# Patient Record
Sex: FEMALE | Race: WHITE | NOT HISPANIC OR LATINO | Employment: FULL TIME | URBAN - METROPOLITAN AREA
[De-identification: names, ages, dates, MRNs, and addresses within clinical notes are randomized per-mention and may not be internally consistent; named-entity substitution may affect disease eponyms.]

---

## 2017-04-20 LAB
A1C: 5.7
CHOLEST SERPL-MSCNC: 192 MG/DL (ref 0–200)
HDLC SERPL-MCNC: 46 MG/DL
LDLC SERPL CALC-MCNC: 128 MG/DL
NON HDL CHOL. (LDL+VLDL): 146
TRIGL SERPL-MCNC: 91 MG/DL (ref 40–160)
TSH SERPL DL<=0.05 MIU/L-ACNC: 0.97 UIU/ML

## 2018-01-11 ENCOUNTER — HOSPITAL ENCOUNTER (OUTPATIENT)
Dept: RADIOLOGY | Facility: HOSPITAL | Age: 38
Discharge: HOME | End: 2018-01-11
Attending: PODIATRIST
Payer: COMMERCIAL

## 2018-01-11 DIAGNOSIS — M19.079 ARTHRITIS OF FOOT: ICD-10-CM

## 2018-01-11 DIAGNOSIS — M19.079 ARTHRITIS OF FOOT: Primary | ICD-10-CM

## 2018-01-11 PROCEDURE — 73630 X-RAY EXAM OF FOOT: CPT | Mod: TC,LT

## 2018-01-11 PROCEDURE — 73630 X-RAY EXAM OF FOOT: CPT | Mod: 26,LT,, | Performed by: RADIOLOGY

## 2018-01-15 ENCOUNTER — DOCUMENTATION ONLY (OUTPATIENT)
Dept: ADMINISTRATIVE | Facility: HOSPITAL | Age: 38
End: 2018-01-15

## 2018-01-16 ENCOUNTER — OFFICE VISIT (OUTPATIENT)
Dept: INTERNAL MEDICINE | Facility: CLINIC | Age: 38
End: 2018-01-16
Payer: COMMERCIAL

## 2018-01-16 VITALS
HEART RATE: 78 BPM | WEIGHT: 176 LBS | TEMPERATURE: 97 F | OXYGEN SATURATION: 97 % | SYSTOLIC BLOOD PRESSURE: 98 MMHG | DIASTOLIC BLOOD PRESSURE: 60 MMHG

## 2018-01-16 DIAGNOSIS — J03.01 RECURRENT STREPTOCOCCAL TONSILLITIS: ICD-10-CM

## 2018-01-16 DIAGNOSIS — F41.9 ANXIETY: ICD-10-CM

## 2018-01-16 DIAGNOSIS — G47.10 HYPERSOMNOLENCE: ICD-10-CM

## 2018-01-16 DIAGNOSIS — R51.9 GENERALIZED HEADACHES: ICD-10-CM

## 2018-01-16 DIAGNOSIS — Z00.00 ANNUAL PHYSICAL EXAM: Primary | ICD-10-CM

## 2018-01-16 PROBLEM — F32.A DEPRESSION: Status: ACTIVE | Noted: 2018-01-16

## 2018-01-16 PROBLEM — M54.50 CHRONIC BILATERAL LOW BACK PAIN WITHOUT SCIATICA: Status: ACTIVE | Noted: 2017-09-18

## 2018-01-16 PROBLEM — G89.29 CHRONIC BILATERAL LOW BACK PAIN WITHOUT SCIATICA: Status: ACTIVE | Noted: 2017-09-18

## 2018-01-16 PROCEDURE — 99999 PR PBB SHADOW E&M-EST. PATIENT-LVL IV: CPT | Mod: PBBFAC,,, | Performed by: FAMILY MEDICINE

## 2018-01-16 PROCEDURE — 99385 PREV VISIT NEW AGE 18-39: CPT | Mod: S$GLB,,, | Performed by: FAMILY MEDICINE

## 2018-01-16 RX ORDER — ESCITALOPRAM OXALATE 20 MG/1
20 TABLET ORAL DAILY
Qty: 30 TABLET | Refills: 11 | Status: SHIPPED | OUTPATIENT
Start: 2018-01-16 | End: 2018-01-16 | Stop reason: SDUPTHER

## 2018-01-16 RX ORDER — RIZATRIPTAN BENZOATE 10 MG/1
10 TABLET ORAL ONCE AS NEEDED
COMMUNITY
Start: 2017-12-20 | End: 2018-06-19

## 2018-01-16 RX ORDER — NAPROXEN 500 MG/1
500 TABLET ORAL
COMMUNITY
Start: 2017-09-12 | End: 2018-08-08 | Stop reason: SDUPTHER

## 2018-01-16 RX ORDER — ZONISAMIDE 100 MG/1
200 CAPSULE ORAL DAILY
COMMUNITY
Start: 2017-12-20 | End: 2018-06-19

## 2018-01-16 RX ORDER — ESCITALOPRAM OXALATE 20 MG/1
20 TABLET ORAL DAILY
COMMUNITY
Start: 2017-09-18 | End: 2018-01-16 | Stop reason: SDUPTHER

## 2018-01-16 RX ORDER — BUSPIRONE HYDROCHLORIDE 7.5 MG/1
7.5 TABLET ORAL NIGHTLY
COMMUNITY
Start: 2017-09-18 | End: 2018-03-09 | Stop reason: SDUPTHER

## 2018-01-16 RX ORDER — ESCITALOPRAM OXALATE 20 MG/1
20 TABLET ORAL DAILY
Qty: 30 TABLET | Refills: 11 | Status: SHIPPED | OUTPATIENT
Start: 2018-01-16 | End: 2018-06-19

## 2018-01-16 RX ORDER — FLUTICASONE PROPIONATE 50 MCG
1 SPRAY, SUSPENSION (ML) NASAL 2 TIMES DAILY PRN
COMMUNITY
Start: 2017-07-06 | End: 2018-07-06

## 2018-01-16 NOTE — PATIENT INSTRUCTIONS
"  Headache, Unspecified    A number of things can cause headaches. The cause of your headache isnt clear. But it doesnt seem to be a sign of any serious illness.  You could have a tension headache or a migraine headache.  Stress can cause a tension headache. This can happen if you tense the muscles of your shoulders, neck, and scalp without knowing it. If this stress lasts long enough, you may develop a tension headache.  It is not clear why migraines occur, but certain things called" triggers" can raise the risk of having a migraine attack. Migraine triggers may include emotional stress or depression, or by hormone changes during the menstrual cycle. Other triggers include birth control pills and other medicines, alcohol or caffeine, foods with tyramine (such as aged cheese, wine), eyestrain, weather changes, missed meals, and lack of sleep or oversleeping.  Other causes of headache include:  · Viral illness with high fever  · Head injury with concussion  · Sinus, ear, or throat infection  · Dental pain and jaw joint (TMJ) pain  More serious but less common causes of headache include stroke, brain hemorrhage, brain tumor, meningitis, and encephalitis.  Home care  Follow these tips when taking care of yourself at home:  · Dont drive yourself home if you were given pain medicine for your headache. Instead, have someone else drive you home. Try to sleep when you get home. You should feel much better when you wake up.  · Apply heat to the back of your neck to ease a neck muscle spasm. Take care of a migraine headache by putting an ice pack on your forehead or at the base of your skull.  · If you have nausea or vomiting, eat a light diet until your headache eases.  · If you have a migraine headache, use sunglasses when in the daylight or around bright indoor lighting until your symptoms get better. Bright glaring light can make this type of headache worse.  Follow-up care  Follow up with your healthcare provider, or " as advised. Talk with your provider if you have frequent headaches. He or she can help figure out a treatment plan. By knowing the earliest signs of headache, and starting treatment right away, you may be able to stop the pain yourself.  When to seek medical advice  Call your healthcare provider right away if any of these occur:  · Your head pain suddenly gets worse after sexual intercourse or strenuous activity  · Your head pain doesnt get better within 24 hours  · You arent able to keep liquids down (repeated vomiting)  · Fever of 100.4ºF (38ºC) or higher, or as directed by your healthcare provider  · Stiff neck  · Extreme drowsiness, confusion, or fainting  · Dizziness or dizziness with spinning sensation (vertigo)  · Weakness in an arm or leg or one side of your face  · You have trouble talking or seeing  Date Last Reviewed: 8/1/2016  © 9999-0088 [employer]. [address] [place] [address], [place] [address]. All rights reserved. This information is not intended as a substitute for professional medical care. Always follow your healthcare professional's instructions.

## 2018-01-16 NOTE — PROGRESS NOTES
Arielle Verde  01/16/2018  [id number]    Primary Doctor No  Patient Care Team:  Primary Doctor No as PCP - General  Has the patient seen any provider outside of the [facility] network since the last visit? (no). If yes, HIPPA forms completed and records requested.        Visit Type:New patient, establish care    Chief Complaint:  Chief Complaint   Patient presents with    Establish Care    Fatigue       History of Present Illness:  37 year old here to establish care.  She is an employee of [email or web address].    History of Anxiety and depression. She is on Lexapro and Buspar.     She has history of headaches. She had MRI done with [facility] in Sept and Oct 2017. She is on Zomig and Maxalt with prn Naprosyn.  She sees Dr. Barbour at [facility]. She reports chronic headaches. She had some white matter lesions, and she had MRA. She was going to have sleep study as well to eval for hyponapenic epsiodes.   She reports she dose have some day solumnance.     Pap was done by Brandi Arteaga. This was done Sept 2016. Mirenia placed then.     Records reviewed from care everywhere.  Lipids done April 2017  DM screen negative April 2017    Colon screen done, checking for mild IBS. Pathology negative.             History:  Past Medical History:   Diagnosis Date    Anxiety     Chronic headaches     Depression      Past Surgical History:   Procedure Laterality Date    ADENOIDECTOMY      breast aug      Hyperhydrosis      TONSILLECTOMY       Family History   Problem Relation Age of Onset    Lupus Mother     Ulcerative colitis Mother     Kidney failure Father     Drug abuse Father      Social History     Social History    Marital status: Single     Spouse name: N/A    Number of children: N/A    Years of education: N/A     Occupational History    Podiatry nurse      Social History Main Topics    Smoking status: Former Smoker    Smokeless tobacco: Never Used    Alcohol use Yes      Comment: Occa    Drug use: No    Sexual  activity: Not Currently     Other Topics Concern    Not on file     Social History Narrative    No narrative on file     Patient Active Problem List   Diagnosis    Anxiety    Chronic bilateral low back pain without sciatica    Depression    Generalized headaches    Recurrent streptococcal tonsillitis    Hypersomnolence     Review of patient's allergies indicates:  Allergies not on file    The following were reviewed at this visit: active problem list, medication list, allergies, family history, social history, and health maintenance.    Medications:  No current outpatient prescriptions on file prior to visit.     No current facility-administered medications on file prior to visit.        Medications have been reviewed and reconciled with patient at this visit.  Barriers to medications present (no)    Adverse reactions to current medications (no)    Over the counter medications reviewed (Yes ), and if needed added to active Medication list at this visit.     Exam:  Wt Readings from Last 3 Encounters:   01/16/18 79.8 kg (176 lb)     Temp Readings from Last 3 Encounters:   01/16/18 97 °F (36.1 °C) (Tympanic)     BP Readings from Last 3 Encounters:   01/16/18 98/60     Pulse Readings from Last 3 Encounters:   01/16/18 78     There is no height or weight on file to calculate BMI.    Review of Systems   Constitutional: Negative.  Negative for chills and fever.   HENT: Negative.  Negative for congestion, sinus pain and sore throat.    Eyes: Negative for blurred vision and double vision.   Respiratory: Negative for cough, sputum production, shortness of breath and wheezing.    Cardiovascular: Negative for chest pain, palpitations and leg swelling.   Gastrointestinal: Negative for abdominal pain, constipation, diarrhea, heartburn, nausea and vomiting.   Genitourinary: Negative.    Musculoskeletal: Negative.    Skin: Negative.  Negative for rash.   Neurological: Positive for headaches.   Endo/Heme/Allergies: Negative.   Negative for polydipsia. Does not bruise/bleed easily.   Psychiatric/Behavioral: Negative for depression and substance abuse.         Physical Exam   Constitutional: She is oriented to person, place, and time. She appears well-developed and well-nourished. No distress.   HENT:   Head: Normocephalic and atraumatic.   Right Ear: External ear normal.   Left Ear: External ear normal.   Nose: Nose normal.   Mouth/Throat: Oropharynx is clear and moist. No oropharyngeal exudate.   Eyes: Conjunctivae and EOM are normal. Pupils are equal, round, and reactive to light. Right eye exhibits no discharge. Left eye exhibits no discharge.   Neck: Normal range of motion. Neck supple. No thyromegaly present.   Cardiovascular: Normal rate, regular rhythm, normal heart sounds and intact distal pulses.    No murmur heard.  Pulmonary/Chest: Effort normal and breath sounds normal. No respiratory distress. She has no wheezes.   Abdominal: Soft. Bowel sounds are normal. She exhibits no distension and no mass. There is no tenderness.   Musculoskeletal: Normal range of motion. She exhibits no edema.   Lymphadenopathy:     She has no cervical adenopathy.   Neurological: She is alert and oriented to person, place, and time. No cranial nerve deficit.   Skin: Capillary refill takes less than 2 seconds. She is not diaphoretic.   Psychiatric: She has a normal mood and affect. Her behavior is normal. Judgment and thought content normal.   Nursing note and vitals reviewed.      Laboratory Reviewed ({N/A)  Lab Results   Component Value Date    CHOL 192 04/20/2017    TRIG 91 04/20/2017    HDL 46 04/20/2017       Assessment:  The primary encounter diagnosis was Annual physical exam. Diagnoses of Generalized headaches, Anxiety, Recurrent streptococcal tonsillitis, and Hypersomnolence were also pertinent to this visit.     Plan     Annual physical exam   Labs done and reviewed with Care Everywhere   Lipids up to date   Pap requested from Brandi Zuluaga  [place]    Generalized headaches  -     Ambulatory referral to Neurology   MRI showed White matter lesions   Sleep study   Would like to switch to [facility]    Anxiety  -     escitalopram oxalate (LEXAPRO) 20 MG tablet; Take 1 tablet (20 mg total) by mouth once daily.  Dispense: 30 tablet; Refill: 11 Stable and refilled    Recurrent streptococcal tonsillitis  -     Ambulatory referral to ENT    Hypersomnolence  -     Polysomnography 4 or more parameters; Future   Sleep study ordered. See if Hypoapnea possible contributor to HA.           Care Plan/Goals: Reviewed  (Yes)  Goals     None          Follow up 6 months    After visit summary was printed and given to patient upon discharge today.  Patient goals and care plan are included in After Visit Summary.

## 2018-01-24 ENCOUNTER — HOSPITAL ENCOUNTER (OUTPATIENT)
Dept: SLEEP MEDICINE | Facility: HOSPITAL | Age: 38
Discharge: HOME | End: 2018-01-24
Attending: FAMILY MEDICINE
Payer: COMMERCIAL

## 2018-01-24 DIAGNOSIS — G47.61 PLMD (PERIODIC LIMB MOVEMENT DISORDER): ICD-10-CM

## 2018-01-24 DIAGNOSIS — G47.10 HYPERSOMNOLENCE: ICD-10-CM

## 2018-01-24 DIAGNOSIS — F51.5 NIGHTMARE DISORDER: ICD-10-CM

## 2018-01-24 PROCEDURE — 95810 POLYSOM 6/> YRS 4/> PARAM: CPT

## 2018-01-24 PROCEDURE — 95810 POLYSOM 6/> YRS 4/> PARAM: CPT | Mod: 26,,, | Performed by: INTERNAL MEDICINE

## 2018-01-24 NOTE — Clinical Note
"SUMMARY STATEMENTS: 1. Findings related to sleep diagnoses: " No snoring.  Overall AHI was 3.1 events per hour. 2. EEG abnormalities: " Delayed sleep latency.  Sleep efficiency was poor.  Wake after sleep onset 81.4 minutes. " Normal sleep architecture " Arousal was mildly 21.6 events per hour. " No alpha intrusion  3. ECG abnormalities: " Normal sinus rhythm 4. Behavioral observations: a. Recording Tech Comments: No parasomnias noted.  INTERPRETATION:   1. Normal AHI.  No obstructive sleep apnea. 2. No snoring. 3. Very mild periodic limb movements with sleep 4. Delayed sleep latency fragmented sleep and poor sleep continuity 5. Possible nightmare disorder based on questionnaire 6. Correlate for insomnia due to underlying depression and anxiety       RECOMMENDATIONS:   1. Optimize stress management: Both pharmacological and nonpharmacological coping mechanisms 2. Good sleep hygiene, avoid naps for longer than 30 minutes 3. Correlate symptoms with 6 weeks sleep diary monitoring, "

## 2018-01-26 NOTE — PROCEDURES
"SUMMARY STATEMENTS:  1. Findings related to sleep diagnoses:   No snoring.  Overall AHI was 3.1 events per hour.  2. EEG abnormalities:   Delayed sleep latency.  Sleep efficiency was poor.  Wake after sleep onset 81.4 minutes.   Normal sleep architecture   Arousal was mildly 21.6 events per hour.   No alpha intrusion   3. ECG abnormalities:   Normal sinus rhythm  4. Behavioral observations:  a. Recording Tech Comments: No parasomnias noted.    INTERPRETATION:     1. Normal AHI.  No obstructive sleep apnea.  2. No snoring.  3. Very mild periodic limb movements with sleep  4. Delayed sleep latency fragmented sleep and poor sleep continuity  5. Possible nightmare disorder based on questionnaire  6. Correlate for insomnia due to underlying depression and anxiety              RECOMMENDATIONS:     1. Optimize stress management: Both pharmacological and nonpharmacological coping mechanisms  2. Good sleep hygiene, avoid naps for longer than 30 minutes  3. Correlate symptoms with 6 weeks sleep diary monitoring, if daytime sleepiness together concerned then PSG with MSLT may be considered.    See imported Sleep Study result in "Chart Review" under the   "Media tab".      (This Sleep Study was interpreted by a Board Certified Sleep   Specialist who conducted an epoch-by-epoch review of the entire   raw data recording.)     (The indication for this sleep study was reviewed and deemed   appropriate by [facility] Practice Parameters or other reasons by a   Board Certified Sleep Specialist.)    Neo Covarrubias MD      "

## 2018-01-29 ENCOUNTER — PATIENT MESSAGE (OUTPATIENT)
Dept: INTERNAL MEDICINE | Facility: CLINIC | Age: 38
End: 2018-01-29

## 2018-02-01 ENCOUNTER — OFFICE VISIT (OUTPATIENT)
Dept: ALLERGY | Facility: CLINIC | Age: 38
End: 2018-02-01
Payer: COMMERCIAL

## 2018-02-01 ENCOUNTER — LAB VISIT (OUTPATIENT)
Dept: LAB | Facility: HOSPITAL | Age: 38
End: 2018-02-01
Payer: COMMERCIAL

## 2018-02-01 VITALS
HEIGHT: 71 IN | HEART RATE: 74 BPM | RESPIRATION RATE: 15 BRPM | DIASTOLIC BLOOD PRESSURE: 60 MMHG | BODY MASS INDEX: 24.53 KG/M2 | SYSTOLIC BLOOD PRESSURE: 100 MMHG | WEIGHT: 175.25 LBS

## 2018-02-01 DIAGNOSIS — J02.0 PHARYNGITIS DUE TO STREPTOCOCCUS SPECIES: Primary | ICD-10-CM

## 2018-02-01 DIAGNOSIS — J02.0 PHARYNGITIS DUE TO STREPTOCOCCUS SPECIES: ICD-10-CM

## 2018-02-01 DIAGNOSIS — T63.481D ALLERGIC REACTION TO INSECT STING, ACCIDENTAL OR UNINTENTIONAL, SUBSEQUENT ENCOUNTER: ICD-10-CM

## 2018-02-01 DIAGNOSIS — J03.01 RECURRENT STREPTOCOCCAL TONSILLITIS: ICD-10-CM

## 2018-02-01 LAB
BASOPHILS # BLD AUTO: 0.05 K/UL
BASOPHILS NFR BLD: 0.6 %
DIFFERENTIAL METHOD: ABNORMAL
EOSINOPHIL # BLD AUTO: 0.1 K/UL
EOSINOPHIL NFR BLD: 0.8 %
ERYTHROCYTE [DISTWIDTH] IN BLOOD BY AUTOMATED COUNT: 13 %
HCT VFR BLD AUTO: 40.2 %
HGB BLD-MCNC: 12.7 G/DL
IGA SERPL-MCNC: 217 MG/DL
IGE SERPL-ACNC: <35 IU/ML
IGG SERPL-MCNC: 935 MG/DL
IGM SERPL-MCNC: 110 MG/DL
IMM GRANULOCYTES # BLD AUTO: 0.01 K/UL
IMM GRANULOCYTES NFR BLD AUTO: 0.1 %
LYMPHOCYTES # BLD AUTO: 3.4 K/UL
LYMPHOCYTES NFR BLD: 43.4 %
MCH RBC QN AUTO: 29.3 PG
MCHC RBC AUTO-ENTMCNC: 31.6 G/DL
MCV RBC AUTO: 93 FL
MONOCYTES # BLD AUTO: 0.4 K/UL
MONOCYTES NFR BLD: 5.3 %
NEUTROPHILS # BLD AUTO: 3.8 K/UL
NEUTROPHILS NFR BLD: 49.8 %
NRBC BLD-RTO: 0 /100 WBC
PLATELET # BLD AUTO: 289 K/UL
PMV BLD AUTO: 10.4 FL
RBC # BLD AUTO: 4.33 M/UL
WBC # BLD AUTO: 7.72 K/UL

## 2018-02-01 PROCEDURE — 86003 ALLG SPEC IGE CRUDE XTRC EA: CPT

## 2018-02-01 PROCEDURE — 99999 PR PBB SHADOW E&M-EST. PATIENT-LVL III: CPT | Mod: PBBFAC,,, | Performed by: ALLERGY & IMMUNOLOGY

## 2018-02-01 PROCEDURE — 82787 IGG 1 2 3 OR 4 EACH: CPT

## 2018-02-01 PROCEDURE — 84165 PROTEIN E-PHORESIS SERUM: CPT

## 2018-02-01 PROCEDURE — 3008F BODY MASS INDEX DOCD: CPT | Mod: S$GLB,,, | Performed by: ALLERGY & IMMUNOLOGY

## 2018-02-01 PROCEDURE — 36415 COLL VENOUS BLD VENIPUNCTURE: CPT

## 2018-02-01 PROCEDURE — 99204 OFFICE O/P NEW MOD 45 MIN: CPT | Mod: S$GLB,,, | Performed by: ALLERGY & IMMUNOLOGY

## 2018-02-01 PROCEDURE — 85025 COMPLETE CBC W/AUTO DIFF WBC: CPT

## 2018-02-01 PROCEDURE — 83520 IMMUNOASSAY QUANT NOS NONAB: CPT

## 2018-02-01 PROCEDURE — 86162 COMPLEMENT TOTAL (CH50): CPT

## 2018-02-01 PROCEDURE — 82784 ASSAY IGA/IGD/IGG/IGM EACH: CPT

## 2018-02-01 PROCEDURE — 82785 ASSAY OF IGE: CPT

## 2018-02-01 PROCEDURE — 86774 TETANUS ANTIBODY: CPT

## 2018-02-01 PROCEDURE — 84165 PROTEIN E-PHORESIS SERUM: CPT | Mod: 26,,, | Performed by: PATHOLOGY

## 2018-02-01 RX ORDER — CHOLECALCIFEROL (VITAMIN D3) 25 MCG
1000 TABLET ORAL DAILY
COMMUNITY
End: 2020-03-18

## 2018-02-01 RX ORDER — NAPROXEN SODIUM 220 MG/1
81 TABLET, FILM COATED ORAL NIGHTLY
COMMUNITY
End: 2020-03-18

## 2018-02-02 NOTE — PROGRESS NOTES
"Chief complaint: Streptococcal tonsillitis and pharyngitis    This note was dictated using voice recognition software and may contain errors.    History:    She informed me that Dr. Duenas suggested she see me for evaluation in view of her history.  She had a 2 PM appointment on Thursday, February 1, 2018.    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  She has an 18-month-old daughter at home.    She stated at age 18 a tonsillectomy was performed because of streptococcal tonsillitis episodes.    In childhood she did experience some episodes of otitis media.  She did not have PE tubes.  She has no history of pneumonia, skin abscesses, osteomyelitis or meningitis.    She stated she experienced mononucleosis upon 2 occasions when she was in third grade and in sixth grade.    Her family history is significant.  Her mother has lupus and ulcerative colitis.  She stated that she does not know her biological father.  She stated he is aware he develops some type of kidney disease and requires dialysis.    Information in her medical record regarding her past medical history family history and social history was reviewed and updated today.  Significant additions if any are as noted above or below.    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  She has no animals at home.  She is a former smoker.    Past medical history:    She stated about 6 years ago she experienced anaphylaxis subsequent to being stung by a honeybee.  This occurred when she was at a swimming pool.  She stated that she lost consciousness.  She keeps an EpiPen with her.  She stated about 6 or 7 years ago she was experiencing gastrointestinal symptoms including diarrhea.  She required colonoscopy and endoscopy of the esophagus and stomach.  She was told that she had esophagitis and "ulcers" which I believe or in the stomach.    She has no history of diabetes thyroid disease heart liver or kidney disease.  " She has no history of asthma.  She does have a history of nasal symptoms.    Review of systems: At the time of her appointment today she stated that she is feeling well.  She stated in August of last year when she was symptomatic with streptococcal pharyngitis that she did feel ill.  I reviewed with her results of laboratory tests present in her medical record.  Rapid strep screens were noted to be positive upon 3 separate dates:  August 23, 2017, September 12, 2017, and September 18, 2017.    Exam:    In general she is in no distress.  She is alert oriented well-developed in good mood attentive  Gait steady  Skin no rash noted  Head no swelling noted  Eyes sclera white conjunctiva pink  Nose patent no polyps seen  Mouth no inflammation or swelling of the lips tongue or in the throat noted no exudate noted no thrush noted no vesicles noted  Ears not inflamed tympanic membranes not inflamed  Neck no masses or thyromegaly noted  Lymph nodes no significant cervical or epitrochlear lymphadenopathy noted  Lungs clear to auscultation  Heart no murmurs heard regular rhythm  Extremities no swelling or inflammation of the hands or legs noted    Impression:    #1 streptococcal tonsillitis and pharyngitis status post tonsillectomy at age 18  #2 anaphylaxis occurring subsequent to honeybee sting  #3 other health concerns as noted in her medical record    Assessment and plan:    Her appointment was 60 minutes in duration spent entirely in face-to-face contact.  More than 50% of the visit was spent in counseling and coordination of care.    I recommended that diagnostic laboratory tests be performed.  This is agreeable with her.  Arrangements were made to have blood drawn after her appointment with me today.  When the results are available to review with her I will do so.  Additional recommendations may be made at that time.    In view of her history of loss of consciousness after being stung by a honeybee consistent with  anaphylaxis I have recommended that IgE be measured directed against honeybee venom.  We discussed the importance of avoidance as it relates to insect stings.  She will continue to keep her EpiPen with her.  We discussed venom immunotherapy.    We discussed the normal functioning of the immune system.  I told her I would like to speak with colleagues regarding the rapid strep screen diagnostic test.    She was given the office phone number.  Should she have additional questions or concerns she was instructed to call.

## 2018-02-03 LAB
IGG1 SER-MCNC: 511 MG/DL
IGG2 SER-MCNC: 259 MG/DL
IGG3 SER-MCNC: 61 MG/DL
IGG4 SER-MCNC: 22 MG/DL
TRYPTASE LEVEL: 3.6 NG/ML

## 2018-02-05 LAB
ALBUMIN SERPL ELPH-MCNC: 5.02 G/DL
ALPHA1 GLOB SERPL ELPH-MCNC: 0.37 G/DL
ALPHA2 GLOB SERPL ELPH-MCNC: 0.78 G/DL
B-GLOBULIN SERPL ELPH-MCNC: 0.86 G/DL
CH50 SERPL-ACNC: 64 U/ML
DEPRECATED HONEY BEE IGE RAST QL: NORMAL
GAMMA GLOB SERPL ELPH-MCNC: 0.98 G/DL
HONEY BEE IGE QN: <0.35 KU/L
PROT SERPL-MCNC: 8 G/DL

## 2018-02-06 LAB — PATHOLOGIST INTERPRETATION SPE: NORMAL

## 2018-02-07 LAB
C DIPHTHERIAE AB SER IA-ACNC: 0.51 IU/ML
C TETANI AB SER-ACNC: 1.29 IU/ML
DEPRECATED S PNEUM 1 IGG SER-MCNC: <0.3 MCG/ML
DEPRECATED S PNEUM12 IGG SER-MCNC: <0.3 MCG/ML
DEPRECATED S PNEUM14 IGG SER-MCNC: 0.4 MCG/ML
DEPRECATED S PNEUM19 IGG SER-MCNC: 2 MCG/ML
DEPRECATED S PNEUM23 IGG SER-MCNC: <0.3 MCG/ML
DEPRECATED S PNEUM3 IGG SER-MCNC: <0.3 MCG/ML
DEPRECATED S PNEUM4 IGG SER-MCNC: 0.3 MCG/ML
DEPRECATED S PNEUM5 IGG SER-MCNC: 0.4 MCG/ML
DEPRECATED S PNEUM8 IGG SER-MCNC: 0.4 MCG/ML
DEPRECATED S PNEUM9 IGG SER-MCNC: <0.3 MCG/ML
HAEM INFLU B IGG SER-MCNC: 0.82 MG/L
S PNEUM DA 18C IGG SER-MCNC: <0.3 MCG/ML
S PNEUM DA 6B IGG SER-MCNC: <0.3 MCG/ML
S PNEUM DA 7F IGG SER-MCNC: 0.5 MCG/ML
S PNEUM DA 9V IGG SER-MCNC: <0.3 MCG/ML

## 2018-02-08 ENCOUNTER — TELEPHONE (OUTPATIENT)
Dept: ALLERGY | Facility: CLINIC | Age: 38
End: 2018-02-08

## 2018-02-08 DIAGNOSIS — T78.2XXD ANAPHYLAXIS, SUBSEQUENT ENCOUNTER: Primary | ICD-10-CM

## 2018-02-09 ENCOUNTER — PATIENT MESSAGE (OUTPATIENT)
Dept: INTERNAL MEDICINE | Facility: CLINIC | Age: 38
End: 2018-02-09

## 2018-02-09 ENCOUNTER — TELEPHONE (OUTPATIENT)
Dept: OBSTETRICS AND GYNECOLOGY | Facility: CLINIC | Age: 38
End: 2018-02-09

## 2018-02-09 DIAGNOSIS — Z11.3 SCREEN FOR STD (SEXUALLY TRANSMITTED DISEASE): Primary | ICD-10-CM

## 2018-02-09 DIAGNOSIS — R51.9 RECURRENT HEADACHE: Primary | ICD-10-CM

## 2018-02-09 NOTE — TELEPHONE ENCOUNTER
This note was dictated using voice recognition software may contain errors.    I completed my telephone conversation with her at 6:50 PM on Thursday, February 8, 2018.  I reviewed the results of her laboratory tests with her.  I have recommended that blood be drawn for measurement of Ig E directed against wasp venom, yellowjacket venom, and warned venom.  This is agreeable with her.  She requested that an appointment be made on February 14, 2018 at the [facility] laboratory.  This will be done at her request.    I reviewed with her that toxic reaction subsequent to insect stings could occur.  In these situations I explained that the IgE antibody against honeybee venom, in her case, would not be detected because toxic reactions were not IgE mediated.  I told her it is important to be certain that she is not ALLERGIC to a different stinging insect.  She stated that she knows with certainty when she experienced her symptoms after experiencing an insect sting that she was not stung by a fire ant.    I reviewed with her the results of her other laboratory tests.  I told her the results would suggest that her immune system is functioning appropriately.    In the future should she be diagnosed with streptococcal pharyngitis, in addition to a rapid strep screen I requested that she ask that a culture and sensitivity assay also be performed.    When the results of her diagnostic tests are available to review with her I will do so.

## 2018-02-09 NOTE — TELEPHONE ENCOUNTER
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   [facility].  She is now an [employer] employee and transfering her care here. She has an appt with neurology, however, based on her records from 'care everywhere' looks like they ruled out several differentials, however there is one differential that soundslike was not pursued - Lyme disease.  Would seeing you help with her work up?  Xuan      Here is her MRI results. Her MRA is also in 'care everywhere'  1.  Scattered foci of increased FLAIR and T2 signal in the centrum semiovale and deep white matter, predominating frontally, advanced for age. This does not have the typical appearance of multiple sclerosis. Other differential possibilities to consider would include vasculitis, Lyme disease, or a manifestation of diabetes mellitus. Chronic microangiopathic ischemic change cannot be totally excluded but this extent would be unusual in a patient this young.    2.  Minimal, bilateral ethmoid sinusitis.

## 2018-02-12 ENCOUNTER — LAB VISIT (OUTPATIENT)
Dept: LAB | Facility: HOSPITAL | Age: 38
End: 2018-02-12
Attending: FAMILY MEDICINE
Payer: COMMERCIAL

## 2018-02-12 ENCOUNTER — OFFICE VISIT (OUTPATIENT)
Dept: INTERNAL MEDICINE | Facility: CLINIC | Age: 38
End: 2018-02-12
Payer: COMMERCIAL

## 2018-02-12 VITALS
WEIGHT: 174.19 LBS | DIASTOLIC BLOOD PRESSURE: 64 MMHG | OXYGEN SATURATION: 98 % | HEIGHT: 71 IN | HEART RATE: 76 BPM | SYSTOLIC BLOOD PRESSURE: 90 MMHG | BODY MASS INDEX: 24.39 KG/M2 | TEMPERATURE: 96 F

## 2018-02-12 DIAGNOSIS — J02.9 SORE THROAT: Primary | ICD-10-CM

## 2018-02-12 DIAGNOSIS — T78.2XXD ANAPHYLAXIS, SUBSEQUENT ENCOUNTER: ICD-10-CM

## 2018-02-12 DIAGNOSIS — H65.03 BILATERAL ACUTE SEROUS OTITIS MEDIA, RECURRENCE NOT SPECIFIED: ICD-10-CM

## 2018-02-12 DIAGNOSIS — F32.A DEPRESSION, UNSPECIFIED DEPRESSION TYPE: ICD-10-CM

## 2018-02-12 DIAGNOSIS — Z11.3 SCREEN FOR STD (SEXUALLY TRANSMITTED DISEASE): ICD-10-CM

## 2018-02-12 DIAGNOSIS — R52 BODY ACHES: ICD-10-CM

## 2018-02-12 LAB
DEPRECATED S PYO AG THROAT QL EIA: NEGATIVE
FLUAV AG SPEC QL IA: NEGATIVE
FLUBV AG SPEC QL IA: NEGATIVE
SPECIMEN SOURCE: NORMAL

## 2018-02-12 PROCEDURE — 36415 COLL VENOUS BLD VENIPUNCTURE: CPT

## 2018-02-12 PROCEDURE — 86003 ALLG SPEC IGE CRUDE XTRC EA: CPT | Mod: 59

## 2018-02-12 PROCEDURE — 87880 STREP A ASSAY W/OPTIC: CPT

## 2018-02-12 PROCEDURE — 3008F BODY MASS INDEX DOCD: CPT | Mod: S$GLB,,, | Performed by: FAMILY MEDICINE

## 2018-02-12 PROCEDURE — 99999 PR PBB SHADOW E&M-EST. PATIENT-LVL III: CPT | Mod: PBBFAC,,, | Performed by: FAMILY MEDICINE

## 2018-02-12 PROCEDURE — 86003 ALLG SPEC IGE CRUDE XTRC EA: CPT

## 2018-02-12 PROCEDURE — 87081 CULTURE SCREEN ONLY: CPT

## 2018-02-12 PROCEDURE — 87400 INFLUENZA A/B EACH AG IA: CPT | Mod: 59

## 2018-02-12 PROCEDURE — 99213 OFFICE O/P EST LOW 20 MIN: CPT | Mod: S$GLB,,, | Performed by: FAMILY MEDICINE

## 2018-02-12 PROCEDURE — 86703 HIV-1/HIV-2 1 RESULT ANTBDY: CPT

## 2018-02-12 RX ORDER — ASPIRIN 81 MG/1
TABLET ORAL
COMMUNITY
Start: 2017-07-10 | End: 2018-02-12

## 2018-02-12 RX ORDER — CEFDINIR 300 MG/1
300 CAPSULE ORAL 2 TIMES DAILY
Qty: 20 CAPSULE | Refills: 0 | Status: SHIPPED | OUTPATIENT
Start: 2018-02-12 | End: 2018-02-22

## 2018-02-12 NOTE — LETTER
February 12, 2018      [facility]  [id number] [place] [id number]  Phone: [phone]  Fax: [phone]       Patient: Arielle Verde   YOB: 1980  Date of Visit: 02/12/2018    To Whom It May Concern:    Rajesh Verde  was at [facility] on 02/12/2018. She may return to work/school on 12/14/2018 with no restrictions. If you have any questions or concerns, or if I can be of further assistance, please do not hesitate to contact me.    Sincerely,    Vera Broussard MD

## 2018-02-12 NOTE — PROGRESS NOTES
"Arielle Verde  02/12/2018  [id number]    Vera Broussard MD  Patient Care Team:  Vera Broussard MD as PCP - General (Family Medicine)  Has the patient seen any provider outside of the [facility] network since the last visit? (no). If yes, HIPPA forms completed and records requested.        Visit Type:an urgent visit for a new problem    Chief Complaint:  Chief Complaint   Patient presents with    Headache     Since Friday, Took Tylenol    Fatigue    Sore Throat     Discuss Lexapro       History of Present Illness:  HPI     Headache    Additional comments: Since Friday, Took Tylenol           Sore Throat    Additional comments: Discuss Lexapro       Last edited by Baldemar Cruz MA on 2/12/2018  8:04 AM. (History)      Started Friday, feeling congestion, bad.  She has cough, productive.  Sore throat  Achy all over.   No doumented fever  Lightheaded and dizziness feeling.  She reports took Tylenol cold and flu.    She does not think the Lexapro is helping with her Depression.  She is on 20 mg.   She reports that she was on Effexor in past when living in [place].   Trigger is her Ex- split. She has her daughter only 50% of time with custody.  She has been in counseling, but reports "gave up" on it because she felt that she didn't get anything out of it.         History:  Past Medical History:   Diagnosis Date    Anxiety     Chronic headaches     Depression      Past Surgical History:   Procedure Laterality Date    ADENOIDECTOMY      breast aug      Hyperhydrosis      TONSILLECTOMY       Family History   Problem Relation Age of Onset    Lupus Mother     Ulcerative colitis Mother     Kidney failure Father     Drug abuse Father      Social History     Social History    Marital status: Single     Spouse name: N/A    Number of children: N/A    Years of education: N/A     Occupational History    Podiatry nurse      Social History Main Topics    Smoking status: Former Smoker    Smokeless " tobacco: Never Used    Alcohol use Yes      Comment: Occa    Drug use: No    Sexual activity: Not Currently     Other Topics Concern    Not on file     Social History Narrative    No narrative on file     Patient Active Problem List   Diagnosis    Anxiety    Chronic bilateral low back pain without sciatica    Depression    Generalized headaches    Recurrent streptococcal tonsillitis    Hypersomnolence    PLMD (periodic limb movement disorder)    Nightmare disorder     Review of patient's allergies indicates:   Allergen Reactions    Topiramate Itching       The following were reviewed at this visit: active problem list, medication list, allergies, family history, social history, and health maintenance.    Medications:  Current Outpatient Prescriptions on File Prior to Visit   Medication Sig Dispense Refill    aspirin 81 MG Chew Take 81 mg by mouth once daily.      busPIRone (BUSPAR) 7.5 MG tablet Take 7.5 mg by mouth nightly.       escitalopram oxalate (LEXAPRO) 20 MG tablet Take 1 tablet (20 mg total) by mouth once daily. 30 tablet 11    fluticasone (FLONASE) 50 mcg/actuation nasal spray 1 spray 2 (two) times daily as needed.       levonorgestrel (MIRENA) 20 mcg/24 hr (5 years) IUD 1 each by Intrauterine route once.      naproxen (NAPROSYN) 500 MG tablet Take 500 mg by mouth.       rizatriptan (MAXALT) 10 MG tablet Take 10 mg by mouth once as needed.       vitamin D 1000 units Tab Take 1,000 Units by mouth once daily.      zonisamide (ZONEGRAN) 100 MG Cap Take 200 mg by mouth once daily.       [DISCONTINUED] ASCORBATE CALCIUM (VITAMIN C ORAL) Take by mouth once daily.        No current facility-administered medications on file prior to visit.        Medications have been reviewed and reconciled with patient at this visit.  Barriers to medications present (no)    Adverse reactions to current medications (no)    Over the counter medications reviewed (Yes ), and if needed added to active  Medication list at this visit.     Exam:  Wt Readings from Last 3 Encounters:   02/12/18 79 kg (174 lb 3.2 oz)   02/01/18 79.5 kg (175 lb 4.3 oz)   01/16/18 79.8 kg (176 lb)     Temp Readings from Last 3 Encounters:   02/12/18 96.4 °F (35.8 °C) (Tympanic)   01/16/18 97 °F (36.1 °C) (Tympanic)     BP Readings from Last 3 Encounters:   02/12/18 90/64   02/01/18 100/60   01/16/18 98/60     Pulse Readings from Last 3 Encounters:   02/12/18 76   02/01/18 74   01/16/18 78     Body mass index is 24.31 kg/m².    Review of Systems   Constitutional: Positive for chills. Negative for fever.   HENT: Positive for congestion and sore throat. Negative for sinus pain.    Eyes: Negative for blurred vision and double vision.   Respiratory: Positive for cough. Negative for sputum production, shortness of breath and wheezing.    Cardiovascular: Negative for chest pain, palpitations and leg swelling.   Gastrointestinal: Negative for abdominal pain, constipation, diarrhea, heartburn, nausea and vomiting.   Genitourinary: Negative.    Musculoskeletal: Negative.    Skin: Negative.  Negative for rash.   Neurological: Negative.    Endo/Heme/Allergies: Negative.  Negative for polydipsia. Does not bruise/bleed easily.   Psychiatric/Behavioral: Negative for depression and substance abuse.         Physical Exam   Constitutional: She is oriented to person, place, and time. She appears well-developed and well-nourished. No distress.   HENT:   Head: Normocephalic and atraumatic.   Right Ear: External ear normal. A middle ear effusion is present.   Left Ear: External ear normal. A middle ear effusion is present.   Nose: Nose normal.   Mouth/Throat: Oropharynx is clear and moist. No oropharyngeal exudate.   Eyes: Conjunctivae and EOM are normal. Pupils are equal, round, and reactive to light. Right eye exhibits no discharge. Left eye exhibits no discharge.   Neck: Normal range of motion. Neck supple. No thyromegaly present.   Cardiovascular: Normal  rate, regular rhythm, normal heart sounds and intact distal pulses.    No murmur heard.  Pulmonary/Chest: Effort normal and breath sounds normal. No respiratory distress. She has no wheezes.   Abdominal: Soft. Bowel sounds are normal. She exhibits no distension and no mass. There is no tenderness.   Musculoskeletal: Normal range of motion. She exhibits no edema.   Lymphadenopathy:     She has no cervical adenopathy.   Neurological: She is alert and oriented to person, place, and time. No cranial nerve deficit.   Skin: Capillary refill takes less than 2 seconds. She is not diaphoretic.   Psychiatric: She has a normal mood and affect. Her behavior is normal. Judgment and thought content normal.   Nursing note and vitals reviewed.      Laboratory Reviewed ({N/A)  Lab Results   Component Value Date    WBC 7.72 02/01/2018    HGB 12.7 02/01/2018    HCT 40.2 02/01/2018     02/01/2018    CHOL 192 04/20/2017    TRIG 91 04/20/2017    HDL 46 04/20/2017       Assessment:  The primary encounter diagnosis was Sore throat. Diagnoses of Body aches, Depression, unspecified depression type, and Bilateral acute serous otitis media, recurrence not specified were also pertinent to this visit.     Plan     Sore throat  -     Strep A culture, throat  -     Influenza antigen Nasal Swab    Body aches  -     Strep A culture, throat  -     Influenza antigen Nasal Swab    Depression, unspecified depression type   Continue Lexapro   Given Celine Thomas information    Bilateral acute serous otitis media, recurrence not specified   Omnicef 300 BID for middle ear infection    FLu and strep negative    Care Plan/Goals: Reviewed  (N/A)  Goals     None          Follow up: No Follow-up on file.    After visit summary was printed and given to patient upon discharge today.  Patient goals and care plan are included in After Visit Summary.

## 2018-02-12 NOTE — PROGRESS NOTES
Arielle Verde  02/12/2018  [id number]    Vera Broussard MD  Patient Care Team:  Vera Broussard MD as PCP - General (Family Medicine)  Has the patient seen any provider outside of the [facility] network since the last visit? (no). If yes, HIPPA forms completed and records requested.        Visit Type:an urgent visit for a new problem    Chief Complaint:  Chief Complaint   Patient presents with    Headache     Since Friday, Took Tylenol    Fatigue    Sore Throat     Discuss Lexapro       History of Present Illness:  HPI     Headache    Additional comments: Since Friday, Took Tylenol           Sore Throat    Additional comments: Discuss Lexapro       Last edited by Baldemar Cruz MA on 2/12/2018  8:04 AM. (History)          History:  Past Medical History:   Diagnosis Date    Anxiety     Chronic headaches     Depression      Past Surgical History:   Procedure Laterality Date    ADENOIDECTOMY      breast aug      Hyperhydrosis      TONSILLECTOMY       Family History   Problem Relation Age of Onset    Lupus Mother     Ulcerative colitis Mother     Kidney failure Father     Drug abuse Father      Social History     Social History    Marital status: Single     Spouse name: N/A    Number of children: N/A    Years of education: N/A     Occupational History    Podiatry nurse      Social History Main Topics    Smoking status: Former Smoker    Smokeless tobacco: Never Used    Alcohol use Yes      Comment: Occa    Drug use: No    Sexual activity: Not Currently     Other Topics Concern    Not on file     Social History Narrative    No narrative on file     Patient Active Problem List   Diagnosis    Anxiety    Chronic bilateral low back pain without sciatica    Depression    Generalized headaches    Recurrent streptococcal tonsillitis    Hypersomnolence    PLMD (periodic limb movement disorder)    Nightmare disorder     Review of patient's allergies indicates:   Allergen Reactions     Topiramate Itching       The following were reviewed at this visit: active problem list, medication list, allergies, family history, social history, and health maintenance.    Medications:  Current Outpatient Prescriptions on File Prior to Visit   Medication Sig Dispense Refill    aspirin 81 MG Chew Take 81 mg by mouth once daily.      busPIRone (BUSPAR) 7.5 MG tablet Take 7.5 mg by mouth nightly.       escitalopram oxalate (LEXAPRO) 20 MG tablet Take 1 tablet (20 mg total) by mouth once daily. 30 tablet 11    fluticasone (FLONASE) 50 mcg/actuation nasal spray 1 spray 2 (two) times daily as needed.       levonorgestrel (MIRENA) 20 mcg/24 hr (5 years) IUD 1 each by Intrauterine route once.      naproxen (NAPROSYN) 500 MG tablet Take 500 mg by mouth.       rizatriptan (MAXALT) 10 MG tablet Take 10 mg by mouth once as needed.       vitamin D 1000 units Tab Take 1,000 Units by mouth once daily.      zonisamide (ZONEGRAN) 100 MG Cap Take 200 mg by mouth once daily.       [DISCONTINUED] ASCORBATE CALCIUM (VITAMIN C ORAL) Take by mouth once daily.        No current facility-administered medications on file prior to visit.        Medications have been reviewed and reconciled with patient at this visit.  Barriers to medications present (no)    Adverse reactions to current medications (no)    Over the counter medications reviewed (Yes ), and if needed added to active Medication list at this visit.     Exam:  Wt Readings from Last 3 Encounters:   02/12/18 79 kg (174 lb 3.2 oz)   02/01/18 79.5 kg (175 lb 4.3 oz)   01/16/18 79.8 kg (176 lb)     Temp Readings from Last 3 Encounters:   02/12/18 96.4 °F (35.8 °C) (Tympanic)   01/16/18 97 °F (36.1 °C) (Tympanic)     BP Readings from Last 3 Encounters:   02/12/18 90/64   02/01/18 100/60   01/16/18 98/60     Pulse Readings from Last 3 Encounters:   02/12/18 76   02/01/18 74   01/16/18 78     Body mass index is 24.31 kg/m².    ROS      Physical Exam    Laboratory Reviewed  ({Yes)  Lab Results   Component Value Date    WBC 7.72 02/01/2018    HGB 12.7 02/01/2018    HCT 40.2 02/01/2018     02/01/2018    CHOL 192 04/20/2017    TRIG 91 04/20/2017    HDL 46 04/20/2017       Assessment:  The primary encounter diagnosis was Sore throat. A diagnosis of Body aches was also pertinent to this visit.     Plan         Care Plan/Goals: Reviewed  (N/A)  Goals     None          Follow up: No Follow-up on file.    After visit summary was printed and given to patient upon discharge today.  Patient goals and care plan are included in After Visit Summary.

## 2018-02-14 LAB
BACTERIA THROAT CULT: NORMAL
HIV 1+2 AB+HIV1 P24 AG SERPL QL IA: NEGATIVE

## 2018-02-15 ENCOUNTER — PATIENT MESSAGE (OUTPATIENT)
Dept: INTERNAL MEDICINE | Facility: CLINIC | Age: 38
End: 2018-02-15

## 2018-02-15 ENCOUNTER — PATIENT MESSAGE (OUTPATIENT)
Dept: ALLERGY | Facility: CLINIC | Age: 38
End: 2018-02-15

## 2018-02-15 ENCOUNTER — OFFICE VISIT (OUTPATIENT)
Dept: INTERNAL MEDICINE | Facility: CLINIC | Age: 38
End: 2018-02-15
Payer: COMMERCIAL

## 2018-02-15 VITALS
HEART RATE: 68 BPM | SYSTOLIC BLOOD PRESSURE: 110 MMHG | BODY MASS INDEX: 24.26 KG/M2 | HEIGHT: 71 IN | DIASTOLIC BLOOD PRESSURE: 68 MMHG | TEMPERATURE: 98 F | WEIGHT: 173.31 LBS

## 2018-02-15 DIAGNOSIS — J06.9 UPPER RESPIRATORY TRACT INFECTION, UNSPECIFIED TYPE: Primary | ICD-10-CM

## 2018-02-15 LAB
ALLERGEN COMMON WASP (YELLOW JACKET) IGE: <0.35 KU/L
ALLERGEN WHITE FACED HORNET IGE: <0.35 KU/L
ALLERGEN YELLOW HORNET IGE: <0.35 KU/L
WASP CLASS: NORMAL
WASP VENOM IGE: <0.35 KU/L
WHITE FACED HORNET CLASS: NORMAL
YEL FACED HORN. CLASS: NORMAL
YELLOW JACKET CLASS: NORMAL

## 2018-02-15 PROCEDURE — 3008F BODY MASS INDEX DOCD: CPT | Mod: S$GLB,,, | Performed by: FAMILY MEDICINE

## 2018-02-15 PROCEDURE — 96372 THER/PROPH/DIAG INJ SC/IM: CPT | Mod: S$GLB,,, | Performed by: FAMILY MEDICINE

## 2018-02-15 PROCEDURE — 99213 OFFICE O/P EST LOW 20 MIN: CPT | Mod: 25,S$GLB,, | Performed by: FAMILY MEDICINE

## 2018-02-15 PROCEDURE — 99999 PR PBB SHADOW E&M-EST. PATIENT-LVL III: CPT | Mod: PBBFAC,,, | Performed by: FAMILY MEDICINE

## 2018-02-15 RX ORDER — METHYLPREDNISOLONE ACETATE 40 MG/ML
40 INJECTION, SUSPENSION INTRA-ARTICULAR; INTRALESIONAL; INTRAMUSCULAR; SOFT TISSUE
Status: COMPLETED | OUTPATIENT
Start: 2018-02-15 | End: 2018-02-15

## 2018-02-15 RX ADMIN — METHYLPREDNISOLONE ACETATE 40 MG: 40 INJECTION, SUSPENSION INTRA-ARTICULAR; INTRALESIONAL; INTRAMUSCULAR; SOFT TISSUE at 03:02

## 2018-02-15 NOTE — PROGRESS NOTES
Arielle Verde  02/15/2018  [id number]    Vera Broussard MD  Patient Care Team:  Vera Broussard MD as PCP - General (Family Medicine)  Has the patient seen any provider outside of the [facility] network since the last visit? (no). If yes, HIPPA forms completed and records requested.        Visit Type:an urgent visit for an existing problem     Chief Complaint:  No chief complaint on file.      History of Present Illness:  Seen last week, runny nose, congestion and diarrhea.  Strep was negative, but throat looked bad.  Started on Omnicef.  She is here because she is feeling still run down.  Flu was negative as well.          History:  Past Medical History:   Diagnosis Date    Anxiety     Chronic headaches     Depression      Past Surgical History:   Procedure Laterality Date    ADENOIDECTOMY      breast aug      Hyperhydrosis      TONSILLECTOMY       Family History   Problem Relation Age of Onset    Lupus Mother     Ulcerative colitis Mother     Kidney failure Father     Drug abuse Father      Social History     Social History    Marital status: Single     Spouse name: N/A    Number of children: N/A    Years of education: N/A     Occupational History    Podiatry nurse      Social History Main Topics    Smoking status: Former Smoker    Smokeless tobacco: Never Used    Alcohol use Yes      Comment: Occa    Drug use: No    Sexual activity: Not Currently     Other Topics Concern    Not on file     Social History Narrative    No narrative on file     Patient Active Problem List   Diagnosis    Anxiety    Chronic bilateral low back pain without sciatica    Depression    Generalized headaches    Recurrent streptococcal tonsillitis    Hypersomnolence    PLMD (periodic limb movement disorder)    Nightmare disorder     Review of patient's allergies indicates:   Allergen Reactions    Topiramate Itching       The following were reviewed at this visit: active problem list, medication list,  allergies, family history, social history, and health maintenance.    Medications:  Current Outpatient Prescriptions on File Prior to Visit   Medication Sig Dispense Refill    ASCORBATE CALCIUM (VITAMIN C ORAL)       aspirin 81 MG Chew Take 81 mg by mouth once daily.      busPIRone (BUSPAR) 7.5 MG tablet Take 7.5 mg by mouth nightly.       cefdinir (OMNICEF) 300 MG capsule Take 1 capsule (300 mg total) by mouth 2 (two) times daily. 20 capsule 0    escitalopram oxalate (LEXAPRO) 20 MG tablet Take 1 tablet (20 mg total) by mouth once daily. 30 tablet 11    fluticasone (FLONASE) 50 mcg/actuation nasal spray 1 spray 2 (two) times daily as needed.       levonorgestrel (MIRENA) 20 mcg/24 hr (5 years) IUD 1 each by Intrauterine route once.      naproxen (NAPROSYN) 500 MG tablet Take 500 mg by mouth.       rizatriptan (MAXALT) 10 MG tablet Take 10 mg by mouth once as needed.       vitamin D 1000 units Tab Take 1,000 Units by mouth once daily.      zonisamide (ZONEGRAN) 100 MG Cap Take 200 mg by mouth once daily.        No current facility-administered medications on file prior to visit.        Medications have been reviewed and reconciled with patient at this visit.  Barriers to medications present (no)    Adverse reactions to current medications (no)    Over the counter medications reviewed (Yes ), and if needed added to active Medication list at this visit.     Exam:  Wt Readings from Last 3 Encounters:   02/12/18 79 kg (174 lb 3.2 oz)   02/01/18 79.5 kg (175 lb 4.3 oz)   01/16/18 79.8 kg (176 lb)     Temp Readings from Last 3 Encounters:   02/12/18 96.4 °F (35.8 °C) (Tympanic)   01/16/18 97 °F (36.1 °C) (Tympanic)     BP Readings from Last 3 Encounters:   02/12/18 90/64   02/01/18 100/60   01/16/18 98/60     Pulse Readings from Last 3 Encounters:   02/12/18 76   02/01/18 74   01/16/18 78     There is no height or weight on file to calculate BMI.    Review of Systems   Constitutional: Negative.  Negative for  chills and fever.   HENT: Positive for ear pain and sore throat. Negative for congestion and sinus pain.    Eyes: Negative for blurred vision and double vision.   Respiratory: Negative for cough, sputum production, shortness of breath and wheezing.    Cardiovascular: Negative for chest pain, palpitations and leg swelling.   Gastrointestinal: Positive for diarrhea. Negative for abdominal pain, constipation, heartburn, nausea and vomiting.   Genitourinary: Negative.    Musculoskeletal: Negative.    Skin: Negative.  Negative for rash.   Neurological: Positive for headaches.   Endo/Heme/Allergies: Negative.  Negative for polydipsia. Does not bruise/bleed easily.   Psychiatric/Behavioral: Negative for depression and substance abuse.         Physical Exam   Constitutional: She is oriented to person, place, and time. She appears well-developed and well-nourished. No distress.   HENT:   Head: Normocephalic and atraumatic.   Right Ear: External ear normal. A middle ear effusion is present.   Left Ear: External ear normal. A middle ear effusion is present.   Nose: Nose normal.   Mouth/Throat: Oropharynx is clear and moist. No oropharyngeal exudate.   Eyes: Conjunctivae and EOM are normal. Pupils are equal, round, and reactive to light. Right eye exhibits no discharge. Left eye exhibits no discharge.   Neck: Normal range of motion. Neck supple. No thyromegaly present.   Cardiovascular: Normal rate, regular rhythm, normal heart sounds and intact distal pulses.    No murmur heard.  Pulmonary/Chest: Effort normal and breath sounds normal. No respiratory distress. She has no wheezes.   Abdominal: Soft. Bowel sounds are normal. She exhibits no distension and no mass. There is no tenderness.   Musculoskeletal: Normal range of motion. She exhibits no edema.   Lymphadenopathy:     She has no cervical adenopathy.   Neurological: She is alert and oriented to person, place, and time. No cranial nerve deficit.   Skin: Capillary refill  takes less than 2 seconds. She is not diaphoretic.   Psychiatric: She has a normal mood and affect. Her behavior is normal. Judgment and thought content normal.   Nursing note and vitals reviewed.      Laboratory Reviewed ({Yes)  Lab Results   Component Value Date    WBC 7.72 02/01/2018    HGB 12.7 02/01/2018    HCT 40.2 02/01/2018     02/01/2018    CHOL 192 04/20/2017    TRIG 91 04/20/2017    HDL 46 04/20/2017       Assessment:  The encounter diagnosis was Upper respiratory tract infection, unspecified type.     Plan     Upper respiratory tract infection, unspecified type  -     methylPREDNISolone acetate injection 40 mg; Inject 1 mL (40 mg total) into the muscle one time.        Care Plan/Goals: Reviewed  (N/A)  Goals     None          Follow up: No Follow-up on file.    After visit summary was printed and given to patient upon discharge today.  Patient goals and care plan are included in After Visit Summary.  Answers for HPI/ROS submitted by the patient on 2/15/2018   Ear pain  Affected ear: right  Chronicity: recurrent  Onset: in the past 7 days  Progression since onset: gradually worsening  Frequency: every few hours  Fever: no fever  Pain - numeric: 1/10  rhinorrhea: Yes  Treatments tried: antibiotics  Improvement on treatment: no relief  Pain severity: mild  chronic ear infection: Yes  hearing loss: No  tympanostomy tube: No

## 2018-02-16 ENCOUNTER — TELEPHONE (OUTPATIENT)
Dept: ALLERGY | Facility: CLINIC | Age: 38
End: 2018-02-16

## 2018-02-16 NOTE — TELEPHONE ENCOUNTER
I completed my telephone conversation with her at 3:05 PM on Friday, February 16, 2018.  I reviewed with her the results of the additional laboratory tests which I had ordered.  All tests for Ig E directed against the insect venoms were negative.    We discussed in detail guidelines for use of epinephrine and treating reactions occurring subsequent to insect stings.  I reviewed with her the importance of promptly administering epinephrine if she is concerned that she may be experiencing an ALLERGIC reaction.    We again discussed differences between toxic reactions occurring subsequent to an insect sting and IgE mediated hypersensitivity reactions occurring subsequent to an insect sting.  As noted above or Ig E assays were negative for honeybee venom, wasp venom, yellowjacket venom, white faced hornet venom and yellow hornet venom.    Should she have additional questions or concerns she was instructed to call.

## 2018-02-18 ENCOUNTER — PATIENT MESSAGE (OUTPATIENT)
Dept: INTERNAL MEDICINE | Facility: CLINIC | Age: 38
End: 2018-02-18

## 2018-02-19 ENCOUNTER — TELEPHONE (OUTPATIENT)
Dept: INTERNAL MEDICINE | Facility: CLINIC | Age: 38
End: 2018-02-19

## 2018-02-19 ENCOUNTER — HOSPITAL ENCOUNTER (OUTPATIENT)
Dept: RADIOLOGY | Facility: HOSPITAL | Age: 38
Discharge: HOME | End: 2018-02-19
Attending: FAMILY MEDICINE
Payer: COMMERCIAL

## 2018-02-19 ENCOUNTER — TELEPHONE (OUTPATIENT)
Dept: OBSTETRICS AND GYNECOLOGY | Facility: CLINIC | Age: 38
End: 2018-02-19

## 2018-02-19 DIAGNOSIS — R05.9 COUGH: ICD-10-CM

## 2018-02-19 DIAGNOSIS — Z82.69 FAMILY HISTORY OF SYSTEMIC LUPUS ERYTHEMATOSUS: Primary | ICD-10-CM

## 2018-02-19 DIAGNOSIS — R51.9 RECURRENT HEADACHE: ICD-10-CM

## 2018-02-19 PROCEDURE — 71046 X-RAY EXAM CHEST 2 VIEWS: CPT | Mod: TC

## 2018-02-19 PROCEDURE — 71046 X-RAY EXAM CHEST 2 VIEWS: CPT | Mod: 26,,, | Performed by: RADIOLOGY

## 2018-02-19 RX ORDER — CODEINE PHOSPHATE AND GUAIFENESIN 10; 100 MG/5ML; MG/5ML
5 SOLUTION ORAL EVERY 8 HOURS PRN
Qty: 118 ML | Refills: 0 | Status: SHIPPED | OUTPATIENT
Start: 2018-02-19 | End: 2018-03-01

## 2018-02-19 NOTE — TELEPHONE ENCOUNTER
I just spoke with patient and she had her chest xray this morning and she has picked up her cough medication.

## 2018-02-19 NOTE — TELEPHONE ENCOUNTER
----- Message from Alyse Douglas sent at 2/19/2018  9:09 AM CST -----  Contact: Patient  Patient returned call. She can be contacted at [phone].    Thanks,  Alyse

## 2018-02-19 NOTE — TELEPHONE ENCOUNTER
Please schedule patient chest xray at [facility] this am.  Call her and tell her she is schedule and to check in.    Cough meds sent to [facility] pharm.    Dr. Broussard  Thanks

## 2018-03-05 ENCOUNTER — OFFICE VISIT (OUTPATIENT)
Dept: INTERNAL MEDICINE | Facility: CLINIC | Age: 38
End: 2018-03-05
Payer: COMMERCIAL

## 2018-03-05 VITALS
HEART RATE: 72 BPM | BODY MASS INDEX: 23.38 KG/M2 | TEMPERATURE: 97 F | OXYGEN SATURATION: 98 % | SYSTOLIC BLOOD PRESSURE: 118 MMHG | WEIGHT: 167 LBS | HEIGHT: 71 IN | DIASTOLIC BLOOD PRESSURE: 70 MMHG

## 2018-03-05 DIAGNOSIS — F33.1 MODERATE EPISODE OF RECURRENT MAJOR DEPRESSIVE DISORDER: Primary | ICD-10-CM

## 2018-03-05 PROCEDURE — 99213 OFFICE O/P EST LOW 20 MIN: CPT | Mod: S$GLB,,, | Performed by: FAMILY MEDICINE

## 2018-03-05 PROCEDURE — 99999 PR PBB SHADOW E&M-EST. PATIENT-LVL III: CPT | Mod: PBBFAC,,, | Performed by: FAMILY MEDICINE

## 2018-03-05 RX ORDER — ARIPIPRAZOLE 5 MG/1
5 TABLET ORAL DAILY
Qty: 30 TABLET | Refills: 2 | Status: SHIPPED | OUTPATIENT
Start: 2018-03-05 | End: 2018-08-27

## 2018-03-05 NOTE — PROGRESS NOTES
Arielle Verde  03/06/2018  [id number]    Vera Broussard MD  Patient Care Team:  Vera Broussard MD as PCP - General (Family Medicine)  Has the patient seen any provider outside of the [facility] network since the last visit? (no). If yes, HIPPA forms completed and records requested.        Visit Type:a scheduled routine follow-up visit    Chief Complaint:  Chief Complaint   Patient presents with    Follow-up     med review       History of Present Illness:  HPI     Follow-up    Additional comments: med review       Last edited by Zenon Cook LPN on 3/5/2018  2:07 PM. (History)      She reports she has history of depression.   She was tx in [place] she was on Lexapro, Buspar, Seroquel and Xanax, and Ambien/Lunesta all at once and she felt over medicated. (Reports over 8 years ago). She was on the Abilify.       Reports work and her friends are noting more depressed mood. Work reports she is more defensive. She feels that she is irritable.    Stressor is that she is  and she feels worse when her daughter is not with her. She does lay in bed. She feels emotionally hypersensitive.   Sleep is poor, mind races at night. She does have excessive worry.         History:  Past Medical History:   Diagnosis Date    Anxiety     Chronic headaches     Depression      Past Surgical History:   Procedure Laterality Date    ADENOIDECTOMY      breast aug      Hyperhydrosis      TONSILLECTOMY       Family History   Problem Relation Age of Onset    Lupus Mother     Ulcerative colitis Mother     Kidney failure Father     Drug abuse Father      Social History     Social History    Marital status: Single     Spouse name: N/A    Number of children: N/A    Years of education: N/A     Occupational History    Podiatry nurse     OB      Social History Main Topics    Smoking status: Former Smoker    Smokeless tobacco: Never Used    Alcohol use Yes      Comment: Occa    Drug use: No    Sexual  activity: Not Currently     Partners: Male     Other Topics Concern    Not on file     Social History Narrative    No narrative on file     Patient Active Problem List   Diagnosis    Anxiety    Chronic bilateral low back pain without sciatica    Depression    Generalized headaches    Recurrent streptococcal tonsillitis    Hypersomnolence    PLMD (periodic limb movement disorder)    Nightmare disorder    Cough     Review of patient's allergies indicates:   Allergen Reactions    Topiramate Itching       The following were reviewed at this visit: active problem list, medication list, allergies, family history, social history, and health maintenance.    Medications:  Current Outpatient Prescriptions on File Prior to Visit   Medication Sig Dispense Refill    ASCORBATE CALCIUM (VITAMIN C ORAL)       aspirin 81 MG Chew Take 81 mg by mouth once daily.      busPIRone (BUSPAR) 7.5 MG tablet Take 7.5 mg by mouth nightly.       escitalopram oxalate (LEXAPRO) 20 MG tablet Take 1 tablet (20 mg total) by mouth once daily. 30 tablet 11    levonorgestrel (MIRENA) 20 mcg/24 hr (5 years) IUD 1 each by Intrauterine route once.      naproxen (NAPROSYN) 500 MG tablet Take 500 mg by mouth.       rizatriptan (MAXALT) 10 MG tablet Take 10 mg by mouth once as needed.       vitamin D 1000 units Tab Take 1,000 Units by mouth once daily.      zonisamide (ZONEGRAN) 100 MG Cap Take 200 mg by mouth once daily.       fluticasone (FLONASE) 50 mcg/actuation nasal spray 1 spray 2 (two) times daily as needed.        No current facility-administered medications on file prior to visit.        Medications have been reviewed and reconciled with patient at this visit.  Barriers to medications present (no)    Adverse reactions to current medications (no)    Over the counter medications reviewed (Yes ), and if needed added to active Medication list at this visit.     Exam:  Wt Readings from Last 3 Encounters:   03/05/18 75.8 kg (167 lb)    02/15/18 78.6 kg (173 lb 4.5 oz)   02/12/18 79 kg (174 lb 3.2 oz)     Temp Readings from Last 3 Encounters:   03/05/18 97.1 °F (36.2 °C)   02/15/18 97.6 °F (36.4 °C) (Tympanic)   02/12/18 96.4 °F (35.8 °C) (Tympanic)     BP Readings from Last 3 Encounters:   03/05/18 118/70   02/15/18 110/68   02/12/18 90/64     Pulse Readings from Last 3 Encounters:   03/05/18 72   02/15/18 68   02/12/18 76     Body mass index is 23.29 kg/m².    Review of Systems   Constitutional: Negative.    Respiratory: Negative.    Cardiovascular: Negative.    Genitourinary: Negative.    Endo/Heme/Allergies: Negative.    Psychiatric/Behavioral: Positive for depression. Negative for substance abuse. The patient does not have insomnia.          Physical Exam   Constitutional: She is oriented to person, place, and time. She appears well-developed and well-nourished.   HENT:   Head: Normocephalic.   Eyes: EOM are normal. Pupils are equal, round, and reactive to light.   Neck: Normal range of motion.   Pulmonary/Chest: Effort normal.   Neurological: She is alert and oriented to person, place, and time.   Psychiatric: She has a normal mood and affect. Her behavior is normal. Judgment and thought content normal.   Nursing note and vitals reviewed.      Laboratory Reviewed ({N/A)  Lab Results   Component Value Date    WBC 7.72 02/01/2018    HGB 12.7 02/01/2018    HCT 40.2 02/01/2018     02/01/2018    CHOL 192 04/20/2017    TRIG 91 04/20/2017    HDL 46 04/20/2017       Assessment:  The encounter diagnosis was Moderate episode of recurrent major depressive disorder.     Plan       Moderate episode of recurrent major depressive disorder  -     ARIPiprazole (ABILIFY) 5 MG Tab; Take 1 tablet (5 mg total) by mouth once daily.  Dispense: 30 tablet; Refill: 2    Recheck 4-5 weeks  Update on Mychart  Discussed possible Psych referral and counseling.  Patient will think about it.    Care Plan/Goals: Reviewed  (N/A)  Goals     None          Follow up: No  Follow-up on file.    After visit summary was printed and given to patient upon discharge today.  Patient goals and care plan are included in After Visit Summary.

## 2018-03-09 ENCOUNTER — PATIENT MESSAGE (OUTPATIENT)
Dept: INTERNAL MEDICINE | Facility: CLINIC | Age: 38
End: 2018-03-09

## 2018-03-09 RX ORDER — BUSPIRONE HYDROCHLORIDE 7.5 MG/1
7.5 TABLET ORAL NIGHTLY
Qty: 30 TABLET | Refills: 3 | Status: SHIPPED | OUTPATIENT
Start: 2018-03-09 | End: 2018-06-20

## 2018-04-04 ENCOUNTER — HOSPITAL ENCOUNTER (OUTPATIENT)
Dept: RADIOLOGY | Facility: HOSPITAL | Age: 38
Discharge: HOME | End: 2018-04-04
Attending: PODIATRIST
Payer: COMMERCIAL

## 2018-04-04 DIAGNOSIS — S90.121A CONTUSION OF LESSER TOE OF RIGHT FOOT WITHOUT DAMAGE TO NAIL, INITIAL ENCOUNTER: Primary | ICD-10-CM

## 2018-04-04 DIAGNOSIS — S90.121A CONTUSION OF LESSER TOE OF RIGHT FOOT WITHOUT DAMAGE TO NAIL, INITIAL ENCOUNTER: ICD-10-CM

## 2018-04-04 PROCEDURE — 73660 X-RAY EXAM OF TOE(S): CPT | Mod: 26,RT,, | Performed by: RADIOLOGY

## 2018-04-04 PROCEDURE — 73660 X-RAY EXAM OF TOE(S): CPT | Mod: TC

## 2018-06-20 ENCOUNTER — OFFICE VISIT (OUTPATIENT)
Dept: INTERNAL MEDICINE | Facility: CLINIC | Age: 38
End: 2018-06-20
Payer: COMMERCIAL

## 2018-06-20 VITALS
BODY MASS INDEX: 24.19 KG/M2 | WEIGHT: 172.81 LBS | HEART RATE: 55 BPM | SYSTOLIC BLOOD PRESSURE: 90 MMHG | TEMPERATURE: 97 F | HEIGHT: 71 IN | OXYGEN SATURATION: 98 % | DIASTOLIC BLOOD PRESSURE: 62 MMHG

## 2018-06-20 DIAGNOSIS — F41.9 ANXIETY: Primary | ICD-10-CM

## 2018-06-20 DIAGNOSIS — F32.A DEPRESSION, UNSPECIFIED DEPRESSION TYPE: ICD-10-CM

## 2018-06-20 PROCEDURE — 99213 OFFICE O/P EST LOW 20 MIN: CPT | Mod: S$GLB,,, | Performed by: FAMILY MEDICINE

## 2018-06-20 PROCEDURE — 99999 PR PBB SHADOW E&M-EST. PATIENT-LVL IV: CPT | Mod: PBBFAC,,, | Performed by: FAMILY MEDICINE

## 2018-06-20 PROCEDURE — 3008F BODY MASS INDEX DOCD: CPT | Mod: CPTII,S$GLB,, | Performed by: FAMILY MEDICINE

## 2018-06-20 RX ORDER — BUSPIRONE HYDROCHLORIDE 5 MG/1
TABLET ORAL
Refills: 1 | COMMUNITY
Start: 2018-04-30 | End: 2018-06-20

## 2018-06-20 RX ORDER — BUSPIRONE HYDROCHLORIDE 7.5 MG/1
7.5 TABLET ORAL NIGHTLY
Qty: 30 TABLET | Refills: 3
Start: 2018-06-20 | End: 2018-07-18 | Stop reason: DRUGHIGH

## 2018-06-20 NOTE — PATIENT INSTRUCTIONS
Anxiety Reaction  Anxiety is the feeling we all get when we think something bad might happen. It is a normal response to stress and usually causes only a mild reaction. When anxiety becomes more severe, it can interfere with daily life. In some cases, you may not even be aware of what it is youre anxious about. There may also be a genetic link or it may be a learned behavior in the home.  Both psychological and physical triggers cause stress reaction. It's often a response to fear or emotional stress, real or imagined. This stress may come from home, family, work, or social relationships.  During an anxiety reaction, you may feel:  · Helpless  · Nervous  · Depressed  · Irritable  Your body may show signs of anxiety in many ways. You may experience:  · Dry mouth  · Shakiness  · Dizziness  · Weakness  · Trouble breathing  · Breathing fast (hyperventilating)  · Chest pressure  · Sweating  · Headache  · Nausea  · Diarrhea  · Tiredness  · Inability to sleep  · Sexual problems  Home care  · Try to locate the sources of stress in your life. They may not be obvious. These may include:  ¨ Daily hassles of life (traffic jams, missed appointments, car troubles, etc.)  ¨ Major life changes, both good (new baby, job promotion) and bad (loss of job, loss of loved one)  ¨ Overload: feeling that you have too many responsibilities and can't take care of all of them at once  ¨ Feeling helpless, feeling that your problems are beyond what youre able to solve  · Notice how your body reacts to stress. Learn to listen to your body signals. This will help you take action before the stress becomes severe.  · When you can, do something about the source of your stress. (Avoid hassles, limit the amount of change that happens in your life at one time and take a break when you feel overloaded).  · Unfortunately, many stressful situations can't be avoided. It is necessary to learn how to better manage stress. There are many proven methods  that will reduce your anxiety. These include simple things like exercise, good nutrition and adequate rest. Also, there are certain techniques that are helpful:  ¨ Relaxation  ¨ Breathing exercises  ¨ Visualization  ¨ Biofeedback  ¨ Meditation  For more information about this, consult your doctor or go to a local bookstore and review the many books and tapes available on this subject.  Follow-up care  If you feel that your anxiety is not responding to self-help measures, contact your doctor or make an appointment with a counselor. You may need short-term psychological counseling and temporary medicine to help you manage stress.  Call 911  Call your healthcare provider right away if any of these occur:  · Trouble breathing  · Confusion  · Drowsiness or trouble wakening  · Fainting or loss of consciousness  · Rapid heart rate  · Seizure  · New chest pain that becomes more severe, lasts longer, or spreads into your shoulder, arm, neck, jaw, or back  When to seek medical advice  Call your healthcare provider right away if any of these occur:  · Your symptoms get worse  · Severe headache not relieved by rest and mild pain reliever  Date Last Reviewed: 9/29/2015  © 0806-7407 [employer]. [address] [place] [id number]. All rights reserved. This information is not intended as a substitute for professional medical care. Always follow your healthcare professional's instructions.

## 2018-06-20 NOTE — PROGRESS NOTES
Arielle Verde  06/20/2018  [id number]    Vear Broussard MD  Patient Care Team:  Vera Broussard MD as PCP - General (Family Medicine)  Has the patient seen any provider outside of the [facility] network since the last visit? (no). If yes, HIPPA forms completed and records requested.        Visit Type:a scheduled routine follow-up visit    Chief Complaint:  Chief Complaint   Patient presents with    Hair Loss     noticed 6 weeks ago    Follow-up     issues with staying focused on and when she does try hard to focus things aren't clear       History of Present Illness:  She reports she has history of depression.   She was tx in [place] she was on Lexapro, Buspar, Seroquel and Xanax, and Ambien/Lunesta all at once and she felt over medicated. (Reports over 8 years ago). She was on the Abilify as well.         Reports work and her friends werew noting more depressed mood. Work reports she is more defensive. She feels that she is irritable.  She was continued on Lexapro and Buspar, and then wanted to restart Abilify.  After her visit in March, we decided to give the Abilify another shot.  She was to remain on Lexapro.      Stressor is that she is  and she feels worse when her daughter is not with her. She does lay in bed. She feels emotionally hypersensitive.   Sleep is poor, mind races at night. She does have excessive worry.     She is back today because she continues to feel more stress.  She reports that she feels that she is loosing her hair because of her stress. She had a TSH on file over a year ago, normal    We had discussed a referral to Psych, she wanted to restart her meds first and see, then will think about it.         History:  Past Medical History:   Diagnosis Date    Anxiety     Chronic headaches     Depression      Past Surgical History:   Procedure Laterality Date    ADENOIDECTOMY      breast aug      Hyperhydrosis      TONSILLECTOMY       Family History   Problem Relation  Age of Onset    Lupus Mother     Ulcerative colitis Mother     Kidney failure Father     Drug abuse Father      Social History     Social History    Marital status: Single     Spouse name: N/A    Number of children: N/A    Years of education: N/A     Occupational History    Podiatry nurse     OB      Social History Main Topics    Smoking status: Former Smoker    Smokeless tobacco: Never Used    Alcohol use Yes      Comment: Occa    Drug use: No    Sexual activity: Not Currently     Partners: Male     Other Topics Concern    Not on file     Social History Narrative    No narrative on file     Patient Active Problem List   Diagnosis    Anxiety    Chronic bilateral low back pain without sciatica    Depression    Generalized headaches    Recurrent streptococcal tonsillitis    Hypersomnolence    PLMD (periodic limb movement disorder)    Nightmare disorder    Cough     Review of patient's allergies indicates:   Allergen Reactions    Topiramate Itching       The following were reviewed at this visit: active problem list, medication list, allergies, family history, social history, and health maintenance.    Medications:  Current Outpatient Prescriptions on File Prior to Visit   Medication Sig Dispense Refill    ARIPiprazole (ABILIFY) 5 MG Tab Take 1 tablet (5 mg total) by mouth once daily. 30 tablet 2    ASCORBATE CALCIUM (VITAMIN C ORAL)       aspirin 81 MG Chew Take 81 mg by mouth once daily.      escitalopram oxalate (LEXAPRO) 20 MG tablet TAKE 1 TABLET BY MOUTH DAILY      fluticasone (FLONASE) 50 mcg/actuation nasal spray 1 spray 2 (two) times daily as needed.       levonorgestrel (MIRENA) 20 mcg/24 hr (5 years) IUD 1 each by Intrauterine route once.      naproxen (NAPROSYN) 500 MG tablet Take 500 mg by mouth.       rizatriptan (MAXALT) 10 MG tablet Take 10 mg by mouth.      vitamin D 1000 units Tab Take 1,000 Units by mouth once daily.      zonisamide (ZONEGRAN) 100 MG Cap TAKE 2  CAPSULES BY MOUTH EVERY NIGHT AT BEDTIME      [DISCONTINUED] busPIRone (BUSPAR) 7.5 MG tablet Take 1 tablet (7.5 mg total) by mouth nightly. 30 tablet 3     No current facility-administered medications on file prior to visit.        Medications have been reviewed and reconciled with patient at this visit.  Barriers to medications present (no)    Adverse reactions to current medications (no)    Over the counter medications reviewed (Yes ), and if needed added to active Medication list at this visit.     Exam:  Wt Readings from Last 3 Encounters:   06/20/18 78.4 kg (172 lb 13.5 oz)   03/05/18 75.8 kg (167 lb)   02/15/18 78.6 kg (173 lb 4.5 oz)     Temp Readings from Last 3 Encounters:   06/20/18 97.1 °F (36.2 °C) (Tympanic)   03/05/18 97.1 °F (36.2 °C)   02/15/18 97.6 °F (36.4 °C) (Tympanic)     BP Readings from Last 3 Encounters:   06/20/18 90/62   03/05/18 118/70   02/15/18 110/68     Pulse Readings from Last 3 Encounters:   06/20/18 (!) 55   03/05/18 72   02/15/18 68     Body mass index is 24.11 kg/m².    Review of Systems   Constitutional: Negative.  Negative for chills and fever.   HENT: Negative.  Negative for congestion, sinus pain and sore throat.    Eyes: Negative for blurred vision and double vision.   Respiratory: Negative for cough, sputum production, shortness of breath and wheezing.    Cardiovascular: Positive for palpitations. Negative for chest pain and leg swelling.   Gastrointestinal: Negative for abdominal pain, constipation, diarrhea, heartburn, nausea and vomiting.   Genitourinary: Negative.    Musculoskeletal: Negative.    Skin: Negative.  Negative for rash.   Neurological: Negative.    Endo/Heme/Allergies: Negative.  Negative for polydipsia. Does not bruise/bleed easily.   Psychiatric/Behavioral: Positive for depression. Negative for substance abuse. The patient is nervous/anxious and has insomnia.          Physical Exam   Constitutional: She is oriented to person, place, and time. She appears  well-developed and well-nourished. No distress.   HENT:   Head: Normocephalic and atraumatic.   Nose: Nose normal.   Mouth/Throat: Oropharyngeal exudate present.   Eyes: Conjunctivae and EOM are normal. Pupils are equal, round, and reactive to light. Right eye exhibits no discharge. Left eye exhibits no discharge.   Neck: Normal range of motion. Neck supple.   Cardiovascular: Normal rate.    Abdominal: She exhibits no distension.   Musculoskeletal: Normal range of motion.   Neurological: She is alert and oriented to person, place, and time. No cranial nerve deficit.   Skin: Capillary refill takes less than 2 seconds. She is not diaphoretic.   Psychiatric: Her behavior is normal. Judgment and thought content normal. Her mood appears anxious. She expresses no homicidal and no suicidal ideation.   Nursing note and vitals reviewed.      Laboratory Reviewed ({Yes)  Lab Results   Component Value Date    WBC 7.72 02/01/2018    HGB 12.7 02/01/2018    HCT 40.2 02/01/2018     02/01/2018    CHOL 192 04/20/2017    TRIG 91 04/20/2017    HDL 46 04/20/2017       Assessment:  The primary encounter diagnosis was Anxiety. A diagnosis of Depression, unspecified depression type was also pertinent to this visit.     Plan     Anxiety  -     Ambulatory Referral to Psychiatry    Depression, unspecified depression type  -     Ambulatory Referral to Psychiatry    I am not sure what to add.  I discussed with patient is time to review with Psych to see with her continued and breakthru sx what is next pharmacological steps.  She agrees with specialty referral.        Care Plan/Goals: Reviewed  (N/A)  Goals     None          Follow up: No Follow-up on file.    After visit summary was printed and given to patient upon discharge today.  Patient goals and care plan are included in After Visit Summary.

## 2018-07-18 ENCOUNTER — OFFICE VISIT (OUTPATIENT)
Dept: PSYCHIATRY | Facility: CLINIC | Age: 38
End: 2018-07-18
Payer: COMMERCIAL

## 2018-07-18 DIAGNOSIS — F41.8 ANXIETY ASSOCIATED WITH DEPRESSION: Primary | ICD-10-CM

## 2018-07-18 DIAGNOSIS — F33.1 MODERATE EPISODE OF RECURRENT MAJOR DEPRESSIVE DISORDER: ICD-10-CM

## 2018-07-18 DIAGNOSIS — G47.00 INSOMNIA, UNSPECIFIED TYPE: ICD-10-CM

## 2018-07-18 PROCEDURE — 90792 PSYCH DIAG EVAL W/MED SRVCS: CPT | Mod: S$GLB,,, | Performed by: PSYCHIATRY & NEUROLOGY

## 2018-07-18 PROCEDURE — 99999 PR PBB SHADOW E&M-EST. PATIENT-LVL I: CPT | Mod: PBBFAC,,, | Performed by: PSYCHIATRY & NEUROLOGY

## 2018-07-18 RX ORDER — BUSPIRONE HYDROCHLORIDE 30 MG/1
30 TABLET ORAL 2 TIMES DAILY
Qty: 60 TABLET | Refills: 2 | Status: SHIPPED | OUTPATIENT
Start: 2018-07-18 | End: 2018-08-23 | Stop reason: SDUPTHER

## 2018-07-18 RX ORDER — TRAZODONE HYDROCHLORIDE 100 MG/1
TABLET ORAL
Qty: 60 TABLET | Refills: 2 | Status: SHIPPED | OUTPATIENT
Start: 2018-07-18 | End: 2018-09-14 | Stop reason: SDUPTHER

## 2018-07-18 RX ORDER — DULOXETIN HYDROCHLORIDE 30 MG/1
CAPSULE, DELAYED RELEASE ORAL
Qty: 60 CAPSULE | Refills: 2 | Status: SHIPPED | OUTPATIENT
Start: 2018-07-18 | End: 2018-09-14 | Stop reason: SDUPTHER
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
## 2018-07-29 PROBLEM — F41.8 ANXIETY ASSOCIATED WITH DEPRESSION: Status: ACTIVE | Noted: 2018-01-16

## 2018-08-08 ENCOUNTER — PATIENT MESSAGE (OUTPATIENT)
Dept: INTERNAL MEDICINE | Facility: CLINIC | Age: 38
End: 2018-08-08

## 2018-08-08 RX ORDER — NAPROXEN 500 MG/1
500 TABLET ORAL 2 TIMES DAILY WITH MEALS
Qty: 60 TABLET | Refills: 0 | Status: SHIPPED | OUTPATIENT
Start: 2018-08-08 | End: 2019-03-27

## 2018-08-08 RX ORDER — ZONISAMIDE 100 MG/1
200 CAPSULE ORAL NIGHTLY
Qty: 60 CAPSULE | Refills: 3 | Status: SHIPPED | OUTPATIENT
Start: 2018-08-08 | End: 2018-10-30 | Stop reason: SDUPTHER

## 2018-08-23 ENCOUNTER — PATIENT MESSAGE (OUTPATIENT)
Dept: PSYCHIATRY | Facility: CLINIC | Age: 38
End: 2018-08-23

## 2018-08-23 RX ORDER — BUSPIRONE HYDROCHLORIDE 15 MG/1
TABLET ORAL
Qty: 120 TABLET | Refills: 2 | Status: SHIPPED | OUTPATIENT
Start: 2018-08-23 | End: 2018-09-14 | Stop reason: SDUPTHER

## 2018-08-27 ENCOUNTER — OFFICE VISIT (OUTPATIENT)
Dept: INTERNAL MEDICINE | Facility: CLINIC | Age: 38
End: 2018-08-27
Payer: COMMERCIAL

## 2018-08-27 VITALS
OXYGEN SATURATION: 98 % | SYSTOLIC BLOOD PRESSURE: 98 MMHG | WEIGHT: 168 LBS | BODY MASS INDEX: 23.52 KG/M2 | HEART RATE: 60 BPM | DIASTOLIC BLOOD PRESSURE: 64 MMHG | TEMPERATURE: 96 F | HEIGHT: 71 IN

## 2018-08-27 DIAGNOSIS — I95.1 ORTHOSTATIC HYPOTENSION: Primary | ICD-10-CM

## 2018-08-27 PROCEDURE — 99213 OFFICE O/P EST LOW 20 MIN: CPT | Mod: S$GLB,,, | Performed by: FAMILY MEDICINE

## 2018-08-27 PROCEDURE — 3008F BODY MASS INDEX DOCD: CPT | Mod: CPTII,S$GLB,, | Performed by: FAMILY MEDICINE

## 2018-08-27 PROCEDURE — 99999 PR PBB SHADOW E&M-EST. PATIENT-LVL III: CPT | Mod: PBBFAC,,, | Performed by: FAMILY MEDICINE

## 2018-08-27 NOTE — PROGRESS NOTES
Arielle Verde  08/27/2018  [id number]    Vera Broussard MD  Patient Care Team:  Vera Broussard MD as PCP - General (Family Medicine)  Has the patient seen any provider outside of the [facility] network since the last visit? (no). If yes, HIPPA forms completed and records requested.        Visit Type:an urgent visit for a new problem    Chief Complaint:  Chief Complaint   Patient presents with    Follow-up     bp changes. it has been running low. She stands up she gets really lightheaded. vision glows black and it goes out. over the weekend she hasn't been feeling like her self. some voice changes and been extremelly tired    Mass     on her left side.    Headache       History of Present Illness:  Patient is here today with concern for orthostatic hypotension.  Bp range over last 6 months of visit, /70-80 range.     Since my last visit with her, she has been able to get in with Psych.   She is on Cymbalta 60, Buspar 30 BID and Abilify 5 mg. She has trazadone for sleep.   It could be possible that some of her psych meds are contributing to her orthostasis.        History:  Past Medical History:   Diagnosis Date    Anxiety     Chronic headaches     Depression      Past Surgical History:   Procedure Laterality Date    ADENOIDECTOMY      breast aug      Hyperhydrosis      TONSILLECTOMY       Family History   Problem Relation Age of Onset    Lupus Mother     Ulcerative colitis Mother     Kidney failure Father     Drug abuse Father      Social History     Socioeconomic History    Marital status: Single     Spouse name: Not on file    Number of children: Not on file    Years of education: Not on file    Highest education level: Not on file   Social Needs    Financial resource strain: Not on file    Food insecurity - worry: Not on file    Food insecurity - inability: Not on file    Transportation needs - medical: Not on file    Transportation needs - non-medical: Not on file    Occupational History    Occupation: Podiatry nurse    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   Tobacco Use    Smoking status: Former Smoker    Smokeless tobacco: Never Used   Substance and Sexual Activity    Alcohol use: Yes     Comment: Occa    Drug use: No    Sexual activity: Not Currently     Partners: Male   Other Topics Concern    Not on file   Social History Narrative    Not on file     Patient Active Problem List   Diagnosis    Anxiety associated with depression    Chronic bilateral low back pain without sciatica    Depression    Generalized headaches    Recurrent streptococcal tonsillitis    Hypersomnolence    PLMD (periodic limb movement disorder)    Nightmare disorder    Cough     Review of patient's allergies indicates:   Allergen Reactions    Topiramate Itching       The following were reviewed at this visit: active problem list, medication list, allergies, family history, social history, and health maintenance.    Medications:  Current Outpatient Medications on File Prior to Visit   Medication Sig Dispense Refill    ASCORBATE CALCIUM (VITAMIN C ORAL)       aspirin 81 MG Chew Take 81 mg by mouth once daily.      busPIRone (BUSPAR) 15 MG tablet Take 2 tablets twice daily 120 tablet 2    DULoxetine (CYMBALTA) 30 MG capsule Take 1 tablet daily for 1 week then 2 tablets daily thereafter. 60 capsule 2    levonorgestrel (MIRENA) 20 mcg/24 hr (5 years) IUD 1 each by Intrauterine route once.      naproxen (NAPROSYN) 500 MG tablet Take 1 tablet (500 mg total) by mouth 2 (two) times daily with meals. 60 tablet 0    rizatriptan (MAXALT) 10 MG tablet Take 10 mg by mouth.      traZODone (DESYREL) 100 MG tablet Take 1/2 to 1 tablet at bedtime as needed for sleep. 60 tablet 2    vitamin D 1000 units Tab Take 1,000 Units by mouth once daily.      zonisamide (ZONEGRAN) 100 MG Cap Take 2 capsules (200 mg total) by mouth every evening. 60 capsule 3    ARIPiprazole (ABILIFY) 5 MG Tab Take 1 tablet (5 mg total) by  mouth once daily. 30 tablet 2     No current facility-administered medications on file prior to visit.        Medications have been reviewed and reconciled with patient at this visit.  Barriers to medications present (no)    Adverse reactions to current medications (no)    Over the counter medications reviewed (Yes ), and if needed added to active Medication list at this visit.   100/62 laying  106/64 sitting  Standing 96/63    Exam:  Wt Readings from Last 3 Encounters:   08/27/18 76.2 kg (167 lb 15.9 oz)   06/20/18 78.4 kg (172 lb 13.5 oz)   03/05/18 75.8 kg (167 lb)     Temp Readings from Last 3 Encounters:   08/27/18 96.2 °F (35.7 °C) (Tympanic)   06/20/18 97.1 °F (36.2 °C) (Tympanic)   03/05/18 97.1 °F (36.2 °C)     BP Readings from Last 3 Encounters:   08/27/18 98/64   06/20/18 90/62   03/05/18 118/70     Pulse Readings from Last 3 Encounters:   08/27/18 60   06/20/18 (!) 55   03/05/18 72     Body mass index is 23.43 kg/m².    Review of Systems   Constitutional: Negative for chills and fever.   HENT: Positive for congestion. Negative for sinus pain and sore throat.    Eyes: Negative for blurred vision and double vision.   Respiratory: Negative for cough, sputum production, shortness of breath and wheezing.    Cardiovascular: Negative for chest pain, palpitations and leg swelling.   Gastrointestinal: Negative for abdominal pain, constipation, diarrhea, heartburn, nausea and vomiting.   Genitourinary: Negative.    Musculoskeletal: Negative.    Skin: Negative.  Negative for rash.   Neurological: Positive for dizziness and weakness.   Endo/Heme/Allergies: Negative.  Negative for polydipsia. Does not bruise/bleed easily.   Psychiatric/Behavioral: Negative for depression and substance abuse.         Physical Exam   Constitutional: She is oriented to person, place, and time. She appears well-developed and well-nourished. No distress.   HENT:   Head: Normocephalic and atraumatic.   Right Ear: External ear normal. A  middle ear effusion is present.   Left Ear: External ear normal. A middle ear effusion is present.   Nose: Nose normal.   Mouth/Throat: Oropharynx is clear and moist. No oropharyngeal exudate.   Eyes: Conjunctivae and EOM are normal. Pupils are equal, round, and reactive to light. Right eye exhibits no discharge. Left eye exhibits no discharge.   Neck: Normal range of motion. Neck supple. No thyromegaly present.   Cardiovascular: Normal rate, regular rhythm, normal heart sounds and intact distal pulses.   No murmur heard.  Pulmonary/Chest: Effort normal and breath sounds normal. No respiratory distress. She has no wheezes.   Abdominal: Soft. Bowel sounds are normal. She exhibits no distension and no mass. There is no tenderness.   Musculoskeletal: Normal range of motion. She exhibits no edema.   Lymphadenopathy:     She has no cervical adenopathy.   Neurological: She is alert and oriented to person, place, and time. No cranial nerve deficit.   Skin: Capillary refill takes less than 2 seconds. She is not diaphoretic.   Psychiatric: She has a normal mood and affect. Her behavior is normal. Judgment and thought content normal.   Nursing note and vitals reviewed.      Laboratory Reviewed ({N/A)  Lab Results   Component Value Date    WBC 7.72 02/01/2018    HGB 12.7 02/01/2018    HCT 40.2 02/01/2018     02/01/2018    CHOL 192 04/20/2017    TRIG 91 04/20/2017    HDL 46 04/20/2017       Assessment:  The encounter diagnosis was Orthostatic hypotension.     Plan     [place] Salt  Increase water intake    If not better- will do Cards referral and Tilt table.    Mucinex and Flonase, Zyrtec.     Care Plan/Goals: Reviewed  (N/A)  Goals     None          Follow up: No Follow-up on file.    After visit summary was printed and given to patient upon discharge today.  Patient goals and care plan are included in After Visit Summary.

## 2018-09-03 ENCOUNTER — PATIENT MESSAGE (OUTPATIENT)
Dept: INTERNAL MEDICINE | Facility: CLINIC | Age: 38
End: 2018-09-03

## 2018-09-03 RX ORDER — METHYLPREDNISOLONE 4 MG/1
TABLET ORAL
Qty: 21 TABLET | Refills: 0 | Status: SHIPPED | OUTPATIENT
Start: 2018-09-03 | End: 2018-09-24
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
## 2018-09-14 ENCOUNTER — OFFICE VISIT (OUTPATIENT)
Dept: PSYCHIATRY | Facility: CLINIC | Age: 38
End: 2018-09-14
Payer: COMMERCIAL

## 2018-09-14 DIAGNOSIS — F33.41 RECURRENT MAJOR DEPRESSIVE DISORDER, IN PARTIAL REMISSION: Primary | ICD-10-CM

## 2018-09-14 DIAGNOSIS — F41.8 ANXIETY ASSOCIATED WITH DEPRESSION: ICD-10-CM

## 2018-09-14 PROCEDURE — 99213 OFFICE O/P EST LOW 20 MIN: CPT | Mod: S$GLB,,, | Performed by: PSYCHIATRY & NEUROLOGY

## 2018-09-14 RX ORDER — TRAZODONE HYDROCHLORIDE 100 MG/1
TABLET ORAL
Qty: 60 TABLET | Refills: 2 | Status: SHIPPED | OUTPATIENT
Start: 2018-09-14 | End: 2019-04-12 | Stop reason: SDUPTHER

## 2018-09-14 RX ORDER — BUSPIRONE HYDROCHLORIDE 15 MG/1
TABLET ORAL
Qty: 120 TABLET | Refills: 2 | Status: SHIPPED | OUTPATIENT
Start: 2018-09-14 | End: 2019-01-03 | Stop reason: SDUPTHER

## 2018-09-14 RX ORDER — DULOXETIN HYDROCHLORIDE 30 MG/1
CAPSULE, DELAYED RELEASE ORAL
Qty: 60 CAPSULE | Refills: 2 | Status: SHIPPED | OUTPATIENT
Start: 2018-09-14 | End: 2018-12-10 | Stop reason: SDUPTHER

## 2018-09-21 ENCOUNTER — PATIENT MESSAGE (OUTPATIENT)
Dept: INTERNAL MEDICINE | Facility: CLINIC | Age: 38
End: 2018-09-21

## 2018-09-21 NOTE — TELEPHONE ENCOUNTER
Patient with what I think it orthostatic hypotension.  I would like a tilt test and card eval.  Do you do this testing, and can we get her scheduled?

## 2018-09-22 PROBLEM — F32.4 MAJOR DEPRESSIVE DISORDER IN PARTIAL REMISSION: Status: ACTIVE | Noted: 2018-01-16

## 2018-09-28 DIAGNOSIS — I95.1 ORTHOSTATIC HYPOTENSION: Primary | ICD-10-CM

## 2018-09-28 DIAGNOSIS — Z76.89 ESTABLISHING CARE WITH NEW DOCTOR, ENCOUNTER FOR: ICD-10-CM

## 2018-10-02 ENCOUNTER — CLINICAL SUPPORT (OUTPATIENT)
Dept: CARDIOLOGY | Facility: CLINIC | Age: 38
End: 2018-10-02
Payer: COMMERCIAL

## 2018-10-02 ENCOUNTER — OFFICE VISIT (OUTPATIENT)
Dept: CARDIOLOGY | Facility: CLINIC | Age: 38
End: 2018-10-02
Payer: COMMERCIAL

## 2018-10-02 ENCOUNTER — CLINICAL SUPPORT (OUTPATIENT)
Dept: CARDIOLOGY | Facility: CLINIC | Age: 38
End: 2018-10-02
Attending: NUCLEAR MEDICINE
Payer: COMMERCIAL

## 2018-10-02 VITALS
HEIGHT: 71 IN | BODY MASS INDEX: 23.89 KG/M2 | DIASTOLIC BLOOD PRESSURE: 60 MMHG | HEART RATE: 70 BPM | SYSTOLIC BLOOD PRESSURE: 102 MMHG | WEIGHT: 170.63 LBS

## 2018-10-02 DIAGNOSIS — I95.1 POSTURAL HYPOTENSION: Primary | Chronic | ICD-10-CM

## 2018-10-02 DIAGNOSIS — I95.1 POSTURAL HYPOTENSION: Chronic | ICD-10-CM

## 2018-10-02 DIAGNOSIS — Z76.89 ESTABLISHING CARE WITH NEW DOCTOR, ENCOUNTER FOR: ICD-10-CM

## 2018-10-02 DIAGNOSIS — R00.2 HEART PALPITATIONS: ICD-10-CM

## 2018-10-02 DIAGNOSIS — I95.1 ORTHOSTATIC HYPOTENSION: ICD-10-CM

## 2018-10-02 LAB
DIASTOLIC DYSFUNCTION: NO
ESTIMATED PA SYSTOLIC PRESSURE: 27.01
RETIRED EF AND QEF - SEE NOTES: 60 (ref 55–65)

## 2018-10-02 PROCEDURE — 99204 OFFICE O/P NEW MOD 45 MIN: CPT | Mod: S$GLB,,, | Performed by: NUCLEAR MEDICINE

## 2018-10-02 PROCEDURE — 99999 PR PBB SHADOW E&M-EST. PATIENT-LVL III: CPT | Mod: PBBFAC,,, | Performed by: NUCLEAR MEDICINE

## 2018-10-02 PROCEDURE — 3008F BODY MASS INDEX DOCD: CPT | Mod: CPTII,S$GLB,, | Performed by: NUCLEAR MEDICINE

## 2018-10-02 PROCEDURE — 93000 ELECTROCARDIOGRAM COMPLETE: CPT | Mod: S$GLB,,, | Performed by: INTERNAL MEDICINE

## 2018-10-02 PROCEDURE — 93306 TTE W/DOPPLER COMPLETE: CPT | Mod: S$GLB,,, | Performed by: INTERNAL MEDICINE

## 2018-10-02 NOTE — PROGRESS NOTES
Subjective:   Patient ID:  Arielle Verde is a 38 y.o. female who presents for evaluation of Consult; Palpitations; and Dizziness      HPI SELF REFERRED FOR CARD EVALUATION OF POSITIONAL DIZZINESS AND WEAKNESS,  FOR LAST 3 WEEKS.  ASSOCIATED WITH LOW BP?  NO FLUSHING, NO PERSPIRATION, NO NAUSEA  NO HX OF NEAR SYNCOPE OR SYNCOPE  HAS HX OF INTERMITTENT PALPITATIONS. AND MIGRAINE  NO HX OF HEART MURMUR, VHD OR CONGENITAL HEART DISEASE  NO HX OF CHF, CMP , CARDIOMEGALY OR MYOPERICARDITIS  NO FM HX OF CARD ARRHYTHMIAS, SCD, SYNCOPE OR DEVICE PLACEMENT  NO FOCAL CNS SYMPTOMS OR SIGNS TO  SUGGEST TIA OR STROKE    Review of Systems   Constitution: Negative for chills, decreased appetite, fever, weakness, malaise/fatigue, weight gain and weight loss.   HENT: Negative for nosebleeds.    Eyes: Negative for blurred vision, double vision and visual disturbance.   Cardiovascular: Positive for palpitations. Negative for chest pain, claudication, cyanosis, dyspnea on exertion, irregular heartbeat, leg swelling, near-syncope, orthopnea, paroxysmal nocturnal dyspnea and syncope.   Respiratory: Negative for cough, hemoptysis, shortness of breath, sleep disturbances due to breathing, snoring and wheezing.    Endocrine: Negative for cold intolerance, heat intolerance, polydipsia and polyuria.   Hematologic/Lymphatic: Does not bruise/bleed easily.   Skin: Negative for flushing and rash.   Musculoskeletal: Negative for gout, joint pain, joint swelling, muscle weakness and myalgias.   Gastrointestinal: Negative for abdominal pain, anorexia, change in bowel habit, constipation, diarrhea, dysphagia, heartburn, hematemesis, hematochezia, jaundice, melena, nausea and vomiting.   Genitourinary: Negative for decreased libido, frequency, hematuria, nocturia and urgency.   Neurological: Positive for dizziness and light-headedness. Negative for difficulty with concentration, excessive daytime sleepiness, focal weakness, headaches,  numbness, seizures, tremors and vertigo.   Psychiatric/Behavioral: Negative for depression and memory loss. The patient is not nervous/anxious.    Allergic/Immunologic: Negative for environmental allergies and hives.         Objective:     Physical Exam   Constitutional: She is oriented to person, place, and time. She appears well-developed. No distress.   HENT:   Head: Normocephalic.   Eyes: Conjunctivae are normal. Pupils are equal, round, and reactive to light. No scleral icterus.   Neck: Normal range of motion. Neck supple. Normal carotid pulses, no hepatojugular reflux and no JVD present. Carotid bruit is not present. No edema present. No thyroid mass and no thyromegaly present.   Cardiovascular: Normal rate, regular rhythm, S1 normal, S2 normal, normal heart sounds and intact distal pulses. PMI is not displaced. Exam reveals no gallop and no friction rub.   No murmur heard.  Pulses:       Carotid pulses are 2+ on the right side, and 2+ on the left side.       Radial pulses are 2+ on the right side, and 2+ on the left side.        Femoral pulses are 2+ on the right side, and 2+ on the left side.       Popliteal pulses are 2+ on the right side, and 2+ on the left side.        Dorsalis pedis pulses are 2+ on the right side, and 2+ on the left side.        Posterior tibial pulses are 2+ on the right side, and 2+ on the left side.   Pulmonary/Chest: Effort normal and breath sounds normal. She has no wheezes. She has no rales. She exhibits no tenderness.   Abdominal: Soft. Bowel sounds are normal. She exhibits no pulsatile midline mass and no mass. There is no hepatosplenomegaly. There is no tenderness.   Musculoskeletal: Normal range of motion. She exhibits no edema or tenderness.        Cervical back: Normal.        Thoracic back: Normal.        Lumbar back: Normal.   Lymphadenopathy:     She has no cervical adenopathy.     She has no axillary adenopathy.        Right: No supraclavicular adenopathy present.         Left: No supraclavicular adenopathy present.   Neurological: She is alert and oriented to person, place, and time. She has normal strength and normal reflexes. No sensory deficit. Gait normal.   Skin: Skin is warm. No rash noted. No cyanosis. No pallor. Nails show no clubbing.   Psychiatric: She has a normal mood and affect. Her speech is normal and behavior is normal. Cognition and memory are normal.       Assessment:     1. Postural hypotension    2. Heart palpitations      WE NEED TO EVALUATE FOR STRUCTURAL AND FUNCTIONAL HEART DISEASE, AND ARRHYTHMIAS    Plan:     1-SCHEDULE ECHO, HOLTER AND TILT TABLE EXAM    2-PHONE CALL, TO REVIEW RESULTS  AND FURTHER RECOMMENDATIONS

## 2018-10-08 ENCOUNTER — CLINICAL SUPPORT (OUTPATIENT)
Dept: CARDIOLOGY | Facility: CLINIC | Age: 38
End: 2018-10-08
Attending: NUCLEAR MEDICINE
Payer: COMMERCIAL

## 2018-10-08 PROCEDURE — 93224 XTRNL ECG REC UP TO 48 HRS: CPT | Mod: S$GLB,,, | Performed by: INTERNAL MEDICINE

## 2018-10-12 ENCOUNTER — PATIENT MESSAGE (OUTPATIENT)
Dept: CARDIOLOGY | Facility: CLINIC | Age: 38
End: 2018-10-12

## 2018-10-12 ENCOUNTER — PATIENT MESSAGE (OUTPATIENT)
Dept: NEUROLOGY | Facility: CLINIC | Age: 38
End: 2018-10-12

## 2018-10-15 ENCOUNTER — TELEPHONE (OUTPATIENT)
Dept: CARDIOLOGY | Facility: CLINIC | Age: 38
End: 2018-10-15

## 2018-10-15 NOTE — TELEPHONE ENCOUNTER
----- Message from Johnson Rodriguez MD sent at 10/2/2018  2:27 PM CDT -----  Reviewed  We will call the patient this week.

## 2018-10-30 ENCOUNTER — OFFICE VISIT (OUTPATIENT)
Dept: NEUROLOGY | Facility: CLINIC | Age: 38
End: 2018-10-30
Payer: COMMERCIAL

## 2018-10-30 ENCOUNTER — TELEPHONE (OUTPATIENT)
Dept: PHARMACY | Facility: CLINIC | Age: 38
End: 2018-10-30

## 2018-10-30 VITALS
HEART RATE: 80 BPM | HEIGHT: 71 IN | SYSTOLIC BLOOD PRESSURE: 94 MMHG | BODY MASS INDEX: 24.07 KG/M2 | WEIGHT: 171.94 LBS | DIASTOLIC BLOOD PRESSURE: 58 MMHG

## 2018-10-30 DIAGNOSIS — Z79.899 USE OF MEDICATION WITH TERATOGENIC POTENTIAL IN FEMALE OF REPRODUCTIVE AGE: ICD-10-CM

## 2018-10-30 DIAGNOSIS — I95.1 ORTHOSTASIS: ICD-10-CM

## 2018-10-30 DIAGNOSIS — G43.019 COMMON MIGRAINE WITH INTRACTABLE MIGRAINE: Primary | ICD-10-CM

## 2018-10-30 PROCEDURE — 99999 PR PBB SHADOW E&M-EST. PATIENT-LVL III: CPT | Mod: PBBFAC,,, | Performed by: PSYCHIATRY & NEUROLOGY

## 2018-10-30 PROCEDURE — 99244 OFF/OP CNSLTJ NEW/EST MOD 40: CPT | Mod: S$GLB,,, | Performed by: PSYCHIATRY & NEUROLOGY

## 2018-10-30 RX ORDER — RIZATRIPTAN BENZOATE 10 MG/1
10 TABLET ORAL ONCE AS NEEDED
Qty: 10 TABLET | Refills: 3 | Status: SHIPPED | OUTPATIENT
Start: 2018-10-30 | End: 2019-10-21 | Stop reason: SDUPTHER

## 2018-10-30 RX ORDER — ZONISAMIDE 100 MG/1
200 CAPSULE ORAL NIGHTLY
Qty: 180 CAPSULE | Refills: 3 | Status: SHIPPED | OUTPATIENT
Start: 2018-10-30 | End: 2020-03-18

## 2018-10-30 RX ORDER — FOLIC ACID 1 MG/1
1 TABLET ORAL DAILY
Qty: 90 TABLET | Refills: 3 | Status: SHIPPED | OUTPATIENT
Start: 2018-10-30 | End: 2020-03-18

## 2018-10-30 NOTE — PATIENT INSTRUCTIONS
Preventing Migraine Headaches: Medicines and Lifestyle Changes     Going to bed and getting up at the same time each day, including weekends, may help prevent migraines.     A migraine is a type of severe headache. Having a migraine can be very painful. But there are steps you can take to help prevent migraines.  Medicines to help prevent migraines  · Your healthcare provider may prescribe certain medicines to help prevent migraines. These medicines may need to be taken daily. Or they may only need to be taken at times when youre likely to have a migraine.  · Common medicines used to help prevent migraines include:  ¨ Triptans (serotonin receptor agonists)  ¨ Nonsteroidal anti-inflammatory drugs (available over-the-counter)  ¨ Beta-blockers  ¨ Anticonvulsants  ¨ Tricyclic antidepressants  ¨ Calcium channel blockers  ¨ Certain vitamins, minerals, and plant extracts  ¨ Botulinum toxin injection (Botox) for certain chronic migraines   ¨ CGRP (calcitonin gene-related peptide) agnonists are being reviewed by the Food and Drug Administration (FDA)  Lifestyle changes for long-term prevention  Here are some suggestions:  · Exercise. Regular exercise can help prevent migraines and improve your health. (If exercise triggers your migraines, talk to your healthcare provider.)  · Keep regular habits. Dont skip or delay meals. Drink plenty of water. And go to bed and get up at about the same time each day. This includes weekends.  · Try alternative treatments. These are treatments that do not involve the use of medicines or surgery. They may help relieve symptoms and prevent migraines. Some treatment options include biofeedback and acupuncture. Ask your healthcare provider to tell you more about these treatments if you have questions.  · Limit caffeine. You may find that caffeine helps relieve pain during an attack. But too much caffeine can also trigger migraines. So, limit the amount of caffeine you consume.  Date Last  Reviewed: 10/11/2015  © 1892-8954 [employer]. [address] [place] [address], [place] [address]. All rights reserved. This information is not intended as a substitute for professional medical care. Always follow your healthcare professional's instructions.

## 2018-10-30 NOTE — PROGRESS NOTES
"Subjective:       Patient ID: Arielle Verde is a 38 y.o. female.    Chief Complaint: Consult; Migraine; Headache; Dizziness; and Visual Field Change    HPI     The patient was referred by Dr. Broussard for evaluation of headache and dizziness.      The headaches started at the age of 16 and  progress from different areas and involves different sides with a throbbing nature. It can involve the right side, the left side or both sides. No visual aura. History is significant for prodromal irritability and urinary frequency.  The headaches are daily with severe ones every week The headaches range from 6-8/10 headaches that cause significant morbidity. The headache is associated with nausea and light, sound, temperature and smell sensitivity. The patient also vomits occasionally.  Abortive meds like OTC NSAIDs and triptans have been partially helpful.  The headaches last for several hours. The patient feels "down" during the headache with no racing. Physical and emotional stressors provoke and aggravate the headache.  The headache builds up slowly towards the middle of the day or the end of the day. The headache is associated with scalp sensitivity. Lack of sleep is a significant trigger of the headache.  No neck pain with radiation. No TMJ problems and she has been wearing a .  The patient denies blurry vision except when she stands uop and no ear ringing. The headache is not positional or postural but worsens with movement and valsalva. Sleep help lessen the headache. No history of head trauma. No history of seizures. No history of smoking. No caffeine overuses. No vertigo. No blacking out.  No fever, chills or rigors. No history of significant memory loss. No history of strokes.  The patient cannot think of food that aggravates the headache. Family history is not remarkable for migraine. No family history of early dementia. Brain MRI and MRA in 2017 were reported as unremarkable. She could not " tolerate TPM and SSRIs have not helped. She was started on  mg QHS by Dr. Barbour with modest benefit.      In addition, she describes lightheadedness and blurry vision when she stands up. No LOC spells. BP is low at baseline an drops further when she stands up.  Cardiac eval has been unremarkable and Tilt-Table is pending.       Review of Systems   Constitutional: Positive for fatigue. Negative for appetite change.   HENT: Negative for hearing loss and tinnitus.    Eyes: Negative for photophobia and visual disturbance.   Respiratory: Negative for apnea and shortness of breath.    Cardiovascular: Negative for chest pain and palpitations.   Gastrointestinal: Negative for nausea and vomiting.   Endocrine: Negative for cold intolerance and heat intolerance.   Genitourinary: Negative for difficulty urinating and urgency.   Musculoskeletal: Negative for arthralgias, back pain, gait problem, joint swelling, myalgias, neck pain and neck stiffness.   Skin: Negative for color change and rash.   Allergic/Immunologic: Negative for environmental allergies and immunocompromised state.   Neurological: Positive for dizziness and headaches. Negative for tremors, seizures, syncope, facial asymmetry, speech difficulty, weakness, light-headedness and numbness.   Hematological: Negative for adenopathy. Does not bruise/bleed easily.   Psychiatric/Behavioral: Positive for dysphoric mood. Negative for agitation, behavioral problems, confusion, decreased concentration, hallucinations, self-injury, sleep disturbance and suicidal ideas. The patient is not nervous/anxious and is not hyperactive.          Current Outpatient Medications:     ASCORBATE CALCIUM (VITAMIN C ORAL), Take 1 tablet by mouth once daily. , Disp: , Rfl:     aspirin 81 MG Chew, Take 81 mg by mouth once daily., Disp: , Rfl:     busPIRone (BUSPAR) 15 MG tablet, Take 2 tablets twice daily, Disp: 120 tablet, Rfl: 2    DULoxetine (CYMBALTA) 30 MG capsule, Take 1  tablet daily for 1 week then 2 tablets daily thereafter. (Patient taking differently: Take 60 mg by mouth once daily. Take 1 tablet daily for 1 week then 2 tablets daily thereafter.), Disp: 60 capsule, Rfl: 2    levonorgestrel (MIRENA) 20 mcg/24 hr (5 years) IUD, 1 each by Intrauterine route once., Disp: , Rfl:     naproxen (NAPROSYN) 500 MG tablet, Take 1 tablet (500 mg total) by mouth 2 (two) times daily with meals. (Patient taking differently: Take 500 mg by mouth 2 (two) times daily as needed. ), Disp: 60 tablet, Rfl: 0    rizatriptan (MAXALT) 10 MG tablet, Take 1 tablet (10 mg total) by mouth once as needed., Disp: 10 tablet, Rfl: 3    traZODone (DESYREL) 100 MG tablet, Take 1/2 to 1 tablet at bedtime as needed for sleep., Disp: 60 tablet, Rfl: 2    vitamin D 1000 units Tab, Take 1,000 Units by mouth once daily., Disp: , Rfl:     zonisamide (ZONEGRAN) 100 MG Cap, Take 2 capsules (200 mg total) by mouth every evening., Disp: 180 capsule, Rfl: 3    erenumab-aooe (AIMOVIG AUTOINJECTOR, 2 PACK,) 70 mg/mL AtIn, Inject 2 mLs (140 mg total) into the skin every 28 days., Disp: 2 mL, Rfl: 11    folic acid (FOLVITE) 1 MG tablet, Take 1 tablet (1 mg total) by mouth once daily., Disp: 90 tablet, Rfl: 3  Past Medical History:   Diagnosis Date    Anxiety     Chronic headaches     Depression      Past Surgical History:   Procedure Laterality Date    ADENOIDECTOMY      breast aug      Hyperhydrosis      TONSILLECTOMY       Social History     Socioeconomic History    Marital status: Single     Spouse name: Not on file    Number of children: Not on file    Years of education: Not on file    Highest education level: Not on file   Social Needs    Financial resource strain: Not on file    Food insecurity - worry: Not on file    Food insecurity - inability: Not on file    Transportation needs - medical: Not on file    Transportation needs - non-medical: Not on file   Occupational History    Occupation:  Podiatry nurse    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    No signs of medical or psychiatric distress.    Normal breathing pattern.    No dysmorphic features    Normal eye contact.     GENERAL MEDICAL EXAM:    HEENT:  Head is atraumatic normocephalic. No tender temporal arteries.     Neck and Axillae: No JVD. No carotid bruits. No thyromegaly. No lymphadenopathy.    Cardiopulmonary: No cyanosis. No tachypnea. Normal respiratory effort.  Clear breath sounds. Normal heart sounds with regular rhythm and no murmurs.    Gastrointestinal:  No stomas or lesions. No hernias.  Abdomen is soft non-tender. No masses or organomegaly.    Skin, Hair and Nails: No pathognonomic skin rash. No neurofibromatosis.   No stigmata of autoimmune disease.     Limbs: No varicose veins. No edema. Symmetric pulses.     Muskoskeletal: No deformities.No spine tenderness.   No signs of longstanding neuropathy. No dislocations or fractures.        Neurologic Exam     Mental Status   Oriented to person, place, and time.   Registration: recalls 3 of 3 objects. Recall at 5 minutes: recalls 3 of 3 objects. Follows 3 step commands.   Attention: normal. Concentration: normal.   Speech: speech is normal   Level of consciousness: alert  Knowledge: good and consistent with education. Able to perform simple calculations.   Able to name object. Able to read. Able to repeat. Able to write. Normal comprehension.     Cranial Nerves     CN II   Visual fields full to confrontation.   Visual acuity: normal  Right visual field deficit: none  Left visual field deficit: none     CN III, IV, VI   Pupils are  equal, round, and reactive to light.  Extraocular motions are normal.   Right pupil: Size: 2 mm. Shape: regular. Reactivity: brisk. Consensual response: intact. Accommodation: intact.   Left pupil: Size: 2 mm. Shape: regular. Reactivity: brisk. Consensual response: intact. Accommodation: intact.   CN III: no CN III palsy  CN VI: no CN VI palsy  Nystagmus: none   Diplopia: none  Ophthalmoparesis: none  Upgaze: normal  Downgaze: normal  Conjugate gaze: present  Vestibulo-ocular reflex: present    CN V   Facial sensation intact.   Right facial sensation deficit: none  Left facial sensation deficit: none  Right corneal reflex: normal  Left corneal reflex: normal    CN VII   Right facial weakness: none  Left facial weakness: none  Right taste: normal  Left taste: normal    CN VIII   CN VIII normal.   Hearing: intact  Right Rinne: AC > BC  Left Rinne: AC > BC  Hwang: does not lateralize     CN IX, X   CN IX normal.   CN X normal.   Palate: symmetric  Right gag reflex: normal  Left gag reflex: normal    CN XI   CN XI normal.   Right sternocleidomastoid strength: normal  Left sternocleidomastoid strength: normal  Right trapezius strength: normal  Left trapezius strength: normal    CN XII   CN XII normal.   Tongue: not atrophic  Fasciculations: absent  Tongue deviation: none    Motor Exam   Muscle bulk: normal  Overall muscle tone: normal  Right arm tone: normal  Left arm tone: normal  Right arm pronator drift: absent  Left arm pronator drift: absent  Right leg tone: normal  Left leg tone: normal    Strength   Strength 5/5 throughout.   Right neck flexion: 5/5  Left neck flexion: 5/5  Right neck extension: 5/5  Left neck extension: 5/5  Right deltoid: 5/5  Left deltoid: 5/5  Right biceps: 5/5  Left biceps: 5/5  Right triceps: 5/5  Left triceps: 5/5  Right wrist flexion: 5/5  Left wrist flexion: 5/5  Right wrist extension: 5/5  Left wrist extension: 5/5  Right interossei: 5/5  Left interossei: 5/5  Right abdominals: 5/5  Left  abdominals: 5/5  Right iliopsoas: 5/5  Left iliopsoas: 5/5  Right quadriceps: 5/5  Left quadriceps: 5/5  Right hamstrin/5  Left hamstrin/5  Right glutei: 5/5  Left glutei: 5/5  Right anterior tibial: 5/5  Left anterior tibial: 5/5  Right posterior tibial: 5/5  Left posterior tibial: 5/5  Right peroneal: 5/5  Left peroneal: 5/5  Right gastroc: 5/5  Left gastroc: 5/5    Sensory Exam   Light touch normal.   Right arm light touch: normal  Left arm light touch: normal  Right leg light touch: normal  Left leg light touch: normal  Vibration normal.   Right arm vibration: normal  Left arm vibration: normal  Right leg vibration: normal  Left leg vibration: normal  Proprioception normal.   Right arm proprioception: normal  Left arm proprioception: normal  Right leg proprioception: normal  Left leg proprioception: normal  Pinprick normal.   Right arm pinprick: normal  Left arm pinprick: normal  Right leg pinprick: normal  Left leg pinprick: normal  Graphesthesia: normal  Stereognosis: normal    Gait, Coordination, and Reflexes     Gait  Gait: normal    Coordination   Romberg: negative  Finger to nose coordination: normal  Heel to shin coordination: normal  Tandem walking coordination: normal    Tremor   Resting tremor: absent  Intention tremor: absent  Action tremor: absent    Reflexes   Right brachioradialis: 2+  Left brachioradialis: 2+  Right biceps: 2+  Left biceps: 2+  Right triceps: 2+  Left triceps: 2+  Right patellar: 2+  Left patellar: 2+  Right achilles: 2+  Left achilles: 2+  Right : 2+  Left : 2+  Right plantar: normal  Left plantar: normal  Right Reynaga: absent  Left Reynaga: absent  Right ankle clonus: absent  Left ankle clonus: absent  Right pendular knee jerk: absent  Left pendular knee jerk: absent      Lab Results   Component Value Date    WBC 7.72 2018    HGB 12.7 2018    HCT 40.2 2018    MCV 93 2018     2018     2017    Brain MRI CMCs    Brain MRA  Unremarkable     Assessment:       1. Common migraine with intractable migraine    2. Use of medication with teratogenic potential in female of reproductive age    3. Orthostasis        Plan:       COMMON MIGRAINE    Get MRI films for review     Headache Calendar     Lifestyle changes:    Good sleep hygiene  Avoid triggers like certain foods, TV and computer work   Minimize physical and emotional stress  Smoking avoidance and cessation  Tapering off caffeine   Good hydration   Small frequent meals   Moderate 30-minute-long aerobic exercises 3 times/week         Abortive medications:    Should only be taken 2-3 times/week to avoid rebound and overuse headaches.  Take at the ONSET of the headache Xavpaq26 mg  PO in combination with naproxen 500 mg PO or Ibuprofen 800 mg PO OR Treximet  for headache without nausea or vomiting.  This regimen can be repeated only once in 24 hours after 2 hours.     When the headache is associated with vomiting I recommended that the patient takes Sumatriptan 3/6 mg Sub-Q  injection. I-explained to the patient that pain meds especially triptans should NOT be taken daily to avoid Rebound Headache and Overuse Headache.    Side effects of triptans were discussed and include rare cardiac and cerebral ischemia.  NSAIDs can cause peptic ulcers, renal insufficiency and may increase the risk of cardiovascular diseases.  SEs were discussed with the patient.  If the headache is severe and  persists beyond 36 hours the patient needs to come to the hospital.    Preventative medications:    TPM was not tolerated     ZNS failed    SSRIs failed    BB is not appropriate with low BP    VPA is not appropriate for CBP    Elavil is not appropriate while on multiple SSRIs    Propranolol/Inderal which can cause low blood pressure, slow heart rate, erectile dysfunction, depression, airway obstruction and heart failure exacerbations.      Will try Aimovig (erenumab) 140 mg monthly. I made her aware that it is a  new medication with limited post marketing experience. She verbalized full understanding.       She uses Mirena for birth control      ORTHOSTASIS    Tilt-table Test     Avoid standing for a long time    Slow transition from lying down to sitting to standing    Cross legs while standing    Increase fluid intake    Increase salt intake     Wearing thigh high stockings           All migraine medications are not safe during pregnancy and the patient was made aware of this fact. Any pregnancy should be planned, and medications should be stopped PRIOR to pregnancy planning. Folic acid 1 mg daily was recommended.            RTC in 8 weeks

## 2018-10-31 NOTE — TELEPHONE ENCOUNTER
DOCUMENTATION ONLY:  Prior Authorization for Aimovig approved from 10/30/18 to 0429/19    Case Id: [id number]    Co-pay: $45    Patient Assistance IS required.    DOCUMENTATION ONLY:  Financial Assistance for Aimovig approved.  Source: Aimovig Copay Card  BIN : [id number]  VANESSAN : CONSTANTIN  ID : [id number]  Group: [id number]  Co-pay: $5.00    Forwarded to the clinical pharmacist for consult and shipment.    -ARR

## 2018-11-05 ENCOUNTER — TELEPHONE (OUTPATIENT)
Dept: PHARMACY | Facility: CLINIC | Age: 38
End: 2018-11-05

## 2018-11-05 NOTE — TELEPHONE ENCOUNTER
Initial Aimovig consult completed on  . Aimovig 140mg will be shipped on  to arrive at patient's home on  via [employer]. $0 copay and permission to charge CC on file. Patient intends to start Aimovig on  . Address confirmed. Confirmed 2 patient identifiers - name and . Therapy Appropriate.    Counseled patient on administration directions:  - Inject 140mg into the skin every 30 days.   - Take out of the refrigerator 30-60 minutes prior to injection.  - Wash hands before and after injection.  - Monthly RX will come with gauze, bandaids, and alcohol swabs.  - Patient may inject in either the tops of the thighs, abdomen- but at least 2 inches away from belly button, or the outer part of her upper arm (with assistance). If injecting 2 pens, use 2 different injection sites. Patient was instructed to rotate injections sites.  - Patient is to wipe down the injection site with the alcohol pad, wait to dry.    - Remove white cap from pen  - Gently squeeze OR stretch the area of the cleaned skin and hold it firmly.  Place the pen flat against the skin then push down on the purple button and release - there will be an initial click; in 10-15 seconds you will hear a second click and the window will go from from clear to yellow, indicating injection is complete.  - Patient should rotate injection sites.   - Patient will use sharps container; once full, per [place] law, she may lock the sharps container and place in trash. Pharmacy will replace the sharps at no additional charge.    Patient was counseled on possible side effects:  - Injection site reaction: redness, soreness, itching, bruising, which should resolve within 3-5 days.  - constipation    Advised to keep a calendar to stay compliant. Consultation included: indication; goals of treatment; administration; storage and handling; side effects; how to handle side effects; the importance of compliance; the importance of keeping all follow up appointments.  Patient  understands to report any medication changes to [facility] and provider. All questions answered and addressed to patients satisfaction. I will f/u with patient in 7-10 days from start, [facility] to contact patient in 3 weeks for refills.

## 2018-11-07 ENCOUNTER — PATIENT MESSAGE (OUTPATIENT)
Dept: CARDIOLOGY | Facility: CLINIC | Age: 38
End: 2018-11-07

## 2018-11-07 ENCOUNTER — PATIENT MESSAGE (OUTPATIENT)
Dept: INTERNAL MEDICINE | Facility: CLINIC | Age: 38
End: 2018-11-07

## 2018-11-07 RX ORDER — METHYLPREDNISOLONE 4 MG/1
TABLET ORAL
Qty: 21 TABLET | Refills: 0 | Status: SHIPPED | OUTPATIENT
Start: 2018-11-07 | End: 2018-11-28

## 2018-11-07 RX ORDER — PROMETHAZINE HYDROCHLORIDE AND DEXTROMETHORPHAN HYDROBROMIDE 6.25; 15 MG/5ML; MG/5ML
5 SYRUP ORAL 2 TIMES DAILY
Qty: 100 ML | Refills: 0 | Status: SHIPPED | OUTPATIENT
Start: 2018-11-07 | End: 2018-11-18

## 2018-11-13 NOTE — TELEPHONE ENCOUNTER
Telephoned patient to advise that NO Tilt testing schedule is booked through January at present and if there are any cancellations they would definitely try and get scheduled sooner.

## 2018-11-20 ENCOUNTER — TELEPHONE (OUTPATIENT)
Dept: PHARMACY | Facility: CLINIC | Age: 38
End: 2018-11-20

## 2018-11-20 NOTE — TELEPHONE ENCOUNTER
Ms. Herrera called [facility] back - she did well with her first injection of aimovig on 11/7 - injected on each side of her stomach. No injection side reactions. She has been noticing some constipation - she has tried dulcolax, does drink as much water or eat as much fiber as she thinks she should. Advised her to try to inc water and fiber (even in the form of a supplement). Also advised using docusate daily as a stool softener. she hasn't noticed much difference in her migraines since her first injection - she had a bad migraine this past weekend that lasted for ~2.5 days, not relieved by Maxalt x2 doses/day, where she was in bed all weekend and was 15 minutes late to work on Monday. she still has her daily, nagging headaches and has started taking folica monse per her neurologist. Advised her it can take a while for the full effects of aimovig to show, and to expect anything from reduction in rescue medications, pain severity, frequency, or duration of headaches. Patient verbalized understanding.  Advised her to call [facility] with any questions or concerns.

## 2018-11-23 ENCOUNTER — PATIENT MESSAGE (OUTPATIENT)
Dept: ELECTROPHYSIOLOGY | Facility: CLINIC | Age: 38
End: 2018-11-23

## 2018-11-26 ENCOUNTER — TELEPHONE (OUTPATIENT)
Dept: CARDIOLOGY | Facility: CLINIC | Age: 38
End: 2018-11-26

## 2018-11-26 ENCOUNTER — PATIENT MESSAGE (OUTPATIENT)
Dept: CARDIOLOGY | Facility: CLINIC | Age: 38
End: 2018-11-26

## 2018-11-26 NOTE — TELEPHONE ENCOUNTER
MsRon Morelpriscila did call back and stated she would wait until January 11 to schedule Tilt    Telephoned patient to schedule Tilt test in  on 11/29/18 for 10:30 AM.  Patient was not aware of needing a  nor all other pre/post procedure requirements.  Stated she would call back later today

## 2018-11-27 ENCOUNTER — TELEPHONE (OUTPATIENT)
Dept: PHARMACY | Facility: CLINIC | Age: 38
End: 2018-11-27

## 2018-12-04 ENCOUNTER — OFFICE VISIT (OUTPATIENT)
Dept: INTERNAL MEDICINE | Facility: CLINIC | Age: 38
End: 2018-12-04
Payer: COMMERCIAL

## 2018-12-04 VITALS
OXYGEN SATURATION: 98 % | HEART RATE: 70 BPM | TEMPERATURE: 98 F | DIASTOLIC BLOOD PRESSURE: 64 MMHG | HEIGHT: 71 IN | WEIGHT: 165 LBS | BODY MASS INDEX: 23.1 KG/M2 | SYSTOLIC BLOOD PRESSURE: 108 MMHG

## 2018-12-04 DIAGNOSIS — H65.93 FLUID LEVEL BEHIND TYMPANIC MEMBRANE OF BOTH EARS: ICD-10-CM

## 2018-12-04 DIAGNOSIS — J01.10 ACUTE FRONTAL SINUSITIS, RECURRENCE NOT SPECIFIED: Primary | ICD-10-CM

## 2018-12-04 PROCEDURE — 99999 PR PBB SHADOW E&M-EST. PATIENT-LVL IV: CPT | Mod: PBBFAC,,, | Performed by: NURSE PRACTITIONER

## 2018-12-04 PROCEDURE — 99214 OFFICE O/P EST MOD 30 MIN: CPT | Mod: S$GLB,,, | Performed by: NURSE PRACTITIONER

## 2018-12-04 PROCEDURE — 3008F BODY MASS INDEX DOCD: CPT | Mod: CPTII,S$GLB,, | Performed by: NURSE PRACTITIONER

## 2018-12-04 RX ORDER — AMOXICILLIN AND CLAVULANATE POTASSIUM 875; 125 MG/1; MG/1
1 TABLET, FILM COATED ORAL EVERY 12 HOURS
Qty: 20 TABLET | Refills: 0 | Status: SHIPPED | OUTPATIENT
Start: 2018-12-04 | End: 2019-02-11 | Stop reason: ALTCHOICE

## 2018-12-04 NOTE — PROGRESS NOTES
Subjective:       Patient ID: Arielle Verde is a 38 y.o. female.    Chief Complaint: Generalized Body Aches; ear pain; Cough; and Headache    Patient presents for cough and congestion.      Cough   This is a new problem. The current episode started 1 to 4 weeks ago. The problem has been unchanged. The cough is productive of sputum (green-yellow). Associated symptoms include chills, ear pain, headaches (h/o migraines, one last week), nasal congestion, rhinorrhea and shortness of breath. Pertinent negatives include no fever, rash, sore throat or wheezing. She has tried prescription cough suppressant (oral steroids several weeks ago) for the symptoms.     Review of Systems   Constitutional: Positive for chills. Negative for fever.   HENT: Positive for congestion, ear pain, rhinorrhea and sinus pressure (behind eyes). Negative for sore throat.    Respiratory: Positive for cough and shortness of breath. Negative for wheezing.    Gastrointestinal: Positive for nausea.   Skin: Negative for rash.   Neurological: Positive for headaches (h/o migraines, one last week).       Objective:      Physical Exam   Constitutional: She is oriented to person, place, and time. She appears well-developed and well-nourished. No distress.   HENT:   Head: Normocephalic and atraumatic.   Right Ear: A middle ear effusion is present.   Left Ear: A middle ear effusion is present.   Nose: Right sinus exhibits frontal sinus tenderness. Right sinus exhibits no maxillary sinus tenderness. Left sinus exhibits frontal sinus tenderness. Left sinus exhibits no maxillary sinus tenderness.   Mouth/Throat: Uvula is midline, oropharynx is clear and moist and mucous membranes are normal.   Beefy red appearance to nasal mucosa with yellow discharge.   Eyes: Conjunctivae and EOM are normal. Pupils are equal, round, and reactive to light.   Neck: Normal range of motion. Neck supple.   Cardiovascular: Normal rate and regular rhythm. Exam reveals no  gallop and no friction rub.   No murmur heard.  Pulmonary/Chest: Effort normal and breath sounds normal. No respiratory distress. She has no wheezes. She has no rales.   Lymphadenopathy:     She has no cervical adenopathy.   Neurological: She is alert and oriented to person, place, and time.   Skin: Skin is warm and dry. No rash noted.   Vitals reviewed.      Assessment:       1. Acute frontal sinusitis, recurrence not specified    2. Fluid level behind tympanic membrane of both ears        Plan:   Acute frontal sinusitis, recurrence not specified  -     amoxicillin-clavulanate 875-125mg (AUGMENTIN) 875-125 mg per tablet; Take 1 tablet by mouth every 12 (twelve) hours.  Dispense: 20 tablet; Refill: 0    Fluid level behind tympanic membrane of both ears  Comments:  flonase, pseudofed      Medication education provided.   Follow-up if symptoms worsen or fail to improve, for keep routine follow up.

## 2018-12-04 NOTE — LETTER
December 4, 2018      [facility]  [id number] [place] [id number]  Phone: [phone]  Fax: [phone]       Patient: Arielle Verde   YOB: 1980  Date of Visit: 12/04/2018    To Whom It May Concern:    Rajesh Verde  was at [facility] on 12/04/2018. She may return to work/school on 12/4/18 with no restrictions. If you have any questions or concerns, or if I can be of further assistance, please do not hesitate to contact me.    Sincerely,    Lisa Broussard NP

## 2018-12-05 ENCOUNTER — PATIENT MESSAGE (OUTPATIENT)
Dept: PSYCHIATRY | Facility: CLINIC | Age: 38
End: 2018-12-05

## 2018-12-07 ENCOUNTER — OFFICE VISIT (OUTPATIENT)
Dept: OPHTHALMOLOGY | Facility: CLINIC | Age: 38
End: 2018-12-07
Payer: COMMERCIAL

## 2018-12-07 DIAGNOSIS — H52.13 MYOPIC ASTIGMATISM, BILATERAL: ICD-10-CM

## 2018-12-07 DIAGNOSIS — Z86.69 HISTORY OF MIGRAINE: ICD-10-CM

## 2018-12-07 DIAGNOSIS — H52.203 MYOPIC ASTIGMATISM, BILATERAL: ICD-10-CM

## 2018-12-07 DIAGNOSIS — R51.9 FREQUENT HEADACHES: Primary | ICD-10-CM

## 2018-12-07 PROCEDURE — 92004 COMPRE OPH EXAM NEW PT 1/>: CPT | Mod: S$GLB,,, | Performed by: OPTOMETRIST

## 2018-12-07 PROCEDURE — 99999 PR PBB SHADOW E&M-EST. PATIENT-LVL II: CPT | Mod: PBBFAC,,, | Performed by: OPTOMETRIST

## 2018-12-07 NOTE — PROGRESS NOTES
HPI     HPI    Dailly headaches, behind eyes, temple area, immediately after waking until   sleep, on several meds, pain scale 7, worse at computer or driving at   night.  The patient states shes been told she has 20/20 vision but at night the   lights bother her and her migraines have gotten worse.   Eye pain: eyes ache daily    Do you wear currently wear glasses or contacts? no    Interested in contacts today? If needed    Do you plan on getting new glasses today? If needed    Last edited by Jefferson Villarreal, OD on 12/7/2018  1:48 PM. (History)            Assessment /Plan     For exam results, see Encounter Report.    Frequent headaches    History of migraine    Myopic astigmatism, bilateral      Minimal refractive error.  OTC readers for computer work or may get Rx filled.    Headaches are non-ocular in origin.    RTC prn

## 2018-12-11 RX ORDER — DULOXETIN HYDROCHLORIDE 30 MG/1
CAPSULE, DELAYED RELEASE ORAL
Qty: 60 CAPSULE | Refills: 0 | Status: SHIPPED | OUTPATIENT
Start: 2018-12-11 | End: 2019-01-03 | Stop reason: SDUPTHER

## 2018-12-17 ENCOUNTER — OFFICE VISIT (OUTPATIENT)
Dept: NEUROLOGY | Facility: CLINIC | Age: 38
End: 2018-12-17
Payer: COMMERCIAL

## 2018-12-17 ENCOUNTER — TELEPHONE (OUTPATIENT)
Dept: PHARMACY | Facility: CLINIC | Age: 38
End: 2018-12-17

## 2018-12-17 VITALS
HEIGHT: 71 IN | HEART RATE: 68 BPM | DIASTOLIC BLOOD PRESSURE: 68 MMHG | BODY MASS INDEX: 23.74 KG/M2 | SYSTOLIC BLOOD PRESSURE: 116 MMHG | WEIGHT: 169.56 LBS

## 2018-12-17 DIAGNOSIS — I95.1 ORTHOSTASIS: ICD-10-CM

## 2018-12-17 DIAGNOSIS — I95.1 POSTURAL HYPOTENSION: Chronic | ICD-10-CM

## 2018-12-17 DIAGNOSIS — Z79.899 USE OF MEDICATION WITH TERATOGENIC POTENTIAL IN FEMALE OF REPRODUCTIVE AGE: ICD-10-CM

## 2018-12-17 DIAGNOSIS — G43.019 COMMON MIGRAINE WITH INTRACTABLE MIGRAINE: Primary | ICD-10-CM

## 2018-12-17 DIAGNOSIS — K59.00 CONSTIPATION, UNSPECIFIED CONSTIPATION TYPE: ICD-10-CM

## 2018-12-17 DIAGNOSIS — G47.10 HYPERSOMNOLENCE: ICD-10-CM

## 2018-12-17 DIAGNOSIS — F51.5 NIGHTMARE DISORDER: ICD-10-CM

## 2018-12-17 DIAGNOSIS — F41.8 ANXIETY ASSOCIATED WITH DEPRESSION: ICD-10-CM

## 2018-12-17 DIAGNOSIS — M54.50 CHRONIC BILATERAL LOW BACK PAIN WITHOUT SCIATICA: ICD-10-CM

## 2018-12-17 DIAGNOSIS — G89.29 CHRONIC BILATERAL LOW BACK PAIN WITHOUT SCIATICA: ICD-10-CM

## 2018-12-17 DIAGNOSIS — F33.41 RECURRENT MAJOR DEPRESSIVE DISORDER, IN PARTIAL REMISSION: ICD-10-CM

## 2018-12-17 DIAGNOSIS — R00.2 HEART PALPITATIONS: ICD-10-CM

## 2018-12-17 DIAGNOSIS — G47.61 PLMD (PERIODIC LIMB MOVEMENT DISORDER): ICD-10-CM

## 2018-12-17 PROCEDURE — 99214 OFFICE O/P EST MOD 30 MIN: CPT | Mod: S$GLB,,, | Performed by: PSYCHIATRY & NEUROLOGY

## 2018-12-17 PROCEDURE — 99999 PR PBB SHADOW E&M-EST. PATIENT-LVL III: CPT | Mod: PBBFAC,,, | Performed by: PSYCHIATRY & NEUROLOGY

## 2018-12-17 PROCEDURE — 3008F BODY MASS INDEX DOCD: CPT | Mod: CPTII,S$GLB,, | Performed by: PSYCHIATRY & NEUROLOGY

## 2018-12-17 NOTE — PROGRESS NOTES
"Subjective:       Patient ID: Arielle Verde is a 38 y.o. female.    Chief Complaint: Follow-up and Migraine    Migraine    Associated symptoms include dizziness. Pertinent negatives include no back pain, hearing loss, nausea, neck pain, numbness, photophobia, seizures, tinnitus, vomiting or weakness.      BACKGROUND HISTORY      The patient was referred by Dr. Broussard for evaluation of headache and dizziness.      The headaches started at the age of 16 and  progress from different areas and involves different sides with a throbbing nature. It can involve the right side, the left side or both sides. No visual aura. History is significant for prodromal irritability and urinary frequency.  The headaches are daily with severe ones every week The headaches range from 6-8/10 headaches that cause significant morbidity. The headache is associated with nausea and light, sound, temperature and smell sensitivity. The patient also vomits occasionally.  Abortive meds like OTC NSAIDs and triptans have been partially helpful.  The headaches last for several hours. The patient feels "down" during the headache with no racing. Physical and emotional stressors provoke and aggravate the headache.  The headache builds up slowly towards the middle of the day or the end of the day. The headache is associated with scalp sensitivity. Lack of sleep is a significant trigger of the headache.  No neck pain with radiation. No TMJ problems and she has been wearing a .  The patient denies blurry vision except when she stands uop and no ear ringing. The headache is not positional or postural but worsens with movement and valsalva. Sleep help lessen the headache. No history of head trauma. No history of seizures. No history of smoking. No caffeine overuses. No vertigo. No blacking out.  No fever, chills or rigors. No history of significant memory loss. No history of strokes.  The patient cannot think of food that aggravates the " headache. Family history is not remarkable for migraine. No family history of early dementia. Brain MRI and MRA in 2017 were reported as unremarkable. She could not tolerate TPM and SSRIs have not helped. She was started on  mg QHS by Dr. Barbour with modest benefit. I added Aimovig 140 mg SQ monthly.      In addition, she describes lightheadedness and blurry vision when she stands up. No LOC spells. BP is low at baseline an drops further when she stands up.  Cardiac eval has been unremarkable and Tilt-Table is pending.  Recommended orthostatic measures.         INTERVAL HISTORY       Aimbovig seems to have helped but caused severe constipation.    No LOC spells. Tilt table results are pending.        Review of Systems   Constitutional: Positive for fatigue. Negative for appetite change.   HENT: Negative for hearing loss and tinnitus.    Eyes: Negative for photophobia and visual disturbance.   Respiratory: Negative for apnea and shortness of breath.    Cardiovascular: Negative for chest pain and palpitations.   Gastrointestinal: Positive for constipation. Negative for nausea and vomiting.   Endocrine: Negative for cold intolerance and heat intolerance.   Genitourinary: Negative for difficulty urinating and urgency.   Musculoskeletal: Negative for arthralgias, back pain, gait problem, joint swelling, myalgias, neck pain and neck stiffness.   Skin: Negative for color change and rash.   Allergic/Immunologic: Negative for environmental allergies and immunocompromised state.   Neurological: Positive for dizziness and headaches. Negative for tremors, seizures, syncope, facial asymmetry, speech difficulty, weakness, light-headedness and numbness.   Hematological: Negative for adenopathy. Does not bruise/bleed easily.   Psychiatric/Behavioral: Positive for dysphoric mood. Negative for agitation, behavioral problems, confusion, decreased concentration, hallucinations, self-injury, sleep disturbance and suicidal ideas.  The patient is not nervous/anxious and is not hyperactive.          Current Outpatient Medications:     amoxicillin-clavulanate 875-125mg (AUGMENTIN) 875-125 mg per tablet, Take 1 tablet by mouth every 12 (twelve) hours., Disp: 20 tablet, Rfl: 0    ASCORBATE CALCIUM (VITAMIN C ORAL), Take 1 tablet by mouth once daily. , Disp: , Rfl:     aspirin 81 MG Chew, Take 81 mg by mouth once daily., Disp: , Rfl:     busPIRone (BUSPAR) 15 MG tablet, Take 2 tablets twice daily, Disp: 120 tablet, Rfl: 2    DULoxetine (CYMBALTA) 30 MG capsule, TAKE ONE CAPSULE BY MOUTH ONCE DAILY FOR 1 WEEK, THEN TWO CAPSULES ONCE DAILY THEREAFTER, Disp: 60 capsule, Rfl: 0    folic acid (FOLVITE) 1 MG tablet, Take 1 tablet (1 mg total) by mouth once daily., Disp: 90 tablet, Rfl: 3    levonorgestrel (MIRENA) 20 mcg/24 hr (5 years) IUD, 1 each by Intrauterine route once., Disp: , Rfl:     naproxen (NAPROSYN) 500 MG tablet, Take 1 tablet (500 mg total) by mouth 2 (two) times daily with meals. (Patient taking differently: Take 500 mg by mouth 2 (two) times daily as needed. ), Disp: 60 tablet, Rfl: 0    traZODone (DESYREL) 100 MG tablet, Take 1/2 to 1 tablet at bedtime as needed for sleep., Disp: 60 tablet, Rfl: 2    vitamin D 1000 units Tab, Take 1,000 Units by mouth once daily., Disp: , Rfl:     zonisamide (ZONEGRAN) 100 MG Cap, Take 2 capsules (200 mg total) by mouth every evening., Disp: 180 capsule, Rfl: 3    galcanezumab-gnlm (EMGALITY) 120 mg/mL PnIj, Inject 120 mg into the skin every 28 days., Disp: 1 mL, Rfl: 11    rizatriptan (MAXALT) 10 MG tablet, Take 1 tablet (10 mg total) by mouth once as needed., Disp: 10 tablet, Rfl: 3  Past Medical History:   Diagnosis Date    Anxiety     Chronic headaches     Depression      Past Surgical History:   Procedure Laterality Date    ADENOIDECTOMY      breast aug      Hyperhydrosis      TONSILLECTOMY       Social History     Socioeconomic History    Marital status: Single     Spouse  name: Not on file    Number of children: Not on file    Years of education: Not on file    Highest education level: Not on file   Social Needs    Financial resource strain: Not on file    Food insecurity - worry: Not on file    Food insecurity - inability: Not on file    Transportation needs - medical: Not on file    Transportation needs - non-medical: Not on file   Occupational History    Occupation: Podiatry nurse    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    No signs of medical or psychiatric distress.    Normal breathing pattern.    No dysmorphic features    Normal eye contact.     GENERAL MEDICAL EXAM:    HEENT:  Head is atraumatic normocephalic.     Neck and Axillae: No JVD.     Cardiopulmonary: No cyanosis. No tachypnea. Normal respiratory effort.    Gastrointestinal:  No stomas or lesions. No hernias.    Skin, Hair and Nails: No pathognonomic skin rash. No neurofibromatosis. No stigmata of autoimmune disease.     Limbs: No varicose veins. No edema.    Muskoskeletal: No deformities.. No signs of longstanding neuropathy. No dislocations or fractures.        Neurologic Exam     Mental Status   Oriented to person, place, and time.   Registration: recalls 3 of 3 objects. Recall at 5 minutes: recalls 3 of 3 objects. Follows 3 step commands.   Attention: normal. Concentration: normal.   Speech: speech is normal   Level of consciousness: alert  Knowledge: good and consistent with education. Able to perform simple calculations.   Able to name object. Able to read. Able to repeat. Able to write. Normal comprehension.     Cranial Nerves     CN II   Visual  fields full to confrontation.   Visual acuity: normal  Right visual field deficit: none  Left visual field deficit: none     CN III, IV, VI   Pupils are equal, round, and reactive to light.  Extraocular motions are normal.   Right pupil: Size: 2 mm. Shape: regular. Reactivity: brisk. Consensual response: intact. Accommodation: intact.   Left pupil: Size: 2 mm. Shape: regular. Reactivity: brisk. Consensual response: intact. Accommodation: intact.   CN III: no CN III palsy  CN VI: no CN VI palsy  Nystagmus: none   Diplopia: none  Ophthalmoparesis: none  Upgaze: normal  Downgaze: normal  Conjugate gaze: present  Vestibulo-ocular reflex: present    CN V   Facial sensation intact.   Right facial sensation deficit: none  Left facial sensation deficit: none  Right corneal reflex: normal  Left corneal reflex: normal    CN VII   Right facial weakness: none  Left facial weakness: none  Right taste: normal  Left taste: normal    CN VIII   CN VIII normal.   Hearing: intact  Right Rinne: AC > BC  Left Rinne: AC > BC  Hwang: does not lateralize     CN IX, X   CN IX normal.   CN X normal.   Palate: symmetric  Right gag reflex: normal  Left gag reflex: normal    CN XI   CN XI normal.   Right sternocleidomastoid strength: normal  Left sternocleidomastoid strength: normal  Right trapezius strength: normal  Left trapezius strength: normal    CN XII   CN XII normal.   Tongue: not atrophic  Fasciculations: absent  Tongue deviation: none    Motor Exam   Muscle bulk: normal  Overall muscle tone: normal  Right arm tone: normal  Left arm tone: normal  Right arm pronator drift: absent  Left arm pronator drift: absent  Right leg tone: normal  Left leg tone: normal    Strength   Strength 5/5 throughout.   Right neck flexion: 5/5  Left neck flexion: 5/5  Right neck extension: 5/5  Left neck extension: 5/5  Right deltoid: 5/5  Left deltoid: 5/5  Right biceps: 5/5  Left biceps: 5/5  Right triceps: 5/5  Left triceps: 5/5  Right wrist flexion:  5/5  Left wrist flexion: 5/5  Right wrist extension: 5/5  Left wrist extension: 5/5  Right interossei: 5/5  Left interossei: 5/5  Right abdominals: 5/5  Left abdominals: 5/5  Right iliopsoas: 5/5  Left iliopsoas: 5/5  Right quadriceps: 5/5  Left quadriceps: 5/  Right hamstrin/5  Left hamstrin/5  Right glutei: 5/5  Left glutei: 5/5  Right anterior tibial: 5/5  Left anterior tibial: 5/5  Right posterior tibial: 5/5  Left posterior tibial: 5  Right peroneal: 55  Left peroneal: 5  Right gastroc: 55  Left gastroc:     Sensory Exam   Light touch normal.   Right arm light touch: normal  Left arm light touch: normal  Right leg light touch: normal  Left leg light touch: normal  Vibration normal.   Right arm vibration: normal  Left arm vibration: normal  Right leg vibration: normal  Left leg vibration: normal  Proprioception normal.   Right arm proprioception: normal  Left arm proprioception: normal  Right leg proprioception: normal  Left leg proprioception: normal  Pinprick normal.   Right arm pinprick: normal  Left arm pinprick: normal  Right leg pinprick: normal  Left leg pinprick: normal  Graphesthesia: normal  Stereognosis: normal    Gait, Coordination, and Reflexes     Gait  Gait: normal    Coordination   Romberg: negative  Finger to nose coordination: normal  Heel to shin coordination: normal  Tandem walking coordination: normal    Tremor   Resting tremor: absent  Intention tremor: absent  Action tremor: absent    Reflexes   Right brachioradialis: 2+  Left brachioradialis: 2+  Right biceps: 2+  Left biceps: 2+  Right triceps: 2+  Left triceps: 2+  Right patellar: 2+  Left patellar: 2+  Right achilles: 2+  Left achilles: 2+  Right : 2+  Left : 2+  Right plantar: normal  Left plantar: normal  Right Reynaga: absent  Left Reynaga: absent  Right ankle clonus: absent  Left ankle clonus: absent  Right pendular knee jerk: absent  Left pendular knee jerk: absent      Lab Results   Component Value Date     WBC 7.72 02/01/2018    HGB 12.7 02/01/2018    HCT 40.2 02/01/2018    MCV 93 02/01/2018     02/01/2018     2017    Brain MRI CMCs    Brain MRA Unremarkable     Assessment:       1. Common migraine with intractable migraine    2. Anxiety associated with depression    3. Recurrent major depressive disorder, in partial remission    4. Postural hypotension    5. Heart palpitations    6. Orthostasis    7. Use of medication with teratogenic potential in female of reproductive age    8. Chronic bilateral low back pain without sciatica    9. Hypersomnolence    10. PLMD (periodic limb movement disorder)    11. Nightmare disorder    12. Constipation, unspecified constipation type        Plan:       COMMON MIGRAINE      Headache Calendar     Lifestyle changes:    Good sleep hygiene  Avoid triggers like certain foods, TV and computer work   Minimize physical and emotional stress  Smoking avoidance and cessation  Tapering off caffeine   Good hydration   Small frequent meals   Moderate 30-minute-long aerobic exercises 3 times/week         Abortive medications:    Should only be taken 2-3 times/week to avoid rebound and overuse headaches.  Take at the ONSET of the headache Sefwsn15 mg  PO in combination with naproxen 500 mg PO or Ibuprofen 800 mg PO OR Treximet  for headache without nausea or vomiting.  This regimen can be repeated only once in 24 hours after 2 hours.      Preventative medications:    TPM was not tolerated     ZNS failed    SSRIs failed    BB is not appropriate with low BP    VPA is not appropriate for CBP    Elavil is not appropriate while on multiple SSRIs    Propranolol/Inderal which can cause low blood pressure, slow heart rate, erectile dysfunction, depression, airway obstruction and heart failure exacerbations.    Aimovig (erenumab) 140 mg monthly caused constipation.    Will try Emgality 120 mg SQ monthly     She uses Mirena for birth control      ORTHOSTASIS    Tilt-table Test     Avoid standing  for a long time    Slow transition from lying down to sitting to standing    Cross legs while standing    Increase fluid intake    Increase salt intake     Wearing thigh high stockings           All migraine medications are not safe during pregnancy and the patient was made aware of this fact. Any pregnancy should be planned, and medications should be stopped PRIOR to pregnancy planning. Folic acid 1 mg daily was recommended.            RTC in 8 weeks

## 2018-12-17 NOTE — PATIENT INSTRUCTIONS
OnabotulinumtoxinA injection (Medical Use)  What is this medicine?  ONABOTULINUMTOXINA (o na NOEMI you lye num tox in ) is a neuro-muscular blocker. This medicine is used to treat crossed eyes, eyelid spasms, severe neck muscle spasms, ankle and toe muscle spasms, and elbow, wrist, and finger muscle spasms. It is also used to treat excessive underarm sweating, to prevent chronic migraine headaches, and to treat loss of bladder control due to neurologic conditions such as multiple sclerosis or spinal cord injury.  How should I use this medicine?  This medicine is for injection into a muscle. It is given by a health care professional in a hospital or clinic setting.  Talk to your pediatrician regarding the use of this medicine in children. While this drug may be prescribed for children as young as 12 years old for selected conditions, precautions do apply.  What side effects may I notice from receiving this medicine?  Side effects that you should report to your doctor or health care professional as soon as possible:  · allergic reactions like skin rash, itching or hives, swelling of the face, lips, or tongue  · breathing problems  · changes in vision  · chest pain or tightness  · eye irritation, pain  · fast, irregular heartbeat  · infection  · numbness  · speech problems  · swallowing problems  · unusual weakness  Side effects that usually do not require medical attention (report to your doctor or health care professional if they continue or are bothersome):  · bruising or pain at site where injected  · drooping eyelid  · dry eyes or mouth  · headache  · muscles aches, pains  · sensitivity to light  · tearing  What may interact with this medicine?  · aminoglycoside antibiotics like gentamicin, neomycin, tobramycin  · muscle relaxants  · other botulinum toxin injections  What if I miss a dose?  This does not apply.  Where should I keep my medicine?  This drug is given in a hospital or clinic and will not be stored  at home.  What should I tell my health care provider before I take this medicine?  They need to know if you have any of these conditions:  · breathing problems  · cerebral palsy spasms  · difficulty urinating  · heart problems  · history of surgery where this medicine is going to be used  · infection at the site where this medicine is going to be used  · myasthenia gravis or other neurologic disease  · nerve or muscle disease  · surgery plans  · take medicines that treat or prevent blood clots  · thyroid problems  · an unusual or allergic reaction to botulinum toxin, albumin, other medicines, foods, dyes, or preservatives  · pregnant or trying to get pregnant  · breast-feeding  What should I watch for while using this medicine?  Visit your doctor for regular check ups.  This medicine will cause weakness in the muscle where it is injected. Tell your doctor if you feel unusually weak in other muscles. Get medical help right away if you have problems with breathing, swallowing, or talking.  This medicine might make your eyelids droop or make you see blurry or double. If you have weak muscles or trouble seeing do not drive a car, use machinery, or do other dangerous activities.  This medicine contains albumin from human blood. It may be possible to pass an infection in this medicine, but no cases have been reported. Talk to your doctor about the risks and benefits of this medicine.  If your activities have been limited by your condition, go back to your regular routine slowly after treatment with this medicine.  NOTE:This sheet is a summary. It may not cover all possible information. If you have questions about this medicine, talk to your doctor, pharmacist, or health care provider. Copyright© 2017 [employer]

## 2018-12-18 ENCOUNTER — TELEPHONE (OUTPATIENT)
Dept: NEUROLOGY | Facility: CLINIC | Age: 38
End: 2018-12-18

## 2018-12-18 NOTE — TELEPHONE ENCOUNTER
----- Message from Maty Cid PharmD sent at 12/18/2018  9:13 AM CST -----  Regarding: Emgality Loading Dose  Good Morning,    We received the rx for Emgality. However, I do not see the loading dose rx. The usual dose for Emgality is....    240 mg once as a loading dose then 28 days later 120 mg every 28 days.    Thank You,    Maty Cid, PharmD    Specialty Pharmacy Clinical Pharmacist  [facility]  P: [phone]

## 2018-12-20 NOTE — TELEPHONE ENCOUNTER
DOCUMENTATION ONLY:  Prior Authorization for Emgality approved from 12/20/18 to 06/17/19    Case Id: [id number]    Co-pay: $45 - $0 with Emgality E-Voucher applied    Forwarded to the clinical pharmacist for consult and shipment.    -ARR

## 2018-12-29 ENCOUNTER — OFFICE VISIT (OUTPATIENT)
Dept: URGENT CARE | Facility: CLINIC | Age: 38
End: 2018-12-29
Payer: COMMERCIAL

## 2018-12-29 VITALS
HEART RATE: 76 BPM | SYSTOLIC BLOOD PRESSURE: 95 MMHG | TEMPERATURE: 99 F | OXYGEN SATURATION: 98 % | DIASTOLIC BLOOD PRESSURE: 66 MMHG

## 2018-12-29 DIAGNOSIS — J02.9 SORE THROAT: ICD-10-CM

## 2018-12-29 DIAGNOSIS — R05.9 COUGH: ICD-10-CM

## 2018-12-29 DIAGNOSIS — R68.89 FLU-LIKE SYMPTOMS: ICD-10-CM

## 2018-12-29 DIAGNOSIS — B96.89 ACUTE BACTERIAL SINUSITIS: Primary | ICD-10-CM

## 2018-12-29 DIAGNOSIS — J01.90 ACUTE BACTERIAL SINUSITIS: Primary | ICD-10-CM

## 2018-12-29 LAB
CTP QC/QA: YES
CTP QC/QA: YES
POC MOLECULAR INFLUENZA A AGN: NEGATIVE
POC MOLECULAR INFLUENZA B AGN: NEGATIVE
S PYO RRNA THROAT QL PROBE: NEGATIVE

## 2018-12-29 PROCEDURE — 99999 PR PBB SHADOW E&M-EST. PATIENT-LVL V: CPT | Mod: PBBFAC,,, | Performed by: PHYSICIAN ASSISTANT

## 2018-12-29 PROCEDURE — 87502 INFLUENZA DNA AMP PROBE: CPT | Mod: QW,S$GLB,, | Performed by: PHYSICIAN ASSISTANT

## 2018-12-29 PROCEDURE — 87081 CULTURE SCREEN ONLY: CPT

## 2018-12-29 PROCEDURE — 99214 OFFICE O/P EST MOD 30 MIN: CPT | Mod: S$GLB,,, | Performed by: PHYSICIAN ASSISTANT

## 2018-12-29 PROCEDURE — 87880 STREP A ASSAY W/OPTIC: CPT | Mod: QW,S$GLB,, | Performed by: PHYSICIAN ASSISTANT

## 2018-12-29 RX ORDER — PROMETHAZINE HYDROCHLORIDE AND DEXTROMETHORPHAN HYDROBROMIDE 6.25; 15 MG/5ML; MG/5ML
5 SYRUP ORAL NIGHTLY PRN
Qty: 118 ML | Refills: 0 | Status: SHIPPED | OUTPATIENT
Start: 2018-12-29 | End: 2019-01-08

## 2018-12-29 RX ORDER — AZITHROMYCIN 250 MG/1
250 TABLET, FILM COATED ORAL DAILY
Qty: 6 TABLET | Refills: 0 | Status: SHIPPED | OUTPATIENT
Start: 2018-12-29 | End: 2019-01-03

## 2018-12-29 RX ORDER — PREDNISONE 10 MG/1
10 TABLET ORAL DAILY
Qty: 5 TABLET | Refills: 0 | Status: SHIPPED | OUTPATIENT
Start: 2018-12-29 | End: 2019-01-03

## 2018-12-29 NOTE — PROGRESS NOTES
Subjective:      Patient ID: Arielle Verde is a 38 y.o. female.    Chief Complaint: Sore Throat; Cough; and Otalgia    URI    This is a new problem. The current episode started yesterday. There has been no fever. Associated symptoms include coughing (green/yellow), ear pain, a sore throat and swollen glands. Pertinent negatives include no abdominal pain, diarrhea, nausea, neck pain, rash, vomiting or wheezing.     Review of Systems   Constitutional: Negative for chills and fever.   HENT: Positive for ear pain and sore throat.    Respiratory: Positive for cough (green/yellow). Negative for shortness of breath, wheezing and stridor.    Gastrointestinal: Negative for abdominal pain, diarrhea, nausea and vomiting.   Musculoskeletal: Negative for neck pain.   Skin: Negative for rash.       Objective:   BP 95/66   Pulse 76   Temp 98.6 °F (37 °C)   SpO2 98%   Physical Exam   Constitutional: She is oriented to person, place, and time. She appears well-developed and well-nourished. No distress.   HENT:   Head: Normocephalic.   Right Ear: External ear and ear canal normal. A middle ear effusion is present.   Left Ear: External ear and ear canal normal. A middle ear effusion is present.   Nose: No mucosal edema or rhinorrhea. Right sinus exhibits maxillary sinus tenderness and frontal sinus tenderness. Left sinus exhibits maxillary sinus tenderness and frontal sinus tenderness.   Mouth/Throat: Uvula is midline, oropharynx is clear and moist and mucous membranes are normal. No oropharyngeal exudate, posterior oropharyngeal edema or posterior oropharyngeal erythema.   Eyes: Conjunctivae and EOM are normal.   Neck: Normal range of motion. Neck supple.   Cardiovascular: Normal rate, regular rhythm and normal heart sounds.   Pulmonary/Chest: Effort normal and breath sounds normal. No accessory muscle usage. No apnea, no tachypnea and no bradypnea. No respiratory distress. She has no decreased breath sounds. She has  no wheezes. She has no rhonchi. She has no rales.   Lymphadenopathy:        Head (right side): No submental, no submandibular and no tonsillar adenopathy present.        Head (left side): No submental, no submandibular and no tonsillar adenopathy present.     She has no cervical adenopathy.   Neurological: She is alert and oriented to person, place, and time.   Skin: Skin is warm and dry. She is not diaphoretic.     Assessment:      1. Acute bacterial sinusitis    2. Sore throat    3. Flu-like symptoms    4. Cough       Plan:   Acute bacterial sinusitis  -     predniSONE (DELTASONE) 10 MG tablet; Take 1 tablet (10 mg total) by mouth once daily. for 5 days  Dispense: 5 tablet; Refill: 0  -     azithromycin (Z-DUANE) 250 MG tablet; Take 1 tablet (250 mg total) by mouth once daily. Take 2 tablets by mouth on day 1, then one tablet daily on days 2-5. for 5 days  Dispense: 6 tablet; Refill: 0    Sore throat  -     POCT rapid strep A  -     Strep A culture, throat    Flu-like symptoms  -     POCT Influenza A/B Molecular    Cough  -     promethazine-dextromethorphan (PROMETHAZINE-DM) 6.25-15 mg/5 mL Syrp; Take 5 mLs by mouth nightly as needed.  Dispense: 118 mL; Refill: 0    strep/flu negative     Acute Sinusitis    -  Discussed viral vs. Bacterial etiology - likely viral given short history of symptoms    -  increase fluids, nasal saline irrigation, flonase    -  Arielle has had a recent steroid, but Arielle requested a steroid shot - discussed side effects of steroids including elevating blood pressure, increased blood glucose levels, increased risk of opportunistic infections, peptic ulcer disease and GI bleeding, insomnia, tremors, suppression of ones own steroid production, avascular necrosis, osteoporosis, increased intraocular pressure, etc. Discussed shot vs. Oral steroid - and decided to do oral - if she feels better with a couple of days of oral steroid then will not take full five days given recent steroid use -  Patient verbalizes risk/benefit profile and agrees to oral steroid    - will cover with antibiotic ([id number]) for risk of lowering immune system with steroid     Cough    -  Promethazine dm for night time only (encourage cough during day, no driving on cough medicine)       AVS provided and instructions reviewed with patient. Patient was counseled on supportive care and instructed to return or contact primary care provider if condition does not improve or for any new or worsening symptoms.    Aspen Daley PA-C   Physician Assistant   [facility]

## 2018-12-29 NOTE — PATIENT INSTRUCTIONS
Acute Sinusitis    Acute sinusitis is irritation and swelling of the sinuses. It is usually caused by a viral infection after a common cold. Your doctor can help you find relief.  What is acute sinusitis?  Sinuses are air-filled spaces in the skull behind the face. They are kept moist and clean by a lining of mucosa. Things such as pollen, smoke, and chemical fumes can irritate the mucosa. It can then swell up. As a response to irritation, the mucosa makes more mucus and other fluids. Tiny hairlike cilia cover the mucosa. Cilia help carry mucus toward the opening of the sinus. Too much mucus may cause the cilia to stop working. This blocks the sinus opening. A buildup of fluid in the sinuses then causes pain and pressure. It can also encourage bacteria to grow in the sinuses.  Common symptoms of acute sinusitis  You may have:  · Facial soreness pain  · Headache  · Fever  · Fluid draining in the back of the throat (postnasal drip)  · Congestion  · Drainage that is thick and colored, instead of clear  · Cough  Diagnosing acute sinusitis  Your doctor will ask about your symptoms and health history. He or she will look at your ear, nose, and throat. You usually won't need to have X-rays taken.    The doctor may take a sample of mucus to check for bacteria. If you have sinusitis that keeps coming back, you may need imaging tests such as X-rays or CAT scans. This will help your doctor check for a structural problem that may be causing the infection.  Treating acute sinusitis  Treatment is aimed at unblocking the sinus opening and helping the cilia work again. You may need to take antihistamine and decongestant medicine. These can reduce inflammation and decrease the amount of fluid your sinuses make. If you have a bacterial infection, you will need to take antibiotic medicine for 10 to 14 days. Take this medicine until it is gone, even if you feel better.  Date Last Reviewed: 10/1/2016  © 9779-8446 [employer] [address] [place] [id number]. All rights reserved. This information is not intended as a substitute for professional medical care. Always follow your healthcare professional's instructions.

## 2019-01-02 LAB — BACTERIA THROAT CULT: NORMAL

## 2019-01-03 ENCOUNTER — OFFICE VISIT (OUTPATIENT)
Dept: PSYCHIATRY | Facility: CLINIC | Age: 39
End: 2019-01-03
Payer: COMMERCIAL

## 2019-01-03 DIAGNOSIS — F33.0 MILD EPISODE OF RECURRENT MAJOR DEPRESSIVE DISORDER: Primary | ICD-10-CM

## 2019-01-03 DIAGNOSIS — F41.8 ANXIETY ASSOCIATED WITH DEPRESSION: ICD-10-CM

## 2019-01-03 PROCEDURE — 99999 PR PBB SHADOW E&M-EST. PATIENT-LVL I: ICD-10-PCS | Mod: PBBFAC,,, | Performed by: PSYCHIATRY & NEUROLOGY

## 2019-01-03 PROCEDURE — 99214 PR OFFICE/OUTPT VISIT, EST, LEVL IV, 30-39 MIN: ICD-10-PCS | Mod: S$GLB,,, | Performed by: PSYCHIATRY & NEUROLOGY

## 2019-01-03 PROCEDURE — 99999 PR PBB SHADOW E&M-EST. PATIENT-LVL I: CPT | Mod: PBBFAC,,, | Performed by: PSYCHIATRY & NEUROLOGY

## 2019-01-03 PROCEDURE — 99214 OFFICE O/P EST MOD 30 MIN: CPT | Mod: S$GLB,,, | Performed by: PSYCHIATRY & NEUROLOGY

## 2019-01-03 RX ORDER — BUSPIRONE HYDROCHLORIDE 15 MG/1
TABLET ORAL
Qty: 120 TABLET | Refills: 3 | Status: SHIPPED | OUTPATIENT
Start: 2019-01-03 | End: 2019-04-12 | Stop reason: SDUPTHER

## 2019-01-03 RX ORDER — DULOXETIN HYDROCHLORIDE 30 MG/1
60 CAPSULE, DELAYED RELEASE ORAL DAILY
Qty: 60 CAPSULE | Refills: 3 | Status: SHIPPED | OUTPATIENT
Start: 2019-01-03 | End: 2019-10-04 | Stop reason: SDUPTHER

## 2019-01-07 ENCOUNTER — TELEPHONE (OUTPATIENT)
Dept: PHARMACY | Facility: CLINIC | Age: 39
End: 2019-01-07

## 2019-01-07 DIAGNOSIS — M72.2 PLANTAR FASCIITIS: Primary | ICD-10-CM

## 2019-01-07 RX ORDER — NABUMETONE 500 MG/1
500 TABLET, FILM COATED ORAL 2 TIMES DAILY
Qty: 60 TABLET | Refills: 1 | Status: SHIPPED | OUTPATIENT
Start: 2019-01-07 | End: 2019-02-06

## 2019-01-11 ENCOUNTER — HOSPITAL ENCOUNTER (OUTPATIENT)
Facility: HOSPITAL | Age: 39
Discharge: HOME | End: 2019-01-11
Attending: INTERNAL MEDICINE | Admitting: INTERNAL MEDICINE
Payer: COMMERCIAL

## 2019-01-11 VITALS
OXYGEN SATURATION: 100 % | WEIGHT: 165 LBS | RESPIRATION RATE: 18 BRPM | BODY MASS INDEX: 23.1 KG/M2 | HEIGHT: 71 IN | SYSTOLIC BLOOD PRESSURE: 82 MMHG | HEART RATE: 100 BPM | TEMPERATURE: 98 F | DIASTOLIC BLOOD PRESSURE: 48 MMHG

## 2019-01-11 DIAGNOSIS — R55 SYNCOPE: ICD-10-CM

## 2019-01-11 DIAGNOSIS — I95.1 POSTURAL HYPOTENSION: Primary | Chronic | ICD-10-CM

## 2019-01-11 PROCEDURE — 93660 TILT TABLE EVALUATION: CPT

## 2019-01-11 PROCEDURE — 93660 TILT TABLE: ICD-10-PCS | Mod: 26,,, | Performed by: INTERNAL MEDICINE

## 2019-01-11 PROCEDURE — 93660 TILT TABLE EVALUATION: CPT | Mod: 26,,, | Performed by: INTERNAL MEDICINE

## 2019-01-11 NOTE — H&P
Consult Note  Cardiology    Consult Requested By:  Reason for Consult:     SUBJECTIVE:     History of Present Illness:  Patient is a 38 y.o. female presents with presyncope    Subjective:   Patient ID:  Arielle Verde is a 38 y.o. female who presents for evaluation of Consult; Palpitations; and Dizziness        HPI SELF REFERRED FOR CARD EVALUATION OF POSITIONAL DIZZINESS AND WEAKNESS,  FOR LAST 3 WEEKS.  ASSOCIATED WITH LOW BP?  NO FLUSHING, NO PERSPIRATION, NO NAUSEA  NO HX OF NEAR SYNCOPE OR SYNCOPE  HAS HX OF INTERMITTENT PALPITATIONS. AND MIGRAINE  NO HX OF HEART MURMUR, VHD OR CONGENITAL HEART DISEASE  NO HX OF CHF, CMP , CARDIOMEGALY OR MYOPERICARDITIS  NO FM HX OF CARD ARRHYTHMIAS, SCD, SYNCOPE OR DEVICE PLACEMENT  NO FOCAL CNS SYMPTOMS OR SIGNS TO  SUGGEST TIA OR STROKE       Review of patient's allergies indicates:   Allergen Reactions    Topiramate Itching       Past Medical History:   Diagnosis Date    Anxiety     Chronic headaches     Depression      Past Surgical History:   Procedure Laterality Date    ADENOIDECTOMY      breast aug      Hyperhydrosis      TONSILLECTOMY       Family History   Problem Relation Age of Onset    Lupus Mother     Ulcerative colitis Mother     Kidney failure Father     Drug abuse Father      Social History     Tobacco Use    Smoking status: Former Smoker    Smokeless tobacco: Never Used   Substance Use Topics    Alcohol use: Yes     Comment: Occa    Drug use: No        Review of Systems:  Constitutional: negative  Eyes: negative  Ears, nose, mouth, throat, and face: negative  Respiratory: negative  Cardiovascular: negative  Gastrointestinal: negative  Genitourinary:negative  Hematologic/lymphatic: negative  Musculoskeletal:negative,   Neurological: negative  Behavioral/Psych: negative  Endocrine: negative  Allergic/Immunologic: negative    OBJECTIVE:     Vital Signs (Most Recent)       Vital Signs Range (Last 24H):  BP: ()/()   Arterial Line BP:  ()/()     No current facility-administered medications for this encounter.      Current Outpatient Medications   Medication Sig    amoxicillin-clavulanate 875-125mg (AUGMENTIN) 875-125 mg per tablet Take 1 tablet by mouth every 12 (twelve) hours.    ASCORBATE CALCIUM (VITAMIN C ORAL) Take 1 tablet by mouth once daily.     aspirin 81 MG Chew Take 81 mg by mouth once daily.    busPIRone (BUSPAR) 15 MG tablet Take 2 tablets twice daily    DULoxetine (CYMBALTA) 30 MG capsule Take 2 capsules (60 mg total) by mouth once daily. TAKE ONE CAPSULE BY MOUTH ONCE DAILY FOR 1 WEEK, THEN TWO CAPSULES ONCE DAILY THEREAFTER    folic acid (FOLVITE) 1 MG tablet Take 1 tablet (1 mg total) by mouth once daily.    galcanezumab-gnlm (EMGALITY) 120 mg/mL PnIj Inject 120 mg into the skin every 28 days.    levonorgestrel (MIRENA) 20 mcg/24 hr (5 years) IUD 1 each by Intrauterine route once.    nabumetone (RELAFEN) 500 MG tablet Take 1 tablet (500 mg total) by mouth 2 (two) times daily.    naproxen (NAPROSYN) 500 MG tablet Take 1 tablet (500 mg total) by mouth 2 (two) times daily with meals. (Patient taking differently: Take 500 mg by mouth 2 (two) times daily as needed. )    rizatriptan (MAXALT) 10 MG tablet Take 1 tablet (10 mg total) by mouth once as needed.    traZODone (DESYREL) 100 MG tablet Take 1/2 to 1 tablet at bedtime as needed for sleep.    vitamin D 1000 units Tab Take 1,000 Units by mouth once daily.    zonisamide (ZONEGRAN) 100 MG Cap Take 2 capsules (200 mg total) by mouth every evening.     No current facility-administered medications on file prior to encounter.      Current Outpatient Medications on File Prior to Encounter   Medication Sig    ASCORBATE CALCIUM (VITAMIN C ORAL) Take 1 tablet by mouth once daily.     aspirin 81 MG Chew Take 81 mg by mouth once daily.    folic acid (FOLVITE) 1 MG tablet Take 1 tablet (1 mg total) by mouth once daily.    levonorgestrel (MIRENA) 20 mcg/24 hr (5 years) IUD 1  each by Intrauterine route once.    naproxen (NAPROSYN) 500 MG tablet Take 1 tablet (500 mg total) by mouth 2 (two) times daily with meals. (Patient taking differently: Take 500 mg by mouth 2 (two) times daily as needed. )    rizatriptan (MAXALT) 10 MG tablet Take 1 tablet (10 mg total) by mouth once as needed.    traZODone (DESYREL) 100 MG tablet Take 1/2 to 1 tablet at bedtime as needed for sleep.    vitamin D 1000 units Tab Take 1,000 Units by mouth once daily.    zonisamide (ZONEGRAN) 100 MG Cap Take 2 capsules (200 mg total) by mouth every evening.       Physical Exam:  General appearance: alert, appears stated age and cooperative  Head: Normocephalic, without obvious abnormality, atraumatic  Eyes:  conjunctivae/corneas clear. PERRL, EOM's intact. Fundi benign.  Nose: no discharge  Throat: normal findings: tongue midline and normal  Neck: no adenopathy, no carotid bruit, no JVD, supple, symmetrical, trachea midline and thyroid not enlarged, symmetric, no tenderness/mass/nodules  Back:  no skin lesions, erythema, or scars  Lungs:  clear to auscultation bilaterally  Chest wall: no tenderness  Heart: regular rate and rhythm, S1, S2 normal, no murmur, click, rub or gallop  Abdomen: soft, non-tender; bowel sounds normal; no masses,  no organomegaly  Extremities: extremities normal, atraumatic, no cyanosis or edema  Pulses: 2+ and symmetric  Skin: Skin color, texture, turgor normal. No rashes or lesions  Neurologic: Grossly normal    Laboratory:  Chemistry: No results found for: NA, K, CL, CO2, BUN, CREATININE, GLUCOSE, CALCIUM  Cardiac Markers: No results found for: CKTOTAL, CKMB, CKMBINDEX, TROPONINI  Cardiac Markers (Last 3): No results found for: CKTOTAL, CKMB, CKMBINDEX, TROPONINI  CBC:   Lab Results   Component Value Date    WBC 7.72 02/01/2018    HGB 12.7 02/01/2018    HCT 40.2 02/01/2018    MCV 93 02/01/2018     02/01/2018     Lipids:   Lab Results   Component Value Date    CHOL 192 04/20/2017     TRIG 91 04/20/2017    HDL 46 04/20/2017     Coagulation: No results found for: PT, INR, APTT    Diagnostic Results:  ECG: Reviewed  X-Ray: Reviewed  US: Reviewed  CT: Reviewed  Echo: Reviewed      ASSESSMENT/PLAN:     Patient Active Problem List   Diagnosis    Anxiety associated with depression    Chronic bilateral low back pain without sciatica    Major depressive disorder in partial remission    Generalized headaches    Recurrent streptococcal tonsillitis    Hypersomnolence    PLMD (periodic limb movement disorder)    Nightmare disorder    Cough    Postural hypotension    Heart palpitations    Common migraine with intractable migraine    Use of medication with teratogenic potential in female of reproductive age    Orthostasis    Constipation        Plan: Tilt table             Risks and benefits explained

## 2019-01-11 NOTE — DISCHARGE INSTRUCTIONS
Vasovagal syncope    Vasovagal syncope is one of the most common causes of fainting. Vasovagal syncope occurs when your body overreacts to certain triggers, such as the sight of blood or extreme emotional distress.    The vasovagal syncope trigger causes a sudden drop in your heart rate and blood pressure. That leads to reduced blood flow to our brain which results in a brief loss of consciousness.    Vasovagal syncope is usually harmless and requires no treatment. But its possible you may injure yourself during an episode.     ORTHOSTATIC HYPOTENSION (POSTURAL HYPOTENSION)    Orthostatic hypotension is a form of low blood pressure that happens when you stand up from sitting or lying down. Orthostatic hypotension can make you feel dizzy and lightheaded.    Orthostatic hypotension is often mild, lasting a few seconds to a few minutes after standing. However, long lasting orthostatic, hypotension can be a sign of more serious problems.    Mild orthostatic hypotension often doesn't need treatment. Many people occasionally feel dizzy or lightheaded after standing and it's usually not cause for concern.    If symptomatic, sit or lay down until the episode passes.

## 2019-01-14 ENCOUNTER — TELEPHONE (OUTPATIENT)
Dept: CARDIOLOGY | Facility: CLINIC | Age: 39
End: 2019-01-14

## 2019-01-14 ENCOUNTER — PATIENT MESSAGE (OUTPATIENT)
Dept: CARDIOLOGY | Facility: CLINIC | Age: 39
End: 2019-01-14

## 2019-01-14 PROBLEM — F33.0 MILD EPISODE OF RECURRENT MAJOR DEPRESSIVE DISORDER: Status: ACTIVE | Noted: 2018-01-16

## 2019-01-14 NOTE — TELEPHONE ENCOUNTER
01/14 I called pt to make her an appt with Dr Rodriguez and she tells me she has one on 01/16/19 scheduled as nurse visit. Cm  ----- Message from Justin Freeman MD sent at 1/13/2019  5:43 AM CST -----  Tilt table normal ordered by  Dr. Rodriguez, schedule appt for next week for him. Pt is a nurse at [employer]

## 2019-01-16 ENCOUNTER — DOCUMENTATION ONLY (OUTPATIENT)
Dept: CARDIOLOGY | Facility: CLINIC | Age: 39
End: 2019-01-16

## 2019-01-16 DIAGNOSIS — I95.1 POSTURAL HYPOTENSION: Primary | Chronic | ICD-10-CM

## 2019-01-16 RX ORDER — MIDODRINE HYDROCHLORIDE 2.5 MG/1
2.5 TABLET ORAL 2 TIMES DAILY WITH MEALS
Qty: 60 TABLET | Refills: 6 | Status: SHIPPED | OUTPATIENT
Start: 2019-01-16 | End: 2019-10-21 | Stop reason: SDUPTHER

## 2019-01-16 NOTE — PROGRESS NOTES
PROBLEM- POSTURAL HYPOTENSION    RECENT TILT TABLE STUDY WAS REVIEWED AND DISCUSSED WITH PT  FINDINGS ARE CONSISTENT WITH POSTURAL HYPOTENSION RATHER THAN VASODEPRESSOR RESPONSE    PLAN:    1- ADEQUATE HYDRATION- 6- 8 GLASSES OF WATER DAILY    2- CAN SALTY SNACKS    3- AVOID PROLONGED STANDING POSITIONS    4- START LOW DOSE OF MIDODRINE 2.5 MG BID WITH MEALS      RETURN IN 3 MONTHS.

## 2019-02-01 ENCOUNTER — TELEPHONE (OUTPATIENT)
Dept: PHARMACY | Facility: CLINIC | Age: 39
End: 2019-02-01

## 2019-02-01 NOTE — TELEPHONE ENCOUNTER
Called patient for follow up and refill for Emgality. Patient reports that her next dose will be on 2/6. Medication will be shipped on 2/5 for patient to receive on 2/6. She did not have any issues with the injections and mentioned that she feels like it is working better than Aimovig. Before starting she was having migraines about 5 times a month and daily headaches. Now she reports only 2 migraines this month and her daily headaches are only twice a week. She also has new glasses after going to her eye doctor for her to use when she is on the computer. Patient uses Maxalt for her migraines and uses it about twice a week to keep the HA from progressing. Medication list reviewed; no new meds, allergies or health conditions. Patient is doing well while on Emgality and denies any side effects at this time. She did not have much time to discuss her therapy as she was at work and very busy. She is aware to reach out to [facility] if she has any questions or concerns.

## 2019-02-08 ENCOUNTER — NURSE TRIAGE (OUTPATIENT)
Dept: ADMINISTRATIVE | Facility: CLINIC | Age: 39
End: 2019-02-08

## 2019-02-09 NOTE — TELEPHONE ENCOUNTER
"  Reason for Disposition   [1] SEVERE widespread itching (i.e., interferes with sleep, normal activities or school) AND [2] not improved after 24 hours of itching Care Advice    Answer Assessment - Initial Assessment Questions  1. DESCRIPTION: "Describe the itching you are having."      Shoulders and scalp, injection site   2. SEVERITY: "How bad is it?"     - MILD - doesn't interfere with normal activities    - MODERATE-SEVERE: interferes with work, school, sleep, or other activities     Moderate - now getting worse   3. SCRATCHING: "Are there any scratch marks? Bleeding?"     N/a   4. ONSET: "When did this begin?"       Mid morning at 10am, took med yest 2/7 at 0600  5. CAUSE: "What do you think is causing the itching?" (ask about swimming pools, pollen, animals, soaps, etc.)     Emgality 120 mg SQ monthly   6. OTHER SYMPTOMS: "Do you have any other symptoms?"      No swelling or trouble breathing, has epi pen on hand for bee al;lergy   7. 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  On emgality for migraine. Had loading injecting last month. Takes med q 28 days. Took dose yest am. mid morning injection site itching feels warm, hard knot, not scalp itching. No throat swelling. Seen in neuro office today - MD gone for weekend. Will try Emgality 120 mg SQ monthly. Pt transferred to speak with MD on call.   "

## 2019-02-11 ENCOUNTER — OFFICE VISIT (OUTPATIENT)
Dept: NEUROLOGY | Facility: CLINIC | Age: 39
End: 2019-02-11
Payer: COMMERCIAL

## 2019-02-11 ENCOUNTER — TELEPHONE (OUTPATIENT)
Dept: NEUROLOGY | Facility: CLINIC | Age: 39
End: 2019-02-11

## 2019-02-11 VITALS
SYSTOLIC BLOOD PRESSURE: 102 MMHG | DIASTOLIC BLOOD PRESSURE: 76 MMHG | HEIGHT: 71 IN | WEIGHT: 169.56 LBS | HEART RATE: 72 BPM | BODY MASS INDEX: 23.74 KG/M2

## 2019-02-11 DIAGNOSIS — Z79.899 USE OF MEDICATION WITH TERATOGENIC POTENTIAL IN FEMALE OF REPRODUCTIVE AGE: ICD-10-CM

## 2019-02-11 DIAGNOSIS — I95.1 POSTURAL HYPOTENSION: Chronic | ICD-10-CM

## 2019-02-11 DIAGNOSIS — M54.50 CHRONIC BILATERAL LOW BACK PAIN WITHOUT SCIATICA: ICD-10-CM

## 2019-02-11 DIAGNOSIS — G43.019 COMMON MIGRAINE WITH INTRACTABLE MIGRAINE: Primary | ICD-10-CM

## 2019-02-11 DIAGNOSIS — G89.29 CHRONIC BILATERAL LOW BACK PAIN WITHOUT SCIATICA: ICD-10-CM

## 2019-02-11 DIAGNOSIS — G47.61 PLMD (PERIODIC LIMB MOVEMENT DISORDER): ICD-10-CM

## 2019-02-11 DIAGNOSIS — F41.8 ANXIETY ASSOCIATED WITH DEPRESSION: ICD-10-CM

## 2019-02-11 DIAGNOSIS — F51.5 NIGHTMARE DISORDER: ICD-10-CM

## 2019-02-11 DIAGNOSIS — R00.2 HEART PALPITATIONS: ICD-10-CM

## 2019-02-11 DIAGNOSIS — F33.0 MILD EPISODE OF RECURRENT MAJOR DEPRESSIVE DISORDER: ICD-10-CM

## 2019-02-11 DIAGNOSIS — I95.1 ORTHOSTASIS: ICD-10-CM

## 2019-02-11 PROBLEM — J03.01 RECURRENT STREPTOCOCCAL TONSILLITIS: Status: RESOLVED | Noted: 2018-01-16 | Resolved: 2019-02-11

## 2019-02-11 PROBLEM — K59.00 CONSTIPATION: Status: RESOLVED | Noted: 2018-12-17 | Resolved: 2019-02-11

## 2019-02-11 PROCEDURE — 99999 PR PBB SHADOW E&M-EST. PATIENT-LVL III: ICD-10-PCS | Mod: PBBFAC,,, | Performed by: PSYCHIATRY & NEUROLOGY

## 2019-02-11 PROCEDURE — 3008F PR BODY MASS INDEX (BMI) DOCUMENTED: ICD-10-PCS | Mod: CPTII,S$GLB,, | Performed by: PSYCHIATRY & NEUROLOGY

## 2019-02-11 PROCEDURE — 99214 OFFICE O/P EST MOD 30 MIN: CPT | Mod: S$GLB,,, | Performed by: PSYCHIATRY & NEUROLOGY

## 2019-02-11 PROCEDURE — 99999 PR PBB SHADOW E&M-EST. PATIENT-LVL III: CPT | Mod: PBBFAC,,, | Performed by: PSYCHIATRY & NEUROLOGY

## 2019-02-11 PROCEDURE — 99214 PR OFFICE/OUTPT VISIT, EST, LEVL IV, 30-39 MIN: ICD-10-PCS | Mod: S$GLB,,, | Performed by: PSYCHIATRY & NEUROLOGY

## 2019-02-11 PROCEDURE — 3008F BODY MASS INDEX DOCD: CPT | Mod: CPTII,S$GLB,, | Performed by: PSYCHIATRY & NEUROLOGY

## 2019-02-11 NOTE — PROGRESS NOTES
"Subjective:       Patient ID: Arielle Verde is a 38 y.o. female.    Chief Complaint: Follow-up (medication question)    Migraine    Associated symptoms include dizziness. Pertinent negatives include no back pain, hearing loss, nausea, neck pain, numbness, photophobia, seizures, tinnitus, vomiting or weakness.      BACKGROUND HISTORY      The patient was referred by Dr. Broussard for evaluation of headache and dizziness.      The headaches started at the age of 16 and  progress from different areas and involves different sides with a throbbing nature. It can involve the right side, the left side or both sides. No visual aura. History is significant for prodromal irritability and urinary frequency.  The headaches are daily with severe ones every week The headaches range from 6-8/10 headaches that cause significant morbidity. The headache is associated with nausea and light, sound, temperature and smell sensitivity. The patient also vomits occasionally.  Abortive meds like OTC NSAIDs and triptans have been partially helpful.  The headaches last for several hours. The patient feels "down" during the headache with no racing. Physical and emotional stressors provoke and aggravate the headache.  The headache builds up slowly towards the middle of the day or the end of the day. The headache is associated with scalp sensitivity. Lack of sleep is a significant trigger of the headache.  No neck pain with radiation. No TMJ problems and she has been wearing a .  The patient denies blurry vision except when she stands uop and no ear ringing. The headache is not positional or postural but worsens with movement and valsalva. Sleep help lessen the headache. No history of head trauma. No history of seizures. No history of smoking. No caffeine overuses. No vertigo. No blacking out.  No fever, chills or rigors. No history of significant memory loss. No history of strokes.  The patient cannot think of food that " aggravates the headache. Family history is not remarkable for migraine. No family history of early dementia. Brain MRI and MRA in 2017 were reported as unremarkable. She could not tolerate TPM and SSRIs have not helped. She was started on  mg QHS by Dr. Barbour with modest benefit. I added Aimovig 140 mg SQ monthly.Aimbovig seems to have helped but caused severe constipation. We tried Emgality.         In addition, she describes lightheadedness and blurry vision when she stands up. No LOC spells. BP is low at baseline an drops further when she stands up.  Cardiac eval has been unremarkable and Tilt-Table is pending.  Recommended orthostatic measures.         INTERVAL HISTORY       Emgality has caused allergic reaction.    No LOC spells. Tilt table results are pending.        Review of Systems   Constitutional: Positive for fatigue. Negative for appetite change.   HENT: Negative for hearing loss and tinnitus.    Eyes: Negative for photophobia and visual disturbance.   Respiratory: Negative for apnea and shortness of breath.    Cardiovascular: Negative for chest pain and palpitations.   Gastrointestinal: Positive for constipation. Negative for nausea and vomiting.   Endocrine: Negative for cold intolerance and heat intolerance.   Genitourinary: Negative for difficulty urinating and urgency.   Musculoskeletal: Negative for arthralgias, back pain, gait problem, joint swelling, myalgias, neck pain and neck stiffness.   Skin: Negative for color change and rash.   Allergic/Immunologic: Negative for environmental allergies and immunocompromised state.   Neurological: Positive for dizziness and headaches. Negative for tremors, seizures, syncope, facial asymmetry, speech difficulty, weakness, light-headedness and numbness.   Hematological: Negative for adenopathy. Does not bruise/bleed easily.   Psychiatric/Behavioral: Positive for dysphoric mood. Negative for agitation, behavioral problems, confusion, decreased  concentration, hallucinations, self-injury, sleep disturbance and suicidal ideas. The patient is not nervous/anxious and is not hyperactive.          Current Outpatient Medications:     ASCORBATE CALCIUM (VITAMIN C ORAL), Take 1 tablet by mouth once daily. , Disp: , Rfl:     aspirin 81 MG Chew, Take 81 mg by mouth once daily., Disp: , Rfl:     busPIRone (BUSPAR) 15 MG tablet, Take 2 tablets twice daily, Disp: 120 tablet, Rfl: 3    DULoxetine (CYMBALTA) 30 MG capsule, Take 2 capsules (60 mg total) by mouth once daily. TAKE ONE CAPSULE BY MOUTH ONCE DAILY FOR 1 WEEK, THEN TWO CAPSULES ONCE DAILY THEREAFTER, Disp: 60 capsule, Rfl: 3    folic acid (FOLVITE) 1 MG tablet, Take 1 tablet (1 mg total) by mouth once daily., Disp: 90 tablet, Rfl: 3    levonorgestrel (MIRENA) 20 mcg/24 hr (5 years) IUD, 1 each by Intrauterine route once., Disp: , Rfl:     midodrine (PROAMATINE) 2.5 MG Tab, Take 1 tablet (2.5 mg total) by mouth 2 (two) times daily with meals., Disp: 60 tablet, Rfl: 6    naproxen (NAPROSYN) 500 MG tablet, Take 1 tablet (500 mg total) by mouth 2 (two) times daily with meals. (Patient taking differently: Take 500 mg by mouth 2 (two) times daily as needed. ), Disp: 60 tablet, Rfl: 0    rizatriptan (MAXALT) 10 MG tablet, Take 1 tablet (10 mg total) by mouth once as needed., Disp: 10 tablet, Rfl: 3    traZODone (DESYREL) 100 MG tablet, Take 1/2 to 1 tablet by mouth at bedtime as needed for sleep., Disp: 60 tablet, Rfl: 2    vitamin D 1000 units Tab, Take 1,000 Units by mouth once daily., Disp: , Rfl:     zonisamide (ZONEGRAN) 100 MG Cap, Take 2 capsules (200 mg total) by mouth every evening., Disp: 180 capsule, Rfl: 3  Past Medical History:   Diagnosis Date    Anxiety     Chronic headaches     Depression      Past Surgical History:   Procedure Laterality Date    ADENOIDECTOMY      breast aug      Hyperhydrosis      TILT TABLE TEST N/A 1/11/2019    Performed by [facility] at 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    No signs of medical or psychiatric distress.    Normal breathing pattern.    No dysmorphic features    Normal eye contact.     GENERAL MEDICAL EXAM:    HEENT:  Head is atraumatic normocephalic.     Neck and Axillae: No JVD.     Cardiopulmonary: No cyanosis. No tachypnea. Normal respiratory effort.    Gastrointestinal:  No stomas or lesions. No hernias.    Skin, Hair and Nails: Abdominal rash at the site of Emgality injection . No neurofibromatosis. No stigmata of autoimmune disease.     Limbs: No varicose veins. No edema.    Muskoskeletal: No deformities.. No signs of longstanding neuropathy. No dislocations or fractures.        Neurologic Exam     Mental Status   Oriented to person, place, and time.   Registration: recalls 3 of 3 objects. Recall at 5 minutes: recalls 3 of 3 objects. Follows 3 step commands.   Attention: normal. Concentration: normal.   Speech: speech is normal   Level of consciousness: alert  Knowledge: good and consistent with education. Able to perform simple calculations.    Able to name object. Able to read. Able to repeat. Able to write. Normal comprehension.     Cranial Nerves     CN II   Visual fields full to confrontation.   Visual acuity: normal  Right visual field deficit: none  Left visual field deficit: none     CN III, IV, VI   Pupils are equal, round, and reactive to light.  Extraocular motions are normal.   Right pupil: Size: 2 mm. Shape: regular. Reactivity: brisk. Consensual response: intact. Accommodation: intact.   Left pupil: Size: 2 mm. Shape: regular. Reactivity: brisk. Consensual response: intact. Accommodation: intact.   CN III: no CN III palsy  CN VI: no CN VI palsy  Nystagmus: none   Diplopia: none  Ophthalmoparesis: none  Upgaze: normal  Downgaze: normal  Conjugate gaze: present  Vestibulo-ocular reflex: present    CN V   Facial sensation intact.   Right facial sensation deficit: none  Left facial sensation deficit: none  Right corneal reflex: normal  Left corneal reflex: normal    CN VII   Right facial weakness: none  Left facial weakness: none  Right taste: normal  Left taste: normal    CN VIII   CN VIII normal.   Hearing: intact  Right Rinne: AC > BC  Left Rinne: AC > BC  Hwang: does not lateralize     CN IX, X   CN IX normal.   CN X normal.   Palate: symmetric  Right gag reflex: normal  Left gag reflex: normal    CN XI   CN XI normal.   Right sternocleidomastoid strength: normal  Left sternocleidomastoid strength: normal  Right trapezius strength: normal  Left trapezius strength: normal    CN XII   CN XII normal.   Tongue: not atrophic  Fasciculations: absent  Tongue deviation: none    Motor Exam   Muscle bulk: normal  Overall muscle tone: normal  Right arm tone: normal  Left arm tone: normal  Right arm pronator drift: absent  Left arm pronator drift: absent  Right leg tone: normal  Left leg tone: normal    Strength   Strength 5/5 throughout.   Right neck flexion: 5/5  Left neck flexion: 5/5  Right neck extension: 5/5  Left neck extension: 5/5  Right  deltoid: 5/5  Left deltoid: 5/5  Right biceps: 5/5  Left biceps: 5/5  Right triceps: 5/5  Left triceps: 5/5  Right wrist flexion: 5/5  Left wrist flexion: 5/  Right wrist extension: 5/  Left wrist extension: /  Right interossei: 5  Left interossei: 5/  Right abdominals: 5/  Left abdominals: 5  Right iliopsoas:   Left iliopsoas:   Right quadriceps: 5  Left quadriceps: 5/  Right hamstrin  Left hamstrin/5  Right glutei:   Left glutei:   Right anterior tibial:   Left anterior tibial:   Right posterior tibial:   Left posterior tibial:   Right peroneal:   Left peroneal:   Right gastroc:   Left gastroc:     Sensory Exam   Light touch normal.   Right arm light touch: normal  Left arm light touch: normal  Right leg light touch: normal  Left leg light touch: normal  Vibration normal.   Right arm vibration: normal  Left arm vibration: normal  Right leg vibration: normal  Left leg vibration: normal  Proprioception normal.   Right arm proprioception: normal  Left arm proprioception: normal  Right leg proprioception: normal  Left leg proprioception: normal  Pinprick normal.   Right arm pinprick: normal  Left arm pinprick: normal  Right leg pinprick: normal  Left leg pinprick: normal  Graphesthesia: normal  Stereognosis: normal    Gait, Coordination, and Reflexes     Gait  Gait: normal    Coordination   Romberg: negative  Finger to nose coordination: normal  Heel to shin coordination: normal  Tandem walking coordination: normal    Tremor   Resting tremor: absent  Intention tremor: absent  Action tremor: absent    Reflexes   Right brachioradialis: 2+  Left brachioradialis: 2+  Right biceps: 2+  Left biceps: 2+  Right triceps: 2+  Left triceps: 2+  Right patellar: 2+  Left patellar: 2+  Right achilles: 2+  Left achilles: 2+  Right : 2+  Left : 2+  Right plantar: normal  Left plantar: normal  Right Reynaga: absent  Left Reynaga: absent  Right ankle clonus:  absent  Left ankle clonus: absent  Right pendular knee jerk: absent  Left pendular knee jerk: absent      Lab Results   Component Value Date    WBC 7.72 02/01/2018    HGB 12.7 02/01/2018    HCT 40.2 02/01/2018    MCV 93 02/01/2018     02/01/2018     2017    Brain MRI CMCs    Brain MRA Unremarkable     Assessment:       1. Common migraine with intractable migraine    2. Anxiety associated with depression    3. Mild episode of recurrent major depressive disorder    4. Postural hypotension    5. Heart palpitations    6. Orthostasis    7. Use of medication with teratogenic potential in female of reproductive age    8. Chronic bilateral low back pain without sciatica    9. PLMD (periodic limb movement disorder)    10. Nightmare disorder        Plan:       COMMON MIGRAINE      Headache Calendar     Lifestyle changes:    Good sleep hygiene  Avoid triggers like certain foods, TV and computer work   Minimize physical and emotional stress  Smoking avoidance and cessation  Tapering off caffeine   Good hydration   Small frequent meals   Moderate 30-minute-long aerobic exercises 3 times/week         Abortive medications:    Should only be taken 2-3 times/week to avoid rebound and overuse headaches.  Take at the ONSET of the headache [id number] mg  PO in combination with naproxen 500 mg PO or Ibuprofen 800 mg PO OR Treximet  for headache without nausea or vomiting.  This regimen can be repeated only once in 24 hours after 2 hours.      Preventative medications:    TPM was not tolerated     ZNS failed    SSRIs failed    BB is not appropriate with low BP    VPA is not appropriate for CBP    Elavil is not appropriate while on multiple SSRIs    Aimovig (erenumab) 140 mg monthly caused constipation.    Emgality 120 mg SQ monthly caused allergic reaction     Does not want Ajovy. Will proceed to Botox.    She uses Mirena for birth control      ORTHOSTASIS    Tilt-table Test     Avoid standing for a long time    Slow transition from  lying down to sitting to standing    Cross legs while standing    Increase fluid intake    Increase salt intake     Wearing thigh high stockings           All migraine medications are not safe during pregnancy and the patient was made aware of this fact. Any pregnancy should be planned, and medications should be stopped PRIOR to pregnancy planning. Folic acid 1 mg daily was recommended.           RTC for Botox

## 2019-02-13 ENCOUNTER — PATIENT MESSAGE (OUTPATIENT)
Dept: URGENT CARE | Facility: CLINIC | Age: 39
End: 2019-02-13

## 2019-02-13 ENCOUNTER — OFFICE VISIT (OUTPATIENT)
Dept: URGENT CARE | Facility: CLINIC | Age: 39
End: 2019-02-13
Payer: COMMERCIAL

## 2019-02-13 VITALS
HEIGHT: 71 IN | HEART RATE: 70 BPM | OXYGEN SATURATION: 98 % | RESPIRATION RATE: 18 BRPM | TEMPERATURE: 98 F | BODY MASS INDEX: 22.4 KG/M2 | WEIGHT: 160 LBS

## 2019-02-13 DIAGNOSIS — J02.9 PHARYNGITIS, UNSPECIFIED ETIOLOGY: ICD-10-CM

## 2019-02-13 DIAGNOSIS — R05.9 COUGH: ICD-10-CM

## 2019-02-13 DIAGNOSIS — B96.89 ACUTE BACTERIAL SINUSITIS: ICD-10-CM

## 2019-02-13 DIAGNOSIS — R50.9 FEVER, UNSPECIFIED FEVER CAUSE: Primary | ICD-10-CM

## 2019-02-13 DIAGNOSIS — R52 BODY ACHES: ICD-10-CM

## 2019-02-13 DIAGNOSIS — N39.0 UTI (URINARY TRACT INFECTION), UNCOMPLICATED: ICD-10-CM

## 2019-02-13 DIAGNOSIS — R35.0 URINARY FREQUENCY: ICD-10-CM

## 2019-02-13 DIAGNOSIS — J01.90 ACUTE BACTERIAL SINUSITIS: ICD-10-CM

## 2019-02-13 LAB
BILIRUB SERPL-MCNC: NEGATIVE MG/DL
BLOOD URINE, POC: 50
COLOR, POC UA: YELLOW
CTP QC/QA: YES
GLUCOSE UR QL STRIP: NORMAL
INFLUENZA A, MOLECULAR: NEGATIVE
INFLUENZA B, MOLECULAR: NEGATIVE
KETONES UR QL STRIP: NEGATIVE
LEUKOCYTE ESTERASE URINE, POC: ABNORMAL
NITRITE, POC UA: NEGATIVE
PH, POC UA: 5
PROTEIN, POC: NEGATIVE
S PYO RRNA THROAT QL PROBE: NEGATIVE
SPECIFIC GRAVITY, POC UA: 1.02
SPECIMEN SOURCE: NORMAL
UROBILINOGEN, POC UA: NORMAL

## 2019-02-13 PROCEDURE — 87502 INFLUENZA DNA AMP PROBE: CPT

## 2019-02-13 PROCEDURE — 99214 PR OFFICE/OUTPT VISIT, EST, LEVL IV, 30-39 MIN: ICD-10-PCS | Mod: 25,S$GLB,, | Performed by: NURSE PRACTITIONER

## 2019-02-13 PROCEDURE — 87880 POCT RAPID STREP A: ICD-10-PCS | Mod: QW,S$GLB,, | Performed by: NURSE PRACTITIONER

## 2019-02-13 PROCEDURE — 81002 URINALYSIS NONAUTO W/O SCOPE: CPT | Mod: S$GLB,,, | Performed by: NURSE PRACTITIONER

## 2019-02-13 PROCEDURE — 87081 CULTURE SCREEN ONLY: CPT

## 2019-02-13 PROCEDURE — 81002 POCT URINE DIPSTICK WITHOUT MICROSCOPE: ICD-10-PCS | Mod: S$GLB,,, | Performed by: NURSE PRACTITIONER

## 2019-02-13 PROCEDURE — 99214 OFFICE O/P EST MOD 30 MIN: CPT | Mod: 25,S$GLB,, | Performed by: NURSE PRACTITIONER

## 2019-02-13 PROCEDURE — 3008F BODY MASS INDEX DOCD: CPT | Mod: CPTII,S$GLB,, | Performed by: NURSE PRACTITIONER

## 2019-02-13 PROCEDURE — 3008F PR BODY MASS INDEX (BMI) DOCUMENTED: ICD-10-PCS | Mod: CPTII,S$GLB,, | Performed by: NURSE PRACTITIONER

## 2019-02-13 PROCEDURE — 99999 PR PBB SHADOW E&M-EST. PATIENT-LVL V: ICD-10-PCS | Mod: PBBFAC,,, | Performed by: NURSE PRACTITIONER

## 2019-02-13 PROCEDURE — 87880 STREP A ASSAY W/OPTIC: CPT | Mod: QW,S$GLB,, | Performed by: NURSE PRACTITIONER

## 2019-02-13 PROCEDURE — 99999 PR PBB SHADOW E&M-EST. PATIENT-LVL V: CPT | Mod: PBBFAC,,, | Performed by: NURSE PRACTITIONER

## 2019-02-13 RX ORDER — AMOXICILLIN AND CLAVULANATE POTASSIUM 875; 125 MG/1; MG/1
1 TABLET, FILM COATED ORAL EVERY 12 HOURS
Qty: 14 TABLET | Refills: 0 | Status: SHIPPED | OUTPATIENT
Start: 2019-02-13 | End: 2019-02-20

## 2019-02-13 RX ORDER — PROMETHAZINE HYDROCHLORIDE AND DEXTROMETHORPHAN HYDROBROMIDE 6.25; 15 MG/5ML; MG/5ML
5 SYRUP ORAL
Qty: 180 ML | Refills: 0 | Status: SHIPPED | OUTPATIENT
Start: 2019-02-13 | End: 2020-01-07

## 2019-02-13 RX ORDER — FLUTICASONE PROPIONATE 50 MCG
2 SPRAY, SUSPENSION (ML) NASAL DAILY
Qty: 16 G | Refills: 0 | Status: SHIPPED | OUTPATIENT
Start: 2019-02-13 | End: 2021-01-06 | Stop reason: SDUPTHER

## 2019-02-13 RX ORDER — PHENAZOPYRIDINE HYDROCHLORIDE 200 MG/1
200 TABLET, FILM COATED ORAL
Qty: 6 TABLET | Refills: 0 | Status: SHIPPED | OUTPATIENT
Start: 2019-02-13 | End: 2019-02-15

## 2019-02-13 NOTE — LETTER
[facility]  Urgent Care  [address]  [place] [address]  Phone: [phone]  Fax: [phone] February 13, 2019    Patient: Arielle Verde   Patient ID [id number]   YOB: 1980   Date of Visit: 2/13/2019       To Whom It May Concern:    Arielle Verde was seen and treated in our urgent care department on 2/13/2019. She may return to work on February 18, 2019.    Sincerely,       Gretchen Salinas NP

## 2019-02-13 NOTE — PROGRESS NOTES
"CHIEF COMPLAINT/REASON FOR VISIT:  Sore throat, nasal congestion, headache, fever with body aches, abdominal pain     HISTORY OF PRESENT ILLNESS:  38  year-old female complains of  Sore throat, nasal congestion, dizziness, headache, nausea, abdominal pain, urinary frequency, cough, fever with body aches onset 2-3 days. Complains of symptoms-nausea, abdominal pain, urinary frequency, fever, back ache being worse this morning. Patient admits tried over-the-counter medications with no relief.  Patient states " supervisor concerned about her being contagious with influenza and strep. ,  Patient concerned regarding strep throat and influenza & requesting strep and influenza screen. Denies chest pain, shortness of breath, vomiting, diarrhea, constipation, blurred vision, dysuria.       Past Medical History:   Diagnosis Date    Anxiety     Chronic headaches     Depression          .  Past Surgical History:   Procedure Laterality Date    ADENOIDECTOMY      breast aug      Hyperhydrosis      TILT TABLE TEST N/A 1/11/2019    Performed by Justin [facility] MD at [facility] CATH LAB    TONSILLECTOMY           Social History     Socioeconomic History    Marital status: Single     Spouse name: Not on file    Number of children: Not on file    Years of education: Not on file    Highest education level: Not on file   Social Needs    Financial resource strain: Not on file    Food insecurity - worry: Not on file    Food insecurity - inability: Not on file    Transportation needs - medical: Not on file    Transportation needs - non-medical: Not on file   Occupational History    Occupation: Podiatry nurse    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   HEENT:  Reports sore throat, nasal congestion, headache, dizziness  NODES: No masses or lesions. Denies swollen glands.   CHEST: reports cough   CARDIOVASCULAR: Denies chest pain, shortness of breath.  ABDOMEN: nausea and abdominal pain,  No weight loss. Denies vomiting diarrhea, constipation  MUSCULOSKELETAL:  back pain.  NEUROLOGIC: No history of seizures, paralysis, alteration of gait or coordination.  PSYCHIATRIC: Denies mood swings, depression or suicidal thoughts.    PE:   APPEARANCE: Well nourished, well developed, in mild distress.   V/S: Reviewed.  SKIN: Normal skin turgor, no lesions.  HEENT: Turbinates injected, mucus membranes okay, minimal red pharynx. TM's with effusions & poor light reflex bilateral, facial tenderness.  CHEST: Lungs clear to auscultation. No wheezing  CARDIOVASCULAR: Regular rate and rhythm.  ABDOMEN: Soft.  Left lower quadrant with Minimal tenderness, Active bowel sounds  MUSCULOSKELETAL:  Low lumbar with minimal limited range of motion due to pain, minimal tenderness on palpation  NEUROLOGIC: No sensory deficits. Gait & Posture: Normal, No cerebellar signs.  MENTAL STATUS: Patient alert, oriented x 3 & conversant.    PLAN: Lab work POCT rapid strep screen, C&S, POCT UA, POCT Influenza screen/ Will call results  Advise increase p.o. fluids--water/juice & rest  Med's:  Augmentin, Flonase, Phenergan DM  / no refills  Simply saline nasal wash to irrigate sinuses and for congestion/runny nose.  Cool mist humidifier/vaporizer.  Practice good handwashing.  Tylenol or Ibuprofen for fever, headache and body aches.  Warm salt water gargles for throat comfort.  Chloraseptic spray or lozenges for throat comfort.  Advise  follow up with PCP  Advise go to ER if symptoms worsen or fail to improve with treatment.  Instructions, follow up, and supportive care as per AVS.  Advise go to ER if nausea, vomiting, fever, increased back pain, or fail to improve with treatment.  AVS provided and reviewed with patient including supportive care, follow up, and red flag symptoms.   Patient verbalizes understanding and agrees with treatment plan. Discharged from Urgent Care in stable condition.  Given work excuse      DIAGNOSIS:  Fever  Cough  Body aches  Pharyngitis  Urinary frequency/UTI  Acute bacterial sinusitis

## 2019-02-13 NOTE — PATIENT INSTRUCTIONS
PLAN: Lab work POCT rapid strep screen, C&S, POCT UA, POCT Influenza screen/ Will call results  Advise increase p.o. fluids--water/juice & rest  Meds:  Augmentin, Flonase, Phenergan DM  / no refills  Simply saline nasal wash to irrigate sinuses and for congestion/runny nose.  Cool mist humidifier/vaporizer.  Practice good handwashing.  Tylenol or Ibuprofen for fever, headache and body aches.  Warm salt water gargles for throat comfort.  Chloraseptic spray or lozenges for throat comfort.  Advise follow up with PCP  Advise go to ER if symptoms worsen or fail to improve with treatment.  Instructions, follow up, and supportive care as per AVS.  Advise go to ER if nausea, vomiting, fever, increased back pain, or fail to improve with treatment.  AVS provided and reviewed with patient including supportive care, follow up, and red flag symptoms.   Patient verbalizes understanding and agrees with treatment plan. Discharged from Urgent Care in stable condition.  Given work excuse

## 2019-02-14 ENCOUNTER — OFFICE VISIT (OUTPATIENT)
Dept: INTERNAL MEDICINE | Facility: CLINIC | Age: 39
End: 2019-02-14
Payer: COMMERCIAL

## 2019-02-14 ENCOUNTER — LAB VISIT (OUTPATIENT)
Dept: LAB | Facility: HOSPITAL | Age: 39
End: 2019-02-14
Payer: COMMERCIAL

## 2019-02-14 VITALS
HEART RATE: 70 BPM | OXYGEN SATURATION: 98 % | SYSTOLIC BLOOD PRESSURE: 92 MMHG | HEIGHT: 71 IN | DIASTOLIC BLOOD PRESSURE: 56 MMHG | RESPIRATION RATE: 20 BRPM | BODY MASS INDEX: 22.32 KG/M2 | TEMPERATURE: 97 F

## 2019-02-14 DIAGNOSIS — R10.9 ABDOMINAL PAIN, UNSPECIFIED ABDOMINAL LOCATION: ICD-10-CM

## 2019-02-14 DIAGNOSIS — R31.9 HEMATURIA, UNSPECIFIED TYPE: ICD-10-CM

## 2019-02-14 DIAGNOSIS — J06.9 URTI (ACUTE UPPER RESPIRATORY INFECTION): ICD-10-CM

## 2019-02-14 DIAGNOSIS — R31.9 HEMATURIA, UNSPECIFIED TYPE: Primary | ICD-10-CM

## 2019-02-14 DIAGNOSIS — R53.83 FATIGUE, UNSPECIFIED TYPE: ICD-10-CM

## 2019-02-14 LAB
ALBUMIN SERPL BCP-MCNC: 4.2 G/DL
ALBUMIN SERPL BCP-MCNC: 4.2 G/DL
ALP SERPL-CCNC: 40 U/L
ALP SERPL-CCNC: 40 U/L
ALT SERPL W/O P-5'-P-CCNC: 15 U/L
ALT SERPL W/O P-5'-P-CCNC: 15 U/L
ANION GAP SERPL CALC-SCNC: 5 MMOL/L
ANION GAP SERPL CALC-SCNC: 5 MMOL/L
AST SERPL-CCNC: 14 U/L
AST SERPL-CCNC: 14 U/L
BACTERIA #/AREA URNS HPF: ABNORMAL /HPF
BASOPHILS # BLD AUTO: 0.04 K/UL
BASOPHILS # BLD AUTO: 0.04 K/UL
BASOPHILS NFR BLD: 0.5 %
BASOPHILS NFR BLD: 0.5 %
BILIRUB SERPL-MCNC: 0.3 MG/DL
BILIRUB SERPL-MCNC: 0.3 MG/DL
BILIRUB UR QL STRIP: ABNORMAL
BUN SERPL-MCNC: 11 MG/DL
BUN SERPL-MCNC: 11 MG/DL
CALCIUM SERPL-MCNC: 9.2 MG/DL
CALCIUM SERPL-MCNC: 9.2 MG/DL
CHLORIDE SERPL-SCNC: 108 MMOL/L
CHLORIDE SERPL-SCNC: 108 MMOL/L
CLARITY UR: ABNORMAL
CO2 SERPL-SCNC: 25 MMOL/L
CO2 SERPL-SCNC: 25 MMOL/L
COLOR UR: ABNORMAL
CREAT SERPL-MCNC: 0.9 MG/DL
CREAT SERPL-MCNC: 0.9 MG/DL
DIFFERENTIAL METHOD: ABNORMAL
DIFFERENTIAL METHOD: ABNORMAL
EOSINOPHIL # BLD AUTO: 0.2 K/UL
EOSINOPHIL # BLD AUTO: 0.2 K/UL
EOSINOPHIL NFR BLD: 2 %
EOSINOPHIL NFR BLD: 2 %
ERYTHROCYTE [DISTWIDTH] IN BLOOD BY AUTOMATED COUNT: 12.9 %
ERYTHROCYTE [DISTWIDTH] IN BLOOD BY AUTOMATED COUNT: 12.9 %
EST. GFR  (AFRICAN AMERICAN): >60 ML/MIN/1.73 M^2
EST. GFR  (AFRICAN AMERICAN): >60 ML/MIN/1.73 M^2
EST. GFR  (NON AFRICAN AMERICAN): >60 ML/MIN/1.73 M^2
EST. GFR  (NON AFRICAN AMERICAN): >60 ML/MIN/1.73 M^2
GLUCOSE SERPL-MCNC: 81 MG/DL
GLUCOSE SERPL-MCNC: 81 MG/DL
GLUCOSE UR QL STRIP: ABNORMAL
HCT VFR BLD AUTO: 40.1 %
HCT VFR BLD AUTO: 40.1 %
HGB BLD-MCNC: 12.6 G/DL
HGB BLD-MCNC: 12.6 G/DL
HGB UR QL STRIP: ABNORMAL
IMM GRANULOCYTES # BLD AUTO: 0.01 K/UL
IMM GRANULOCYTES # BLD AUTO: 0.01 K/UL
IMM GRANULOCYTES NFR BLD AUTO: 0.1 %
IMM GRANULOCYTES NFR BLD AUTO: 0.1 %
KETONES UR QL STRIP: ABNORMAL
LEUKOCYTE ESTERASE UR QL STRIP: ABNORMAL
LYMPHOCYTES # BLD AUTO: 3.1 K/UL
LYMPHOCYTES # BLD AUTO: 3.1 K/UL
LYMPHOCYTES NFR BLD: 36.9 %
LYMPHOCYTES NFR BLD: 36.9 %
MCH RBC QN AUTO: 29.6 PG
MCH RBC QN AUTO: 29.6 PG
MCHC RBC AUTO-ENTMCNC: 31.4 G/DL
MCHC RBC AUTO-ENTMCNC: 31.4 G/DL
MCV RBC AUTO: 94 FL
MCV RBC AUTO: 94 FL
MICROSCOPIC COMMENT: ABNORMAL
MONOCYTES # BLD AUTO: 0.6 K/UL
MONOCYTES # BLD AUTO: 0.6 K/UL
MONOCYTES NFR BLD: 7 %
MONOCYTES NFR BLD: 7 %
NEUTROPHILS # BLD AUTO: 4.5 K/UL
NEUTROPHILS # BLD AUTO: 4.5 K/UL
NEUTROPHILS NFR BLD: 53.5 %
NEUTROPHILS NFR BLD: 53.5 %
NITRITE UR QL STRIP: ABNORMAL
NRBC BLD-RTO: 0 /100 WBC
NRBC BLD-RTO: 0 /100 WBC
PH UR STRIP: ABNORMAL [PH] (ref 5–8)
PLATELET # BLD AUTO: 250 K/UL
PLATELET # BLD AUTO: 250 K/UL
PMV BLD AUTO: 10.4 FL
PMV BLD AUTO: 10.4 FL
POTASSIUM SERPL-SCNC: 3.4 MMOL/L
POTASSIUM SERPL-SCNC: 3.4 MMOL/L
PROT SERPL-MCNC: 7.1 G/DL
PROT SERPL-MCNC: 7.1 G/DL
PROT UR QL STRIP: ABNORMAL
RBC # BLD AUTO: 4.26 M/UL
RBC # BLD AUTO: 4.26 M/UL
RBC #/AREA URNS HPF: 5 /HPF (ref 0–4)
SODIUM SERPL-SCNC: 138 MMOL/L
SODIUM SERPL-SCNC: 138 MMOL/L
SP GR UR STRIP: ABNORMAL (ref 1–1.03)
SQUAMOUS #/AREA URNS HPF: 25 /HPF
TSH SERPL DL<=0.005 MIU/L-ACNC: 0.65 UIU/ML
URN SPEC COLLECT METH UR: ABNORMAL
WBC # BLD AUTO: 8.48 K/UL
WBC # BLD AUTO: 8.48 K/UL
WBC #/AREA URNS HPF: 11 /HPF (ref 0–5)

## 2019-02-14 PROCEDURE — 87086 URINE CULTURE/COLONY COUNT: CPT

## 2019-02-14 PROCEDURE — 81000 URINALYSIS NONAUTO W/SCOPE: CPT

## 2019-02-14 PROCEDURE — 99214 PR OFFICE/OUTPT VISIT, EST, LEVL IV, 30-39 MIN: ICD-10-PCS | Mod: S$GLB,,, | Performed by: FAMILY MEDICINE

## 2019-02-14 PROCEDURE — 3008F PR BODY MASS INDEX (BMI) DOCUMENTED: ICD-10-PCS | Mod: CPTII,S$GLB,, | Performed by: FAMILY MEDICINE

## 2019-02-14 PROCEDURE — 99214 OFFICE O/P EST MOD 30 MIN: CPT | Mod: S$GLB,,, | Performed by: FAMILY MEDICINE

## 2019-02-14 PROCEDURE — 84443 ASSAY THYROID STIM HORMONE: CPT

## 2019-02-14 PROCEDURE — 85025 COMPLETE CBC W/AUTO DIFF WBC: CPT

## 2019-02-14 PROCEDURE — 36415 COLL VENOUS BLD VENIPUNCTURE: CPT

## 2019-02-14 PROCEDURE — 99999 PR PBB SHADOW E&M-EST. PATIENT-LVL V: ICD-10-PCS | Mod: PBBFAC,,, | Performed by: FAMILY MEDICINE

## 2019-02-14 PROCEDURE — 80053 COMPREHEN METABOLIC PANEL: CPT

## 2019-02-14 PROCEDURE — 99999 PR PBB SHADOW E&M-EST. PATIENT-LVL V: CPT | Mod: PBBFAC,,, | Performed by: FAMILY MEDICINE

## 2019-02-14 PROCEDURE — 3008F BODY MASS INDEX DOCD: CPT | Mod: CPTII,S$GLB,, | Performed by: FAMILY MEDICINE

## 2019-02-14 RX ORDER — PREDNISONE 20 MG/1
20 TABLET ORAL DAILY
Qty: 5 TABLET | Refills: 0 | Status: SHIPPED | OUTPATIENT
Start: 2019-02-14 | End: 2019-02-19

## 2019-02-14 NOTE — PROGRESS NOTES
"Subjective:       Patient ID: Arielle Verde is a 38 y.o. female.    Chief Complaint: Follow-up (bilateral ear infection, sinusitis) and Pain (headache and abdomen)    HPI     37 yo F    Anxiety  Migraine  orthostasis    Went to urgent care yesterday  [place] poorly    Noted to have low BP and high heart rate.    Diagnosis of bilateral ear infection / sinus infection.    Has been having migraine.  Has had ongoing pressure in sinus       Reports has been having 3 weeks of abdominal pain.  Low abdomen on L side  UA obtained - noted to have blood in urine.  Reports back has been hurting    Reports back has been hurting on R side.    Prescribed Augmentin and pyridium    Given flonase    Feeling "out of it" currently  Stomach and back hurting worse    She is worried as her father was on dialysis.  Mother also had renal issues.      Feeling dizzy  Feeling lightheaded  Atypical for her.    Eating and drinking is poor      No diarrhea  No nausea -     Not hungry - just tired.    Headache is significant.      Fevers/ off on at home.  Low grade.    Review of Systems   Constitutional: Negative for activity change and unexpected weight change.   HENT: Negative for hearing loss, rhinorrhea and trouble swallowing.    Eyes: Negative for discharge and visual disturbance.   Respiratory: Negative for chest tightness and wheezing.    Cardiovascular: Negative for chest pain and palpitations.   Gastrointestinal: Negative for blood in stool, constipation, diarrhea and vomiting.   Endocrine: Positive for polyuria. Negative for polydipsia.   Genitourinary: Positive for hematuria. Negative for difficulty urinating, dysuria and menstrual problem.   Musculoskeletal: Negative for arthralgias, joint swelling and neck pain.   Neurological: Positive for weakness and headaches.   Psychiatric/Behavioral: Positive for confusion. Negative for dysphoric mood.       Objective:       Vitals:    02/14/19 1449   BP: (!) 92/56   Pulse: 70   Resp: " 20   Temp: 97.4 °F (36.3 °C)       Physical Exam   Constitutional: She is oriented to person, place, and time. She appears well-developed and well-nourished. She does not have a sickly appearance. No distress.   HENT:   Head: Normocephalic and atraumatic.   Right Ear: External ear normal.   Left Ear: External ear normal.   Eyes: Conjunctivae, EOM and lids are normal.   Neck: Trachea normal, normal range of motion and full passive range of motion without pain.   Cardiovascular: Normal rate, regular rhythm and normal heart sounds.   Pulmonary/Chest: Effort normal and breath sounds normal. No stridor. No respiratory distress.   Abdominal: Soft. Normal appearance and bowel sounds are normal. She exhibits no distension. There is no tenderness. There is no guarding. No hernia.   Some lower abd tenderness - L worse than right  No flank tenderness   Musculoskeletal: Normal range of motion.   Lymphadenopathy:     She has no cervical adenopathy.   Neurological: She is alert and oriented to person, place, and time. She is not disoriented.   Skin: Skin is warm, dry and intact. No rash noted. She is not diaphoretic.   Psychiatric: She has a normal mood and affect. Her speech is normal and behavior is normal. Thought content normal.       Assessment:       1. Hematuria, unspecified type    2. Abdominal pain, unspecified abdominal location    3. Fatigue, unspecified type    4. URTI (acute upper respiratory infection)        Plan:   URTI  Using Augmentin  Will prescribe some prednisone to help with sinus relief pain.  Flu? Tested negative but still on my differential  Will continue w abx for now        Hematuria, unspecified type  -     CBC auto differential; Future; Expected date: 02/14/2019  -     Urinalysis; Future; Expected date: 02/14/2019  -     Urine culture; Future; Expected date: 02/14/2019  -     Comprehensive metabolic panel; Future; Expected date: 02/14/2019    Suspect UTI  Will await cutlure prior to changing  augmentin      Abdominal pain, unspecified abdominal location  -     CBC auto differential; Future; Expected date: 02/14/2019  -     Comprehensive metabolic panel; Future; Expected date: 02/14/2019  Worsens or fails to improve CT scan to be obtained    Fatigue, unspecified type  -     CBC auto differential; Future; Expected date: 02/14/2019  -     Comprehensive metabolic panel; Future; Expected date: 02/14/2019  -     TSH; Future; Expected date: 02/14/2019        No Follow-up on file.

## 2019-02-16 LAB
BACTERIA THROAT CULT: NORMAL
BACTERIA UR CULT: NO GROWTH

## 2019-02-19 ENCOUNTER — TELEPHONE (OUTPATIENT)
Dept: INTERNAL MEDICINE | Facility: CLINIC | Age: 39
End: 2019-02-19

## 2019-02-19 ENCOUNTER — LAB VISIT (OUTPATIENT)
Dept: LAB | Facility: HOSPITAL | Age: 39
End: 2019-02-19
Attending: FAMILY MEDICINE
Payer: COMMERCIAL

## 2019-02-19 DIAGNOSIS — R10.9 ABDOMINAL PAIN, UNSPECIFIED ABDOMINAL LOCATION: ICD-10-CM

## 2019-02-19 DIAGNOSIS — R10.9 ABDOMINAL PAIN, UNSPECIFIED ABDOMINAL LOCATION: Primary | ICD-10-CM

## 2019-02-19 LAB
BACTERIA #/AREA URNS HPF: NORMAL /HPF
BILIRUB UR QL STRIP: NEGATIVE
CLARITY UR: CLEAR
COLOR UR: YELLOW
GLUCOSE UR QL STRIP: NEGATIVE
HGB UR QL STRIP: NEGATIVE
KETONES UR QL STRIP: NEGATIVE
LEUKOCYTE ESTERASE UR QL STRIP: NEGATIVE
MICROSCOPIC COMMENT: NORMAL
NITRITE UR QL STRIP: NEGATIVE
PH UR STRIP: 6 [PH] (ref 5–8)
PROT UR QL STRIP: NEGATIVE
RBC #/AREA URNS HPF: 1 /HPF (ref 0–4)
SP GR UR STRIP: 1.01 (ref 1–1.03)
SQUAMOUS #/AREA URNS HPF: 3 /HPF
URN SPEC COLLECT METH UR: NORMAL
WBC #/AREA URNS HPF: 2 /HPF (ref 0–5)

## 2019-02-19 PROCEDURE — 81000 URINALYSIS NONAUTO W/SCOPE: CPT

## 2019-02-19 PROCEDURE — 87086 URINE CULTURE/COLONY COUNT: CPT

## 2019-02-19 NOTE — TELEPHONE ENCOUNTER
----- Message from Valerio Cassidy MD sent at 2/19/2019 11:38 AM CST -----  Call and schedule urine and US

## 2019-02-19 NOTE — PROGRESS NOTES
Subjective:       Patient ID: Arielle Verde is a 38 y.o. female.    Chief Complaint: No chief complaint on file.    HPI  Review of Systems    Objective:      Physical Exam    Assessment:       1. Abdominal pain, unspecified abdominal location        Plan:   Abdominal pain, unspecified abdominal location  -     URINALYSIS; Future; Expected date: 02/19/2019  -     Urinalysis Microscopic; Future; Expected date: 02/19/2019  -     Urine culture; Future; Expected date: 02/19/2019  -     US Abdomen Limited; Future; Expected date: 02/19/2019          Recurrent abdominal pain  Umbilical region - to LLQ  Will obtain repeat urine - noted some WBC on last check  Will get US schedule to evaluate for any gross abnormality  Monitor for resolution as pain has come gone    Denies GI/ Urinary/ fever/chills symptoms.    No Follow-up on file.

## 2019-02-20 LAB — BACTERIA UR CULT: NO GROWTH

## 2019-02-21 ENCOUNTER — TELEPHONE (OUTPATIENT)
Dept: RADIOLOGY | Facility: HOSPITAL | Age: 39
End: 2019-02-21

## 2019-02-22 ENCOUNTER — HOSPITAL ENCOUNTER (OUTPATIENT)
Dept: RADIOLOGY | Facility: HOSPITAL | Age: 39
Discharge: HOME | End: 2019-02-22
Attending: FAMILY MEDICINE
Payer: COMMERCIAL

## 2019-02-22 DIAGNOSIS — R10.9 ABDOMINAL PAIN, UNSPECIFIED ABDOMINAL LOCATION: ICD-10-CM

## 2019-02-22 PROCEDURE — 76700 US ABDOMEN COMPLETE: ICD-10-PCS | Mod: 26,,, | Performed by: RADIOLOGY

## 2019-02-22 PROCEDURE — 76700 US EXAM ABDOM COMPLETE: CPT | Mod: 26,,, | Performed by: RADIOLOGY

## 2019-02-22 PROCEDURE — 76700 US EXAM ABDOM COMPLETE: CPT | Mod: TC

## 2019-03-06 ENCOUNTER — HOSPITAL ENCOUNTER (OUTPATIENT)
Dept: RADIOLOGY | Facility: HOSPITAL | Age: 39
Discharge: HOME | End: 2019-03-06
Attending: PODIATRIST
Payer: COMMERCIAL

## 2019-03-06 DIAGNOSIS — M79.672 PAIN IN LEFT FOOT: ICD-10-CM

## 2019-03-06 DIAGNOSIS — M79.672 PAIN IN LEFT FOOT: Primary | ICD-10-CM

## 2019-03-06 PROCEDURE — 73630 XR FOOT COMPLETE 3 VIEW LEFT: ICD-10-PCS | Mod: 26,LT,, | Performed by: RADIOLOGY

## 2019-03-06 PROCEDURE — 73630 X-RAY EXAM OF FOOT: CPT | Mod: TC,LT

## 2019-03-06 PROCEDURE — 73630 X-RAY EXAM OF FOOT: CPT | Mod: 26,LT,, | Performed by: RADIOLOGY

## 2019-03-22 ENCOUNTER — PROCEDURE VISIT (OUTPATIENT)
Dept: NEUROLOGY | Facility: CLINIC | Age: 39
End: 2019-03-22
Payer: COMMERCIAL

## 2019-03-22 VITALS
RESPIRATION RATE: 20 BRPM | SYSTOLIC BLOOD PRESSURE: 110 MMHG | HEIGHT: 71 IN | HEART RATE: 60 BPM | DIASTOLIC BLOOD PRESSURE: 62 MMHG | WEIGHT: 165 LBS | BODY MASS INDEX: 23.1 KG/M2

## 2019-03-22 DIAGNOSIS — G43.019 COMMON MIGRAINE WITH INTRACTABLE MIGRAINE: Primary | ICD-10-CM

## 2019-03-22 PROCEDURE — 64615 CHEMODENERV MUSC MIGRAINE: CPT | Mod: PBBFAC | Performed by: PSYCHIATRY & NEUROLOGY

## 2019-03-22 PROCEDURE — 64615 CHEMODENERV MUSC MIGRAINE: CPT | Mod: S$GLB,,, | Performed by: PSYCHIATRY & NEUROLOGY

## 2019-03-22 PROCEDURE — 64615 PR CHEMODENERVATION OF MUSCLE FOR CHRONIC MIGRAINE: ICD-10-PCS | Mod: S$GLB,,, | Performed by: PSYCHIATRY & NEUROLOGY

## 2019-03-22 RX ADMIN — ONABOTULINUMTOXINA 200 UNITS: 200 INJECTION, POWDER, LYOPHILIZED, FOR SOLUTION INTRADERMAL; INTRAMUSCULAR at 10:03

## 2019-03-22 NOTE — PROCEDURES
1. PROCEDURE: Botox injections     2. INDICATION: Intractable Migraine     3. TIME OUT: The procedure was discussed in details with the patient and the consent form was signed. The patient verbalized understanding of the procedure . I discussed the risks that may include serious immune reaction and distant spread that could results in loss of breathing. Other side effects may include droopy eyelids, trouble swallowing and cardiac arrhythmias. It was also stressed that it is contraindicated in pregnancy.     3. COURCE:   The standard protocol was followed. the procedure was very smooth.     4. COMPLICATIONS: None. The patient tolerated the procedure very well.     5. AFTERCARE: I encouraged the patient to use ice if the sites of injection get tender and call me with any problems or complications.         As per the standard protocol, a total 200 units were injected in 31 sites.     RT  10 units  LT  10 units  Procerus 10 units  RT Frontalis 15 units  LT Frontalis 15 units  RT Temporalis 25 units  LT Temporalis 25 units  RT Occipitalis 20 units   LT Occipitalis 20 units  RT Cervical Paraspinals 10 units  LT Cervical Paraspinals 10 units  RT Trapezius 15 units  LT Trapezius 15 units

## 2019-03-27 RX ORDER — IBUPROFEN 600 MG/1
600 TABLET ORAL 2 TIMES DAILY
Qty: 60 TABLET | Refills: 2 | Status: SHIPPED | OUTPATIENT
Start: 2019-03-27 | End: 2019-08-02

## 2019-04-12 ENCOUNTER — OFFICE VISIT (OUTPATIENT)
Dept: PSYCHIATRY | Facility: CLINIC | Age: 39
End: 2019-04-12
Payer: COMMERCIAL

## 2019-04-12 VITALS
BODY MASS INDEX: 23.28 KG/M2 | HEART RATE: 81 BPM | DIASTOLIC BLOOD PRESSURE: 70 MMHG | SYSTOLIC BLOOD PRESSURE: 111 MMHG | WEIGHT: 166.88 LBS

## 2019-04-12 DIAGNOSIS — F33.0 MILD EPISODE OF RECURRENT MAJOR DEPRESSIVE DISORDER: Primary | ICD-10-CM

## 2019-04-12 DIAGNOSIS — F41.8 ANXIETY ASSOCIATED WITH DEPRESSION: ICD-10-CM

## 2019-04-12 PROCEDURE — 3008F PR BODY MASS INDEX (BMI) DOCUMENTED: ICD-10-PCS | Mod: CPTII,S$GLB,, | Performed by: PSYCHIATRY & NEUROLOGY

## 2019-04-12 PROCEDURE — 3008F BODY MASS INDEX DOCD: CPT | Mod: CPTII,S$GLB,, | Performed by: PSYCHIATRY & NEUROLOGY

## 2019-04-12 PROCEDURE — 99214 PR OFFICE/OUTPT VISIT, EST, LEVL IV, 30-39 MIN: ICD-10-PCS | Mod: S$GLB,,, | Performed by: PSYCHIATRY & NEUROLOGY

## 2019-04-12 PROCEDURE — 99999 PR PBB SHADOW E&M-EST. PATIENT-LVL II: ICD-10-PCS | Mod: PBBFAC,,, | Performed by: PSYCHIATRY & NEUROLOGY

## 2019-04-12 PROCEDURE — 99999 PR PBB SHADOW E&M-EST. PATIENT-LVL II: CPT | Mod: PBBFAC,,, | Performed by: PSYCHIATRY & NEUROLOGY

## 2019-04-12 PROCEDURE — 99214 OFFICE O/P EST MOD 30 MIN: CPT | Mod: S$GLB,,, | Performed by: PSYCHIATRY & NEUROLOGY

## 2019-04-12 RX ORDER — BUSPIRONE HYDROCHLORIDE 15 MG/1
TABLET ORAL
Qty: 360 TABLET | Refills: 1 | Status: SHIPPED | OUTPATIENT
Start: 2019-04-12 | End: 2019-10-04 | Stop reason: SDUPTHER

## 2019-04-12 RX ORDER — DULOXETIN HYDROCHLORIDE 60 MG/1
60 CAPSULE, DELAYED RELEASE ORAL DAILY
Qty: 90 CAPSULE | Refills: 1 | Status: SHIPPED | OUTPATIENT
Start: 2019-04-12 | End: 2019-10-04 | Stop reason: SDUPTHER

## 2019-04-12 RX ORDER — TRAZODONE HYDROCHLORIDE 100 MG/1
TABLET ORAL
Qty: 90 TABLET | Refills: 1 | Status: SHIPPED | OUTPATIENT
Start: 2019-04-12 | End: 2019-10-04 | Stop reason: SDUPTHER
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
## 2019-07-03 ENCOUNTER — PROCEDURE VISIT (OUTPATIENT)
Dept: NEUROLOGY | Facility: CLINIC | Age: 39
End: 2019-07-03
Payer: MEDICAID

## 2019-07-03 VITALS
BODY MASS INDEX: 22.99 KG/M2 | DIASTOLIC BLOOD PRESSURE: 73 MMHG | HEIGHT: 71 IN | WEIGHT: 164.25 LBS | HEART RATE: 82 BPM | SYSTOLIC BLOOD PRESSURE: 113 MMHG

## 2019-07-03 DIAGNOSIS — G47.61 PLMD (PERIODIC LIMB MOVEMENT DISORDER): ICD-10-CM

## 2019-07-03 DIAGNOSIS — R51.9 GENERALIZED HEADACHES: ICD-10-CM

## 2019-07-03 DIAGNOSIS — I95.1 ORTHOSTASIS: ICD-10-CM

## 2019-07-03 DIAGNOSIS — R00.2 HEART PALPITATIONS: ICD-10-CM

## 2019-07-03 DIAGNOSIS — M54.50 CHRONIC BILATERAL LOW BACK PAIN WITHOUT SCIATICA: ICD-10-CM

## 2019-07-03 DIAGNOSIS — F41.8 ANXIETY ASSOCIATED WITH DEPRESSION: ICD-10-CM

## 2019-07-03 DIAGNOSIS — Z79.899 USE OF MEDICATION WITH TERATOGENIC POTENTIAL IN FEMALE OF REPRODUCTIVE AGE: ICD-10-CM

## 2019-07-03 DIAGNOSIS — G43.019 COMMON MIGRAINE WITH INTRACTABLE MIGRAINE: Primary | ICD-10-CM

## 2019-07-03 DIAGNOSIS — G89.29 CHRONIC BILATERAL LOW BACK PAIN WITHOUT SCIATICA: ICD-10-CM

## 2019-07-03 DIAGNOSIS — F33.0 MILD EPISODE OF RECURRENT MAJOR DEPRESSIVE DISORDER: ICD-10-CM

## 2019-07-03 DIAGNOSIS — I95.1 POSTURAL HYPOTENSION: Chronic | ICD-10-CM

## 2019-07-03 PROBLEM — R05.9 COUGH: Status: RESOLVED | Noted: 2018-02-19 | Resolved: 2019-07-03

## 2019-07-03 PROCEDURE — 64615 PR CHEMODENERVATION OF MUSCLE FOR CHRONIC MIGRAINE: ICD-10-PCS | Mod: S$PBB,,, | Performed by: PSYCHIATRY & NEUROLOGY

## 2019-07-03 PROCEDURE — 64615 CHEMODENERV MUSC MIGRAINE: CPT | Mod: S$PBB,,, | Performed by: PSYCHIATRY & NEUROLOGY

## 2019-07-03 PROCEDURE — 64615 CHEMODENERV MUSC MIGRAINE: CPT | Mod: PBBFAC | Performed by: PSYCHIATRY & NEUROLOGY

## 2019-07-03 RX ADMIN — ONABOTULINUMTOXINA 200 UNITS: 200 INJECTION, POWDER, LYOPHILIZED, FOR SOLUTION INTRADERMAL; INTRAMUSCULAR at 09:07

## 2019-07-03 NOTE — PROCEDURES
Procedures             1. PROCEDURE: Botox injections     2. INDICATION: Intractable Migraine     3. TIME OUT: The procedure was discussed in details with the patient and the consent form was signed. The patient verbalized understanding of the procedure . I discussed the risks that may include serious immune reaction and distant spread that could results in loss of breathing. Other side effects may include droopy eyelids, trouble swallowing and cardiac arrhythmias. It was also stressed that it is contraindicated in pregnancy.     3. COURCE:   The standard protocol was followed. the procedure was very smooth.     4. COMPLICATIONS: None. The patient tolerated the procedure very well.     5. AFTERCARE: I encouraged the patient to use ice if the sites of injection get tender and call me with any problems or complications.           As per the standard protocol, a total 155 units were injected in 31 sites.         RT  5 units in 1 site    LT  5 units in 1 site     Procerus 5 units in 1 site     RT Frontalis 10 units in 2 sites     LT Frontalis 10 units in 2 site     RT Temporalis 20 units in 4 sites    LT Temporalis 20 units in 4 sites    RT Occipitalis 15 units in 3 sites    LT Occipitalis 15  units in 3 sites     RT Cervical Paraspinals 10 units in 2 sites    LT Cervical Paraspinals 10 units in 2 sites    RT Trapezius 15 units in 3 sites    LT Trapezius 15 units in 3 sites       Per the standard of care protocol we use 155 units and waste 45 units. I gave the patient the option of following the standard protocol vs.using the extra 45 units to target areas where the pain is maximum which could potentially provide extra help with headache control. I explained to the patient that this will be an off-label use, not the standard protocol, not evidence-based and could result in more pronounced side effects. The patient verbalized full understanding of all the facts and the risks and benefits and elected  to use the extra 45 units for the following sites:            Procerus 5 units in 1 site     RT Frontalis 10 units in 2 sites     LT Frontalis 10 units in 2 site     RT Temporalis 10 units in 2 sites    LT Temporalis 10 units in 2 sites

## 2019-09-17 ENCOUNTER — PATIENT MESSAGE (OUTPATIENT)
Dept: NEUROLOGY | Facility: CLINIC | Age: 39
End: 2019-09-17

## 2019-09-23 ENCOUNTER — TELEPHONE (OUTPATIENT)
Dept: PSYCHIATRY | Facility: CLINIC | Age: 39
End: 2019-09-23

## 2019-09-23 RX ORDER — AMOXICILLIN 500 MG/1
500 CAPSULE ORAL EVERY 12 HOURS
Qty: 20 CAPSULE | Refills: 0 | Status: SHIPPED | OUTPATIENT
Start: 2019-09-23 | End: 2019-10-03

## 2019-09-23 NOTE — PROGRESS NOTES
Subjective:       Patient ID: Arielle Verde is a 39 y.o. female.    Chief Complaint: No chief complaint on file.    HPI  Review of Systems    Objective:      Physical Exam    Assessment:       No diagnosis found.    Plan:   There are no diagnoses linked to this encounter.      Patient Called  Lost insurance cannot make appt.  Several days of sore throat/ enlarged nodes/ strept exposure  centor criteria ran  3-4 pts  Amoxicillin called in.  F/u if not improving    No follow-ups on file.

## 2019-10-01 ENCOUNTER — TELEPHONE (OUTPATIENT)
Dept: NEUROLOGY | Facility: CLINIC | Age: 39
End: 2019-10-01

## 2019-10-01 NOTE — TELEPHONE ENCOUNTER
Called to inform pt that botox procedure wasn't covered , to see if she could possible upload info to get in approved for her procedure tomorrow . Pl got very rude and disrespectful calling me little girl and trying to argue online. after asking pat was there anything I cold do to deescalate the call . Pt stated on the lil girl I will call the dr and your supervisor and get you fired . supervisor ( Glory )  Was informed of this call .

## 2019-10-03 DIAGNOSIS — G43.019 COMMON MIGRAINE WITH INTRACTABLE MIGRAINE: Primary | ICD-10-CM

## 2019-10-04 ENCOUNTER — OFFICE VISIT (OUTPATIENT)
Dept: PSYCHIATRY | Facility: CLINIC | Age: 39
End: 2019-10-04
Payer: COMMERCIAL

## 2019-10-04 VITALS
BODY MASS INDEX: 25.03 KG/M2 | HEART RATE: 93 BPM | SYSTOLIC BLOOD PRESSURE: 103 MMHG | WEIGHT: 179.44 LBS | DIASTOLIC BLOOD PRESSURE: 74 MMHG

## 2019-10-04 DIAGNOSIS — F41.9 ANXIETY: Primary | ICD-10-CM

## 2019-10-04 DIAGNOSIS — F33.0 MILD EPISODE OF RECURRENT MAJOR DEPRESSIVE DISORDER: ICD-10-CM

## 2019-10-04 PROCEDURE — 99214 OFFICE O/P EST MOD 30 MIN: CPT | Mod: S$GLB,,, | Performed by: PSYCHIATRY & NEUROLOGY

## 2019-10-04 PROCEDURE — 99999 PR PBB SHADOW E&M-EST. PATIENT-LVL II: ICD-10-PCS | Mod: PBBFAC,,, | Performed by: PSYCHIATRY & NEUROLOGY

## 2019-10-04 PROCEDURE — 3008F PR BODY MASS INDEX (BMI) DOCUMENTED: ICD-10-PCS | Mod: CPTII,S$GLB,, | Performed by: PSYCHIATRY & NEUROLOGY

## 2019-10-04 PROCEDURE — 99214 PR OFFICE/OUTPT VISIT, EST, LEVL IV, 30-39 MIN: ICD-10-PCS | Mod: S$GLB,,, | Performed by: PSYCHIATRY & NEUROLOGY

## 2019-10-04 PROCEDURE — 99999 PR PBB SHADOW E&M-EST. PATIENT-LVL II: CPT | Mod: PBBFAC,,, | Performed by: PSYCHIATRY & NEUROLOGY

## 2019-10-04 PROCEDURE — 3008F BODY MASS INDEX DOCD: CPT | Mod: CPTII,S$GLB,, | Performed by: PSYCHIATRY & NEUROLOGY

## 2019-10-04 RX ORDER — CLONAZEPAM 0.5 MG/1
0.5 TABLET ORAL DAILY PRN
Qty: 30 TABLET | Refills: 2 | Status: SHIPPED | OUTPATIENT
Start: 2019-10-04 | End: 2020-01-22 | Stop reason: SDUPTHER

## 2019-10-04 RX ORDER — TRAZODONE HYDROCHLORIDE 100 MG/1
TABLET ORAL
Qty: 180 TABLET | Refills: 0 | Status: SHIPPED | OUTPATIENT
Start: 2019-10-04 | End: 2019-10-21 | Stop reason: SDUPTHER

## 2019-10-04 RX ORDER — DULOXETIN HYDROCHLORIDE 60 MG/1
60 CAPSULE, DELAYED RELEASE ORAL DAILY
Qty: 90 CAPSULE | Refills: 0 | Status: SHIPPED | OUTPATIENT
Start: 2019-10-04 | End: 2019-10-21 | Stop reason: SDUPTHER

## 2019-10-04 RX ORDER — BUSPIRONE HYDROCHLORIDE 15 MG/1
TABLET ORAL
Qty: 360 TABLET | Refills: 0 | Status: SHIPPED | OUTPATIENT
Start: 2019-10-04 | End: 2019-10-21 | Stop reason: SDUPTHER
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
## 2019-10-21 ENCOUNTER — OFFICE VISIT (OUTPATIENT)
Dept: OBSTETRICS AND GYNECOLOGY | Facility: CLINIC | Age: 39
End: 2019-10-21
Payer: COMMERCIAL

## 2019-10-21 ENCOUNTER — LAB VISIT (OUTPATIENT)
Dept: LAB | Facility: HOSPITAL | Age: 39
End: 2019-10-21
Attending: OBSTETRICS & GYNECOLOGY
Payer: COMMERCIAL

## 2019-10-21 ENCOUNTER — PATIENT OUTREACH (OUTPATIENT)
Dept: ADMINISTRATIVE | Facility: HOSPITAL | Age: 39
End: 2019-10-21

## 2019-10-21 VITALS
SYSTOLIC BLOOD PRESSURE: 98 MMHG | BODY MASS INDEX: 25.74 KG/M2 | DIASTOLIC BLOOD PRESSURE: 72 MMHG | HEIGHT: 71 IN | WEIGHT: 183.88 LBS

## 2019-10-21 DIAGNOSIS — Z11.3 SCREENING FOR STDS (SEXUALLY TRANSMITTED DISEASES): ICD-10-CM

## 2019-10-21 DIAGNOSIS — Z01.419 WELL WOMAN EXAM WITH ROUTINE GYNECOLOGICAL EXAM: ICD-10-CM

## 2019-10-21 DIAGNOSIS — Z01.419 WELL WOMAN EXAM WITH ROUTINE GYNECOLOGICAL EXAM: Primary | ICD-10-CM

## 2019-10-21 LAB
ALBUMIN SERPL BCP-MCNC: 4.2 G/DL (ref 3.5–5.2)
ALP SERPL-CCNC: 43 U/L (ref 55–135)
ALT SERPL W/O P-5'-P-CCNC: 35 U/L (ref 10–44)
ANION GAP SERPL CALC-SCNC: 9 MMOL/L (ref 8–16)
AST SERPL-CCNC: 31 U/L (ref 10–40)
BASOPHILS # BLD AUTO: 0.03 K/UL (ref 0–0.2)
BASOPHILS NFR BLD: 0.5 % (ref 0–1.9)
BILIRUB SERPL-MCNC: 0.3 MG/DL (ref 0.1–1)
BUN SERPL-MCNC: 13 MG/DL (ref 6–20)
CALCIUM SERPL-MCNC: 9.4 MG/DL (ref 8.7–10.5)
CHLORIDE SERPL-SCNC: 102 MMOL/L (ref 95–110)
CO2 SERPL-SCNC: 27 MMOL/L (ref 23–29)
CREAT SERPL-MCNC: 0.9 MG/DL (ref 0.5–1.4)
DIFFERENTIAL METHOD: ABNORMAL
EOSINOPHIL # BLD AUTO: 0.1 K/UL (ref 0–0.5)
EOSINOPHIL NFR BLD: 1.9 % (ref 0–8)
ERYTHROCYTE [DISTWIDTH] IN BLOOD BY AUTOMATED COUNT: 13.5 % (ref 11.5–14.5)
EST. GFR  (AFRICAN AMERICAN): >60 ML/MIN/1.73 M^2
EST. GFR  (NON AFRICAN AMERICAN): >60 ML/MIN/1.73 M^2
FSH SERPL-ACNC: 5.6 MIU/ML
GLUCOSE SERPL-MCNC: 90 MG/DL (ref 70–110)
HCT VFR BLD AUTO: 38.1 % (ref 37–48.5)
HGB BLD-MCNC: 12 G/DL (ref 12–16)
IMM GRANULOCYTES # BLD AUTO: 0.01 K/UL (ref 0–0.04)
IMM GRANULOCYTES NFR BLD AUTO: 0.2 % (ref 0–0.5)
LH SERPL-ACNC: 6.5 MIU/ML
LYMPHOCYTES # BLD AUTO: 2.4 K/UL (ref 1–4.8)
LYMPHOCYTES NFR BLD: 36.4 % (ref 18–48)
MCH RBC QN AUTO: 29.7 PG (ref 27–31)
MCHC RBC AUTO-ENTMCNC: 31.5 G/DL (ref 32–36)
MCV RBC AUTO: 94 FL (ref 82–98)
MONOCYTES # BLD AUTO: 0.4 K/UL (ref 0.3–1)
MONOCYTES NFR BLD: 5.6 % (ref 4–15)
NEUTROPHILS # BLD AUTO: 3.6 K/UL (ref 1.8–7.7)
NEUTROPHILS NFR BLD: 55.4 % (ref 38–73)
NRBC BLD-RTO: 0 /100 WBC
PLATELET # BLD AUTO: 251 K/UL (ref 150–350)
PMV BLD AUTO: 10.1 FL (ref 9.2–12.9)
POTASSIUM SERPL-SCNC: 4 MMOL/L (ref 3.5–5.1)
PROLACTIN SERPL IA-MCNC: 9.9 NG/ML (ref 5.2–26.5)
PROT SERPL-MCNC: 7.4 G/DL (ref 6–8.4)
RBC # BLD AUTO: 4.04 M/UL (ref 4–5.4)
SODIUM SERPL-SCNC: 138 MMOL/L (ref 136–145)
TSH SERPL DL<=0.005 MIU/L-ACNC: 0.89 UIU/ML (ref 0.4–4)
WBC # BLD AUTO: 6.46 K/UL (ref 3.9–12.7)

## 2019-10-21 PROCEDURE — 86703 HIV-1/HIV-2 1 RESULT ANTBDY: CPT

## 2019-10-21 PROCEDURE — 87086 URINE CULTURE/COLONY COUNT: CPT

## 2019-10-21 PROCEDURE — 87491 CHLMYD TRACH DNA AMP PROBE: CPT

## 2019-10-21 PROCEDURE — 88141 LIQUID-BASED PAP SMEAR, SCREENING: ICD-10-PCS | Mod: ,,, | Performed by: PATHOLOGY

## 2019-10-21 PROCEDURE — 84402 ASSAY OF FREE TESTOSTERONE: CPT

## 2019-10-21 PROCEDURE — 84146 ASSAY OF PROLACTIN: CPT

## 2019-10-21 PROCEDURE — 36415 COLL VENOUS BLD VENIPUNCTURE: CPT

## 2019-10-21 PROCEDURE — 88141 CYTOPATH C/V INTERPRET: CPT | Mod: ,,, | Performed by: PATHOLOGY

## 2019-10-21 PROCEDURE — 84443 ASSAY THYROID STIM HORMONE: CPT

## 2019-10-21 PROCEDURE — 83001 ASSAY OF GONADOTROPIN (FSH): CPT

## 2019-10-21 PROCEDURE — 99385 PREV VISIT NEW AGE 18-39: CPT | Mod: S$GLB,,, | Performed by: OBSTETRICS & GYNECOLOGY

## 2019-10-21 PROCEDURE — 83002 ASSAY OF GONADOTROPIN (LH): CPT

## 2019-10-21 PROCEDURE — 87661 TRICHOMONAS VAGINALIS AMPLIF: CPT

## 2019-10-21 PROCEDURE — 86592 SYPHILIS TEST NON-TREP QUAL: CPT

## 2019-10-21 PROCEDURE — 85025 COMPLETE CBC W/AUTO DIFF WBC: CPT

## 2019-10-21 PROCEDURE — 88175 CYTOPATH C/V AUTO FLUID REDO: CPT | Performed by: PATHOLOGY

## 2019-10-21 PROCEDURE — 99385 PR PREVENTIVE VISIT,NEW,18-39: ICD-10-PCS | Mod: S$GLB,,, | Performed by: OBSTETRICS & GYNECOLOGY

## 2019-10-21 PROCEDURE — 99999 PR PBB SHADOW E&M-EST. PATIENT-LVL II: ICD-10-PCS | Mod: PBBFAC,,,

## 2019-10-21 PROCEDURE — 80053 COMPREHEN METABOLIC PANEL: CPT

## 2019-10-21 PROCEDURE — 80074 ACUTE HEPATITIS PANEL: CPT

## 2019-10-21 PROCEDURE — 87481 CANDIDA DNA AMP PROBE: CPT | Mod: 59

## 2019-10-21 PROCEDURE — 99999 PR PBB SHADOW E&M-EST. PATIENT-LVL II: CPT | Mod: PBBFAC,,,

## 2019-10-21 PROCEDURE — 87624 HPV HI-RISK TYP POOLED RSLT: CPT

## 2019-10-21 RX ORDER — CETIRIZINE HYDROCHLORIDE 10 MG/1
10 TABLET ORAL DAILY PRN
Refills: 0 | COMMUNITY
Start: 2019-08-11

## 2019-10-21 RX ORDER — MIDODRINE HYDROCHLORIDE 2.5 MG/1
2.5 TABLET ORAL 2 TIMES DAILY WITH MEALS
COMMUNITY
End: 2020-03-18

## 2019-10-21 RX ORDER — DULOXETIN HYDROCHLORIDE 60 MG/1
60 CAPSULE, DELAYED RELEASE ORAL NIGHTLY
COMMUNITY
End: 2020-01-22 | Stop reason: SDUPTHER

## 2019-10-21 RX ORDER — RIZATRIPTAN BENZOATE 10 MG/1
10 TABLET ORAL DAILY PRN
COMMUNITY
Start: 2017-12-20

## 2019-10-21 RX ORDER — BUSPIRONE HYDROCHLORIDE 15 MG/1
15 TABLET ORAL NIGHTLY
COMMUNITY
End: 2020-01-22 | Stop reason: SDUPTHER

## 2019-10-21 RX ORDER — TRAZODONE HYDROCHLORIDE 100 MG/1
100 TABLET ORAL NIGHTLY
COMMUNITY
End: 2020-01-22

## 2019-10-21 NOTE — PROGRESS NOTES
Pap completd  9/6/2016 Result. This result has an attachment that is not available. LVM Re info on pap smear.

## 2019-10-22 ENCOUNTER — PATIENT MESSAGE (OUTPATIENT)
Dept: INTERNAL MEDICINE | Facility: CLINIC | Age: 39
End: 2019-10-22

## 2019-10-22 LAB
BACTERIA UR CULT: NORMAL
BACTERIAL VAGINOSIS DNA: POSITIVE
C TRACH DNA SPEC QL NAA+PROBE: NOT DETECTED
CANDIDA GLABRATA DNA: NEGATIVE
CANDIDA KRUSEI DNA: NEGATIVE
CANDIDA RRNA VAG QL PROBE: NEGATIVE
HAV IGM SERPL QL IA: NEGATIVE
HBV CORE IGM SERPL QL IA: NEGATIVE
HBV SURFACE AG SERPL QL IA: NEGATIVE
HBV SURFACE AG SERPL QL IA: NEGATIVE
HCV AB SERPL QL IA: NEGATIVE
HIV 1+2 AB+HIV1 P24 AG SERPL QL IA: NEGATIVE
N GONORRHOEA DNA SPEC QL NAA+PROBE: NOT DETECTED
RPR SER QL: NORMAL
T VAGINALIS RRNA GENITAL QL PROBE: NEGATIVE

## 2019-10-24 ENCOUNTER — PATIENT MESSAGE (OUTPATIENT)
Dept: INTERNAL MEDICINE | Facility: CLINIC | Age: 39
End: 2019-10-24

## 2019-10-24 LAB
HPV HR 12 DNA CVX QL NAA+PROBE: POSITIVE
HPV16 AG SPEC QL: NEGATIVE
HPV18 DNA SPEC QL NAA+PROBE: NEGATIVE
TESTOST FREE SERPL-MCNC: 0.9 PG/ML

## 2019-10-24 NOTE — PROGRESS NOTES
Subjective:       Patient ID: Arielle Verde is a 39 y.o. female.    Chief Complaint:  No chief complaint on file.      History of Present Illness  HPI  Annual Exam-Premenopausal  Patient presents for annual exam. The patient has no complaints today. The patient is sexually active. GYN screening history: last pap: was normal.  She currently has Mirena IUD which was placed in January 2018.  No complaints today, but she requests full panel of STD testing because she has gotten back together with her ex and they have both been with other partners in the interim. She would also like to have her hormones and thyroid checked for baseline, in case she desires to conceive in the near future.          GYN & OB History  No LMP recorded. Patient has had an implant.   Date of Last Pap: 10/22/2019    OB History   No data available       Review of Systems  Review of Systems       Constitutional:  Negative for fatigue; reports weight gain of roughly 17 lbs this year  Breasts:  Negative for masses, tenderness, or nipple discharge  Gastrointestinal:  Negative for nausea, abdominal pain, and abdominal distension  Genitourinary:  Negative for dysuria, frequency, or urgency  Gyn:  Negative for vaginal bleeding, pelvic pain, or vaginal discharge     Objective:    Physical Exam:   Constitutional: She is oriented to person, place, and time. She appears well-developed and well-nourished. No distress.      Neck: Neck supple. No thyromegaly present.    Cardiovascular: Normal heart sounds and intact distal pulses.     Pulmonary/Chest: Effort normal. No respiratory distress. She exhibits no mass, no tenderness, no deformity and no retraction. Right breast exhibits no mass, no nipple discharge and no tenderness. Left breast exhibits no mass, no nipple discharge and no tenderness. Breasts are symmetrical.        Abdominal: Soft. Bowel sounds are normal. She exhibits no distension. There is no tenderness. There is no guarding.      Genitourinary: Vagina normal and uterus normal. Uterus is not tender. Cervix is normal. Right adnexum displays no mass and no tenderness. Left adnexum displays no mass and no tenderness. No tenderness in the vagina. Labial bartholins normal.Cervix exhibits no motion tenderness.   Genitourinary Comments: iud strings visible extending from cervical os                Lymphadenopathy:        Right: No inguinal adenopathy present.        Left: No inguinal adenopathy present.    Neurological: She is alert and oriented to person, place, and time. She has normal reflexes.    Skin: Skin is warm and dry.    Psychiatric: She has a normal mood and affect.                      Physical Exam:    Constitutional:  She appears well developed and well nourished.  No distress.  Neck:  Supple.  No thyromegaly present.  Breasts:  Symmetrical.  No skin changes or visible lesions.  No palpable masses, nipple discharge, or lymphadenopathy bilaterally.    Abdominal:  Soft, no distension.  No tenderness.  No masses  Pelvic:  Vulva:  No lesions.  Normal female genitalia  Urethral Meatus:  Normal size and location.  No lesions.  No prolapse.  Urethra:  No masses, tenderness, prolapse, or scarring  Vagina:  No lesions or discharge.  No significant cystocoele or rectocoele.  Cervix:  Surgically absent  Uterus:  Surgically absent  Adnexa:  No masses or tenderness  Anus and Perineum:  No lesions.  No external hemorrhoids      ASSESSMENT:  1. Well woman exam with routine gynecological exam  Liquid-based pap smear, screening    HPV High Risk Genotypes, PCR    C. trachomatis/N. gonorrhoeae by AMP DNA    HIV 1/2 Ag/Ab (4th Gen)    RPR    Hepatitis panel, acute    Hepatitis B surface antigen    Follicle stimulating hormone    Luteinizing hormone    TSH    Prolactin    Testosterone, free    CBC auto differential    Comprehensive metabolic panel    Urine culture    Vaginosis Screen by DNA Probe   :      PLAN:  Patient counseled regarding recommended  routine health maintenance for her age.       Assessment:             The primary encounter diagnosis was Well woman exam with routine gynecological exam. A diagnosis of Screening for STDs (sexually transmitted diseases) was also pertinent to this visit.     Plan:            Orders Placed This Encounter   Procedures    HPV High Risk Genotypes, PCR    C. trachomatis/N. gonorrhoeae by AMP DNA    Urine culture    Vaginosis Screen by DNA Probe    HIV 1/2 Ag/Ab (4th Gen)    RPR    Hepatitis panel, acute    Hepatitis B surface antigen    Follicle stimulating hormone    Luteinizing hormone    TSH    Prolactin    Testosterone, free    CBC auto differential    Comprehensive metabolic panel   Follow up with results of labwork and pap smear.  Patient was meant to see Dr. Willis today, but due to unexpected surgery, patient agreed to see me.  The patient was discussed with Dr. Willis for collaborative care of Ms. Verde.  She will follow up with Dr. Willis for removal of her Mirena IUD if she desires to attempt conceiving within the next year.  Otherwise, she can return in 1 year for annual visit.

## 2019-10-25 ENCOUNTER — TELEPHONE (OUTPATIENT)
Dept: OBSTETRICS AND GYNECOLOGY | Facility: CLINIC | Age: 39
End: 2019-10-25

## 2019-10-25 ENCOUNTER — PATIENT MESSAGE (OUTPATIENT)
Dept: NEUROLOGY | Facility: CLINIC | Age: 39
End: 2019-10-25

## 2019-10-25 RX ORDER — FLUCONAZOLE 150 MG/1
150 TABLET ORAL DAILY
Qty: 1 TABLET | Refills: 1 | Status: SHIPPED | OUTPATIENT
Start: 2019-10-25 | End: 2019-10-26

## 2019-10-25 RX ORDER — METRONIDAZOLE 500 MG/1
500 TABLET ORAL EVERY 12 HOURS
Qty: 14 TABLET | Refills: 0 | Status: SHIPPED | OUTPATIENT
Start: 2019-10-25 | End: 2019-11-01

## 2019-10-25 NOTE — TELEPHONE ENCOUNTER
Spoke with patient regarding her HPV co-test results.  They were released to patient portal despite having not received final path report for pap smear.  Explained the purpose of the co-testing and how the results are informative and pending her pap smear results, it will help us determine what next steps may be needed.  She verbalizes understanding.

## 2019-10-28 ENCOUNTER — PATIENT MESSAGE (OUTPATIENT)
Dept: INTERNAL MEDICINE | Facility: CLINIC | Age: 39
End: 2019-10-28

## 2019-10-28 NOTE — PROGRESS NOTES
I just notified pt her pap smear is abnormal and requires further testing with colposcopy.   Please schedule colpo

## 2019-10-29 ENCOUNTER — TELEPHONE (OUTPATIENT)
Dept: OBSTETRICS AND GYNECOLOGY | Facility: CLINIC | Age: 39
End: 2019-10-29

## 2019-10-29 NOTE — TELEPHONE ENCOUNTER
----- Message from Rachele Willis MD sent at 10/28/2019  5:05 PM CDT -----  I just notified pt her pap smear is abnormal and requires further testing with colposcopy.   Please schedule colpo

## 2019-11-04 ENCOUNTER — PROCEDURE VISIT (OUTPATIENT)
Dept: OBSTETRICS AND GYNECOLOGY | Facility: CLINIC | Age: 39
End: 2019-11-04
Payer: COMMERCIAL

## 2019-11-04 VITALS
BODY MASS INDEX: 25.96 KG/M2 | HEIGHT: 71 IN | SYSTOLIC BLOOD PRESSURE: 100 MMHG | WEIGHT: 185.44 LBS | DIASTOLIC BLOOD PRESSURE: 70 MMHG

## 2019-11-04 DIAGNOSIS — R87.612 LOW GRADE SQUAMOUS INTRAEPITHELIAL LESION (LGSIL) ON CERVICAL PAP SMEAR: ICD-10-CM

## 2019-11-04 DIAGNOSIS — Z01.818 PRE-PROCEDURAL EXAMINATION: Primary | ICD-10-CM

## 2019-11-04 LAB
B-HCG UR QL: NEGATIVE
CTP QC/QA: YES

## 2019-11-04 PROCEDURE — 88305 TISSUE SPECIMEN TO PATHOLOGY, OBSTETRICS/GYNECOLOGY: ICD-10-PCS | Mod: 26,,, | Performed by: PATHOLOGY

## 2019-11-04 PROCEDURE — 57454 BX/CURETT OF CERVIX W/SCOPE: CPT | Mod: S$GLB,,, | Performed by: OBSTETRICS & GYNECOLOGY

## 2019-11-04 PROCEDURE — 57454 PR COLPOSC,CERVIX W/ADJ VAG,W/BX & CURRETAG: ICD-10-PCS | Mod: S$GLB,,, | Performed by: OBSTETRICS & GYNECOLOGY

## 2019-11-04 PROCEDURE — 88305 TISSUE EXAM BY PATHOLOGIST: CPT | Performed by: PATHOLOGY

## 2019-11-04 PROCEDURE — 87491 CHLMYD TRACH DNA AMP PROBE: CPT

## 2019-11-04 PROCEDURE — 88305 TISSUE EXAM BY PATHOLOGIST: CPT | Mod: 26,,, | Performed by: PATHOLOGY

## 2019-11-04 PROCEDURE — 81025 POCT URINE PREGNANCY: ICD-10-PCS | Mod: S$GLB,,, | Performed by: OBSTETRICS & GYNECOLOGY

## 2019-11-04 PROCEDURE — 81025 URINE PREGNANCY TEST: CPT | Mod: S$GLB,,, | Performed by: OBSTETRICS & GYNECOLOGY

## 2019-11-04 NOTE — PROCEDURES
Colposcopy W/BIOPSY AND ECC  Date/Time: 11/4/2019 1:15 PM  Performed by: Rachele Willis MD  Authorized by: Rachele Willis MD   Preparation: Patient was prepped and draped in the usual sterile fashion.  Local anesthesia used: no    Anesthesia:  Local anesthesia used: no    Sedation:  Patient sedated: no    Patient tolerance: Patient tolerated the procedure well with no immediate complications              COLPOSCOPY:    Arielle Verde is a 39 y.o. female No obstetric history on file.  presents for colposcopy.  No LMP recorded. Patient has had an implant..  Her most recent pap smear shows low-grade squamous intraepithelial neoplasia (LGSIL - encompassing HPV,mild dysplasia,SALLIE I).      This is her first ever abnl pap. She is present w her on and off significant other: her baby Jose's father.       The abnormal test findings were discussed, as well as HPV infection, need for colposcopy and possible biopsies to determine the plan of care, treatments available, the minimal risk of bleeding and infection with colposcopy, and alternatives to colposcopy and she agrees to proceed.      UPT is negative    COLPOSCOPY EXAM:   TIME OUT PERFORMED.     There were no vulvar, vaginal or cervical lesions.  The transformation zone was visualized.     acetowhite lesion(s) noted at 11, 5 o'clock    Biopsy was taken at 2, 5 o'clock.  ECC was performed    Hemostasis was adequate with application of Monsel's solution.  The speculum was removed.  The patient did tolerate the procedure well.    All collected specimens sent to pathology for histologic analysis.    Post-colposcopy counseling:  The patient was instructed to manage post-colposcopy cramping with NSAIDs or Tylenol, or with a prescription per the medication card.  Avoid intercourse, douching, or tampons in the vagina for at least 2-3 days.  Expect a clumpy blackish discharge due to Monsel's solution application for several days.  Report heavy bleeding,  worsening pain or pain that does not respond to above medications, or foul-smelling vaginal discharge. HPV vaccine recommended according to FDA age guidelines.  Importance of follow-up stressed.      Follow up based on colposcopy results.

## 2019-11-05 LAB
C TRACH DNA SPEC QL NAA+PROBE: NOT DETECTED
N GONORRHOEA DNA SPEC QL NAA+PROBE: NOT DETECTED

## 2019-11-07 ENCOUNTER — PATIENT MESSAGE (OUTPATIENT)
Dept: OBSTETRICS AND GYNECOLOGY | Facility: CLINIC | Age: 39
End: 2019-11-07

## 2019-11-08 ENCOUNTER — PATIENT MESSAGE (OUTPATIENT)
Dept: OBSTETRICS AND GYNECOLOGY | Facility: CLINIC | Age: 39
End: 2019-11-08

## 2019-11-12 ENCOUNTER — OFFICE VISIT (OUTPATIENT)
Dept: INTERNAL MEDICINE | Facility: CLINIC | Age: 39
End: 2019-11-12
Payer: COMMERCIAL

## 2019-11-12 ENCOUNTER — TELEPHONE (OUTPATIENT)
Dept: OBSTETRICS AND GYNECOLOGY | Facility: CLINIC | Age: 39
End: 2019-11-12

## 2019-11-12 VITALS
DIASTOLIC BLOOD PRESSURE: 60 MMHG | OXYGEN SATURATION: 99 % | WEIGHT: 183.88 LBS | TEMPERATURE: 97 F | BODY MASS INDEX: 25.64 KG/M2 | SYSTOLIC BLOOD PRESSURE: 100 MMHG | HEART RATE: 80 BPM

## 2019-11-12 DIAGNOSIS — F32.A DEPRESSION, UNSPECIFIED DEPRESSION TYPE: Primary | ICD-10-CM

## 2019-11-12 PROCEDURE — 3008F PR BODY MASS INDEX (BMI) DOCUMENTED: ICD-10-PCS | Mod: CPTII,S$GLB,, | Performed by: FAMILY MEDICINE

## 2019-11-12 PROCEDURE — 99213 PR OFFICE/OUTPT VISIT, EST, LEVL III, 20-29 MIN: ICD-10-PCS | Mod: S$GLB,,, | Performed by: FAMILY MEDICINE

## 2019-11-12 PROCEDURE — 99999 PR PBB SHADOW E&M-EST. PATIENT-LVL IV: ICD-10-PCS | Mod: PBBFAC,,, | Performed by: FAMILY MEDICINE

## 2019-11-12 PROCEDURE — 99213 OFFICE O/P EST LOW 20 MIN: CPT | Mod: S$GLB,,, | Performed by: FAMILY MEDICINE

## 2019-11-12 PROCEDURE — 3008F BODY MASS INDEX DOCD: CPT | Mod: CPTII,S$GLB,, | Performed by: FAMILY MEDICINE

## 2019-11-12 PROCEDURE — 99999 PR PBB SHADOW E&M-EST. PATIENT-LVL IV: CPT | Mod: PBBFAC,,, | Performed by: FAMILY MEDICINE

## 2019-11-12 NOTE — PROGRESS NOTES
Subjective:       Patient ID: Arielle Verde is a 39 y.o. female.    Chief Complaint: No chief complaint on file.    HPI     38 yo F    Depression    Feels disconnected.  Feels depressed.worse than usual  Not herself  Others have noticed  No SI/HI.    Has been drinking alcohol - denies daily - but at times excessively / binge drinking          Review of Systems   Constitutional: Negative for activity change.   Eyes: Negative for discharge.   Respiratory: Negative for wheezing.    Cardiovascular: Positive for palpitations. Negative for chest pain.   Gastrointestinal: Negative for blood in stool, constipation, diarrhea and vomiting.   Genitourinary: Negative for difficulty urinating and hematuria.   Neurological: Positive for headaches.   Psychiatric/Behavioral: Positive for dysphoric mood.       Objective:       Vitals:    11/12/19 1314   BP: 100/60   Pulse: 80   Temp: 96.9 °F (36.1 °C)       Physical Exam   Constitutional: She is oriented to person, place, and time. She appears well-developed. No distress.   Cardiovascular: Normal rate.   Pulmonary/Chest: Breath sounds normal.   Neurological: She is alert and oriented to person, place, and time.   Skin: No pallor.   Psychiatric: She has a normal mood and affect. Her behavior is normal. Judgment and thought content normal.       Assessment:       1. Depression, unspecified depression type        Plan:   Depression, unspecified depression type  -     Ambulatory consult to Psychology        Depression  Denies any SI or HI - just feels she is not herself.   Chronic and significant problem  On 4 psychiatric medications at present.  Considered transition to SSRI from SNRI - however she reports has done those in the past and did not seem interested in try. I will defer pharmacologic changes to her psychiatrist.   Discussed non-pharmacologic methods of depression management.  Advising she removes alcohol intake.  Advising she considers taking a break from social  media  Improve diet - discussed gut biome  Discussed necessity of physical activity  Encouraged to discuss other potential options with her psychiatrist - ECT? Or other alternative means of management  We will obtain a psychologist for her to discuss with  Message psychiatrist  Consult psychology

## 2019-11-12 NOTE — TELEPHONE ENCOUNTER
Spoke with pt., advised pt. Dr Willis hasn't reviewed the results, notified pt will forward result request to Dr Willis.  Pt verbalized understanding.

## 2019-11-13 ENCOUNTER — PATIENT MESSAGE (OUTPATIENT)
Dept: OBSTETRICS AND GYNECOLOGY | Facility: CLINIC | Age: 39
End: 2019-11-13

## 2019-11-13 ENCOUNTER — TELEPHONE (OUTPATIENT)
Dept: OBSTETRICS AND GYNECOLOGY | Facility: CLINIC | Age: 39
End: 2019-11-13

## 2019-11-13 NOTE — TELEPHONE ENCOUNTER
----- Message from Ramila Vinson sent at 11/13/2019  4:21 PM CST -----  Contact: pt  .Type:  Patient Returning Call    Who Called:pt  Who Left Message for Patient:[facility]  Does the patient know what this is regarding?:schedule surgery  Would the patient rather a call back or a response via [facility]? Call back  Best Call Back Number:[phone]  Additional Information:

## 2019-11-13 NOTE — TELEPHONE ENCOUNTER
Returned pt call in regards to scheduling a CKC. Pt stated that she received a call from [facility] GUPTA and she missed the call. Informed pt that I would let [facility] know she called back. Pt verbalized understanding.

## 2019-11-14 ENCOUNTER — TELEPHONE (OUTPATIENT)
Dept: OBSTETRICS AND GYNECOLOGY | Facility: CLINIC | Age: 39
End: 2019-11-14

## 2019-11-14 NOTE — TELEPHONE ENCOUNTER
Spoke with pt. Pt stated she needs to know when surgery (CKC) will be so she can make arrangements for work and for her mom to come help her from [place]. Please advise. Thank you.

## 2019-11-14 NOTE — TELEPHONE ENCOUNTER
----- Message from Lois Sarkar sent at 11/14/2019  2:58 PM CST -----  Contact: self  needs surgery date/time & documents for work...[phone] (home)

## 2019-11-14 NOTE — TELEPHONE ENCOUNTER
Spoke to patient in regards to scheduling surgery.  Genesis is aware of a date that she wants to squeeze patient in- will check schedule and get back with patient this afternoon or tomorrow.

## 2019-11-15 ENCOUNTER — TELEPHONE (OUTPATIENT)
Dept: OBSTETRICS AND GYNECOLOGY | Facility: CLINIC | Age: 39
End: 2019-11-15

## 2019-11-15 DIAGNOSIS — R87.612 LOW GRADE SQUAMOUS INTRAEPITHELIAL LESION (LGSIL) ON CERVICAL PAP SMEAR: Primary | ICD-10-CM

## 2019-11-15 NOTE — TELEPHONE ENCOUNTER
----- Message from Edy Minor sent at 11/15/2019  8:49 AM CST -----  Contact: pt   .Type:  Patient Returning Call    Who Called: pt   Who Left Message for Patient: kim   Does the patient know what this is regarding?: yes/ procedure   Would the patient rather a call back or a response via [facility]? Callback   Best Call Back Number: .[phone]    Additional Information:

## 2019-11-15 NOTE — TELEPHONE ENCOUNTER
Returned patient call and notified patient that [facility] would be giving the patient a call back to inform patient of surgery time and date as soon as possible. Patient verbalized understanding.

## 2019-11-18 ENCOUNTER — OFFICE VISIT (OUTPATIENT)
Dept: OBSTETRICS AND GYNECOLOGY | Facility: CLINIC | Age: 39
End: 2019-11-18
Payer: COMMERCIAL

## 2019-11-18 ENCOUNTER — HOSPITAL ENCOUNTER (OUTPATIENT)
Dept: PREADMISSION TESTING | Facility: HOSPITAL | Age: 39
Discharge: HOME | End: 2019-11-18
Attending: OBSTETRICS & GYNECOLOGY
Payer: COMMERCIAL

## 2019-11-18 VITALS
RESPIRATION RATE: 20 BRPM | WEIGHT: 180 LBS | DIASTOLIC BLOOD PRESSURE: 60 MMHG | SYSTOLIC BLOOD PRESSURE: 97 MMHG | TEMPERATURE: 98 F | BODY MASS INDEX: 25.2 KG/M2 | HEIGHT: 71 IN | HEART RATE: 77 BPM

## 2019-11-18 VITALS
WEIGHT: 187.38 LBS | DIASTOLIC BLOOD PRESSURE: 60 MMHG | SYSTOLIC BLOOD PRESSURE: 97 MMHG | BODY MASS INDEX: 26.23 KG/M2 | HEIGHT: 71 IN

## 2019-11-18 DIAGNOSIS — Z01.818 PREOP EXAMINATION: Primary | ICD-10-CM

## 2019-11-18 PROCEDURE — 99499 UNLISTED E&M SERVICE: CPT | Mod: S$GLB,,, | Performed by: OBSTETRICS & GYNECOLOGY

## 2019-11-18 PROCEDURE — 99499 NO LOS: ICD-10-PCS | Mod: S$GLB,,, | Performed by: OBSTETRICS & GYNECOLOGY

## 2019-11-18 PROCEDURE — 99999 PR PBB SHADOW E&M-EST. PATIENT-LVL II: CPT | Mod: PBBFAC,,, | Performed by: OBSTETRICS & GYNECOLOGY

## 2019-11-18 PROCEDURE — 99999 PR PBB SHADOW E&M-EST. PATIENT-LVL II: ICD-10-PCS | Mod: PBBFAC,,, | Performed by: OBSTETRICS & GYNECOLOGY

## 2019-11-18 NOTE — DISCHARGE INSTRUCTIONS
To confirm, Your doctor has instructed you that surgery is scheduled for 11/27/19  at  11:30 a.m.       Please report to [facility], [facility], 1st floor, main lobby by 10:00 a.m.  Pre admit office will call afternoon prior to surgery with final arrival time    INSTRUCTIONS IMPORTANT!!!   Do not eat, drink, or smoke after 12 midnight, may have water and apple juice until 3 hours prior to surgery. OK to brush teeth, no gum, candy or mints!    ¨ Take only these medicines with a small swallow of water-morning of surgery.  Klonopin, if needed    Pre operative instructions:  Please review the Pre-Operative Instruction booklet that you were given.        Bathing Instructions--See page 6 in the Pre-operative booklet.      Prevention of surgical site infections:     -Keep incisions clean and dry.   -Do not soak/submerge incisions in water until completely healed.   -Do not apply lotions, powders, creams, or deodorants to site.   -Always make sure hands are cleaned with antibacterial soap/ alcohol-based                 prior to touching the surgical site.  (This includes doctors,                 nurses, staff, and yourself.)    Signs and symptoms:   -Redness and pain around the area where you had surgery   -Drainage of cloudy fluid from your surgical wound   -Fever over 100.4       I have read or had read and explained to me, and understand the above information.  Additional comments or instructions:  Received a copy of Pre-operative instructions booklet, FAQ surgical site infection sheet, and packets of hibiclens (if indicated).

## 2019-11-18 NOTE — LETTER
November 18, 2019      [facility] - OB/ GYN  [id number]  [place] [facility] [address]  Phone: [phone]  Fax: [phone]       Patient: Arielle Verde   YOB: 1980  Date of Visit: 11/18/2019    To Whom It May Concern:    Rajesh Verde  was at [facility] on 11/18/2019 for a preoperative appointment. She will be having surgery on 11/27/19. It is in my medical opinion that Ms. Verde will need to be off of work starting 11/27/19 through 12/1/19, for recovery and may return to work on 12/02/19 with no restrictions. If you have any questions or concerns, or if I can be of further assistance, please do not hesitate to contact me.    Sincerely,      Rachele Willis MD

## 2019-11-18 NOTE — H&P (VIEW-ONLY)
39 y.o. Arielle Verde   For preoperative appointment for CKC.      SPECIMEN  1) Endocervical curettage.  2) Cervix at 11 o'clock.  3) Cervix at 5 o'clock.  FINAL PATHOLOGIC DIAGNOSIS  1. Cervix, endocervix, curettage:  -FRAGMENTS OF MUCUS AND ACUTE INFLAMMATION ALONG WITH RARE MINUTE DETACHED  FRAGMENTS OF ENDOCERVICAL EPITHELIUM, NEGATIVE FOR DYSPLASIA OR MALIGNANCY  2. Cervix, 11 o'clock, biopsy:  -HIGH-GRADE SQUAMOUS INTRAEPITHELIAL LESION, CERVICAL INTRAEPITHELIAL NEOPLASIA (SALLIE 3)  WITH SUPERFICIAL GLANDULAR INVOLVEMENT  3. Cervix, 5 o'clock, biopsy:  -LOW-GRADE SQUAMOUS INTRAEPITHELIAL LESION, CERVICAL INTRAEPITHELIAL NEOPLASIA (SALLIE 1)        Chief Complaint   Patient presents with    Pre-op Exam       Patient Active Problem List    Diagnosis Date Noted    Common migraine with intractable migraine 10/30/2018    Use of medication with teratogenic potential in female of reproductive age 10/30/2018    Orthostasis 10/30/2018    Postural hypotension 10/02/2018    Heart palpitations 10/02/2018    PLMD (periodic limb movement disorder)     Anxiety associated with depression 2018    Mild episode of recurrent major depressive disorder 2018    Chronic bilateral low back pain without sciatica 2017    Generalized headaches 2017       Past Medical History:   Diagnosis Date    Anxiety     Cancer     cervical cancer    Chronic headaches     Depression     Migraine        Past Surgical History:   Procedure Laterality Date    ADENOIDECTOMY      BREAST SURGERY      breast augmentatin    Hyperhydrosis      TILT TABLE TEST N/A 2019    Procedure: TILT TABLE TEST;  Surgeon: Justin Fremean MD;  Location: [facility] CATH LAB;  Service: Cardiology;  Laterality: N/A;  Michael pt/pt req 930 start time    TONSILLECTOMY         Family History   Problem Relation Age of Onset    Lupus Mother     Ulcerative colitis Mother     Kidney failure Father     Drug abuse  Father     Breast cancer Paternal Grandmother     Colon cancer Neg Hx     Ovarian cancer Neg Hx        Social History     Socioeconomic History    Marital status: Single     Spouse name: Not on file    Number of children: Not on file    Years of education: Not on file    Highest education level: Not on file   Occupational History    Occupation: Podiatry nurse    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   Social Needs    Financial resource strain: Not on file    Food insecurity:     Worry: Not on file     Inability: Not on file    Transportation needs:     Medical: Not on file     Non-medical: Not on file   Tobacco Use    Smoking status: Current Some Day Smoker    Smokeless tobacco: Never Used    Tobacco comment: no smoking after m.n prior to sx   Substance and Sexual Activity    Alcohol use: Yes     Comment: Occasionally  No alcohol 72h prior to sx    Drug use: No    Sexual activity: Yes     Partners: Male     Birth control/protection: IUD   Lifestyle    Physical activity:     Days per week: Not on file     Minutes per session: Not on file    Stress: Not on file   Relationships    Social connections:     Talks on phone: Not on file     Gets together: Not on file     Attends Islam service: Not on file     Active member of club or organization: Not on file     Attends meetings of clubs or organizations: Not on file     Relationship status: Not on file   Other Topics Concern    Not on file   Social History Narrative    Not on file       Current Outpatient Medications   Medication Sig Dispense Refill    ASCORBATE CALCIUM (VITAMIN C ORAL) Take 1 tablet by mouth every evening.       aspirin 81 MG Chew Take 81 mg by mouth every evening.       busPIRone (BUSPAR) 15 MG tablet Take 15 mg by mouth every evening.       cetirizine (ZYRTEC) 10 MG tablet Take 10 mg by mouth daily as needed.   0    clonazePAM (KLONOPIN) 0.5 MG tablet Take 1 tablet (0.5 mg total) by mouth daily as needed for Anxiety. 30 tablet 2    DULoxetine  (CYMBALTA) 60 MG capsule Take 60 mg by mouth every evening.       fluticasone (FLONASE) 50 mcg/actuation nasal spray 2 sprays (100 mcg total) by Each Nare route once daily. (Patient taking differently: 2 sprays by Each Nostril route daily as needed. ) 16 g 0    folic acid (FOLVITE) 1 MG tablet Take 1 tablet (1 mg total) by mouth once daily. 90 tablet 3    levonorgestrel (MIRENA) 20 mcg/24 hr (5 years) IUD 1 each by Intrauterine route once.      midodrine (PROAMATINE) 2.5 MG Tab Take 2.5 mg by mouth 2 (two) times daily with meals.       promethazine-dextromethorphan (PROMETHAZINE-DM) 6.25-15 mg/5 mL Syrp Take 5 mLs by mouth every 6 to 8 hours as needed. 180 mL 0    rizatriptan (MAXALT) 10 MG tablet Take 10 mg by mouth daily as needed.      traZODone (DESYREL) 100 MG tablet Take 100 mg by mouth every evening.       vitamin D 1000 units Tab Take 1,000 Units by mouth once daily.      zonisamide (ZONEGRAN) 100 MG Cap Take 2 capsules (200 mg total) by mouth every evening. 180 capsule 3     No current facility-administered medications for this visit.        Review of patient's allergies indicates:   Allergen Reactions    Emgality [galcanezumab-gnlm] Hives and Itching    Topiramate Itching       ROS:  GENERAL: No fever, chills, fatigability or weight loss.  CHEST: no chest pain, shortness of breath or palpitations.  ABDOMEN: Appetite fine. No weight loss. Denies diarrhea, abdominal pain, hematemesis or blood in stool.  URINARY: No flank pain, dysuria or hematuria.  BREASTS: Breasts symmetric, nontender and no lumps detected.       PHYSICAL EXAM    Vitals:    11/18/19 1420   BP: 97/60       APPEARANCE: Well nourished, well developed, in no acute distress.      COUNSELING  Discussed with patient the risks of surgery, including but not limited to, missed cervical cancer,  risks of anesthesia, infection, bleeding, injury to other organs, such as skin, nerves, arteries, veins, bowel, bladder, ureters, with possible  need for reparative or subsequent surgery such as bowel resection/repair, hernia, colostomy, bladder/ureter surgery, blood transfusion, possible hysterectomyThere is also risks of development of deep venous thrombosis, pulmonary embolus, myocardial infarction, possible death. The patient verbalizes understanding. Discussed with the Patient the risks/benefits/alternatives of the treatment options.  Consents were signed. Pamphlets given.

## 2019-11-18 NOTE — PROGRESS NOTES
39 y.o. Arielle Verde   For preoperative appointment for CKC.      SPECIMEN  1) Endocervical curettage.  2) Cervix at 11 o'clock.  3) Cervix at 5 o'clock.  FINAL PATHOLOGIC DIAGNOSIS  1. Cervix, endocervix, curettage:  -FRAGMENTS OF MUCUS AND ACUTE INFLAMMATION ALONG WITH RARE MINUTE DETACHED  FRAGMENTS OF ENDOCERVICAL EPITHELIUM, NEGATIVE FOR DYSPLASIA OR MALIGNANCY  2. Cervix, 11 o'clock, biopsy:  -HIGH-GRADE SQUAMOUS INTRAEPITHELIAL LESION, CERVICAL INTRAEPITHELIAL NEOPLASIA (SALLIE 3)  WITH SUPERFICIAL GLANDULAR INVOLVEMENT  3. Cervix, 5 o'clock, biopsy:  -LOW-GRADE SQUAMOUS INTRAEPITHELIAL LESION, CERVICAL INTRAEPITHELIAL NEOPLASIA (SALLIE 1)        Chief Complaint   Patient presents with    Pre-op Exam       Patient Active Problem List    Diagnosis Date Noted    Common migraine with intractable migraine 10/30/2018    Use of medication with teratogenic potential in female of reproductive age 10/30/2018    Orthostasis 10/30/2018    Postural hypotension 10/02/2018    Heart palpitations 10/02/2018    PLMD (periodic limb movement disorder)     Anxiety associated with depression 2018    Mild episode of recurrent major depressive disorder 2018    Chronic bilateral low back pain without sciatica 2017    Generalized headaches 2017       Past Medical History:   Diagnosis Date    Anxiety     Cancer     cervical cancer    Chronic headaches     Depression     Migraine        Past Surgical History:   Procedure Laterality Date    ADENOIDECTOMY      BREAST SURGERY      breast augmentatin    Hyperhydrosis      TILT TABLE TEST N/A 2019    Procedure: TILT TABLE TEST;  Surgeon: Justin Freeman MD;  Location: [facility] CATH LAB;  Service: Cardiology;  Laterality: N/A;  Michael pt/pt req 930 start time    TONSILLECTOMY         Family History   Problem Relation Age of Onset    Lupus Mother     Ulcerative colitis Mother     Kidney failure Father     Drug abuse  Father     Breast cancer Paternal Grandmother     Colon cancer Neg Hx     Ovarian cancer Neg Hx        Social History     Socioeconomic History    Marital status: Single     Spouse name: Not on file    Number of children: Not on file    Years of education: Not on file    Highest education level: Not on file   Occupational History    Occupation: Podiatry nurse    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   Social Needs    Financial resource strain: Not on file    Food insecurity:     Worry: Not on file     Inability: Not on file    Transportation needs:     Medical: Not on file     Non-medical: Not on file   Tobacco Use    Smoking status: Current Some Day Smoker    Smokeless tobacco: Never Used    Tobacco comment: no smoking after m.n prior to sx   Substance and Sexual Activity    Alcohol use: Yes     Comment: Occasionally  No alcohol 72h prior to sx    Drug use: No    Sexual activity: Yes     Partners: Male     Birth control/protection: IUD   Lifestyle    Physical activity:     Days per week: Not on file     Minutes per session: Not on file    Stress: Not on file   Relationships    Social connections:     Talks on phone: Not on file     Gets together: Not on file     Attends Voodoo service: Not on file     Active member of club or organization: Not on file     Attends meetings of clubs or organizations: Not on file     Relationship status: Not on file   Other Topics Concern    Not on file   Social History Narrative    Not on file       Current Outpatient Medications   Medication Sig Dispense Refill    ASCORBATE CALCIUM (VITAMIN C ORAL) Take 1 tablet by mouth every evening.       aspirin 81 MG Chew Take 81 mg by mouth every evening.       busPIRone (BUSPAR) 15 MG tablet Take 15 mg by mouth every evening.       cetirizine (ZYRTEC) 10 MG tablet Take 10 mg by mouth daily as needed.   0    clonazePAM (KLONOPIN) 0.5 MG tablet Take 1 tablet (0.5 mg total) by mouth daily as needed for Anxiety. 30 tablet 2    DULoxetine  (CYMBALTA) 60 MG capsule Take 60 mg by mouth every evening.       fluticasone (FLONASE) 50 mcg/actuation nasal spray 2 sprays (100 mcg total) by Each Nare route once daily. (Patient taking differently: 2 sprays by Each Nostril route daily as needed. ) 16 g 0    folic acid (FOLVITE) 1 MG tablet Take 1 tablet (1 mg total) by mouth once daily. 90 tablet 3    levonorgestrel (MIRENA) 20 mcg/24 hr (5 years) IUD 1 each by Intrauterine route once.      midodrine (PROAMATINE) 2.5 MG Tab Take 2.5 mg by mouth 2 (two) times daily with meals.       promethazine-dextromethorphan (PROMETHAZINE-DM) 6.25-15 mg/5 mL Syrp Take 5 mLs by mouth every 6 to 8 hours as needed. 180 mL 0    rizatriptan (MAXALT) 10 MG tablet Take 10 mg by mouth daily as needed.      traZODone (DESYREL) 100 MG tablet Take 100 mg by mouth every evening.       vitamin D 1000 units Tab Take 1,000 Units by mouth once daily.      zonisamide (ZONEGRAN) 100 MG Cap Take 2 capsules (200 mg total) by mouth every evening. 180 capsule 3     No current facility-administered medications for this visit.        Review of patient's allergies indicates:   Allergen Reactions    Emgality [galcanezumab-gnlm] Hives and Itching    Topiramate Itching       ROS:  GENERAL: No fever, chills, fatigability or weight loss.  CHEST: no chest pain, shortness of breath or palpitations.  ABDOMEN: Appetite fine. No weight loss. Denies diarrhea, abdominal pain, hematemesis or blood in stool.  URINARY: No flank pain, dysuria or hematuria.  BREASTS: Breasts symmetric, nontender and no lumps detected.       PHYSICAL EXAM    Vitals:    11/18/19 1420   BP: 97/60       APPEARANCE: Well nourished, well developed, in no acute distress.      COUNSELING  Discussed with patient the risks of surgery, including but not limited to, missed cervical cancer,  risks of anesthesia, infection, bleeding, injury to other organs, such as skin, nerves, arteries, veins, bowel, bladder, ureters, with possible  need for reparative or subsequent surgery such as bowel resection/repair, hernia, colostomy, bladder/ureter surgery, blood transfusion, possible hysterectomyThere is also risks of development of deep venous thrombosis, pulmonary embolus, myocardial infarction, possible death. The patient verbalizes understanding. Discussed with the Patient the risks/benefits/alternatives of the treatment options.  Consents were signed. Pamphlets given.

## 2019-11-26 ENCOUNTER — TELEPHONE (OUTPATIENT)
Dept: OBSTETRICS AND GYNECOLOGY | Facility: CLINIC | Age: 39
End: 2019-11-26

## 2019-11-26 DIAGNOSIS — Z01.818 PRE-OP TESTING: Primary | ICD-10-CM

## 2019-11-26 NOTE — TELEPHONE ENCOUNTER
Spoke with pre admit. Was notified pt did not have cbc or hematocrit. Called pt to schedule lab work. While calling pt I noticed she did have her labs completed. Called pre admit back. Confirmed all labs were completed.   Notified pt everything was good and ready to go for tomorrow. Pt verbalized understanding.

## 2019-11-27 ENCOUNTER — HOSPITAL ENCOUNTER (OUTPATIENT)
Facility: HOSPITAL | Age: 39
Discharge: HOME | End: 2019-11-27
Attending: OBSTETRICS & GYNECOLOGY | Admitting: OBSTETRICS & GYNECOLOGY
Payer: COMMERCIAL

## 2019-11-27 ENCOUNTER — ANESTHESIA (OUTPATIENT)
Dept: SURGERY | Facility: HOSPITAL | Age: 39
End: 2019-11-27
Payer: COMMERCIAL

## 2019-11-27 ENCOUNTER — ANESTHESIA EVENT (OUTPATIENT)
Dept: SURGERY | Facility: HOSPITAL | Age: 39
End: 2019-11-27
Payer: COMMERCIAL

## 2019-11-27 VITALS
BODY MASS INDEX: 25.93 KG/M2 | HEIGHT: 71 IN | SYSTOLIC BLOOD PRESSURE: 104 MMHG | HEART RATE: 63 BPM | RESPIRATION RATE: 13 BRPM | DIASTOLIC BLOOD PRESSURE: 57 MMHG | WEIGHT: 185.19 LBS | OXYGEN SATURATION: 97 % | TEMPERATURE: 97 F

## 2019-11-27 DIAGNOSIS — N87.9 CERVICAL DYSPLASIA: Primary | ICD-10-CM

## 2019-11-27 DIAGNOSIS — N93.9 ABNORMAL VAGINAL BLEEDING: ICD-10-CM

## 2019-11-27 DIAGNOSIS — R87.612 LOW GRADE SQUAMOUS INTRAEPITHELIAL LESION (LGSIL) ON CERVICAL PAP SMEAR: ICD-10-CM

## 2019-11-27 LAB
B-HCG UR QL: NEGATIVE
CTP QC/QA: YES

## 2019-11-27 PROCEDURE — 81025 URINE PREGNANCY TEST: CPT | Performed by: OBSTETRICS & GYNECOLOGY

## 2019-11-27 PROCEDURE — 88305 TISSUE EXAM BY PATHOLOGIST: CPT | Mod: 26,,, | Performed by: PATHOLOGY

## 2019-11-27 PROCEDURE — 88300 SURGICAL PATH GROSS: CPT | Performed by: PATHOLOGY

## 2019-11-27 PROCEDURE — 88307 TISSUE EXAM BY PATHOLOGIST: CPT | Mod: 26,,, | Performed by: PATHOLOGY

## 2019-11-27 PROCEDURE — 36000707: Performed by: OBSTETRICS & GYNECOLOGY

## 2019-11-27 PROCEDURE — 71000015 HC POSTOP RECOV 1ST HR: Performed by: OBSTETRICS & GYNECOLOGY

## 2019-11-27 PROCEDURE — 63600175 PHARM REV CODE 636 W HCPCS: Performed by: NURSE ANESTHETIST, CERTIFIED REGISTERED

## 2019-11-27 PROCEDURE — 37000009 HC ANESTHESIA EA ADD 15 MINS: Performed by: OBSTETRICS & GYNECOLOGY

## 2019-11-27 PROCEDURE — 88305 TISSUE EXAM BY PATHOLOGIST: CPT | Performed by: PATHOLOGY

## 2019-11-27 PROCEDURE — 25000003 PHARM REV CODE 250: Performed by: ANESTHESIOLOGY

## 2019-11-27 PROCEDURE — 36000706: Performed by: OBSTETRICS & GYNECOLOGY

## 2019-11-27 PROCEDURE — 25000003 PHARM REV CODE 250: Performed by: NURSE ANESTHETIST, CERTIFIED REGISTERED

## 2019-11-27 PROCEDURE — 37000008 HC ANESTHESIA 1ST 15 MINUTES: Performed by: OBSTETRICS & GYNECOLOGY

## 2019-11-27 PROCEDURE — 88305 TISSUE EXAM BY PATHOLOGIST: ICD-10-PCS | Mod: 26,,, | Performed by: PATHOLOGY

## 2019-11-27 PROCEDURE — 88307 PR  SURG PATH,LEVEL V: ICD-10-PCS | Mod: 26,,, | Performed by: PATHOLOGY

## 2019-11-27 PROCEDURE — 25000003 PHARM REV CODE 250: Performed by: OBSTETRICS & GYNECOLOGY

## 2019-11-27 PROCEDURE — 63600175 PHARM REV CODE 636 W HCPCS: Performed by: OBSTETRICS & GYNECOLOGY

## 2019-11-27 PROCEDURE — 71000033 HC RECOVERY, INTIAL HOUR: Performed by: OBSTETRICS & GYNECOLOGY

## 2019-11-27 PROCEDURE — 57520 CONIZATION OF CERVIX: CPT | Mod: ,,, | Performed by: OBSTETRICS & GYNECOLOGY

## 2019-11-27 PROCEDURE — 88307 TISSUE EXAM BY PATHOLOGIST: CPT | Performed by: PATHOLOGY

## 2019-11-27 PROCEDURE — 63600175 PHARM REV CODE 636 W HCPCS: Performed by: ANESTHESIOLOGY

## 2019-11-27 PROCEDURE — 57520 PR CONIZATION CERVIX,KNIFE/LASER: ICD-10-PCS | Mod: ,,, | Performed by: OBSTETRICS & GYNECOLOGY

## 2019-11-27 PROCEDURE — 88300 PR  SURG PATH,GROSS,LEVEL I: ICD-10-PCS | Mod: 26,,, | Performed by: PATHOLOGY

## 2019-11-27 PROCEDURE — 88300 SURGICAL PATH GROSS: CPT | Mod: 26,,, | Performed by: PATHOLOGY

## 2019-11-27 RX ORDER — PROPOFOL 10 MG/ML
VIAL (ML) INTRAVENOUS CONTINUOUS PRN
Status: DISCONTINUED | OUTPATIENT
Start: 2019-11-27 | End: 2019-11-27

## 2019-11-27 RX ORDER — LIDOCAINE HYDROCHLORIDE 10 MG/ML
INJECTION, SOLUTION EPIDURAL; INFILTRATION; INTRACAUDAL; PERINEURAL
Status: DISCONTINUED | OUTPATIENT
Start: 2019-11-27 | End: 2019-11-27

## 2019-11-27 RX ORDER — MEDROXYPROGESTERONE ACETATE 150 MG/ML
150 INJECTION, SUSPENSION INTRAMUSCULAR
Status: SHIPPED | OUTPATIENT
Start: 2019-11-27 | End: 2021-02-19

## 2019-11-27 RX ORDER — KETOROLAC TROMETHAMINE 30 MG/ML
15 INJECTION, SOLUTION INTRAMUSCULAR; INTRAVENOUS EVERY 8 HOURS PRN
Status: DISCONTINUED | OUTPATIENT
Start: 2019-11-27 | End: 2019-11-27 | Stop reason: HOSPADM

## 2019-11-27 RX ORDER — MIDAZOLAM HYDROCHLORIDE 1 MG/ML
INJECTION, SOLUTION INTRAMUSCULAR; INTRAVENOUS
Status: DISCONTINUED | OUTPATIENT
Start: 2019-11-27 | End: 2019-11-27

## 2019-11-27 RX ORDER — IBUPROFEN 600 MG/1
600 TABLET ORAL EVERY 8 HOURS PRN
Qty: 12 TABLET | Refills: 1 | Status: SHIPPED | OUTPATIENT
Start: 2019-11-27 | End: 2020-01-07 | Stop reason: ALTCHOICE

## 2019-11-27 RX ORDER — FENTANYL CITRATE 50 UG/ML
25 INJECTION, SOLUTION INTRAMUSCULAR; INTRAVENOUS EVERY 5 MIN PRN
Status: DISCONTINUED | OUTPATIENT
Start: 2019-11-27 | End: 2019-11-27 | Stop reason: HOSPADM

## 2019-11-27 RX ORDER — SODIUM CHLORIDE, SODIUM LACTATE, POTASSIUM CHLORIDE, CALCIUM CHLORIDE 600; 310; 30; 20 MG/100ML; MG/100ML; MG/100ML; MG/100ML
INJECTION, SOLUTION INTRAVENOUS CONTINUOUS PRN
Status: DISCONTINUED | OUTPATIENT
Start: 2019-11-27 | End: 2019-11-27

## 2019-11-27 RX ORDER — HYDROCODONE BITARTRATE AND ACETAMINOPHEN 5; 325 MG/1; MG/1
1 TABLET ORAL
Status: DISCONTINUED | OUTPATIENT
Start: 2019-11-27 | End: 2019-11-27 | Stop reason: HOSPADM

## 2019-11-27 RX ORDER — SODIUM CHLORIDE 0.9 % (FLUSH) 0.9 %
10 SYRINGE (ML) INJECTION
Status: DISCONTINUED | OUTPATIENT
Start: 2019-11-27 | End: 2019-11-27 | Stop reason: HOSPADM

## 2019-11-27 RX ORDER — MEDROXYPROGESTERONE ACETATE 150 MG/ML
150 INJECTION, SUSPENSION INTRAMUSCULAR ONCE
Status: COMPLETED | OUTPATIENT
Start: 2019-11-27 | End: 2019-11-27

## 2019-11-27 RX ORDER — HYDROCODONE BITARTRATE AND ACETAMINOPHEN 5; 325 MG/1; MG/1
1 TABLET ORAL EVERY 8 HOURS PRN
Qty: 4 TABLET | Refills: 0 | Status: SHIPPED | OUTPATIENT
Start: 2019-11-27 | End: 2020-01-07

## 2019-11-27 RX ORDER — VASOPRESSIN 20 [USP'U]/ML
INJECTION, SOLUTION INTRAMUSCULAR; SUBCUTANEOUS
Status: DISCONTINUED | OUTPATIENT
Start: 2019-11-27 | End: 2019-11-27 | Stop reason: HOSPADM

## 2019-11-27 RX ORDER — ONDANSETRON 2 MG/ML
4 INJECTION INTRAMUSCULAR; INTRAVENOUS DAILY PRN
Status: DISCONTINUED | OUTPATIENT
Start: 2019-11-27 | End: 2019-11-27 | Stop reason: HOSPADM

## 2019-11-27 RX ORDER — SODIUM CHLORIDE 9 MG/ML
INJECTION, SOLUTION INTRAVENOUS CONTINUOUS
Status: DISCONTINUED | OUTPATIENT
Start: 2019-11-27 | End: 2019-11-27 | Stop reason: HOSPADM

## 2019-11-27 RX ORDER — PROPOFOL 10 MG/ML
VIAL (ML) INTRAVENOUS
Status: DISCONTINUED | OUTPATIENT
Start: 2019-11-27 | End: 2019-11-27

## 2019-11-27 RX ORDER — FENTANYL CITRATE 50 UG/ML
INJECTION, SOLUTION INTRAMUSCULAR; INTRAVENOUS
Status: DISCONTINUED | OUTPATIENT
Start: 2019-11-27 | End: 2019-11-27

## 2019-11-27 RX ADMIN — PROPOFOL 50 MG: 10 INJECTION, EMULSION INTRAVENOUS at 11:11

## 2019-11-27 RX ADMIN — FENTANYL CITRATE 100 MCG: 50 INJECTION, SOLUTION INTRAMUSCULAR; INTRAVENOUS at 11:11

## 2019-11-27 RX ADMIN — HYDROCODONE BITARTRATE AND ACETAMINOPHEN 1 TABLET: 5; 325 TABLET ORAL at 12:11

## 2019-11-27 RX ADMIN — FENTANYL CITRATE 25 MCG: 50 INJECTION INTRAMUSCULAR; INTRAVENOUS at 12:11

## 2019-11-27 RX ADMIN — SODIUM CHLORIDE, SODIUM LACTATE, POTASSIUM CHLORIDE, AND CALCIUM CHLORIDE: 600; 310; 30; 20 INJECTION, SOLUTION INTRAVENOUS at 11:11

## 2019-11-27 RX ADMIN — LIDOCAINE HYDROCHLORIDE 50 MG: 10 INJECTION, SOLUTION EPIDURAL; INFILTRATION; INTRACAUDAL; PERINEURAL at 11:11

## 2019-11-27 RX ADMIN — PROPOFOL 50 MCG/KG/MIN: 10 INJECTION, EMULSION INTRAVENOUS at 11:11

## 2019-11-27 RX ADMIN — MIDAZOLAM 2 MG: 1 INJECTION INTRAMUSCULAR; INTRAVENOUS at 11:11

## 2019-11-27 RX ADMIN — MEDROXYPROGESTERONE ACETATE 150 MG: 150 INJECTION, SUSPENSION, EXTENDED RELEASE INTRAMUSCULAR at 03:11

## 2019-11-27 RX ADMIN — KETOROLAC TROMETHAMINE 15 MG: 30 INJECTION, SOLUTION INTRAMUSCULAR; INTRAVENOUS at 12:11

## 2019-11-27 NOTE — ANESTHESIA POSTPROCEDURE EVALUATION
Anesthesia Post Evaluation    Patient: Arielle Verde    Procedure(s) Performed: Procedure(s) (LRB):  CONE BIOPSY, CERVIX, USING COLD (N/A)  REMOVAL, INTRAUTERINE DEVICE    Final Anesthesia Type: MAC    Patient location during evaluation: PACU  Patient participation: Yes- Able to Participate  Level of consciousness: awake and alert  Post-procedure vital signs: reviewed and stable  Pain management: adequate  Airway patency: patent    PONV status at discharge: No PONV  Anesthetic complications: no      Cardiovascular status: hemodynamically stable  Respiratory status: spontaneous ventilation  Hydration status: euvolemic  Follow-up not needed.          Vitals Value Taken Time   /57 11/27/2019  1:05 PM   Temp 36.2 °C (97.2 °F) 11/27/2019  1:05 PM   Pulse 64 11/27/2019  1:07 PM   Resp 21 11/27/2019  1:07 PM   SpO2 98 % 11/27/2019  1:07 PM   Vitals shown include unvalidated device data.      Event Time     Out of Recovery 13:06:53          Pain/Qasim Score: Pain Rating Prior to Med Admin: 6 (11/27/2019 12:49 PM)  Qasim Score: 10 (11/27/2019  1:11 PM)

## 2019-11-27 NOTE — OP NOTE
11/27/2019     PREOP DIAGNOSIS:   As above - cervical dysplasia, see below    POSTOP DIAGNOSIS:  same    PROCEDURE:  CKC, ECC    SURGEON:  CHERISE Willis    ASSISTANT:  First Assistant: TELMA Barnes    Was necessary to assist in performing this case    ANESTHESIA:  MAC    COMPLICATIONS:  None    EBL:  Minimal <2mL          INDICATIONS:  Consents have been signed and reviewed.  Questions have been answered.    PROCEDURE:  Patient was taken to the operating room where general anesthesia was administered and found to be adequate.  She was prepped and draped in normal sterile fashion.  A weighted sterile speculum was placed.  The anterior lip of the cervix was grasped with a single tooth tenaculum.  'Stay' sutures of 0-Chromic were placed at 3 and 9:00. Lugols solution was placed and did not show any discrete lesions.  Vaospressin/saline (20/50 ratio) 5mL was injected pericervical avoiding 3 and 9:00. 11blade knife was used to outline then resect a cone shaped biopsy of cervix. Endocervical curettage was then done. Surface of cervix was cauterized and monsels solution was placed. Hemostasis was noted.    All instruments were removed from the vagina.  Sponge, lap, needle and instrument count was correct x 2.  Patient was taken to the recovery room in stable condition.            10/2019 LGSIL  11/4/19 colpo SPECIMEN  1) Endocervical curettage.  2) Cervix at 11 o'clock.  3) Cervix at 5 o'clock.  FINAL PATHOLOGIC DIAGNOSIS  1. Cervix, endocervix, curettage:  -FRAGMENTS OF MUCUS AND ACUTE INFLAMMATION ALONG WITH RARE MINUTE DETACHED  FRAGMENTS OF ENDOCERVICAL EPITHELIUM, NEGATIVE FOR DYSPLASIA OR MALIGNANCY  2. Cervix, 11 o'clock, biopsy:  -HIGH-GRADE SQUAMOUS INTRAEPITHELIAL LESION, CERVICAL INTRAEPITHELIAL NEOPLASIA (SALLIE 3)  WITH SUPERFICIAL GLANDULAR INVOLVEMENT  3. Cervix, 5 o'clock, biopsy:  -LOW-GRADE SQUAMOUS INTRAEPITHELIAL LESION, CERVICAL INTRAEPITHELIAL NEOPLASIA (SALLIE 1)

## 2019-11-27 NOTE — ANESTHESIA PREPROCEDURE EVALUATION
11/27/2019  Arielle Verde is a 39 y.o., female.    Anesthesia Evaluation    I have reviewed the Patient Summary Reports.    I have reviewed the Nursing Notes.   I have reviewed the Medications.     Review of Systems  Anesthesia Hx:  No problems with previous Anesthesia    Social:  Smoker Social smoker   Hematology/Oncology:         -- Anemia:   Cardiovascular:  Cardiovascular Normal     Pulmonary:  Pulmonary Normal    Renal/:  Renal/ Normal     Hepatic/GI:  Hepatic/GI Normal    Neurological:   Headaches    Endocrine:  Endocrine Normal    Psych:   Psychiatric History          Physical Exam  General:  Well nourished    Airway/Jaw/Neck:  Airway Findings: Mouth Opening: Normal Tongue: Normal  General Airway Assessment: Adult  Mallampati: II  TM Distance: 4 - 6 cm  Jaw/Neck Findings:  Neck ROM: Normal ROM      Dental:  Dental Findings: In tact   Chest/Lungs:  Chest/Lungs Findings: Clear to auscultation, Normal Respiratory Rate     Heart/Vascular:  Heart Findings: Rate: Normal  Rhythm: Regular Rhythm  Sounds: Normal        Mental Status:  Mental Status Findings:  Cooperative, Alert and Oriented         Anesthesia Plan  Type of Anesthesia, risks & benefits discussed:  Anesthesia Type:  general, MAC  Patient's Preference:   Intra-op Monitoring Plan: standard ASA monitors  Intra-op Monitoring Plan Comments:   Post Op Pain Control Plan: multimodal analgesia and per primary service following discharge from PACU  Post Op Pain Control Plan Comments:   Induction:   IV  Beta Blocker:  Patient is not currently on a Beta-Blocker (No further documentation required).       Informed Consent: Patient understands risks and agrees with Anesthesia plan.  Questions answered. Anesthesia consent signed with patient.  ASA Score: 2     Day of Surgery Review of History & Physical:  There are no significant changes.           Ready For Surgery From Anesthesia Perspective.     Patient Active Problem List   Diagnosis    Anxiety associated with depression    Chronic bilateral low back pain without sciatica    Mild episode of recurrent major depressive disorder    Generalized headaches    PLMD (periodic limb movement disorder)    Postural hypotension    Heart palpitations    Common migraine with intractable migraine    Use of medication with teratogenic potential in female of reproductive age    Orthostasis    Abnormal vaginal bleeding       Chemistry        Component Value Date/Time     11/18/2019 1520    K 3.9 11/18/2019 1520     11/18/2019 1520    CO2 26 11/18/2019 1520    BUN 15 11/18/2019 1520    CREATININE 0.8 11/18/2019 1520    GLU 91 11/18/2019 1520        Component Value Date/Time    CALCIUM 8.9 11/18/2019 1520    ALKPHOS 43 (L) 10/21/2019 1549    AST 31 10/21/2019 1549    ALT 35 10/21/2019 1549    BILITOT 0.3 10/21/2019 1549    ESTGFRAFRICA >60.0 11/18/2019 1520    EGFRNONAA >60.0 11/18/2019 1520        Lab Results   Component Value Date    WBC 6.90 11/18/2019    HGB 11.9 (L) 11/18/2019    HCT 39.5 11/18/2019    MCV 98 11/18/2019     11/18/2019

## 2019-11-27 NOTE — TRANSFER OF CARE
"Anesthesia Transfer of Care Note    Patient: Arielle Verde    Procedure(s) Performed: Procedure(s) (LRB):  CONE BIOPSY, CERVIX, USING COLD (N/A)  REMOVAL, INTRAUTERINE DEVICE    Patient location: PACU    Anesthesia Type: MAC    Transport from OR: Transported from OR on room air with adequate spontaneous ventilation    Post pain: adequate analgesia    Post assessment: no apparent anesthetic complications and tolerated procedure well    Post vital signs: stable    Level of consciousness: awake, alert and oriented    Nausea/Vomiting: no nausea/vomiting    Complications: none    Transfer of care protocol was followed      Last vitals:   Visit Vitals  /71 (BP Location: Right arm, Patient Position: Sitting)   Pulse 80   Temp 36.8 °C (98.2 °F) (Skin)   Resp 16   Ht 5' 11" (1.803 m)   Wt 84 kg (185 lb 3 oz)   SpO2 98%   Breastfeeding? No   BMI 25.83 kg/m²     "

## 2019-11-27 NOTE — DISCHARGE SUMMARY
DISCHARGE DIAGNOSIS    S/p CKC, uncomplicated  Postoperative recovery was uneventful      DISPOSITION  home      CONDITION  Stable    Follow Up  Paula 4 weeks

## 2019-12-08 ENCOUNTER — PATIENT MESSAGE (OUTPATIENT)
Dept: OBSTETRICS AND GYNECOLOGY | Facility: CLINIC | Age: 39
End: 2019-12-08

## 2019-12-09 ENCOUNTER — PATIENT MESSAGE (OUTPATIENT)
Dept: OBSTETRICS AND GYNECOLOGY | Facility: CLINIC | Age: 39
End: 2019-12-09

## 2019-12-09 LAB
FINAL PATHOLOGIC DIAGNOSIS: NORMAL
GROSS: NORMAL

## 2019-12-11 ENCOUNTER — PATIENT MESSAGE (OUTPATIENT)
Dept: OBSTETRICS AND GYNECOLOGY | Facility: CLINIC | Age: 39
End: 2019-12-11

## 2019-12-16 ENCOUNTER — PATIENT MESSAGE (OUTPATIENT)
Dept: OBSTETRICS AND GYNECOLOGY | Facility: CLINIC | Age: 39
End: 2019-12-16

## 2019-12-18 ENCOUNTER — PATIENT MESSAGE (OUTPATIENT)
Dept: OBSTETRICS AND GYNECOLOGY | Facility: CLINIC | Age: 39
End: 2019-12-18

## 2019-12-19 ENCOUNTER — TELEPHONE (OUTPATIENT)
Dept: OBSTETRICS AND GYNECOLOGY | Facility: CLINIC | Age: 39
End: 2019-12-19

## 2019-12-20 ENCOUNTER — OFFICE VISIT (OUTPATIENT)
Dept: URGENT CARE | Facility: CLINIC | Age: 39
End: 2019-12-20
Payer: COMMERCIAL

## 2019-12-20 VITALS
DIASTOLIC BLOOD PRESSURE: 56 MMHG | SYSTOLIC BLOOD PRESSURE: 111 MMHG | WEIGHT: 189.25 LBS | HEIGHT: 71 IN | TEMPERATURE: 98 F | OXYGEN SATURATION: 96 % | BODY MASS INDEX: 26.49 KG/M2 | HEART RATE: 86 BPM

## 2019-12-20 DIAGNOSIS — J02.9 PHARYNGITIS, UNSPECIFIED ETIOLOGY: Primary | ICD-10-CM

## 2019-12-20 LAB
CTP QC/QA: YES
S PYO RRNA THROAT QL PROBE: NEGATIVE

## 2019-12-20 PROCEDURE — 87880 POCT RAPID STREP A: ICD-10-PCS | Mod: QW,S$GLB,, | Performed by: PHYSICIAN ASSISTANT

## 2019-12-20 PROCEDURE — 3008F BODY MASS INDEX DOCD: CPT | Mod: CPTII,S$GLB,, | Performed by: PHYSICIAN ASSISTANT

## 2019-12-20 PROCEDURE — 87081 CULTURE SCREEN ONLY: CPT

## 2019-12-20 PROCEDURE — 99213 OFFICE O/P EST LOW 20 MIN: CPT | Mod: S$GLB,,, | Performed by: PHYSICIAN ASSISTANT

## 2019-12-20 PROCEDURE — 3008F PR BODY MASS INDEX (BMI) DOCUMENTED: ICD-10-PCS | Mod: CPTII,S$GLB,, | Performed by: PHYSICIAN ASSISTANT

## 2019-12-20 PROCEDURE — 99213 PR OFFICE/OUTPT VISIT, EST, LEVL III, 20-29 MIN: ICD-10-PCS | Mod: S$GLB,,, | Performed by: PHYSICIAN ASSISTANT

## 2019-12-20 PROCEDURE — 87880 STREP A ASSAY W/OPTIC: CPT | Mod: QW,S$GLB,, | Performed by: PHYSICIAN ASSISTANT

## 2019-12-20 PROCEDURE — 99999 PR PBB SHADOW E&M-EST. PATIENT-LVL III: CPT | Mod: PBBFAC,,, | Performed by: PHYSICIAN ASSISTANT

## 2019-12-20 PROCEDURE — 99999 PR PBB SHADOW E&M-EST. PATIENT-LVL III: ICD-10-PCS | Mod: PBBFAC,,, | Performed by: PHYSICIAN ASSISTANT

## 2019-12-20 NOTE — PROGRESS NOTES
"Subjective:       History was provided by the patient.  Arielle Verde is a 39 y.o. who presents for evaluation of a sore throat. Associated symptoms include sore throat and swollen glands. Onset of symptoms was this morning.  He has had recent close exposure to someone with proven streptococcal pharyngitis.  Her daughter was recently treated for Strep pharyngitis.  The following portions of the patient's history were reviewed and updated as appropriate: allergies, current medications, past family history, past medical history, past social history, past surgical history and problem list.    Review of Systems  Review of Systems   Constitutional: Negative for chills and fever.   HENT: Positive for sore throat.    Respiratory: Negative for cough and shortness of breath.    Cardiovascular: Negative for chest pain.   Gastrointestinal: Negative for abdominal pain and nausea.   Genitourinary: Negative for dysuria and frequency.   Musculoskeletal: Negative for back pain and myalgias.   Neurological: Negative for headaches.   All other systems reviewed and are negative.         Objective:   BP (!) 111/56   Pulse 86   Temp 97.9 °F (36.6 °C) (Oral)   Ht 5' 11" (1.803 m)   Wt 85.9 kg (189 lb 4.2 oz)   SpO2 96%   BMI 26.40 kg/m²   Physical Exam   Constitutional: She is oriented to person, place, and time and well-developed, well-nourished, and in no distress.   HENT:   Head: Normocephalic.   Right Ear: Tympanic membrane and external ear normal.   Left Ear: Tympanic membrane and external ear normal.   Nose: Nose normal.   Mouth/Throat: Uvula is midline and mucous membranes are normal. Posterior oropharyngeal erythema (cobblestoning) present. No oropharyngeal exudate.   Neck: Normal range of motion.   Cardiovascular: Normal rate and normal heart sounds.   Pulmonary/Chest: Effort normal and breath sounds normal. No respiratory distress.   Neurological: She is alert and oriented to person, place, and time.   Skin: " Skin is warm.   Psychiatric: Affect normal.          Assessment:     Encounter Diagnosis   Name Primary?    Pharyngitis, unspecified etiology Yes        Plan:     No antibiotics indicated at this time.  Will send for culture.  Flonase recommended.  Tylenol and Ibuprofen for fever and discomfort.  Salt water gargles for sore throat relief.  Follow up with primary care physician in 1 week if symptoms do not resolve.  Report to ER with new or worsening symptoms.  Red flags include muffled voices, swelling in back of the throat, fever uncontrolled, etc.

## 2019-12-20 NOTE — LETTER
December 20, 2019      [facility]  [address]  [facility] [address]  Phone: [phone]  Fax: [phone]       Patient: Arielle Verde   YOB: 1980  Date of Visit: 12/20/2019    To Whom It May Concern:    Rajesh Verde  was at [facility] on 12/20/2019. She may return to work on 12/23/2019 with no restrictions. If you have any questions or concerns, or if I can be of further assistance, please do not hesitate to contact me.    Sincerely,    Rafat Tavera PA-C

## 2019-12-20 NOTE — PATIENT INSTRUCTIONS
No antibiotics indicated at this time.  Will send for culture.  Flonase recommended.  Tylenol and Ibuprofen for fever and discomfort.  Salt water gargles for sore throat relief.  Follow up with primary care physician in 1 week if symptoms do not resolve.  Report to ER with new or worsening symptoms.  Red flags include muffled voices, swelling in back of the throat, fever uncontrolled, etc.

## 2019-12-23 ENCOUNTER — OFFICE VISIT (OUTPATIENT)
Dept: OBSTETRICS AND GYNECOLOGY | Facility: CLINIC | Age: 39
End: 2019-12-23
Payer: COMMERCIAL

## 2019-12-23 VITALS
DIASTOLIC BLOOD PRESSURE: 78 MMHG | BODY MASS INDEX: 26.54 KG/M2 | WEIGHT: 190.25 LBS | SYSTOLIC BLOOD PRESSURE: 108 MMHG

## 2019-12-23 DIAGNOSIS — Z09 POSTOP CHECK: Primary | ICD-10-CM

## 2019-12-23 LAB — BACTERIA THROAT CULT: NORMAL

## 2019-12-23 PROCEDURE — 99024 POSTOP FOLLOW-UP VISIT: CPT | Mod: S$GLB,,, | Performed by: OBSTETRICS & GYNECOLOGY

## 2019-12-23 PROCEDURE — 99024 PR POST-OP FOLLOW-UP VISIT: ICD-10-PCS | Mod: S$GLB,,, | Performed by: OBSTETRICS & GYNECOLOGY

## 2019-12-23 PROCEDURE — 90651 9VHPV VACCINE 2/3 DOSE IM: CPT | Mod: S$GLB,,, | Performed by: OBSTETRICS & GYNECOLOGY

## 2019-12-23 PROCEDURE — 99999 PR PBB SHADOW E&M-EST. PATIENT-LVL IV: ICD-10-PCS | Mod: PBBFAC,,, | Performed by: OBSTETRICS & GYNECOLOGY

## 2019-12-23 PROCEDURE — 99999 PR PBB SHADOW E&M-EST. PATIENT-LVL IV: CPT | Mod: PBBFAC,,, | Performed by: OBSTETRICS & GYNECOLOGY

## 2019-12-23 PROCEDURE — 90651 HPV VACCINE 9-VALENT 3 DOSE IM: ICD-10-PCS | Mod: S$GLB,,, | Performed by: OBSTETRICS & GYNECOLOGY

## 2019-12-23 PROCEDURE — 90471 IMMUNIZATION ADMIN: CPT | Mod: S$GLB,,, | Performed by: OBSTETRICS & GYNECOLOGY

## 2019-12-23 PROCEDURE — 90471 HPV VACCINE 9-VALENT 3 DOSE IM: ICD-10-PCS | Mod: S$GLB,,, | Performed by: OBSTETRICS & GYNECOLOGY

## 2019-12-23 NOTE — PROGRESS NOTES
Arielle Verde is a 39 y.o. female  post-op from a  C and IUD removal.    There are no complaints and the procedure and hospitalization occured without complications.     The operative findings and pathology were reviewed with the patient.     1. GROSS DIAGNOSIS: INTRAUTERINE DEVICE  2. CERVICAL CONE BIOPSY SHOWING AREAS OF MILD-TO-MODERATE SQUAMOUS DYSPLASIA  (SALLIE 1-2). DYSPLASIA IS PRESENT IN SECTIONS FROM THE 12 - 3 O'CLOCK, 6-9 O'CLOCK AND 9-12  O'CLOCK QUADRANTS. DYSPLASIA IS NOT PRESENT AT THE INKED AND CAUTERIZED ENDO AND  ECTOCERVICAL MARGINS.  3. ENDOCERVICAL CURETTAGE SHOWING FRAGMENTS OF BENIGN ENDOCERVICAL TISSUE AND  MUCUS WITH NO DYSPLASIA IDENTIFIED.    PE:  APPEARANCE: Well nourished, well developed, in no acute distress.   ABDOMEN: Soft. No tenderness or masses.    PELVIC: cervix appears well healed.     Assessment: Routine postoperative follow-up exam        Follow-up with me  6months for repeat pap smear.   In 4mo for 3rd gardasil.  Pt had received depo provera day of surgery. She is released for intercourse. Awaiting her new IUD before the 3mo britany from her last provera injection.

## 2020-01-06 ENCOUNTER — OFFICE VISIT (OUTPATIENT)
Dept: URGENT CARE | Facility: CLINIC | Age: 40
End: 2020-01-06
Payer: COMMERCIAL

## 2020-01-06 ENCOUNTER — HOSPITAL ENCOUNTER (OUTPATIENT)
Dept: RADIOLOGY | Facility: HOSPITAL | Age: 40
Discharge: HOME | End: 2020-01-06
Attending: NURSE PRACTITIONER
Payer: COMMERCIAL

## 2020-01-06 ENCOUNTER — PATIENT MESSAGE (OUTPATIENT)
Dept: NEUROLOGY | Facility: CLINIC | Age: 40
End: 2020-01-06

## 2020-01-06 VITALS
DIASTOLIC BLOOD PRESSURE: 62 MMHG | HEART RATE: 100 BPM | TEMPERATURE: 100 F | OXYGEN SATURATION: 96 % | SYSTOLIC BLOOD PRESSURE: 114 MMHG | HEIGHT: 71 IN | WEIGHT: 189.25 LBS | BODY MASS INDEX: 26.49 KG/M2

## 2020-01-06 DIAGNOSIS — S39.92XA TAILBONE INJURY, INITIAL ENCOUNTER: Primary | ICD-10-CM

## 2020-01-06 DIAGNOSIS — M54.50 LOW BACK PAIN, NON-SPECIFIC: ICD-10-CM

## 2020-01-06 DIAGNOSIS — S39.92XA TAILBONE INJURY, INITIAL ENCOUNTER: ICD-10-CM
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
## 2020-01-06 PROCEDURE — 99214 PR OFFICE/OUTPT VISIT, EST, LEVL IV, 30-39 MIN: ICD-10-PCS | Mod: 25,S$GLB,, | Performed by: NURSE PRACTITIONER

## 2020-01-06 PROCEDURE — 72100 XR LUMBAR SPINE AP AND LATERAL: ICD-10-PCS | Mod: 26,,, | Performed by: RADIOLOGY

## 2020-01-06 PROCEDURE — 3008F BODY MASS INDEX DOCD: CPT | Mod: CPTII,S$GLB,, | Performed by: NURSE PRACTITIONER

## 2020-01-06 PROCEDURE — 3008F PR BODY MASS INDEX (BMI) DOCUMENTED: ICD-10-PCS | Mod: CPTII,S$GLB,, | Performed by: NURSE PRACTITIONER

## 2020-01-06 PROCEDURE — 99999 PR PBB SHADOW E&M-EST. PATIENT-LVL V: CPT | Mod: PBBFAC,,, | Performed by: NURSE PRACTITIONER

## 2020-01-06 PROCEDURE — 99214 OFFICE O/P EST MOD 30 MIN: CPT | Mod: 25,S$GLB,, | Performed by: NURSE PRACTITIONER

## 2020-01-06 PROCEDURE — 96372 PR INJECTION,THERAP/PROPH/DIAG2ST, IM OR SUBCUT: ICD-10-PCS | Mod: S$GLB,,, | Performed by: NURSE PRACTITIONER

## 2020-01-06 PROCEDURE — 72220 X-RAY EXAM SACRUM TAILBONE: CPT | Mod: 26,,, | Performed by: RADIOLOGY

## 2020-01-06 PROCEDURE — 72220 XR SACRUM AND COCCYX: ICD-10-PCS | Mod: 26,,, | Performed by: RADIOLOGY

## 2020-01-06 PROCEDURE — 72100 X-RAY EXAM L-S SPINE 2/3 VWS: CPT | Mod: 26,,, | Performed by: RADIOLOGY

## 2020-01-06 PROCEDURE — 99999 PR PBB SHADOW E&M-EST. PATIENT-LVL V: ICD-10-PCS | Mod: PBBFAC,,, | Performed by: NURSE PRACTITIONER

## 2020-01-06 PROCEDURE — 96372 THER/PROPH/DIAG INJ SC/IM: CPT | Mod: S$GLB,,, | Performed by: NURSE PRACTITIONER

## 2020-01-06 PROCEDURE — 72220 X-RAY EXAM SACRUM TAILBONE: CPT | Mod: TC,PO

## 2020-01-06 PROCEDURE — 72100 X-RAY EXAM L-S SPINE 2/3 VWS: CPT | Mod: TC,PO

## 2020-01-06 RX ORDER — TRAMADOL HYDROCHLORIDE 50 MG/1
50 TABLET ORAL EVERY 6 HOURS PRN
Qty: 15 TABLET | Refills: 0 | Status: SHIPPED | OUTPATIENT
Start: 2020-01-06 | End: 2021-04-19

## 2020-01-06 RX ORDER — KETOROLAC TROMETHAMINE 30 MG/ML
60 INJECTION, SOLUTION INTRAMUSCULAR; INTRAVENOUS
Status: COMPLETED | OUTPATIENT
Start: 2020-01-06 | End: 2020-01-06

## 2020-01-06 RX ADMIN — KETOROLAC TROMETHAMINE 60 MG: 30 INJECTION, SOLUTION INTRAMUSCULAR; INTRAVENOUS at 04:01

## 2020-01-06 NOTE — PATIENT INSTRUCTIONS
PLAN:  X-ray lumbar,  sacrum, coccyx  Toradol 60 mg IM now  Consult Orthopedics  Advise apply cool compresses and rest  Meds:  Ultram/no refills  Tylenol or Ibuprofen for fever, headache and body aches.  Warm salt water gargles for throat comfort.  Chloraseptic spray or lozenges for throat comfort.  Advise follow up with PCP  Advise go to ER if nausea, vomiting, fever, increased pain, or fail to improve with treatment.  AVS provided and reviewed with patient including supportive care, follow up, and red flag symptoms.   Patient verbalizes understanding and agrees with treatment plan. Discharged from Urgent Care in stable condition.

## 2020-01-06 NOTE — PROGRESS NOTES
"CHIEF COMPLAINT/REASON FOR VISIT:  Low back and tailbone pain    HISTORY OF PRESENT ILLNESS:  39 year-old female complains of low back and tail bone pain onset Saturday.  Patient admits went Ice skating slipped and fell sitting on buttocks.  Denies seeking emergency room treatment.  Denies trying any over-the-counter medication.. Patient denies LOC, chest pain, shortness of breath, congestion, cough, dizziness, blurred vision, nausea, vomiting, diarrhea, " aching all over", fatigue, loss of appetite & fever      Past Medical History:   Diagnosis Date    Anxiety     Cancer     cervical cancer    Chronic headaches     Depression     Migraine           Social History     Socioeconomic History    Marital status: Single     Spouse name: Not on file    Number of children: Not on file    Years of education: Not on file    Highest education level: Not on file   Occupational History    Occupation: Podiatry nurse    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   Social Needs    Financial resource strain: Not on file    Food insecurity:     Worry: Not on file     Inability: Not on file    Transportation needs:     Medical: Not on file     Non-medical: Not on file   Tobacco Use    Smoking status: Current Some Day Smoker    Smokeless tobacco: Never Used    Tobacco comment: no smoking after m.n prior to sx   Substance and Sexual Activity    Alcohol use: Not Currently     Comment: Occasionally  No alcohol 72h prior to sx    Drug use: No    Sexual activity: Not Currently     Partners: Male     Birth control/protection: IUD   Lifestyle    Physical activity:     Days per week: Not on file     Minutes per session: Not on file    Stress: Not on file   Relationships    Social connections:     Talks on phone: Not on file     Gets together: Not on file     Attends Yazidi service: Not on file     Active member of club or organization: Not on file     Attends meetings of clubs or organizations: Not on file     Relationship status: Not on file "   Other Topics Concern    Not on file   Social History Narrative    Not on file          Family History   Problem Relation Age of Onset    Lupus Mother     Ulcerative colitis Mother     Kidney failure Father     Drug abuse Father     Breast cancer Paternal Grandmother     Colon cancer Neg Hx     Ovarian cancer Neg Hx        ROS:  GENERAL:  Slipped and fell on buttock  SKIN: No rashes, itching or changes in color or texture of skin.  HEENT: No headaches or recent head trauma.  Denies ear pain, discharge or vertigo. No loss of smell, no epistaxis or postnasal drip. No hoarseness or change in voice.   NODES: No masses or lesions. Denies swollen glands.  CHEST: Denies cyanosis, wheezing, cough and sputum production.  CARDIOVASCULAR: Denies chest pain, PND, orthopnea or reduced exercise tolerance.  ABDOMEN: Appetite fine. No weight loss. Denies diarrhea, abdominal pain  MUSCULOSKELETAL:  Tail bone and low back pain.  NEUROLOGIC: No history of seizures, paralysis, alteration of gait or coordination.  PSYCHIATRIC: Denies mood swings, depression or suicidal thoughts.    PE:   APPEARANCE: Well nourished, well developed, in moderate distress.   V/S: Reviewed.  SKIN: Normal skin turgor, no abrasions, redness, ecchymosis, soft tissue swelling  CHEST:  No respiratory symptoms  CARDIOVASCULAR: Regular rate and rhythm   MUSCULOSKELETAL:  Low lumbar with limited range of motion due to pain, tenderness on palpation of low lumbar and sacral area  NEUROLOGIC: No sensory deficits. Gait & Posture: Normal gait and fine motion. No cerebellar signs.  MENTAL STATUS: Patient alert, oriented x 3 & conversant.    PLAN:  X-ray lumbar,  sacrum, coccyx  Toradol 60 mg IM now  Consult Orthopedics  Advise apply cool compresses and rest  Meds:  Ultram/no refills  Tylenol or Ibuprofen for fever, headache and body aches.  Warm salt water gargles for throat comfort.  Chloraseptic spray or lozenges for throat comfort.  Advise follow up with  PCP  Advise go to ER if nausea, vomiting, fever, increased pain, or fail to improve with treatment.  AVS provided and reviewed with patient including supportive care, follow up, and red flag symptoms.   Patient verbalizes understanding and agrees with treatment plan. Discharged from Urgent Care in stable condition.  Given work excuse    Advise x-rays within normal limits, no fractures, dislocations, [facility] radiologist will officially read x-rays.     DIAGNOSIS:   Fall/slip  Low back pain  Tail bone injury

## 2020-01-07 ENCOUNTER — OFFICE VISIT (OUTPATIENT)
Dept: NEUROSURGERY | Facility: CLINIC | Age: 40
End: 2020-01-07
Payer: COMMERCIAL

## 2020-01-07 ENCOUNTER — TELEPHONE (OUTPATIENT)
Dept: NEUROSURGERY | Facility: CLINIC | Age: 40
End: 2020-01-07

## 2020-01-07 VITALS
WEIGHT: 189 LBS | HEART RATE: 76 BPM | HEIGHT: 71 IN | BODY MASS INDEX: 26.46 KG/M2 | DIASTOLIC BLOOD PRESSURE: 67 MMHG | SYSTOLIC BLOOD PRESSURE: 111 MMHG

## 2020-01-07 DIAGNOSIS — M54.50 ACUTE BILATERAL LOW BACK PAIN WITHOUT SCIATICA: Primary | ICD-10-CM
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
## 2020-01-07 PROCEDURE — 99203 OFFICE O/P NEW LOW 30 MIN: CPT | Mod: S$GLB,,, | Performed by: PHYSICIAN ASSISTANT

## 2020-01-07 PROCEDURE — 99203 PR OFFICE/OUTPT VISIT, NEW, LEVL III, 30-44 MIN: ICD-10-PCS | Mod: S$GLB,,, | Performed by: PHYSICIAN ASSISTANT

## 2020-01-07 PROCEDURE — 99999 PR PBB SHADOW E&M-EST. PATIENT-LVL III: ICD-10-PCS | Mod: PBBFAC,,, | Performed by: PHYSICIAN ASSISTANT

## 2020-01-07 PROCEDURE — 3008F PR BODY MASS INDEX (BMI) DOCUMENTED: ICD-10-PCS | Mod: CPTII,S$GLB,, | Performed by: PHYSICIAN ASSISTANT

## 2020-01-07 PROCEDURE — 99999 PR PBB SHADOW E&M-EST. PATIENT-LVL III: CPT | Mod: PBBFAC,,, | Performed by: PHYSICIAN ASSISTANT

## 2020-01-07 PROCEDURE — 3008F BODY MASS INDEX DOCD: CPT | Mod: CPTII,S$GLB,, | Performed by: PHYSICIAN ASSISTANT

## 2020-01-07 RX ORDER — TIZANIDINE 4 MG/1
4 TABLET ORAL EVERY 6 HOURS PRN
Qty: 60 TABLET | Refills: 0 | Status: SHIPPED | OUTPATIENT
Start: 2020-01-07 | End: 2020-03-31 | Stop reason: SDUPTHER

## 2020-01-07 RX ORDER — KETOROLAC TROMETHAMINE 10 MG/1
10 TABLET, FILM COATED ORAL EVERY 6 HOURS PRN
Qty: 20 TABLET | Refills: 0 | Status: SHIPPED | OUTPATIENT
Start: 2020-01-07 | End: 2020-03-31 | Stop reason: SDUPTHER

## 2020-01-07 NOTE — PROGRESS NOTES
"Subjective:      Patient ID: Arielle Verde is a 39 y.o. female.    Chief Complaint: Lumbar Spine Pain (L-Spine)    HPI   The patient is seen today for evaluation of acute low back pain secondary to a fall while ice skating recently (1/4/20).Whenever she fell she did not feel or hear a "pop".  She has increased pain with movement. Her pain seems to be worse at night and she even c/o sophia hip, knee and ankle pain at night. Her R side is more symptomatic. She denies numbness, tingling and weakness. Describes her pain as "burning."   Patient denies previous back surgery and injections. She does not have any bladder/bowel incontinence nor does she have urinary incontinence.   She did go to  recently, had x-rays, Toradol injection and Ultram. Her shot was effective but short-lived.     She rates her pain a 6/10.       Review of Systems   Constitutional: Negative for chills and fever.   HENT: Negative for congestion.    Respiratory: Negative for cough, choking and chest tightness.    Cardiovascular: Negative for chest pain.   Gastrointestinal: Negative for nausea and vomiting.   Genitourinary: Negative for difficulty urinating.   Musculoskeletal: Positive for arthralgias, back pain and myalgias.   Neurological: Negative for weakness and numbness.   Psychiatric/Behavioral: Negative for agitation and behavioral problems.         Objective:       Neurosurgery Physical Exam  Ortho/SPM Exam    Nursing note and vitals reviewed  Gen:Oriented to person, place, and time.             Appears stated age   Skin: no rashes or lesions identified   Head:Normocephalic and atraumatic.  Nose: Nose normal.    Eyes: EOM are normal. Pupils are equal, round, and reactive to light.   Neck: Neck supple. No masses or lesions palpated  Cardiovascular: Intact distal pulses.    Abdominal: Soft.   Neurological: Alert and oriented to person, place, and time.  No cranial nerve deficit.  Coordination normal. Normal muscle tone  Psychiatric: " Normal mood and affect. Behavior is normal.      Back:  TTP lower lumbar spine (R >L)   PRESENT Paraspinal muscle spasms    PRESENT Pain with flexion and extention    DECREASED SECONDARY TO PAIN Range of motion    Neg  Straight leg raise     Motor   Right Right Left Left  Level Group   5  5  L2 Hip flexor (Psoas)   5  5  L3 Leg extension (Quads)   5  5  L4 Dorsiflexion & foot inversion (Tibialis Anterior)   5  5  L5 Great toe extension ( EHL)   5  5  S1 Foot eversion (Gastroc, PL & PB)     Sensation  NL Decreased (R/L/BL) Level Sensation    X  L2 Anterio-medial thigh   X  L3 Medial thigh around knee   X  L4 Medial foot   X  L5 Dorsum foot   X  S1 Lateral foot     Reflex  2+  Patellar tendon (L4)   2+  Achilles tendon (S1)       Imaging:    Results for orders placed during the hospital encounter of 01/06/20   X-Ray Lumbar Spine AP And Lateral    Narrative EXAMINATION:  XR LUMBAR SPINE AP AND LATERAL    CLINICAL HISTORY:  Low back pain, minor trauma;fell ice skaking;Low back pain    TECHNIQUE:  AP, lateral and spot images were performed of the lumbar spine.    COMPARISON:  None    FINDINGS:  No scoliosis.  No fracture.  No listhesis.  Mild disc space narrowing with marginal spurring at L4-L5.  Mild disc space narrowing also seen at the lumbosacral junction.      Impression As above      Electronically signed by: Colin Ma MD  Date:    01/06/2020  Time:    16:33          I  reviewed all pertinent imaging regarding this case.  Assessment:     1. Acute bilateral low back pain without sciatica    2. Fall, initial encounter      Plan:     Acute bilateral low back pain without sciatica    Fall, initial encounter    Other orders  -     tiZANidine (ZANAFLEX) 4 MG tablet; Take 1 tablet (4 mg total) by mouth every 6 (six) hours as needed (muscle spasms).  Dispense: 60 tablet; Refill: 0  -     ketorolac (TORADOL) 10 mg tablet; Take 1 tablet (10 mg total) by mouth every 6 (six) hours as needed for Pain.  Dispense: 20 tablet;  Refill: 0      Patient was given Rx for doni relaxant and steroid. She was instructed to apply heating pad several times a day for up to 15 min each time (don't sleep with heating pad).   If patient does not show any signs of improvement will consider MRI and/or PT in future.   Patient will update us on any changes.     Ryan Roberts PA-C  Neurosurgery

## 2020-01-07 NOTE — TELEPHONE ENCOUNTER
Phoned pt to see if she has any imaging that are 6 months to 1 year old of her L- Spine.    Pt stated she does not. Understanding verbalized.

## 2020-01-11 ENCOUNTER — PATIENT MESSAGE (OUTPATIENT)
Dept: PSYCHIATRY | Facility: CLINIC | Age: 40
End: 2020-01-11

## 2020-01-13 RX ORDER — TRAZODONE HYDROCHLORIDE 100 MG/1
200 TABLET ORAL NIGHTLY PRN
Qty: 180 TABLET | Refills: 0 | Status: SHIPPED | OUTPATIENT
Start: 2020-01-13 | End: 2020-01-22 | Stop reason: SDUPTHER

## 2020-01-22 ENCOUNTER — OFFICE VISIT (OUTPATIENT)
Dept: PSYCHIATRY | Facility: CLINIC | Age: 40
End: 2020-01-22
Payer: COMMERCIAL

## 2020-01-22 VITALS
HEART RATE: 74 BPM | BODY MASS INDEX: 26.44 KG/M2 | SYSTOLIC BLOOD PRESSURE: 122 MMHG | WEIGHT: 189.63 LBS | DIASTOLIC BLOOD PRESSURE: 79 MMHG

## 2020-01-22 DIAGNOSIS — F41.8 ANXIETY ASSOCIATED WITH DEPRESSION: ICD-10-CM

## 2020-01-22 DIAGNOSIS — F33.41 RECURRENT MAJOR DEPRESSIVE DISORDER, IN PARTIAL REMISSION: Primary | ICD-10-CM

## 2020-01-22 PROCEDURE — 99214 OFFICE O/P EST MOD 30 MIN: CPT | Mod: S$GLB,,, | Performed by: PSYCHIATRY & NEUROLOGY

## 2020-01-22 PROCEDURE — 99214 PR OFFICE/OUTPT VISIT, EST, LEVL IV, 30-39 MIN: ICD-10-PCS | Mod: S$GLB,,, | Performed by: PSYCHIATRY & NEUROLOGY

## 2020-01-22 PROCEDURE — 99999 PR PBB SHADOW E&M-EST. PATIENT-LVL II: ICD-10-PCS | Mod: PBBFAC,,, | Performed by: PSYCHIATRY & NEUROLOGY

## 2020-01-22 PROCEDURE — 99999 PR PBB SHADOW E&M-EST. PATIENT-LVL II: CPT | Mod: PBBFAC,,, | Performed by: PSYCHIATRY & NEUROLOGY

## 2020-01-22 PROCEDURE — 3008F BODY MASS INDEX DOCD: CPT | Mod: CPTII,S$GLB,, | Performed by: PSYCHIATRY & NEUROLOGY

## 2020-01-22 PROCEDURE — 3008F PR BODY MASS INDEX (BMI) DOCUMENTED: ICD-10-PCS | Mod: CPTII,S$GLB,, | Performed by: PSYCHIATRY & NEUROLOGY

## 2020-01-22 RX ORDER — TRAZODONE HYDROCHLORIDE 100 MG/1
TABLET ORAL
Qty: 270 TABLET | Refills: 1 | Status: SHIPPED | OUTPATIENT
Start: 2020-01-22 | End: 2020-05-13 | Stop reason: SDUPTHER

## 2020-01-22 RX ORDER — CLONAZEPAM 0.5 MG/1
0.5 TABLET ORAL DAILY PRN
Qty: 30 TABLET | Refills: 3 | Status: SHIPPED | OUTPATIENT
Start: 2020-01-22 | End: 2020-05-13 | Stop reason: SDUPTHER

## 2020-01-22 RX ORDER — BUSPIRONE HYDROCHLORIDE 15 MG/1
30 TABLET ORAL 2 TIMES DAILY
Qty: 120 TABLET | Refills: 3 | Status: SHIPPED | OUTPATIENT
Start: 2020-01-22 | End: 2020-05-13 | Stop reason: SDUPTHER

## 2020-01-22 RX ORDER — DULOXETIN HYDROCHLORIDE 60 MG/1
60 CAPSULE, DELAYED RELEASE ORAL NIGHTLY
Qty: 30 CAPSULE | Refills: 3 | Status: SHIPPED | OUTPATIENT
Start: 2020-01-22 | End: 2020-05-13 | Stop reason: SDUPTHER
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
## 2020-01-25 PROBLEM — F33.41 RECURRENT MAJOR DEPRESSIVE DISORDER, IN PARTIAL REMISSION: Status: ACTIVE | Noted: 2018-01-16

## 2020-02-12 ENCOUNTER — CLINICAL SUPPORT (OUTPATIENT)
Dept: OBSTETRICS AND GYNECOLOGY | Facility: CLINIC | Age: 40
End: 2020-02-12
Payer: COMMERCIAL

## 2020-02-12 ENCOUNTER — TELEPHONE (OUTPATIENT)
Dept: OBSTETRICS AND GYNECOLOGY | Facility: CLINIC | Age: 40
End: 2020-02-12

## 2020-02-12 PROCEDURE — 99999 PR PBB SHADOW E&M-EST. PATIENT-LVL II: ICD-10-PCS | Mod: PBBFAC,,,

## 2020-02-12 PROCEDURE — 99999 PR PBB SHADOW E&M-EST. PATIENT-LVL II: CPT | Mod: PBBFAC,,,

## 2020-02-12 NOTE — PROGRESS NOTES
Verified pt with two identifiers name and . Allergies and medications reviewed. Depo provera 150 mg/ml administered IM to left ventrogluteal while using aseptic technique. No discomfort noted. Pt tolerated well. Advised pt to wait 15 minutes in the lobby to monitor for side effects. Next scheduled injection 20 at the [facility].  Pt verbalized understanding.

## 2020-02-19 ENCOUNTER — TELEPHONE (OUTPATIENT)
Dept: NEUROLOGY | Facility: CLINIC | Age: 40
End: 2020-02-19

## 2020-02-21 ENCOUNTER — PATIENT MESSAGE (OUTPATIENT)
Dept: NEUROLOGY | Facility: CLINIC | Age: 40
End: 2020-02-21

## 2020-02-24 ENCOUNTER — PATIENT MESSAGE (OUTPATIENT)
Dept: OBSTETRICS AND GYNECOLOGY | Facility: CLINIC | Age: 40
End: 2020-02-24

## 2020-02-24 ENCOUNTER — TELEPHONE (OUTPATIENT)
Dept: PHARMACY | Facility: CLINIC | Age: 40
End: 2020-02-24

## 2020-02-24 ENCOUNTER — CLINICAL SUPPORT (OUTPATIENT)
Dept: OBSTETRICS AND GYNECOLOGY | Facility: CLINIC | Age: 40
End: 2020-02-24
Payer: COMMERCIAL

## 2020-02-24 DIAGNOSIS — Z23 NEED FOR HPV VACCINATION: Primary | ICD-10-CM

## 2020-02-24 DIAGNOSIS — Z97.5 CONTRACEPTION, DEVICE INTRAUTERINE: Primary | ICD-10-CM

## 2020-02-24 PROCEDURE — 90471 IMMUNIZATION ADMIN: CPT | Mod: S$GLB,,, | Performed by: OBSTETRICS & GYNECOLOGY

## 2020-02-24 PROCEDURE — 90471 HPV VACCINE 9-VALENT 3 DOSE IM: ICD-10-PCS | Mod: S$GLB,,, | Performed by: OBSTETRICS & GYNECOLOGY

## 2020-02-24 PROCEDURE — 90651 HPV VACCINE 9-VALENT 3 DOSE IM: ICD-10-PCS | Mod: S$GLB,,, | Performed by: OBSTETRICS & GYNECOLOGY

## 2020-02-24 PROCEDURE — 99999 PR PBB SHADOW E&M-EST. PATIENT-LVL II: CPT | Mod: PBBFAC,,,

## 2020-02-24 PROCEDURE — 99999 PR PBB SHADOW E&M-EST. PATIENT-LVL II: ICD-10-PCS | Mod: PBBFAC,,,

## 2020-02-24 PROCEDURE — 90651 9VHPV VACCINE 2/3 DOSE IM: CPT | Mod: S$GLB,,, | Performed by: OBSTETRICS & GYNECOLOGY

## 2020-02-24 NOTE — PROGRESS NOTES
Verified patient with 2 patient identifiers. Allergies and medications reviewed.   HPV vaccine given IM to left deltoid using aseptic technique.   No discomfort noted. Patient tolerated well.     Patient advised to wait 15 minutes in lobby to monitor for reaction.   Patient verbalized understanding.

## 2020-02-24 NOTE — TELEPHONE ENCOUNTER
Patient reached out to [facility] in regards to Mirena IUD - she mentions that she had a procedure in November where she has to have her IUD removed for her cervix to completely heal. She is informed that Mirena is covered through her pharmacy benefits but she is responsible for a copay of $352.57 - she will check with her provider and discuss medical billing as a cheaper option. [facility] will also advise provider to check Buy and Bill to see if it is cheaper for the patient. NO GO: [facility] will close out on our end - Buy and Bill. TTN

## 2020-02-25 ENCOUNTER — PATIENT MESSAGE (OUTPATIENT)
Dept: OBSTETRICS AND GYNECOLOGY | Facility: CLINIC | Age: 40
End: 2020-02-25

## 2020-02-26 ENCOUNTER — PATIENT MESSAGE (OUTPATIENT)
Dept: OBSTETRICS AND GYNECOLOGY | Facility: CLINIC | Age: 40
End: 2020-02-26

## 2020-02-27 ENCOUNTER — OFFICE VISIT (OUTPATIENT)
Dept: FAMILY MEDICINE | Facility: CLINIC | Age: 40
End: 2020-02-27
Payer: COMMERCIAL

## 2020-02-27 VITALS
WEIGHT: 194.88 LBS | DIASTOLIC BLOOD PRESSURE: 62 MMHG | OXYGEN SATURATION: 98 % | SYSTOLIC BLOOD PRESSURE: 94 MMHG | TEMPERATURE: 98 F | BODY MASS INDEX: 27.28 KG/M2 | HEIGHT: 71 IN | HEART RATE: 104 BPM

## 2020-02-27 DIAGNOSIS — R68.89 FLU-LIKE SYMPTOMS: Primary | ICD-10-CM

## 2020-02-27 DIAGNOSIS — J06.9 UPPER RESPIRATORY TRACT INFECTION, UNSPECIFIED TYPE: ICD-10-CM

## 2020-02-27 PROCEDURE — 99214 OFFICE O/P EST MOD 30 MIN: CPT | Mod: 25,S$GLB,, | Performed by: FAMILY MEDICINE

## 2020-02-27 PROCEDURE — 96372 THER/PROPH/DIAG INJ SC/IM: CPT | Mod: S$GLB,,, | Performed by: FAMILY MEDICINE

## 2020-02-27 PROCEDURE — 99999 PR PBB SHADOW E&M-EST. PATIENT-LVL III: CPT | Mod: PBBFAC,,, | Performed by: FAMILY MEDICINE

## 2020-02-27 PROCEDURE — 99999 PR PBB SHADOW E&M-EST. PATIENT-LVL III: ICD-10-PCS | Mod: PBBFAC,,, | Performed by: FAMILY MEDICINE

## 2020-02-27 PROCEDURE — 96372 PR INJECTION,THERAP/PROPH/DIAG2ST, IM OR SUBCUT: ICD-10-PCS | Mod: S$GLB,,, | Performed by: FAMILY MEDICINE

## 2020-02-27 PROCEDURE — 99214 PR OFFICE/OUTPT VISIT, EST, LEVL IV, 30-39 MIN: ICD-10-PCS | Mod: 25,S$GLB,, | Performed by: FAMILY MEDICINE

## 2020-02-27 PROCEDURE — 3008F BODY MASS INDEX DOCD: CPT | Mod: CPTII,S$GLB,, | Performed by: FAMILY MEDICINE

## 2020-02-27 PROCEDURE — 3008F PR BODY MASS INDEX (BMI) DOCUMENTED: ICD-10-PCS | Mod: CPTII,S$GLB,, | Performed by: FAMILY MEDICINE

## 2020-02-27 RX ORDER — ALBUTEROL SULFATE 90 UG/1
2 AEROSOL, METERED RESPIRATORY (INHALATION) EVERY 6 HOURS PRN
Qty: 6.7 G | Refills: 0 | Status: SHIPPED | OUTPATIENT
Start: 2020-02-27 | End: 2021-08-05 | Stop reason: SDUPTHER

## 2020-02-27 RX ORDER — BENZONATATE 200 MG/1
200 CAPSULE ORAL 3 TIMES DAILY PRN
Qty: 20 CAPSULE | Refills: 0 | Status: SHIPPED | OUTPATIENT
Start: 2020-02-27 | End: 2020-03-08

## 2020-02-27 RX ORDER — BETAMETHASONE SODIUM PHOSPHATE AND BETAMETHASONE ACETATE 3; 3 MG/ML; MG/ML
6 INJECTION, SUSPENSION INTRA-ARTICULAR; INTRALESIONAL; INTRAMUSCULAR; SOFT TISSUE
Status: COMPLETED | OUTPATIENT
Start: 2020-02-27 | End: 2020-02-27

## 2020-02-27 RX ADMIN — BETAMETHASONE SODIUM PHOSPHATE AND BETAMETHASONE ACETATE 6 MG: 3; 3 INJECTION, SUSPENSION INTRA-ARTICULAR; INTRALESIONAL; INTRAMUSCULAR; SOFT TISSUE at 02:02

## 2020-02-27 NOTE — LETTER
February 27, 2020      [facility]  [address]  [facility] [address]  Phone: [phone]  Fax: [phone]       Patient: Arielle Verde   YOB: 1980  Date of Visit: 02/27/2020    To Whom It May Concern:    Rajesh Verde  was at [facility] on 02/27/2020. She may return to work/school on 03/02/2020 with no restrictions. If you have any questions or concerns, or if I can be of further assistance, please do not hesitate to contact me.    Sincerely,        Kati Lopes LPN

## 2020-02-27 NOTE — PROGRESS NOTES
Subjective:       Patient ID: Arielle Verde is a 39 y.o. female.    Chief Complaint: Sinusitis and Sore Throat      HPI Comments:       Current Outpatient Medications:     albuterol (PROVENTIL/VENTOLIN HFA) 90 mcg/actuation inhaler, Inhale 2 puffs into the lungs every 6 (six) hours as needed for Wheezing. Dispense with spacer., Disp: 6.7 g, Rfl: 0    ASCORBATE CALCIUM (VITAMIN C ORAL), Take 1 tablet by mouth every evening. , Disp: , Rfl:     aspirin 81 MG Chew, Take 81 mg by mouth every evening. , Disp: , Rfl:     benzonatate (TESSALON) 200 MG capsule, Take 1 capsule (200 mg total) by mouth 3 (three) times daily as needed., Disp: 20 capsule, Rfl: 0    busPIRone (BUSPAR) 15 MG tablet, Take 2 tablets (30 mg total) by mouth 2 (two) times daily., Disp: 120 tablet, Rfl: 3    cetirizine (ZYRTEC) 10 MG tablet, Take 10 mg by mouth daily as needed. , Disp: , Rfl: 0    clonazePAM (KLONOPIN) 0.5 MG tablet, Take 1 tablet (0.5 mg total) by mouth daily as needed for Anxiety., Disp: 30 tablet, Rfl: 3    DULoxetine (CYMBALTA) 60 MG capsule, Take 1 capsule (60 mg total) by mouth every evening., Disp: 30 capsule, Rfl: 3    fluticasone (FLONASE) 50 mcg/actuation nasal spray, 2 sprays (100 mcg total) by Each Nare route once daily. (Patient taking differently: 2 sprays by Each Nostril route daily as needed. ), Disp: 16 g, Rfl: 0    folic acid (FOLVITE) 1 MG tablet, Take 1 tablet (1 mg total) by mouth once daily., Disp: 90 tablet, Rfl: 3    ketorolac (TORADOL) 10 mg tablet, Take 1 tablet (10 mg total) by mouth every 6 (six) hours as needed for Pain., Disp: 20 tablet, Rfl: 0    levonorgestrel (MIRENA) 20 mcg/24 hr (5 years) IUD, 1 each by Intrauterine route once., Disp: , Rfl:     midodrine (PROAMATINE) 2.5 MG Tab, Take 2.5 mg by mouth 2 (two) times daily with meals. , Disp: , Rfl:     rizatriptan (MAXALT) 10 MG tablet, Take 10 mg by mouth daily as needed., Disp: , Rfl:     tiZANidine (ZANAFLEX) 4 MG tablet,  "Take 1 tablet (4 mg total) by mouth every 6 (six) hours as needed (muscle spasms)., Disp: 60 tablet, Rfl: 0    traMADol (ULTRAM) 50 mg tablet, Take 1 tablet (50 mg total) by mouth every 6 (six) hours as needed for Pain., Disp: 15 tablet, Rfl: 0    traZODone (DESYREL) 100 MG tablet, Take 1-3 tablets at bedtime as needed for sleep., Disp: 270 tablet, Rfl: 1    vitamin D 1000 units Tab, Take 1,000 Units by mouth once daily., Disp: , Rfl:     zonisamide (ZONEGRAN) 100 MG Cap, Take 2 capsules (200 mg total) by mouth every evening., Disp: 180 capsule, Rfl: 3    Current Facility-Administered Medications:     betamethasone acetate-betamethasone sodium phosphate injection 6 mg, 6 mg, Intramuscular, 1 time in Clinic/HOD, Neftaly Ambrose MD    medroxyPROGESTERone (DEPO-PROVERA) injection 150 mg, 150 mg, Intramuscular, Q90 Days, Rachele Willis MD      This my 1st time seeing this patient.  Previously healthy.  Smoker.    1/2 day history of sore throat and fatigue.  Bilateral ear pain, headache, shortness of breath, nonproductive cough.  Sneezing.  No GI symptoms.  No body aches.  Recent sick exposures.  Taking ibuprofen with mild relief.    No menstrual periods.  On Mirena    Review of Systems   Constitutional: Negative for activity change, appetite change and fever.   HENT: Positive for congestion, ear pain, rhinorrhea, sinus pressure and sneezing. Negative for sore throat.    Respiratory: Positive for cough and shortness of breath.    Cardiovascular: Negative for chest pain.   Gastrointestinal: Negative for abdominal pain, diarrhea and nausea.   Genitourinary: Negative for difficulty urinating.   Musculoskeletal: Negative for arthralgias and myalgias.   Neurological: Positive for headaches. Negative for dizziness.       Objective:      Vitals:    02/27/20 1329   BP: 94/62   Pulse: 104   Temp: 97.6 °F (36.4 °C)   TempSrc: Tympanic   SpO2: 98%   Weight: 88.4 kg (194 lb 14.2 oz)   Height: 5' 11" (1.803 m)   PainSc: " 0-No pain     Physical Exam   Constitutional: She is oriented to person, place, and time. She appears well-developed and well-nourished.  Non-toxic appearance. She appears ill. No distress.   HENT:   Head: Normocephalic.   Right Ear: Tympanic membrane, external ear and ear canal normal.   Left Ear: Tympanic membrane, external ear and ear canal normal.   Nose: Mucosal edema and rhinorrhea present.   Mouth/Throat: Mucous membranes are normal. Posterior oropharyngeal edema present. No oropharyngeal exudate or posterior oropharyngeal erythema.   Neck: Neck supple. No thyromegaly present.   Cardiovascular: Normal rate, regular rhythm and normal heart sounds.   No murmur heard.  Pulmonary/Chest: Effort normal and breath sounds normal. She has no wheezes. She has no rales.   Abdominal: Soft. She exhibits no distension.   Musculoskeletal: She exhibits no edema.   Lymphadenopathy:     She has no cervical adenopathy.   Neurological: She is alert and oriented to person, place, and time.   Skin: Skin is warm and dry. She is not diaphoretic.   Psychiatric: She has a normal mood and affect. Her behavior is normal. Judgment and thought content normal.   Nursing note and vitals reviewed.      Assessment:       1. Flu-like symptoms    2. Upper respiratory tract infection, unspecified type        Plan:   Flu-like symptoms  Comments:  Rapid flu negative.  Fluids and rest.  Over-the-counter analgesics as needed.  Orders:  -     POCT Influenza A/B    Upper respiratory tract infection, unspecified type  Comments:  C[facility]estone IM.  Warm saltwater gargles.  Tessalon Perles  Orders:  -     betamethasone acetate-betamethasone sodium phosphate injection 6 mg    Other orders  -     albuterol (PROVENTIL/VENTOLIN HFA) 90 mcg/actuation inhaler; Inhale 2 puffs into the lungs every 6 (six) hours as needed for Wheezing. Dispense with spacer.  Dispense: 6.7 g; Refill: 0  -     benzonatate (TESSALON) 200 MG capsule; Take 1 capsule (200 mg total) by  mouth 3 (three) times daily as needed.  Dispense: 20 capsule; Refill: 0

## 2020-03-03 ENCOUNTER — TELEPHONE (OUTPATIENT)
Dept: OBSTETRICS AND GYNECOLOGY | Facility: CLINIC | Age: 40
End: 2020-03-03

## 2020-03-09 ENCOUNTER — PATIENT MESSAGE (OUTPATIENT)
Dept: OBSTETRICS AND GYNECOLOGY | Facility: CLINIC | Age: 40
End: 2020-03-09

## 2020-03-13 ENCOUNTER — PROCEDURE VISIT (OUTPATIENT)
Dept: NEUROLOGY | Facility: CLINIC | Age: 40
End: 2020-03-13
Payer: COMMERCIAL

## 2020-03-13 VITALS
BODY MASS INDEX: 27.32 KG/M2 | HEIGHT: 71 IN | SYSTOLIC BLOOD PRESSURE: 112 MMHG | WEIGHT: 195.13 LBS | DIASTOLIC BLOOD PRESSURE: 70 MMHG | RESPIRATION RATE: 16 BRPM | HEART RATE: 78 BPM

## 2020-03-13 DIAGNOSIS — F41.8 ANXIETY ASSOCIATED WITH DEPRESSION: ICD-10-CM

## 2020-03-13 DIAGNOSIS — R87.612 LOW GRADE SQUAMOUS INTRAEPITHELIAL LESION (LGSIL) ON CERVICAL PAP SMEAR: ICD-10-CM

## 2020-03-13 DIAGNOSIS — N87.9 CERVICAL DYSPLASIA: ICD-10-CM

## 2020-03-13 DIAGNOSIS — R51.9 GENERALIZED HEADACHES: ICD-10-CM

## 2020-03-13 DIAGNOSIS — N93.9 ABNORMAL VAGINAL BLEEDING: ICD-10-CM

## 2020-03-13 DIAGNOSIS — Z79.899 USE OF MEDICATION WITH TERATOGENIC POTENTIAL IN FEMALE OF REPRODUCTIVE AGE: ICD-10-CM

## 2020-03-13 DIAGNOSIS — G89.29 CHRONIC BILATERAL LOW BACK PAIN WITHOUT SCIATICA: ICD-10-CM

## 2020-03-13 DIAGNOSIS — G43.019 COMMON MIGRAINE WITH INTRACTABLE MIGRAINE: Primary | ICD-10-CM

## 2020-03-13 DIAGNOSIS — M54.50 CHRONIC BILATERAL LOW BACK PAIN WITHOUT SCIATICA: ICD-10-CM

## 2020-03-13 DIAGNOSIS — F33.41 RECURRENT MAJOR DEPRESSIVE DISORDER, IN PARTIAL REMISSION: ICD-10-CM

## 2020-03-13 DIAGNOSIS — I95.1 ORTHOSTASIS: ICD-10-CM

## 2020-03-13 DIAGNOSIS — R00.2 HEART PALPITATIONS: ICD-10-CM

## 2020-03-13 DIAGNOSIS — G47.61 PLMD (PERIODIC LIMB MOVEMENT DISORDER): ICD-10-CM

## 2020-03-13 DIAGNOSIS — I95.1 POSTURAL HYPOTENSION: Chronic | ICD-10-CM

## 2020-03-13 PROCEDURE — 64615 CHEMODENERV MUSC MIGRAINE: CPT | Mod: S$GLB,,, | Performed by: PSYCHIATRY & NEUROLOGY

## 2020-03-13 PROCEDURE — 64615 PR CHEMODENERVATION OF MUSCLE FOR CHRONIC MIGRAINE: ICD-10-PCS | Mod: S$GLB,,, | Performed by: PSYCHIATRY & NEUROLOGY

## 2020-03-13 NOTE — PROCEDURES
Procedures           1. PROCEDURE: Botox injections      2. INDICATION: Intractable Migraine      3. TIME OUT: The procedure was discussed in details with the patient and the consent form was signed. The patient verbalized understanding of the procedure . I discussed the risks that may include serious immune reaction and distant spread that could results in loss of breathing. Other side effects may include droopy eyelids, trouble swallowing and cardiac arrhythmias. It was also stressed that it is contraindicated in pregnancy.      3. COURSE:   The standard protocol was followed. the procedure was very smooth.      4. COMPLICATIONS: None. The patient tolerated the procedure very well.      5. AFTERCARE: I encouraged the patient to use ice if the sites of injection get tender and call me with any problems or complications.               As per the standard protocol, a total 155 units were injected in 31 sites.            RT  5 units in 1 site     LT  5 units in 1 site      Procerus 5 units in 1 site      RT Frontalis 10 units in 2 sites      LT Frontalis 10 units in 2 site      RT Temporalis 20 units in 4 sites     LT Temporalis 20 units in 4 sites     RT Occipitalis 15 units in 3 sites     LT Occipitalis 15  units in 3 sites      RT Cervical Paraspinals 10 units in 2 sites     LT Cervical Paraspinals 10 units in 2 sites     RT Trapezius 15 units in 3 sites     LT Trapezius 15 units in 3 sites         Per the standard of care protocol we use 155 units and waste 45 units. I gave the patient the option of following the standard protocol vs.using the extra 45 units to target areas where the pain is maximum which could potentially provide extra help with headache control. I explained to the patient that this will be an off-label use, not the standard protocol, not evidence-based and could result in more pronounced side effects. The patient verbalized full understanding of all the facts and the risks  and benefits and elected to use the extra 45 units for the following sites:                 Procerus 5 units in 1 site      RT Frontalis 10 units in 2 sites      LT Frontalis 10 units in 2 site      RT Temporalis 10 units in 2 sites     LT Temporalis 10 units in 2 sites

## 2020-03-16 ENCOUNTER — PATIENT MESSAGE (OUTPATIENT)
Dept: INTERNAL MEDICINE | Facility: CLINIC | Age: 40
End: 2020-03-16

## 2020-03-18 ENCOUNTER — OFFICE VISIT (OUTPATIENT)
Dept: INTERNAL MEDICINE | Facility: CLINIC | Age: 40
End: 2020-03-18
Payer: COMMERCIAL

## 2020-03-18 ENCOUNTER — PATIENT MESSAGE (OUTPATIENT)
Dept: PSYCHIATRY | Facility: CLINIC | Age: 40
End: 2020-03-18

## 2020-03-18 ENCOUNTER — PATIENT MESSAGE (OUTPATIENT)
Dept: INTERNAL MEDICINE | Facility: CLINIC | Age: 40
End: 2020-03-18

## 2020-03-18 VITALS — DIASTOLIC BLOOD PRESSURE: 60 MMHG | SYSTOLIC BLOOD PRESSURE: 90 MMHG

## 2020-03-18 DIAGNOSIS — R63.5 WEIGHT GAIN: Primary | ICD-10-CM

## 2020-03-18 PROCEDURE — 99213 PR OFFICE/OUTPT VISIT, EST, LEVL III, 20-29 MIN: ICD-10-PCS | Mod: 95,,, | Performed by: FAMILY MEDICINE

## 2020-03-18 PROCEDURE — 99213 OFFICE O/P EST LOW 20 MIN: CPT | Mod: 95,,, | Performed by: FAMILY MEDICINE

## 2020-03-18 NOTE — PROGRESS NOTES
Arielle Verde  03/18/2020  [id number]    Vera Broussard MD  Patient Care Team:  Vera Broussard MD as PCP - General (Family Medicine)  Luly Gonzalez LPN as Care Coordinator (Internal Medicine)  Has the patient seen any provider outside of the [facility] network since the last visit? (no). If yes, HIPPA forms completed and records requested.    The patient location is: work  The chief complaint leading to consultation is: fatigue, weight gain  Visit type: Virtual visit with synchronous audio and video  Total time spent with patient: 2;40-2;59  Each patient to whom he or she provides medical services by telemedicine is:  (1) informed of the relationship between the physician and patient and the respective role of any other health care provider with respect to management of the patient; and (2) notified that he or she may decline to receive medical services by telemedicine and may withdraw from such care at any time.          Visit Type:a scheduled routine follow-up visit    Chief Complaint:  Weight SOB    History of Present Illness:  39  She started smoking again.   She reports she has put on 40 pounds since August.    She reports she wasn't exercising, and now can't go to gym.  She reports with smoking and weight gain she has started to get SOB with exertion. No CP, but can feel a tightness.    Removed IUD, and got Depo.  She has been on it until her new IUD can be put in.  She had cervical cancer, and they removed part of cervix, [facility], Dr. Jorgensen.    She is followed by Psych. She reports she is Happy with the meds.   She feels better with the Klonopin. She is on Cymbalta.     Does check BP at home.   Average 90/60, on minodrine    Saw labs in October.    History:  Past Medical History:   Diagnosis Date    Anxiety     Cancer     cervical cancer    Chronic headaches     Depression     Migraine      Past Surgical History:   Procedure Laterality Date    ADENOIDECTOMY      BREAST SURGERY       breast augmentatin    COLD KNIFE CONIZATION OF CERVIX N/A 11/27/2019    Procedure: CONE BIOPSY, CERVIX, USING COLD;  Surgeon: Rachele Willis MD;  Location: [facility] OR;  Service: OB/GYN;  Laterality: N/A;    Hyperhydrosis      REMOVAL OF INTRAUTERINE DEVICE (IUD)  11/27/2019    Procedure: REMOVAL, INTRAUTERINE DEVICE;  Surgeon: Rachele Willis MD;  Location: [facility] OR;  Service: OB/GYN;;    TILT TABLE TEST N/A 1/11/2019    Procedure: TILT TABLE TEST;  Surgeon: Justin Freeman MD;  Location: [facility] CATH LAB;  Service: Cardiology;  Laterality: N/A;  Rodriguez pt/pt req 930 start time    TONSILLECTOMY       Family History   Problem Relation Age of Onset    Lupus Mother     Ulcerative colitis Mother     Kidney failure Father     Drug abuse Father     Breast cancer Paternal Grandmother     Colon cancer Neg Hx     Ovarian cancer Neg Hx      Social History     Socioeconomic History    Marital status: Single     Spouse name: Not on file    Number of children: Not on file    Years of education: Not on file    Highest education level: Not on file   Occupational History    Occupation: Podiatry nurse    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   Social Needs    Financial resource strain: Not on file    Food insecurity:     Worry: Not on file     Inability: Not on file    Transportation needs:     Medical: Not on file     Non-medical: Not on file   Tobacco Use    Smoking status: Current Some Day Smoker    Smokeless tobacco: Never Used    Tobacco comment: no smoking after m.n prior to sx   Substance and Sexual Activity    Alcohol use: Yes     Comment: social     Drug use: No    Sexual activity: Not Currently     Partners: Male     Birth control/protection: IUD   Lifestyle    Physical activity:     Days per week: Not on file     Minutes per session: Not on file    Stress: Not on file   Relationships    Social connections:     Talks on phone: Not on file     Gets together: Not on file     Attends Jain service: Not on  file     Active member of club or organization: Not on file     Attends meetings of clubs or organizations: Not on file     Relationship status: Not on file   Other Topics Concern    Not on file   Social History Narrative    Not on file     Patient Active Problem List   Diagnosis    Anxiety associated with depression    Chronic bilateral low back pain without sciatica    Recurrent major depressive disorder, in partial remission    Generalized headaches    PLMD (periodic limb movement disorder)    Postural hypotension    Heart palpitations    Common migraine with intractable migraine    Use of medication with teratogenic potential in female of reproductive age    Orthostasis    Abnormal vaginal bleeding    Low grade squamous intraepithelial lesion (LGSIL) on cervical Pap smear    Cervical dysplasia     Review of patient's allergies indicates:   Allergen Reactions    Emgality [galcanezumab-gnlm] Hives and Itching    Topiramate Itching       The following were reviewed at this visit: active problem list, medication list, allergies, family history, social history, and health maintenance.    Medications:  Current Outpatient Medications on File Prior to Visit   Medication Sig Dispense Refill    albuterol (PROVENTIL/VENTOLIN HFA) 90 mcg/actuation inhaler Inhale 2 puffs into the lungs every 6 (six) hours as needed for Wheezing. Dispense with spacer. 6.7 g 0    ASCORBATE CALCIUM (VITAMIN C ORAL) Take 1 tablet by mouth every evening.       busPIRone (BUSPAR) 15 MG tablet Take 2 tablets (30 mg total) by mouth 2 (two) times daily. 120 tablet 3    cetirizine (ZYRTEC) 10 MG tablet Take 10 mg by mouth daily as needed.   0    clonazePAM (KLONOPIN) 0.5 MG tablet Take 1 tablet (0.5 mg total) by mouth daily as needed for Anxiety. 30 tablet 3    DULoxetine (CYMBALTA) 60 MG capsule Take 1 capsule (60 mg total) by mouth every evening. 30 capsule 3    fluticasone (FLONASE) 50 mcg/actuation nasal spray 2 sprays (100  mcg total) by Each Nare route once daily. (Patient taking differently: 2 sprays by Each Nostril route daily as needed. ) 16 g 0    ketorolac (TORADOL) 10 mg tablet Take 1 tablet (10 mg total) by mouth every 6 (six) hours as needed for Pain. 20 tablet 0    tiZANidine (ZANAFLEX) 4 MG tablet Take 1 tablet (4 mg total) by mouth every 6 (six) hours as needed (muscle spasms). 60 tablet 0    traMADol (ULTRAM) 50 mg tablet Take 1 tablet (50 mg total) by mouth every 6 (six) hours as needed for Pain. 15 tablet 0    traZODone (DESYREL) 100 MG tablet Take 1-3 tablets at bedtime as needed for sleep. 270 tablet 1    [DISCONTINUED] vitamin D 1000 units Tab Take 1,000 Units by mouth once daily.      levonorgestrel (MIRENA) 20 mcg/24 hr (5 years) IUD 1 each by Intrauterine route once.      rizatriptan (MAXALT) 10 MG tablet Take 10 mg by mouth daily as needed.      [DISCONTINUED] aspirin 81 MG Chew Take 81 mg by mouth every evening.       [DISCONTINUED] folic acid (FOLVITE) 1 MG tablet Take 1 tablet (1 mg total) by mouth once daily. 90 tablet 3    [DISCONTINUED] midodrine (PROAMATINE) 2.5 MG Tab Take 2.5 mg by mouth 2 (two) times daily with meals.       [DISCONTINUED] zonisamide (ZONEGRAN) 100 MG Cap Take 2 capsules (200 mg total) by mouth every evening. 180 capsule 3     Current Facility-Administered Medications on File Prior to Visit   Medication Dose Route Frequency Provider Last Rate Last Dose    medroxyPROGESTERone (DEPO-PROVERA) injection 150 mg  150 mg Intramuscular Q90 Days Rachele Willis MD           Medications have been reviewed and reconciled with patient at this visit.  Barriers to medications present (no)    Adverse reactions to current medications (no)    Over the counter medications reviewed (Yes ), and if needed added to active Medication list at this visit.     Exam:  Wt Readings from Last 3 Encounters:   03/13/20 88.5 kg (195 lb 1.7 oz)   02/27/20 88.4 kg (194 lb 14.2 oz)   01/22/20 86 kg (189 lb  9.5 oz)     Temp Readings from Last 3 Encounters:   02/27/20 97.6 °F (36.4 °C) (Tympanic)   01/06/20 100 °F (37.8 °C) (Tympanic)   12/20/19 97.9 °F (36.6 °C) (Oral)     BP Readings from Last 3 Encounters:   03/18/20 90/60   03/13/20 112/70   02/27/20 94/62     Pulse Readings from Last 3 Encounters:   03/13/20 78   02/27/20 104   01/22/20 74     There is no height or weight on file to calculate BMI.      Review of Systems   Constitutional: Positive for malaise/fatigue. Negative for chills and fever.        Weight gain     HENT: Negative.  Negative for congestion, sinus pain and sore throat.    Eyes: Negative for blurred vision and double vision.   Respiratory: Positive for shortness of breath. Negative for cough, sputum production and wheezing.    Cardiovascular: Negative for chest pain, palpitations and leg swelling.   Gastrointestinal: Negative for abdominal pain, constipation, diarrhea, heartburn, nausea and vomiting.   Genitourinary: Negative.    Musculoskeletal: Negative.    Skin: Negative.  Negative for rash.   Neurological: Negative.    Endo/Heme/Allergies: Negative.  Negative for polydipsia. Does not bruise/bleed easily.   Psychiatric/Behavioral: Negative for depression and substance abuse.     Physical Exam   Constitutional: She is oriented to person, place, and time. She appears well-nourished.   Pulmonary/Chest: Effort normal.   Neurological: She is alert and oriented to person, place, and time.   Psychiatric: She has a normal mood and affect. Her behavior is normal. Judgment and thought content normal.       Laboratory Reviewed ({N/A)  Lab Results   Component Value Date    WBC 6.90 11/18/2019    HGB 11.9 (L) 11/18/2019    HCT 39.5 11/18/2019     11/18/2019    CHOL 192 04/20/2017    TRIG 91 04/20/2017    HDL 46 04/20/2017    ALT 35 10/21/2019    AST 31 10/21/2019     11/18/2019    K 3.9 11/18/2019     11/18/2019    CREATININE 0.8 11/18/2019    BUN 15 11/18/2019    CO2 26 11/18/2019     TSH 0.886 10/21/2019       Diagnoses and all orders for this visit:    Weight gain  -     naltrexone-bupropion (CONTRAVE) 8-90 mg TbSR; Take 2 tablets by mouth 2 (two) times daily.      I will discuss with Dr. CATA Nicole if okay with Contrave  Increase exercise  Stop smoking  Get off DEPO- weight gain.    Watch diet    Recent labs reviewed              Care Plan/Goals: Reviewed  (N/A)  Goals    None         Follow up: Follow up in about 3 months (around 6/18/2020).    After visit summary was printed and given to patient upon discharge today.  Patient goals and care plan are included in After Visit Summary.

## 2020-03-24 ENCOUNTER — PATIENT MESSAGE (OUTPATIENT)
Dept: INTERNAL MEDICINE | Facility: CLINIC | Age: 40
End: 2020-03-24

## 2020-03-26 ENCOUNTER — PATIENT MESSAGE (OUTPATIENT)
Dept: INTERNAL MEDICINE | Facility: CLINIC | Age: 40
End: 2020-03-26

## 2020-03-30 ENCOUNTER — PROCEDURE VISIT (OUTPATIENT)
Dept: OBSTETRICS AND GYNECOLOGY | Facility: CLINIC | Age: 40
End: 2020-03-30
Payer: COMMERCIAL

## 2020-03-30 VITALS
BODY MASS INDEX: 27.78 KG/M2 | SYSTOLIC BLOOD PRESSURE: 118 MMHG | DIASTOLIC BLOOD PRESSURE: 80 MMHG | WEIGHT: 198.44 LBS | HEIGHT: 71 IN

## 2020-03-30 DIAGNOSIS — Z30.430 ENCOUNTER FOR INSERTION OF MIRENA IUD: Primary | ICD-10-CM

## 2020-03-30 PROCEDURE — 58300 INSERT INTRAUTERINE DEVICE: CPT | Mod: S$GLB,,, | Performed by: OBSTETRICS & GYNECOLOGY

## 2020-03-30 PROCEDURE — 58300 INSERTION OF IUD-TODAY: ICD-10-PCS | Mod: S$GLB,,, | Performed by: OBSTETRICS & GYNECOLOGY

## 2020-03-30 NOTE — PROCEDURES
Insertion of IUD-Today  Date/Time: 3/30/2020 2:30 PM  Performed by: Rachele Willis MD  Authorized by: Rachele Willis MD     Consent:     Consent obtained:  Verbal and written    Consent given by:  Patient    Procedure risks and benefits discussed: yes      Patient questions answered: yes      Patient agrees, verbalizes understanding, and wants to proceed: yes      Educational handouts given: yes      Instructions and paperwork completed: yes    Procedure:     Pelvic exam performed: yes      Negative GC/chlamydia test: no      Negative urine pregnancy test: yes      Negative serum pregnancy test: no      Cervix cleaned and prepped: yes      Speculum placed in vagina: yes      Tenaculum applied to cervix: yes      Uterus sounded: yes      Uterus sound depth (cm):  7    IUD inserted with no complications: yes      Strings trimmed: yes    Post-procedure:     Patient tolerated procedure well: yes      Patient will follow up after next period: yes

## 2020-03-31 RX ORDER — KETOROLAC TROMETHAMINE 10 MG/1
10 TABLET, FILM COATED ORAL EVERY 6 HOURS PRN
Qty: 20 TABLET | Refills: 0 | Status: SHIPPED | OUTPATIENT
Start: 2020-03-31 | End: 2021-04-19

## 2020-03-31 RX ORDER — TIZANIDINE 4 MG/1
4 TABLET ORAL EVERY 6 HOURS PRN
Qty: 60 TABLET | Refills: 0 | Status: SHIPPED | OUTPATIENT
Start: 2020-03-31 | End: 2021-04-19

## 2020-04-29 ENCOUNTER — CLINICAL SUPPORT (OUTPATIENT)
Dept: OBSTETRICS AND GYNECOLOGY | Facility: CLINIC | Age: 40
End: 2020-04-29
Payer: COMMERCIAL

## 2020-04-29 PROCEDURE — 90471 IMMUNIZATION ADMIN: CPT | Mod: S$GLB,,, | Performed by: NURSE PRACTITIONER

## 2020-04-29 PROCEDURE — 99999 PR PBB SHADOW E&M-EST. PATIENT-LVL II: CPT | Mod: PBBFAC,,,

## 2020-04-29 PROCEDURE — 99999 PR PBB SHADOW E&M-EST. PATIENT-LVL II: ICD-10-PCS | Mod: PBBFAC,,,

## 2020-04-29 PROCEDURE — 90651 HPV VACCINE 9-VALENT 3 DOSE IM: ICD-10-PCS | Mod: S$GLB,,, | Performed by: NURSE PRACTITIONER

## 2020-04-29 PROCEDURE — 90651 9VHPV VACCINE 2/3 DOSE IM: CPT | Mod: S$GLB,,, | Performed by: NURSE PRACTITIONER

## 2020-04-29 PROCEDURE — 90471 HPV VACCINE 9-VALENT 3 DOSE IM: ICD-10-PCS | Mod: S$GLB,,, | Performed by: NURSE PRACTITIONER

## 2020-04-29 NOTE — PROGRESS NOTES
Verified patient with 2 patient identifiers. Allergies and medications reviewed.   HPV vaccine given IM to right deltoid using aseptic technique.   No discomfort noted. Patient tolerated well.      Patient advised to wait 15 minutes in lobby to monitor for reaction.   Patient verbalized understanding.

## 2020-05-13 RX ORDER — TRAZODONE HYDROCHLORIDE 100 MG/1
TABLET ORAL
Qty: 270 TABLET | Refills: 0 | Status: SHIPPED | OUTPATIENT
Start: 2020-05-13 | End: 2020-08-11 | Stop reason: SDUPTHER

## 2020-05-13 RX ORDER — BUSPIRONE HYDROCHLORIDE 15 MG/1
30 TABLET ORAL 2 TIMES DAILY
Qty: 120 TABLET | Refills: 1 | Status: SHIPPED | OUTPATIENT
Start: 2020-05-13 | End: 2020-10-27 | Stop reason: SDUPTHER

## 2020-05-13 RX ORDER — DULOXETIN HYDROCHLORIDE 60 MG/1
60 CAPSULE, DELAYED RELEASE ORAL NIGHTLY
Qty: 30 CAPSULE | Refills: 1 | Status: SHIPPED | OUTPATIENT
Start: 2020-05-13 | End: 2020-08-18

## 2020-05-13 RX ORDER — CLONAZEPAM 0.5 MG/1
0.5 TABLET ORAL DAILY PRN
Qty: 30 TABLET | Refills: 1 | Status: SHIPPED | OUTPATIENT
Start: 2020-05-13 | End: 2020-10-27 | Stop reason: SDUPTHER

## 2020-06-15 ENCOUNTER — PATIENT MESSAGE (OUTPATIENT)
Dept: OBSTETRICS AND GYNECOLOGY | Facility: CLINIC | Age: 40
End: 2020-06-15

## 2020-08-01 ENCOUNTER — PATIENT MESSAGE (OUTPATIENT)
Dept: INTERNAL MEDICINE | Facility: CLINIC | Age: 40
End: 2020-08-01

## 2020-08-02 RX ORDER — EPINEPHRINE 0.3 MG/.3ML
0.3 INJECTION SUBCUTANEOUS
Status: DISCONTINUED | OUTPATIENT
Start: 2020-08-02 | End: 2020-10-21

## 2020-08-11 ENCOUNTER — PATIENT MESSAGE (OUTPATIENT)
Dept: NEUROLOGY | Facility: CLINIC | Age: 40
End: 2020-08-11

## 2020-10-19 ENCOUNTER — PATIENT MESSAGE (OUTPATIENT)
Dept: INTERNAL MEDICINE | Facility: CLINIC | Age: 40
End: 2020-10-19

## 2020-10-21 ENCOUNTER — PATIENT MESSAGE (OUTPATIENT)
Dept: INTERNAL MEDICINE | Facility: CLINIC | Age: 40
End: 2020-10-21

## 2020-10-21 RX ORDER — EPINEPHRINE 0.3 MG/.3ML
2 INJECTION SUBCUTANEOUS ONCE
Qty: 0.6 ML | Refills: 0 | Status: SHIPPED | OUTPATIENT
Start: 2020-10-21 | End: 2021-01-07 | Stop reason: SDUPTHER

## 2020-10-27 ENCOUNTER — OFFICE VISIT (OUTPATIENT)
Dept: PSYCHIATRY | Facility: CLINIC | Age: 40
End: 2020-10-27
Payer: COMMERCIAL

## 2020-10-27 DIAGNOSIS — F33.41 RECURRENT MAJOR DEPRESSIVE DISORDER, IN PARTIAL REMISSION: Primary | ICD-10-CM

## 2020-10-27 PROCEDURE — 99213 OFFICE O/P EST LOW 20 MIN: CPT | Mod: S$GLB,,, | Performed by: PSYCHIATRY & NEUROLOGY

## 2020-10-27 PROCEDURE — 99999 PR PBB SHADOW E&M-EST. PATIENT-LVL I: CPT | Mod: PBBFAC,,, | Performed by: PSYCHIATRY & NEUROLOGY

## 2020-10-27 PROCEDURE — 99999 PR PBB SHADOW E&M-EST. PATIENT-LVL I: ICD-10-PCS | Mod: PBBFAC,,, | Performed by: PSYCHIATRY & NEUROLOGY

## 2020-10-27 PROCEDURE — 99213 PR OFFICE/OUTPT VISIT, EST, LEVL III, 20-29 MIN: ICD-10-PCS | Mod: S$GLB,,, | Performed by: PSYCHIATRY & NEUROLOGY

## 2020-10-27 RX ORDER — DULOXETIN HYDROCHLORIDE 60 MG/1
CAPSULE, DELAYED RELEASE ORAL
Qty: 30 CAPSULE | Refills: 5 | Status: SHIPPED | OUTPATIENT
Start: 2020-10-27 | End: 2021-04-27 | Stop reason: SDUPTHER

## 2020-10-27 RX ORDER — BUSPIRONE HYDROCHLORIDE 15 MG/1
30 TABLET ORAL 2 TIMES DAILY
Qty: 120 TABLET | Refills: 5 | Status: SHIPPED | OUTPATIENT
Start: 2020-10-27 | End: 2021-04-27 | Stop reason: SDUPTHER

## 2020-10-27 RX ORDER — BUPROPION HYDROCHLORIDE 150 MG/1
150 TABLET ORAL DAILY
Qty: 30 TABLET | Refills: 5 | Status: SHIPPED | OUTPATIENT
Start: 2020-10-27 | End: 2021-04-27

## 2020-10-27 RX ORDER — CLONAZEPAM 0.5 MG/1
0.5 TABLET ORAL DAILY PRN
Qty: 30 TABLET | Refills: 2 | Status: SHIPPED | OUTPATIENT
Start: 2020-10-27 | End: 2021-04-27 | Stop reason: SDUPTHER

## 2020-10-27 RX ORDER — TRAZODONE HYDROCHLORIDE 100 MG/1
TABLET ORAL
Qty: 270 TABLET | Refills: 1 | Status: SHIPPED | OUTPATIENT
Start: 2020-10-27 | End: 2021-04-27 | Stop reason: SDUPTHER
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
## 2020-11-02 ENCOUNTER — PROCEDURE VISIT (OUTPATIENT)
Dept: NEUROLOGY | Facility: CLINIC | Age: 40
End: 2020-11-02
Payer: COMMERCIAL

## 2020-11-02 VITALS
HEART RATE: 72 BPM | BODY MASS INDEX: 28.31 KG/M2 | WEIGHT: 202.19 LBS | RESPIRATION RATE: 16 BRPM | DIASTOLIC BLOOD PRESSURE: 78 MMHG | HEIGHT: 71 IN | SYSTOLIC BLOOD PRESSURE: 122 MMHG

## 2020-11-02 DIAGNOSIS — N93.9 ABNORMAL VAGINAL BLEEDING: ICD-10-CM

## 2020-11-02 DIAGNOSIS — R00.2 HEART PALPITATIONS: ICD-10-CM

## 2020-11-02 DIAGNOSIS — G47.61 PLMD (PERIODIC LIMB MOVEMENT DISORDER): ICD-10-CM

## 2020-11-02 DIAGNOSIS — F33.41 RECURRENT MAJOR DEPRESSIVE DISORDER, IN PARTIAL REMISSION: ICD-10-CM

## 2020-11-02 DIAGNOSIS — Z79.899 USE OF MEDICATION WITH TERATOGENIC POTENTIAL IN FEMALE OF REPRODUCTIVE AGE: ICD-10-CM

## 2020-11-02 DIAGNOSIS — M54.50 CHRONIC BILATERAL LOW BACK PAIN WITHOUT SCIATICA: ICD-10-CM

## 2020-11-02 DIAGNOSIS — F41.8 ANXIETY ASSOCIATED WITH DEPRESSION: ICD-10-CM

## 2020-11-02 DIAGNOSIS — G89.29 CHRONIC BILATERAL LOW BACK PAIN WITHOUT SCIATICA: ICD-10-CM

## 2020-11-02 DIAGNOSIS — R87.612 LOW GRADE SQUAMOUS INTRAEPITHELIAL LESION (LGSIL) ON CERVICAL PAP SMEAR: ICD-10-CM

## 2020-11-02 DIAGNOSIS — R51.9 GENERALIZED HEADACHES: ICD-10-CM

## 2020-11-02 DIAGNOSIS — N87.9 CERVICAL DYSPLASIA: ICD-10-CM

## 2020-11-02 DIAGNOSIS — I95.1 ORTHOSTASIS: ICD-10-CM

## 2020-11-02 DIAGNOSIS — G43.019 COMMON MIGRAINE WITH INTRACTABLE MIGRAINE: Primary | ICD-10-CM

## 2020-11-02 DIAGNOSIS — I95.1 POSTURAL HYPOTENSION: Chronic | ICD-10-CM

## 2020-11-02 PROCEDURE — 64615 PR CHEMODENERVATION OF MUSCLE FOR CHRONIC MIGRAINE: ICD-10-PCS | Mod: S$GLB,,, | Performed by: PSYCHIATRY & NEUROLOGY

## 2020-11-02 PROCEDURE — 64615 CHEMODENERV MUSC MIGRAINE: CPT | Mod: S$GLB,,, | Performed by: PSYCHIATRY & NEUROLOGY

## 2020-11-02 RX ADMIN — ONABOTULINUMTOXINA 200 UNITS: 100 INJECTION, POWDER, LYOPHILIZED, FOR SOLUTION INTRADERMAL; INTRAMUSCULAR at 03:11

## 2020-11-02 NOTE — PROCEDURES
1. PROCEDURE: Botox injections      2. INDICATION: Intractable Migraine      3. TIME OUT: The procedure was discussed in details with the patient and the consent form was signed. The patient verbalized understanding of the procedure . I discussed the risks that may include serious immune reaction and distant spread that could results in loss of breathing. Other side effects may include droopy eyelids, trouble swallowing and cardiac arrhythmias. It was also stressed that it is contraindicated in pregnancy.      3. COURSE:   The standard protocol was followed. the procedure was very smooth.      4. COMPLICATIONS: None. The patient tolerated the procedure very well.      5. AFTERCARE: I encouraged the patient to use ice if the sites of injection get tender and call me with any problems or complications.               As per the standard protocol, a total 155 units were injected in 31 sites.            RT  5 units in 1 site     LT  5 units in 1 site      Procerus 5 units in 1 site      RT Frontalis 10 units in 2 sites      LT Frontalis 10 units in 2 site      RT Temporalis 20 units in 4 sites     LT Temporalis 20 units in 4 sites     RT Occipitalis 15 units in 3 sites     LT Occipitalis 15  units in 3 sites      RT Cervical Paraspinals 10 units in 2 sites     LT Cervical Paraspinals 10 units in 2 sites     RT Trapezius 15 units in 3 sites     LT Trapezius 15 units in 3 sites         Per the standard of care protocol we use 155 units and waste 45 units. I gave the patient the option of following the standard protocol vs.using the extra 45 units to target areas where the pain is maximum which could potentially provide extra help with headache control. I explained to the patient that this will be an off-label use, not the standard protocol, not evidence-based and could result in more pronounced side effects. The patient verbalized full understanding of all the facts and the risks and benefits and  elected to use the extra 45 units for the following sites:                 Procerus 5 units in 1 site      RT Frontalis 10 units in 2 sites      LT Frontalis 10 units in 2 site      RT Temporalis 10 units in 2 sites     LT Temporalis 10 units in 2 sites

## 2020-12-10 ENCOUNTER — OFFICE VISIT (OUTPATIENT)
Dept: PSYCHIATRY | Facility: CLINIC | Age: 40
End: 2020-12-10
Payer: COMMERCIAL

## 2020-12-10 DIAGNOSIS — F33.41 RECURRENT MAJOR DEPRESSIVE DISORDER, IN PARTIAL REMISSION: ICD-10-CM

## 2020-12-10 DIAGNOSIS — F41.8 ANXIETY ASSOCIATED WITH DEPRESSION: Primary | ICD-10-CM

## 2020-12-10 PROCEDURE — 90791 PSYCH DIAGNOSTIC EVALUATION: CPT | Mod: S$GLB,,, | Performed by: SOCIAL WORKER

## 2020-12-10 PROCEDURE — 90791 PR PSYCHIATRIC DIAGNOSTIC EVALUATION: ICD-10-PCS | Mod: S$GLB,,, | Performed by: SOCIAL WORKER
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
## 2020-12-19 ENCOUNTER — PATIENT MESSAGE (OUTPATIENT)
Dept: PSYCHIATRY | Facility: CLINIC | Age: 40
End: 2020-12-19

## 2021-01-02 ENCOUNTER — OFFICE VISIT (OUTPATIENT)
Dept: URGENT CARE | Facility: CLINIC | Age: 41
End: 2021-01-02
Payer: COMMERCIAL

## 2021-01-02 VITALS — TEMPERATURE: 97 F | HEART RATE: 100 BPM | BODY MASS INDEX: 29.01 KG/M2 | OXYGEN SATURATION: 97 % | WEIGHT: 208 LBS

## 2021-01-02 DIAGNOSIS — B96.89 ACUTE BACTERIAL SINUSITIS: Primary | ICD-10-CM

## 2021-01-02 DIAGNOSIS — R09.89 CHEST CONGESTION: ICD-10-CM

## 2021-01-02 DIAGNOSIS — J01.90 ACUTE BACTERIAL SINUSITIS: Primary | ICD-10-CM

## 2021-01-02 DIAGNOSIS — Z20.822 EXPOSURE TO COVID-19 VIRUS: ICD-10-CM

## 2021-01-02 DIAGNOSIS — R05.9 COUGH: ICD-10-CM

## 2021-01-02 LAB
CTP QC/QA: YES
SARS-COV-2 RDRP RESP QL NAA+PROBE: NEGATIVE

## 2021-01-02 PROCEDURE — 99999 PR PBB SHADOW E&M-EST. PATIENT-LVL V: CPT | Mod: PBBFAC,,, | Performed by: PHYSICIAN ASSISTANT

## 2021-01-02 PROCEDURE — 99214 OFFICE O/P EST MOD 30 MIN: CPT | Mod: S$GLB,,, | Performed by: PHYSICIAN ASSISTANT

## 2021-01-02 PROCEDURE — U0002: ICD-10-PCS | Mod: QW,S$GLB,, | Performed by: PHYSICIAN ASSISTANT

## 2021-01-02 PROCEDURE — 1126F PR PAIN SEVERITY QUANTIFIED, NO PAIN PRESENT: ICD-10-PCS | Mod: S$GLB,,, | Performed by: PHYSICIAN ASSISTANT

## 2021-01-02 PROCEDURE — 1126F AMNT PAIN NOTED NONE PRSNT: CPT | Mod: S$GLB,,, | Performed by: PHYSICIAN ASSISTANT

## 2021-01-02 PROCEDURE — 99214 PR OFFICE/OUTPT VISIT, EST, LEVL IV, 30-39 MIN: ICD-10-PCS | Mod: S$GLB,,, | Performed by: PHYSICIAN ASSISTANT

## 2021-01-02 PROCEDURE — 3008F PR BODY MASS INDEX (BMI) DOCUMENTED: ICD-10-PCS | Mod: CPTII,S$GLB,, | Performed by: PHYSICIAN ASSISTANT

## 2021-01-02 PROCEDURE — 99999 PR PBB SHADOW E&M-EST. PATIENT-LVL V: ICD-10-PCS | Mod: PBBFAC,,, | Performed by: PHYSICIAN ASSISTANT

## 2021-01-02 PROCEDURE — 3008F BODY MASS INDEX DOCD: CPT | Mod: CPTII,S$GLB,, | Performed by: PHYSICIAN ASSISTANT

## 2021-01-02 PROCEDURE — U0002 COVID-19 LAB TEST NON-CDC: HCPCS | Mod: QW,S$GLB,, | Performed by: PHYSICIAN ASSISTANT

## 2021-01-02 RX ORDER — BENZONATATE 100 MG/1
100 CAPSULE ORAL 3 TIMES DAILY PRN
COMMUNITY
End: 2022-06-06

## 2021-01-02 RX ORDER — PROMETHAZINE HYDROCHLORIDE AND DEXTROMETHORPHAN HYDROBROMIDE 6.25; 15 MG/5ML; MG/5ML
SYRUP ORAL
COMMUNITY
End: 2021-04-19

## 2021-01-02 RX ORDER — AMOXICILLIN AND CLAVULANATE POTASSIUM 875; 125 MG/1; MG/1
1 TABLET, FILM COATED ORAL 2 TIMES DAILY
Qty: 14 TABLET | Refills: 0 | Status: SHIPPED | OUTPATIENT
Start: 2021-01-02 | End: 2021-01-09

## 2021-01-02 RX ORDER — PROMETHAZINE HYDROCHLORIDE AND DEXTROMETHORPHAN HYDROBROMIDE 6.25; 15 MG/5ML; MG/5ML
5 SYRUP ORAL EVERY 8 HOURS PRN
Qty: 118 ML | Refills: 0 | Status: SHIPPED | OUTPATIENT
Start: 2021-01-02 | End: 2021-01-12

## 2021-01-06 ENCOUNTER — PATIENT MESSAGE (OUTPATIENT)
Dept: INTERNAL MEDICINE | Facility: CLINIC | Age: 41
End: 2021-01-06

## 2021-01-06 DIAGNOSIS — R50.9 FEVER, UNSPECIFIED FEVER CAUSE: ICD-10-CM

## 2021-01-06 DIAGNOSIS — B96.89 ACUTE BACTERIAL SINUSITIS: ICD-10-CM

## 2021-01-06 DIAGNOSIS — J02.9 PHARYNGITIS, UNSPECIFIED ETIOLOGY: ICD-10-CM

## 2021-01-06 DIAGNOSIS — R52 BODY ACHES: ICD-10-CM

## 2021-01-06 DIAGNOSIS — J01.90 ACUTE BACTERIAL SINUSITIS: ICD-10-CM

## 2021-01-06 RX ORDER — FLUTICASONE PROPIONATE 50 MCG
2 SPRAY, SUSPENSION (ML) NASAL DAILY PRN
Qty: 16 G | Refills: 1 | Status: SHIPPED | OUTPATIENT
Start: 2021-01-06 | End: 2022-03-07

## 2021-01-07 ENCOUNTER — PATIENT MESSAGE (OUTPATIENT)
Dept: INTERNAL MEDICINE | Facility: CLINIC | Age: 41
End: 2021-01-07

## 2021-01-07 RX ORDER — EPINEPHRINE 0.3 MG/.3ML
2 INJECTION SUBCUTANEOUS ONCE
Qty: 0.6 ML | Refills: 0 | Status: SHIPPED | OUTPATIENT
Start: 2021-01-07 | End: 2021-01-07 | Stop reason: SDUPTHER

## 2021-01-07 RX ORDER — EPINEPHRINE 0.3 MG/.3ML
2 INJECTION SUBCUTANEOUS ONCE
Qty: 0.6 ML | Refills: 0 | Status: SHIPPED | OUTPATIENT
Start: 2021-01-07

## 2021-02-01 ENCOUNTER — PATIENT MESSAGE (OUTPATIENT)
Dept: NEUROLOGY | Facility: CLINIC | Age: 41
End: 2021-02-01

## 2021-02-08 ENCOUNTER — PROCEDURE VISIT (OUTPATIENT)
Dept: NEUROLOGY | Facility: CLINIC | Age: 41
End: 2021-02-08
Payer: COMMERCIAL

## 2021-02-08 VITALS
HEART RATE: 83 BPM | SYSTOLIC BLOOD PRESSURE: 110 MMHG | DIASTOLIC BLOOD PRESSURE: 78 MMHG | BODY MASS INDEX: 29.23 KG/M2 | HEIGHT: 71 IN | WEIGHT: 208.75 LBS

## 2021-02-08 DIAGNOSIS — G89.29 CHRONIC BILATERAL LOW BACK PAIN WITHOUT SCIATICA: ICD-10-CM

## 2021-02-08 DIAGNOSIS — F41.8 ANXIETY ASSOCIATED WITH DEPRESSION: ICD-10-CM

## 2021-02-08 DIAGNOSIS — R87.612 LOW GRADE SQUAMOUS INTRAEPITHELIAL LESION (LGSIL) ON CERVICAL PAP SMEAR: ICD-10-CM

## 2021-02-08 DIAGNOSIS — I95.1 ORTHOSTASIS: ICD-10-CM

## 2021-02-08 DIAGNOSIS — R00.2 HEART PALPITATIONS: ICD-10-CM

## 2021-02-08 DIAGNOSIS — Z79.899 USE OF MEDICATION WITH TERATOGENIC POTENTIAL IN FEMALE OF REPRODUCTIVE AGE: ICD-10-CM

## 2021-02-08 DIAGNOSIS — N93.9 ABNORMAL VAGINAL BLEEDING: ICD-10-CM

## 2021-02-08 DIAGNOSIS — R51.9 GENERALIZED HEADACHES: ICD-10-CM

## 2021-02-08 DIAGNOSIS — F33.41 RECURRENT MAJOR DEPRESSIVE DISORDER, IN PARTIAL REMISSION: ICD-10-CM

## 2021-02-08 DIAGNOSIS — I95.1 POSTURAL HYPOTENSION: Chronic | ICD-10-CM

## 2021-02-08 DIAGNOSIS — G43.019 COMMON MIGRAINE WITH INTRACTABLE MIGRAINE: Primary | ICD-10-CM

## 2021-02-08 DIAGNOSIS — M54.50 CHRONIC BILATERAL LOW BACK PAIN WITHOUT SCIATICA: ICD-10-CM

## 2021-02-08 DIAGNOSIS — G47.61 PLMD (PERIODIC LIMB MOVEMENT DISORDER): ICD-10-CM

## 2021-02-08 DIAGNOSIS — N87.9 CERVICAL DYSPLASIA: ICD-10-CM

## 2021-02-08 PROCEDURE — 64615 CHEMODENERV MUSC MIGRAINE: CPT | Mod: S$GLB,,, | Performed by: PSYCHIATRY & NEUROLOGY

## 2021-02-08 PROCEDURE — 64615 PR CHEMODENERVATION OF MUSCLE FOR CHRONIC MIGRAINE: ICD-10-PCS | Mod: S$GLB,,, | Performed by: PSYCHIATRY & NEUROLOGY

## 2021-02-08 RX ADMIN — ONABOTULINUMTOXINA 200 UNITS: 100 INJECTION, POWDER, LYOPHILIZED, FOR SOLUTION INTRADERMAL; INTRAMUSCULAR at 02:02

## 2021-02-16 ENCOUNTER — PATIENT MESSAGE (OUTPATIENT)
Dept: NEUROLOGY | Facility: CLINIC | Age: 41
End: 2021-02-16

## 2021-02-17 ENCOUNTER — PATIENT MESSAGE (OUTPATIENT)
Dept: NEUROLOGY | Facility: CLINIC | Age: 41
End: 2021-02-17

## 2021-03-25 ENCOUNTER — PATIENT MESSAGE (OUTPATIENT)
Dept: ADMINISTRATIVE | Facility: HOSPITAL | Age: 41
End: 2021-03-25

## 2021-04-19 ENCOUNTER — TELEPHONE (OUTPATIENT)
Dept: NEUROLOGY | Facility: CLINIC | Age: 41
End: 2021-04-19

## 2021-04-19 ENCOUNTER — PROCEDURE VISIT (OUTPATIENT)
Dept: NEUROLOGY | Facility: CLINIC | Age: 41
End: 2021-04-19
Payer: COMMERCIAL

## 2021-04-19 ENCOUNTER — LAB VISIT (OUTPATIENT)
Dept: LAB | Facility: HOSPITAL | Age: 41
End: 2021-04-19
Attending: FAMILY MEDICINE
Payer: COMMERCIAL

## 2021-04-19 ENCOUNTER — OFFICE VISIT (OUTPATIENT)
Dept: INTERNAL MEDICINE | Facility: CLINIC | Age: 41
End: 2021-04-19
Payer: COMMERCIAL

## 2021-04-19 VITALS
WEIGHT: 212.31 LBS | DIASTOLIC BLOOD PRESSURE: 70 MMHG | HEART RATE: 93 BPM | TEMPERATURE: 98 F | BODY MASS INDEX: 29.72 KG/M2 | SYSTOLIC BLOOD PRESSURE: 108 MMHG | HEIGHT: 71 IN | OXYGEN SATURATION: 98 %

## 2021-04-19 VITALS
RESPIRATION RATE: 16 BRPM | WEIGHT: 212.31 LBS | SYSTOLIC BLOOD PRESSURE: 116 MMHG | OXYGEN SATURATION: 99 % | DIASTOLIC BLOOD PRESSURE: 68 MMHG | BODY MASS INDEX: 29.72 KG/M2 | HEART RATE: 120 BPM | HEIGHT: 71 IN

## 2021-04-19 DIAGNOSIS — N87.9 CERVICAL DYSPLASIA: ICD-10-CM

## 2021-04-19 DIAGNOSIS — R00.2 HEART PALPITATIONS: ICD-10-CM

## 2021-04-19 DIAGNOSIS — F41.8 ANXIETY ASSOCIATED WITH DEPRESSION: ICD-10-CM

## 2021-04-19 DIAGNOSIS — G47.61 PLMD (PERIODIC LIMB MOVEMENT DISORDER): ICD-10-CM

## 2021-04-19 DIAGNOSIS — R53.83 FATIGUE, UNSPECIFIED TYPE: Primary | ICD-10-CM

## 2021-04-19 DIAGNOSIS — G43.019 COMMON MIGRAINE WITH INTRACTABLE MIGRAINE: ICD-10-CM

## 2021-04-19 DIAGNOSIS — R53.83 FATIGUE, UNSPECIFIED TYPE: ICD-10-CM

## 2021-04-19 DIAGNOSIS — I95.1 ORTHOSTASIS: ICD-10-CM

## 2021-04-19 DIAGNOSIS — Z79.899 USE OF MEDICATION WITH TERATOGENIC POTENTIAL IN FEMALE OF REPRODUCTIVE AGE: ICD-10-CM

## 2021-04-19 DIAGNOSIS — R51.9 GENERALIZED HEADACHES: ICD-10-CM

## 2021-04-19 DIAGNOSIS — N93.9 ABNORMAL VAGINAL BLEEDING: ICD-10-CM

## 2021-04-19 DIAGNOSIS — R87.612 LOW GRADE SQUAMOUS INTRAEPITHELIAL LESION (LGSIL) ON CERVICAL PAP SMEAR: ICD-10-CM

## 2021-04-19 DIAGNOSIS — G43.009 MIGRAINE WITHOUT AURA AND WITHOUT STATUS MIGRAINOSUS, NOT INTRACTABLE: Primary | ICD-10-CM

## 2021-04-19 DIAGNOSIS — M54.50 CHRONIC BILATERAL LOW BACK PAIN WITHOUT SCIATICA: ICD-10-CM

## 2021-04-19 DIAGNOSIS — G89.29 CHRONIC BILATERAL LOW BACK PAIN WITHOUT SCIATICA: ICD-10-CM

## 2021-04-19 DIAGNOSIS — I95.1 POSTURAL HYPOTENSION: Chronic | ICD-10-CM

## 2021-04-19 DIAGNOSIS — F33.41 RECURRENT MAJOR DEPRESSIVE DISORDER, IN PARTIAL REMISSION: ICD-10-CM

## 2021-04-19 LAB
ANION GAP SERPL CALC-SCNC: 10 MMOL/L (ref 8–16)
BUN SERPL-MCNC: 13 MG/DL (ref 6–20)
CALCIUM SERPL-MCNC: 8.6 MG/DL (ref 8.7–10.5)
CHLORIDE SERPL-SCNC: 106 MMOL/L (ref 95–110)
CO2 SERPL-SCNC: 24 MMOL/L (ref 23–29)
CREAT SERPL-MCNC: 0.8 MG/DL (ref 0.5–1.4)
EST. GFR  (AFRICAN AMERICAN): >60 ML/MIN/1.73 M^2
EST. GFR  (NON AFRICAN AMERICAN): >60 ML/MIN/1.73 M^2
ESTRADIOL SERPL-MCNC: 26 PG/ML
GLUCOSE SERPL-MCNC: 100 MG/DL (ref 70–110)
POTASSIUM SERPL-SCNC: 3.6 MMOL/L (ref 3.5–5.1)
SODIUM SERPL-SCNC: 140 MMOL/L (ref 136–145)
T3 SERPL-MCNC: 97 NG/DL (ref 60–180)
T4 FREE SERPL-MCNC: 1 NG/DL (ref 0.71–1.51)
TSH SERPL DL<=0.005 MIU/L-ACNC: 0.77 UIU/ML (ref 0.4–4)

## 2021-04-19 PROCEDURE — 84443 ASSAY THYROID STIM HORMONE: CPT | Performed by: FAMILY MEDICINE

## 2021-04-19 PROCEDURE — 3008F BODY MASS INDEX DOCD: CPT | Mod: CPTII,S$GLB,, | Performed by: FAMILY MEDICINE

## 2021-04-19 PROCEDURE — 3008F PR BODY MASS INDEX (BMI) DOCUMENTED: ICD-10-PCS | Mod: CPTII,S$GLB,, | Performed by: FAMILY MEDICINE

## 2021-04-19 PROCEDURE — 82670 ASSAY OF TOTAL ESTRADIOL: CPT | Performed by: FAMILY MEDICINE

## 2021-04-19 PROCEDURE — 84480 ASSAY TRIIODOTHYRONINE (T3): CPT | Performed by: FAMILY MEDICINE

## 2021-04-19 PROCEDURE — 99999 PR PBB SHADOW E&M-EST. PATIENT-LVL IV: CPT | Mod: PBBFAC,,, | Performed by: FAMILY MEDICINE

## 2021-04-19 PROCEDURE — 84439 ASSAY OF FREE THYROXINE: CPT | Performed by: FAMILY MEDICINE

## 2021-04-19 PROCEDURE — 64615 CHEMODENERV MUSC MIGRAINE: CPT | Mod: S$GLB,,, | Performed by: PSYCHIATRY & NEUROLOGY

## 2021-04-19 PROCEDURE — 99999 PR PBB SHADOW E&M-EST. PATIENT-LVL IV: ICD-10-PCS | Mod: PBBFAC,,, | Performed by: FAMILY MEDICINE

## 2021-04-19 PROCEDURE — 99214 PR OFFICE/OUTPT VISIT, EST, LEVL IV, 30-39 MIN: ICD-10-PCS | Mod: S$GLB,,, | Performed by: FAMILY MEDICINE

## 2021-04-19 PROCEDURE — 83036 HEMOGLOBIN GLYCOSYLATED A1C: CPT | Performed by: FAMILY MEDICINE

## 2021-04-19 PROCEDURE — 1126F AMNT PAIN NOTED NONE PRSNT: CPT | Mod: S$GLB,,, | Performed by: FAMILY MEDICINE

## 2021-04-19 PROCEDURE — 80048 BASIC METABOLIC PNL TOTAL CA: CPT | Performed by: FAMILY MEDICINE

## 2021-04-19 PROCEDURE — 1126F PR PAIN SEVERITY QUANTIFIED, NO PAIN PRESENT: ICD-10-PCS | Mod: S$GLB,,, | Performed by: FAMILY MEDICINE

## 2021-04-19 PROCEDURE — 99214 OFFICE O/P EST MOD 30 MIN: CPT | Mod: S$GLB,,, | Performed by: FAMILY MEDICINE

## 2021-04-19 PROCEDURE — 36415 COLL VENOUS BLD VENIPUNCTURE: CPT | Performed by: FAMILY MEDICINE

## 2021-04-19 PROCEDURE — 84403 ASSAY OF TOTAL TESTOSTERONE: CPT | Performed by: FAMILY MEDICINE

## 2021-04-19 PROCEDURE — 64615 PR CHEMODENERVATION OF MUSCLE FOR CHRONIC MIGRAINE: ICD-10-PCS | Mod: S$GLB,,, | Performed by: PSYCHIATRY & NEUROLOGY

## 2021-04-19 RX ADMIN — ONABOTULINUMTOXINA 200 UNITS: 100 INJECTION, POWDER, LYOPHILIZED, FOR SOLUTION INTRADERMAL; INTRAMUSCULAR at 02:04

## 2021-04-20 LAB
ESTIMATED AVG GLUCOSE: 128 MG/DL (ref 68–131)
HBA1C MFR BLD: 6.1 % (ref 4–5.6)

## 2021-04-24 LAB
ALBUMIN SERPL-MCNC: 4.2 G/DL (ref 3.6–5.1)
SHBG SERPL-SCNC: 31 NMOL/L (ref 17–124)
TESTOST FREE SERPL-MCNC: 2.7 PG/ML (ref 0.2–5)
TESTOST SERPL-MCNC: 22 NG/DL (ref 2–45)
TESTOSTERONE.FREE+WB SERPL-MCNC: 5.2 NG/DL (ref 0.5–8.5)

## 2021-04-27 ENCOUNTER — OFFICE VISIT (OUTPATIENT)
Dept: PSYCHIATRY | Facility: CLINIC | Age: 41
End: 2021-04-27
Payer: COMMERCIAL

## 2021-04-27 ENCOUNTER — TELEPHONE (OUTPATIENT)
Dept: INTERNAL MEDICINE | Facility: CLINIC | Age: 41
End: 2021-04-27

## 2021-04-27 VITALS
WEIGHT: 214.31 LBS | SYSTOLIC BLOOD PRESSURE: 122 MMHG | BODY MASS INDEX: 29.89 KG/M2 | DIASTOLIC BLOOD PRESSURE: 84 MMHG | HEART RATE: 80 BPM

## 2021-04-27 DIAGNOSIS — F33.0 MILD EPISODE OF RECURRENT MAJOR DEPRESSIVE DISORDER: Primary | ICD-10-CM

## 2021-04-27 DIAGNOSIS — F41.8 ANXIETY ASSOCIATED WITH DEPRESSION: ICD-10-CM

## 2021-04-27 PROCEDURE — 99214 OFFICE O/P EST MOD 30 MIN: CPT | Mod: S$GLB,,, | Performed by: PSYCHIATRY & NEUROLOGY

## 2021-04-27 PROCEDURE — 3008F BODY MASS INDEX DOCD: CPT | Mod: CPTII,S$GLB,, | Performed by: PSYCHIATRY & NEUROLOGY

## 2021-04-27 PROCEDURE — 99999 PR PBB SHADOW E&M-EST. PATIENT-LVL II: ICD-10-PCS | Mod: PBBFAC,,, | Performed by: PSYCHIATRY & NEUROLOGY

## 2021-04-27 PROCEDURE — 99999 PR PBB SHADOW E&M-EST. PATIENT-LVL II: CPT | Mod: PBBFAC,,, | Performed by: PSYCHIATRY & NEUROLOGY

## 2021-04-27 PROCEDURE — 3008F PR BODY MASS INDEX (BMI) DOCUMENTED: ICD-10-PCS | Mod: CPTII,S$GLB,, | Performed by: PSYCHIATRY & NEUROLOGY

## 2021-04-27 PROCEDURE — 99214 PR OFFICE/OUTPT VISIT, EST, LEVL IV, 30-39 MIN: ICD-10-PCS | Mod: S$GLB,,, | Performed by: PSYCHIATRY & NEUROLOGY

## 2021-04-27 RX ORDER — BUSPIRONE HYDROCHLORIDE 15 MG/1
30 TABLET ORAL 2 TIMES DAILY
Qty: 120 TABLET | Refills: 2 | Status: SHIPPED | OUTPATIENT
Start: 2021-04-27 | End: 2021-08-05 | Stop reason: SDUPTHER

## 2021-04-27 RX ORDER — CLONAZEPAM 0.5 MG/1
0.5 TABLET ORAL DAILY PRN
Qty: 30 TABLET | Refills: 2 | Status: SHIPPED | OUTPATIENT
Start: 2021-04-27 | End: 2021-11-09 | Stop reason: SDUPTHER

## 2021-04-27 RX ORDER — DULOXETIN HYDROCHLORIDE 60 MG/1
CAPSULE, DELAYED RELEASE ORAL
Qty: 30 CAPSULE | Refills: 2 | Status: SHIPPED | OUTPATIENT
Start: 2021-04-27 | End: 2021-08-05 | Stop reason: SDUPTHER

## 2021-04-27 RX ORDER — TRAZODONE HYDROCHLORIDE 100 MG/1
TABLET ORAL
Qty: 270 TABLET | Refills: 0 | Status: SHIPPED | OUTPATIENT
Start: 2021-04-27 | End: 2021-08-05 | Stop reason: SDUPTHER

## 2021-04-27 RX ORDER — BUPROPION HYDROCHLORIDE 300 MG/1
300 TABLET ORAL DAILY
Qty: 30 TABLET | Refills: 2 | Status: SHIPPED | OUTPATIENT
Start: 2021-04-27 | End: 2021-08-05 | Stop reason: SDUPTHER

## 2021-07-07 ENCOUNTER — PATIENT MESSAGE (OUTPATIENT)
Dept: INTERNAL MEDICINE | Facility: CLINIC | Age: 41
End: 2021-07-07

## 2021-07-09 ENCOUNTER — PATIENT MESSAGE (OUTPATIENT)
Dept: INTERNAL MEDICINE | Facility: CLINIC | Age: 41
End: 2021-07-09

## 2021-07-09 RX ORDER — METFORMIN HYDROCHLORIDE 500 MG/1
500 TABLET ORAL 2 TIMES DAILY WITH MEALS
Qty: 180 TABLET | Refills: 3 | Status: SHIPPED | OUTPATIENT
Start: 2021-07-09 | End: 2021-08-05 | Stop reason: SDUPTHER

## 2021-07-09 RX ORDER — TRIAMCINOLONE ACETONIDE 1 MG/G
CREAM TOPICAL 2 TIMES DAILY
Qty: 80 G | Refills: 1 | Status: SHIPPED | OUTPATIENT
Start: 2021-07-09

## 2021-07-19 ENCOUNTER — PROCEDURE VISIT (OUTPATIENT)
Dept: NEUROLOGY | Facility: CLINIC | Age: 41
End: 2021-07-19
Payer: COMMERCIAL

## 2021-07-19 VITALS
SYSTOLIC BLOOD PRESSURE: 118 MMHG | WEIGHT: 208.31 LBS | HEIGHT: 71 IN | DIASTOLIC BLOOD PRESSURE: 72 MMHG | HEART RATE: 93 BPM | BODY MASS INDEX: 29.16 KG/M2 | RESPIRATION RATE: 16 BRPM

## 2021-07-19 DIAGNOSIS — N87.9 CERVICAL DYSPLASIA: ICD-10-CM

## 2021-07-19 DIAGNOSIS — R87.612 LOW GRADE SQUAMOUS INTRAEPITHELIAL LESION (LGSIL) ON CERVICAL PAP SMEAR: ICD-10-CM

## 2021-07-19 DIAGNOSIS — F33.41 RECURRENT MAJOR DEPRESSIVE DISORDER, IN PARTIAL REMISSION: ICD-10-CM

## 2021-07-19 DIAGNOSIS — G43.009 MIGRAINE WITHOUT AURA AND WITHOUT STATUS MIGRAINOSUS, NOT INTRACTABLE: Primary | ICD-10-CM

## 2021-07-19 DIAGNOSIS — I95.1 ORTHOSTASIS: ICD-10-CM

## 2021-07-19 DIAGNOSIS — N93.9 ABNORMAL VAGINAL BLEEDING: ICD-10-CM

## 2021-07-19 DIAGNOSIS — G43.019 COMMON MIGRAINE WITH INTRACTABLE MIGRAINE: ICD-10-CM

## 2021-07-19 DIAGNOSIS — G89.29 CHRONIC BILATERAL LOW BACK PAIN WITHOUT SCIATICA: ICD-10-CM

## 2021-07-19 DIAGNOSIS — F41.8 ANXIETY ASSOCIATED WITH DEPRESSION: ICD-10-CM

## 2021-07-19 DIAGNOSIS — M54.50 CHRONIC BILATERAL LOW BACK PAIN WITHOUT SCIATICA: ICD-10-CM

## 2021-07-19 DIAGNOSIS — G47.61 PLMD (PERIODIC LIMB MOVEMENT DISORDER): ICD-10-CM

## 2021-07-19 DIAGNOSIS — Z79.899 USE OF MEDICATION WITH TERATOGENIC POTENTIAL IN FEMALE OF REPRODUCTIVE AGE: ICD-10-CM

## 2021-07-19 DIAGNOSIS — R51.9 GENERALIZED HEADACHES: ICD-10-CM

## 2021-07-19 DIAGNOSIS — R00.2 HEART PALPITATIONS: ICD-10-CM

## 2021-07-19 DIAGNOSIS — I95.1 POSTURAL HYPOTENSION: Chronic | ICD-10-CM

## 2021-07-19 PROCEDURE — 64615 PR CHEMODENERVATION OF MUSCLE FOR CHRONIC MIGRAINE: ICD-10-PCS | Mod: S$GLB,,, | Performed by: PSYCHIATRY & NEUROLOGY

## 2021-07-19 PROCEDURE — 64615 CHEMODENERV MUSC MIGRAINE: CPT | Mod: S$GLB,,, | Performed by: PSYCHIATRY & NEUROLOGY

## 2021-07-19 RX ADMIN — ONABOTULINUMTOXINA 200 UNITS: 100 INJECTION, POWDER, LYOPHILIZED, FOR SOLUTION INTRADERMAL; INTRAMUSCULAR at 01:07

## 2021-08-02 ENCOUNTER — PATIENT MESSAGE (OUTPATIENT)
Dept: PSYCHIATRY | Facility: CLINIC | Age: 41
End: 2021-08-02

## 2021-08-05 ENCOUNTER — OFFICE VISIT (OUTPATIENT)
Dept: PSYCHIATRY | Facility: CLINIC | Age: 41
End: 2021-08-05
Payer: COMMERCIAL

## 2021-08-05 ENCOUNTER — OFFICE VISIT (OUTPATIENT)
Dept: INTERNAL MEDICINE | Facility: CLINIC | Age: 41
End: 2021-08-05
Payer: COMMERCIAL

## 2021-08-05 DIAGNOSIS — R73.03 PREDIABETES: ICD-10-CM

## 2021-08-05 DIAGNOSIS — F33.41 RECURRENT MAJOR DEPRESSIVE DISORDER, IN PARTIAL REMISSION: Primary | ICD-10-CM

## 2021-08-05 DIAGNOSIS — R05.9 COUGH: Primary | ICD-10-CM

## 2021-08-05 PROCEDURE — 1160F PR REVIEW ALL MEDS BY PRESCRIBER/CLIN PHARMACIST DOCUMENTED: ICD-10-PCS | Mod: CPTII,,, | Performed by: FAMILY MEDICINE

## 2021-08-05 PROCEDURE — 3044F HG A1C LEVEL LT 7.0%: CPT | Mod: CPTII,,, | Performed by: FAMILY MEDICINE

## 2021-08-05 PROCEDURE — 99213 PR OFFICE/OUTPT VISIT, EST, LEVL III, 20-29 MIN: ICD-10-PCS | Mod: 95,,, | Performed by: FAMILY MEDICINE

## 2021-08-05 PROCEDURE — 99213 OFFICE O/P EST LOW 20 MIN: CPT | Mod: 95,,, | Performed by: FAMILY MEDICINE

## 2021-08-05 PROCEDURE — 1160F RVW MEDS BY RX/DR IN RCRD: CPT | Mod: CPTII,,, | Performed by: FAMILY MEDICINE

## 2021-08-05 PROCEDURE — 99213 OFFICE O/P EST LOW 20 MIN: CPT | Mod: 95,,, | Performed by: PSYCHIATRY & NEUROLOGY

## 2021-08-05 PROCEDURE — 1126F PR PAIN SEVERITY QUANTIFIED, NO PAIN PRESENT: ICD-10-PCS | Mod: CPTII,,, | Performed by: FAMILY MEDICINE

## 2021-08-05 PROCEDURE — 1126F AMNT PAIN NOTED NONE PRSNT: CPT | Mod: CPTII,,, | Performed by: FAMILY MEDICINE

## 2021-08-05 PROCEDURE — 1159F MED LIST DOCD IN RCRD: CPT | Mod: CPTII,,, | Performed by: FAMILY MEDICINE

## 2021-08-05 PROCEDURE — 3044F PR MOST RECENT HEMOGLOBIN A1C LEVEL <7.0%: ICD-10-PCS | Mod: CPTII,,, | Performed by: FAMILY MEDICINE

## 2021-08-05 PROCEDURE — 1159F PR MEDICATION LIST DOCUMENTED IN MEDICAL RECORD: ICD-10-PCS | Mod: CPTII,,, | Performed by: FAMILY MEDICINE

## 2021-08-05 PROCEDURE — 99213 PR OFFICE/OUTPT VISIT, EST, LEVL III, 20-29 MIN: ICD-10-PCS | Mod: 95,,, | Performed by: PSYCHIATRY & NEUROLOGY

## 2021-08-05 RX ORDER — DULOXETIN HYDROCHLORIDE 60 MG/1
CAPSULE, DELAYED RELEASE ORAL
Qty: 30 CAPSULE | Refills: 3 | Status: SHIPPED | OUTPATIENT
Start: 2021-08-05 | End: 2021-11-09 | Stop reason: SDUPTHER

## 2021-08-05 RX ORDER — BUSPIRONE HYDROCHLORIDE 15 MG/1
30 TABLET ORAL 2 TIMES DAILY
Qty: 120 TABLET | Refills: 3 | Status: SHIPPED | OUTPATIENT
Start: 2021-08-05 | End: 2021-11-09 | Stop reason: SDUPTHER

## 2021-08-05 RX ORDER — METFORMIN HYDROCHLORIDE 1000 MG/1
1000 TABLET ORAL 2 TIMES DAILY WITH MEALS
Qty: 180 TABLET | Refills: 3 | Status: SHIPPED | OUTPATIENT
Start: 2021-08-05 | End: 2022-06-06

## 2021-08-05 RX ORDER — TRAZODONE HYDROCHLORIDE 100 MG/1
TABLET ORAL
Qty: 270 TABLET | Refills: 1 | Status: SHIPPED | OUTPATIENT
Start: 2021-08-05 | End: 2021-11-09 | Stop reason: SDUPTHER

## 2021-08-05 RX ORDER — BUPROPION HYDROCHLORIDE 300 MG/1
300 TABLET ORAL DAILY
Qty: 30 TABLET | Refills: 3 | Status: SHIPPED | OUTPATIENT
Start: 2021-08-05 | End: 2021-09-15

## 2021-08-05 RX ORDER — ALBUTEROL SULFATE 90 UG/1
2 AEROSOL, METERED RESPIRATORY (INHALATION) EVERY 6 HOURS PRN
Qty: 6.7 G | Refills: 0 | Status: SHIPPED | OUTPATIENT
Start: 2021-08-05 | End: 2023-02-07
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
## 2021-08-12 ENCOUNTER — LAB VISIT (OUTPATIENT)
Dept: LAB | Facility: HOSPITAL | Age: 41
End: 2021-08-12
Attending: FAMILY MEDICINE
Payer: COMMERCIAL

## 2021-08-12 DIAGNOSIS — R73.03 PREDIABETES: ICD-10-CM

## 2021-08-12 PROCEDURE — 83036 HEMOGLOBIN GLYCOSYLATED A1C: CPT | Performed by: FAMILY MEDICINE

## 2021-08-12 PROCEDURE — 36415 COLL VENOUS BLD VENIPUNCTURE: CPT | Performed by: FAMILY MEDICINE

## 2021-08-13 LAB
ESTIMATED AVG GLUCOSE: 123 MG/DL (ref 68–131)
HBA1C MFR BLD: 5.9 % (ref 4–5.6)

## 2021-08-14 ENCOUNTER — PATIENT MESSAGE (OUTPATIENT)
Dept: INTERNAL MEDICINE | Facility: CLINIC | Age: 41
End: 2021-08-14

## 2021-09-15 ENCOUNTER — PATIENT MESSAGE (OUTPATIENT)
Dept: PSYCHIATRY | Facility: CLINIC | Age: 41
End: 2021-09-15

## 2021-09-15 RX ORDER — BUPROPION HYDROCHLORIDE 150 MG/1
450 TABLET ORAL DAILY
Qty: 90 TABLET | Refills: 2 | Status: SHIPPED | OUTPATIENT
Start: 2021-09-15 | End: 2021-11-09 | Stop reason: SDUPTHER

## 2021-09-20 ENCOUNTER — PATIENT MESSAGE (OUTPATIENT)
Dept: URGENT CARE | Facility: CLINIC | Age: 41
End: 2021-09-20

## 2021-09-20 ENCOUNTER — TELEPHONE (OUTPATIENT)
Dept: URGENT CARE | Facility: CLINIC | Age: 41
End: 2021-09-20

## 2021-09-20 ENCOUNTER — OFFICE VISIT (OUTPATIENT)
Dept: URGENT CARE | Facility: CLINIC | Age: 41
End: 2021-09-20
Payer: COMMERCIAL

## 2021-09-20 ENCOUNTER — PATIENT MESSAGE (OUTPATIENT)
Dept: INTERNAL MEDICINE | Facility: CLINIC | Age: 41
End: 2021-09-20

## 2021-09-20 VITALS
TEMPERATURE: 99 F | DIASTOLIC BLOOD PRESSURE: 70 MMHG | OXYGEN SATURATION: 96 % | WEIGHT: 203.81 LBS | HEART RATE: 83 BPM | SYSTOLIC BLOOD PRESSURE: 101 MMHG | BODY MASS INDEX: 28.43 KG/M2

## 2021-09-20 DIAGNOSIS — I88.9 LYMPHADENITIS: Primary | ICD-10-CM

## 2021-09-20 DIAGNOSIS — M54.2 NECK PAIN ON LEFT SIDE: ICD-10-CM

## 2021-09-20 DIAGNOSIS — J02.9 SORE THROAT: ICD-10-CM

## 2021-09-20 LAB
CTP QC/QA: YES
CTP QC/QA: YES
MOLECULAR STREP A: NEGATIVE
SARS-COV-2 RDRP RESP QL NAA+PROBE: NEGATIVE

## 2021-09-20 PROCEDURE — U0002 COVID-19 LAB TEST NON-CDC: HCPCS | Mod: QW,S$GLB,, | Performed by: PHYSICIAN ASSISTANT

## 2021-09-20 PROCEDURE — 99214 OFFICE O/P EST MOD 30 MIN: CPT | Mod: S$GLB,,, | Performed by: PHYSICIAN ASSISTANT

## 2021-09-20 PROCEDURE — 99999 PR PBB SHADOW E&M-EST. PATIENT-LVL IV: ICD-10-PCS | Mod: PBBFAC,,, | Performed by: PHYSICIAN ASSISTANT

## 2021-09-20 PROCEDURE — 3044F PR MOST RECENT HEMOGLOBIN A1C LEVEL <7.0%: ICD-10-PCS | Mod: CPTII,S$GLB,, | Performed by: PHYSICIAN ASSISTANT

## 2021-09-20 PROCEDURE — 99214 PR OFFICE/OUTPT VISIT, EST, LEVL IV, 30-39 MIN: ICD-10-PCS | Mod: S$GLB,,, | Performed by: PHYSICIAN ASSISTANT

## 2021-09-20 PROCEDURE — 1160F PR REVIEW ALL MEDS BY PRESCRIBER/CLIN PHARMACIST DOCUMENTED: ICD-10-PCS | Mod: CPTII,S$GLB,, | Performed by: PHYSICIAN ASSISTANT

## 2021-09-20 PROCEDURE — 87651 STREP A DNA AMP PROBE: CPT | Mod: QW,S$GLB,, | Performed by: PHYSICIAN ASSISTANT

## 2021-09-20 PROCEDURE — 3074F SYST BP LT 130 MM HG: CPT | Mod: CPTII,S$GLB,, | Performed by: PHYSICIAN ASSISTANT

## 2021-09-20 PROCEDURE — 3074F PR MOST RECENT SYSTOLIC BLOOD PRESSURE < 130 MM HG: ICD-10-PCS | Mod: CPTII,S$GLB,, | Performed by: PHYSICIAN ASSISTANT

## 2021-09-20 PROCEDURE — 3008F PR BODY MASS INDEX (BMI) DOCUMENTED: ICD-10-PCS | Mod: CPTII,S$GLB,, | Performed by: PHYSICIAN ASSISTANT

## 2021-09-20 PROCEDURE — 1160F RVW MEDS BY RX/DR IN RCRD: CPT | Mod: CPTII,S$GLB,, | Performed by: PHYSICIAN ASSISTANT

## 2021-09-20 PROCEDURE — 99999 PR PBB SHADOW E&M-EST. PATIENT-LVL IV: CPT | Mod: PBBFAC,,, | Performed by: PHYSICIAN ASSISTANT

## 2021-09-20 PROCEDURE — 87651 POCT STREP A MOLECULAR: ICD-10-PCS | Mod: QW,S$GLB,, | Performed by: PHYSICIAN ASSISTANT

## 2021-09-20 PROCEDURE — 1159F PR MEDICATION LIST DOCUMENTED IN MEDICAL RECORD: ICD-10-PCS | Mod: CPTII,S$GLB,, | Performed by: PHYSICIAN ASSISTANT

## 2021-09-20 PROCEDURE — 3008F BODY MASS INDEX DOCD: CPT | Mod: CPTII,S$GLB,, | Performed by: PHYSICIAN ASSISTANT

## 2021-09-20 PROCEDURE — 3044F HG A1C LEVEL LT 7.0%: CPT | Mod: CPTII,S$GLB,, | Performed by: PHYSICIAN ASSISTANT

## 2021-09-20 PROCEDURE — 3078F PR MOST RECENT DIASTOLIC BLOOD PRESSURE < 80 MM HG: ICD-10-PCS | Mod: CPTII,S$GLB,, | Performed by: PHYSICIAN ASSISTANT

## 2021-09-20 PROCEDURE — 3078F DIAST BP <80 MM HG: CPT | Mod: CPTII,S$GLB,, | Performed by: PHYSICIAN ASSISTANT

## 2021-09-20 PROCEDURE — U0002: ICD-10-PCS | Mod: QW,S$GLB,, | Performed by: PHYSICIAN ASSISTANT

## 2021-09-20 PROCEDURE — 1159F MED LIST DOCD IN RCRD: CPT | Mod: CPTII,S$GLB,, | Performed by: PHYSICIAN ASSISTANT

## 2021-09-23 ENCOUNTER — TELEPHONE (OUTPATIENT)
Dept: OTOLARYNGOLOGY | Facility: CLINIC | Age: 41
End: 2021-09-23

## 2021-09-23 ENCOUNTER — OFFICE VISIT (OUTPATIENT)
Dept: OTOLARYNGOLOGY | Facility: CLINIC | Age: 41
End: 2021-09-23
Payer: COMMERCIAL

## 2021-09-23 ENCOUNTER — HOSPITAL ENCOUNTER (OUTPATIENT)
Dept: RADIOLOGY | Facility: HOSPITAL | Age: 41
Discharge: HOME | End: 2021-09-23
Attending: PHYSICIAN ASSISTANT
Payer: COMMERCIAL

## 2021-09-23 ENCOUNTER — PATIENT MESSAGE (OUTPATIENT)
Dept: OTOLARYNGOLOGY | Facility: CLINIC | Age: 41
End: 2021-09-23

## 2021-09-23 ENCOUNTER — PATIENT MESSAGE (OUTPATIENT)
Dept: INTERNAL MEDICINE | Facility: CLINIC | Age: 41
End: 2021-09-23

## 2021-09-23 VITALS
SYSTOLIC BLOOD PRESSURE: 119 MMHG | HEART RATE: 109 BPM | BODY MASS INDEX: 28.29 KG/M2 | WEIGHT: 202.81 LBS | TEMPERATURE: 99 F | DIASTOLIC BLOOD PRESSURE: 78 MMHG

## 2021-09-23 DIAGNOSIS — R07.0 THROAT PAIN IN ADULT: ICD-10-CM

## 2021-09-23 DIAGNOSIS — K11.21 ACUTE SIALOADENITIS: Primary | ICD-10-CM

## 2021-09-23 DIAGNOSIS — K11.21 ACUTE SIALOADENITIS: ICD-10-CM

## 2021-09-23 PROCEDURE — 99999 PR PBB SHADOW E&M-EST. PATIENT-LVL III: CPT | Mod: PBBFAC,,, | Performed by: PHYSICIAN ASSISTANT

## 2021-09-23 PROCEDURE — 3078F PR MOST RECENT DIASTOLIC BLOOD PRESSURE < 80 MM HG: ICD-10-PCS | Mod: CPTII,S$GLB,, | Performed by: PHYSICIAN ASSISTANT

## 2021-09-23 PROCEDURE — 76536 US EXAM OF HEAD AND NECK: CPT | Mod: TC

## 2021-09-23 PROCEDURE — 3008F PR BODY MASS INDEX (BMI) DOCUMENTED: ICD-10-PCS | Mod: CPTII,S$GLB,, | Performed by: PHYSICIAN ASSISTANT

## 2021-09-23 PROCEDURE — 3074F SYST BP LT 130 MM HG: CPT | Mod: CPTII,S$GLB,, | Performed by: PHYSICIAN ASSISTANT

## 2021-09-23 PROCEDURE — 76536 US SOFT TISSUE HEAD NECK THYROID: ICD-10-PCS | Mod: 26,,, | Performed by: RADIOLOGY

## 2021-09-23 PROCEDURE — 1159F MED LIST DOCD IN RCRD: CPT | Mod: CPTII,S$GLB,, | Performed by: PHYSICIAN ASSISTANT

## 2021-09-23 PROCEDURE — 99204 PR OFFICE/OUTPT VISIT, NEW, LEVL IV, 45-59 MIN: ICD-10-PCS | Mod: S$GLB,,, | Performed by: PHYSICIAN ASSISTANT

## 2021-09-23 PROCEDURE — 3044F PR MOST RECENT HEMOGLOBIN A1C LEVEL <7.0%: ICD-10-PCS | Mod: CPTII,S$GLB,, | Performed by: PHYSICIAN ASSISTANT

## 2021-09-23 PROCEDURE — 76536 US EXAM OF HEAD AND NECK: CPT | Mod: 26,,, | Performed by: RADIOLOGY

## 2021-09-23 PROCEDURE — 3044F HG A1C LEVEL LT 7.0%: CPT | Mod: CPTII,S$GLB,, | Performed by: PHYSICIAN ASSISTANT

## 2021-09-23 PROCEDURE — 3078F DIAST BP <80 MM HG: CPT | Mod: CPTII,S$GLB,, | Performed by: PHYSICIAN ASSISTANT

## 2021-09-23 PROCEDURE — 1159F PR MEDICATION LIST DOCUMENTED IN MEDICAL RECORD: ICD-10-PCS | Mod: CPTII,S$GLB,, | Performed by: PHYSICIAN ASSISTANT

## 2021-09-23 PROCEDURE — 3008F BODY MASS INDEX DOCD: CPT | Mod: CPTII,S$GLB,, | Performed by: PHYSICIAN ASSISTANT

## 2021-09-23 PROCEDURE — 99204 OFFICE O/P NEW MOD 45 MIN: CPT | Mod: S$GLB,,, | Performed by: PHYSICIAN ASSISTANT

## 2021-09-23 PROCEDURE — 3074F PR MOST RECENT SYSTOLIC BLOOD PRESSURE < 130 MM HG: ICD-10-PCS | Mod: CPTII,S$GLB,, | Performed by: PHYSICIAN ASSISTANT

## 2021-09-23 PROCEDURE — 99999 PR PBB SHADOW E&M-EST. PATIENT-LVL III: ICD-10-PCS | Mod: PBBFAC,,, | Performed by: PHYSICIAN ASSISTANT

## 2021-09-23 RX ORDER — AMOXICILLIN AND CLAVULANATE POTASSIUM 875; 125 MG/1; MG/1
1 TABLET, FILM COATED ORAL 2 TIMES DAILY
Qty: 20 TABLET | Refills: 0 | Status: SHIPPED | OUTPATIENT
Start: 2021-09-23 | End: 2021-10-03

## 2021-09-27 ENCOUNTER — PATIENT MESSAGE (OUTPATIENT)
Dept: OTOLARYNGOLOGY | Facility: CLINIC | Age: 41
End: 2021-09-27

## 2021-09-29 DIAGNOSIS — Z12.31 OTHER SCREENING MAMMOGRAM: ICD-10-CM

## 2021-10-10 ENCOUNTER — PATIENT OUTREACH (OUTPATIENT)
Dept: ADMINISTRATIVE | Facility: OTHER | Age: 41
End: 2021-10-10

## 2021-10-19 ENCOUNTER — PROCEDURE VISIT (OUTPATIENT)
Dept: NEUROLOGY | Facility: CLINIC | Age: 41
End: 2021-10-19
Payer: COMMERCIAL

## 2021-10-19 ENCOUNTER — OFFICE VISIT (OUTPATIENT)
Dept: URGENT CARE | Facility: CLINIC | Age: 41
End: 2021-10-19
Payer: COMMERCIAL

## 2021-10-19 VITALS
SYSTOLIC BLOOD PRESSURE: 102 MMHG | HEIGHT: 71 IN | RESPIRATION RATE: 16 BRPM | DIASTOLIC BLOOD PRESSURE: 72 MMHG | BODY MASS INDEX: 28.05 KG/M2 | WEIGHT: 200.38 LBS

## 2021-10-19 VITALS
OXYGEN SATURATION: 97 % | DIASTOLIC BLOOD PRESSURE: 72 MMHG | WEIGHT: 193.69 LBS | SYSTOLIC BLOOD PRESSURE: 102 MMHG | BODY MASS INDEX: 27.01 KG/M2 | TEMPERATURE: 98 F

## 2021-10-19 DIAGNOSIS — R51.9 HEADACHE ABOVE THE EYE REGION: ICD-10-CM

## 2021-10-19 DIAGNOSIS — R09.81 NASAL CONGESTION: ICD-10-CM

## 2021-10-19 DIAGNOSIS — G43.019 INTRACTABLE MIGRAINE WITHOUT AURA AND WITHOUT STATUS MIGRAINOSUS: Primary | ICD-10-CM

## 2021-10-19 DIAGNOSIS — J01.90 ACUTE BACTERIAL SINUSITIS: Primary | ICD-10-CM

## 2021-10-19 DIAGNOSIS — B96.89 ACUTE BACTERIAL SINUSITIS: Primary | ICD-10-CM

## 2021-10-19 DIAGNOSIS — R52 GENERALIZED BODY ACHES: ICD-10-CM

## 2021-10-19 DIAGNOSIS — R05.9 COUGH: ICD-10-CM

## 2021-10-19 LAB
CTP QC/QA: YES
SARS-COV-2 RDRP RESP QL NAA+PROBE: NEGATIVE

## 2021-10-19 PROCEDURE — 3074F PR MOST RECENT SYSTOLIC BLOOD PRESSURE < 130 MM HG: ICD-10-PCS | Mod: CPTII,S$GLB,, | Performed by: PHYSICIAN ASSISTANT

## 2021-10-19 PROCEDURE — 3008F BODY MASS INDEX DOCD: CPT | Mod: CPTII,S$GLB,, | Performed by: PHYSICIAN ASSISTANT

## 2021-10-19 PROCEDURE — 3044F PR MOST RECENT HEMOGLOBIN A1C LEVEL <7.0%: ICD-10-PCS | Mod: CPTII,S$GLB,, | Performed by: PHYSICIAN ASSISTANT

## 2021-10-19 PROCEDURE — 3044F HG A1C LEVEL LT 7.0%: CPT | Mod: CPTII,S$GLB,, | Performed by: PHYSICIAN ASSISTANT

## 2021-10-19 PROCEDURE — 64615 PR CHEMODENERVATION OF MUSCLE FOR CHRONIC MIGRAINE: ICD-10-PCS | Mod: S$GLB,,, | Performed by: PSYCHIATRY & NEUROLOGY

## 2021-10-19 PROCEDURE — U0002 COVID-19 LAB TEST NON-CDC: HCPCS | Mod: QW,S$GLB,, | Performed by: PHYSICIAN ASSISTANT

## 2021-10-19 PROCEDURE — 64615 CHEMODENERV MUSC MIGRAINE: CPT | Mod: S$GLB,,, | Performed by: PSYCHIATRY & NEUROLOGY

## 2021-10-19 PROCEDURE — 3074F SYST BP LT 130 MM HG: CPT | Mod: CPTII,S$GLB,, | Performed by: PHYSICIAN ASSISTANT

## 2021-10-19 PROCEDURE — 1160F PR REVIEW ALL MEDS BY PRESCRIBER/CLIN PHARMACIST DOCUMENTED: ICD-10-PCS | Mod: CPTII,S$GLB,, | Performed by: PHYSICIAN ASSISTANT

## 2021-10-19 PROCEDURE — 99999 PR PBB SHADOW E&M-EST. PATIENT-LVL IV: ICD-10-PCS | Mod: PBBFAC,,, | Performed by: PHYSICIAN ASSISTANT

## 2021-10-19 PROCEDURE — 3078F DIAST BP <80 MM HG: CPT | Mod: CPTII,S$GLB,, | Performed by: PHYSICIAN ASSISTANT

## 2021-10-19 PROCEDURE — U0002: ICD-10-PCS | Mod: QW,S$GLB,, | Performed by: PHYSICIAN ASSISTANT

## 2021-10-19 PROCEDURE — 1159F MED LIST DOCD IN RCRD: CPT | Mod: CPTII,S$GLB,, | Performed by: PHYSICIAN ASSISTANT

## 2021-10-19 PROCEDURE — 1159F PR MEDICATION LIST DOCUMENTED IN MEDICAL RECORD: ICD-10-PCS | Mod: CPTII,S$GLB,, | Performed by: PHYSICIAN ASSISTANT

## 2021-10-19 PROCEDURE — 99999 PR PBB SHADOW E&M-EST. PATIENT-LVL IV: CPT | Mod: PBBFAC,,, | Performed by: PHYSICIAN ASSISTANT

## 2021-10-19 PROCEDURE — 99214 PR OFFICE/OUTPT VISIT, EST, LEVL IV, 30-39 MIN: ICD-10-PCS | Mod: S$GLB,CS,, | Performed by: PHYSICIAN ASSISTANT

## 2021-10-19 PROCEDURE — 3078F PR MOST RECENT DIASTOLIC BLOOD PRESSURE < 80 MM HG: ICD-10-PCS | Mod: CPTII,S$GLB,, | Performed by: PHYSICIAN ASSISTANT

## 2021-10-19 PROCEDURE — 3008F PR BODY MASS INDEX (BMI) DOCUMENTED: ICD-10-PCS | Mod: CPTII,S$GLB,, | Performed by: PHYSICIAN ASSISTANT

## 2021-10-19 PROCEDURE — 99214 OFFICE O/P EST MOD 30 MIN: CPT | Mod: S$GLB,CS,, | Performed by: PHYSICIAN ASSISTANT

## 2021-10-19 PROCEDURE — 1160F RVW MEDS BY RX/DR IN RCRD: CPT | Mod: CPTII,S$GLB,, | Performed by: PHYSICIAN ASSISTANT

## 2021-10-19 RX ORDER — AMOXICILLIN AND CLAVULANATE POTASSIUM 875; 125 MG/1; MG/1
1 TABLET, FILM COATED ORAL 2 TIMES DAILY
Qty: 10 TABLET | Refills: 0 | Status: SHIPPED | OUTPATIENT
Start: 2021-10-19 | End: 2021-10-24

## 2021-10-19 RX ADMIN — ONABOTULINUMTOXINA 200 UNITS: 100 INJECTION, POWDER, LYOPHILIZED, FOR SOLUTION INTRADERMAL; INTRAMUSCULAR at 04:10

## 2021-10-26 ENCOUNTER — PATIENT MESSAGE (OUTPATIENT)
Dept: INTERNAL MEDICINE | Facility: CLINIC | Age: 41
End: 2021-10-26
Payer: COMMERCIAL

## 2021-10-26 RX ORDER — FLUCONAZOLE 150 MG/1
150 TABLET ORAL DAILY
Qty: 2 TABLET | Refills: 0 | Status: SHIPPED | OUTPATIENT
Start: 2021-10-26 | End: 2021-10-28

## 2021-11-09 ENCOUNTER — OFFICE VISIT (OUTPATIENT)
Dept: PSYCHIATRY | Facility: CLINIC | Age: 41
End: 2021-11-09
Payer: COMMERCIAL

## 2021-11-09 DIAGNOSIS — G47.00 INSOMNIA, UNSPECIFIED TYPE: ICD-10-CM

## 2021-11-09 DIAGNOSIS — F32.A CHRONIC DEPRESSION: Primary | ICD-10-CM

## 2021-11-09 DIAGNOSIS — F41.9 ANXIETY: ICD-10-CM

## 2021-11-09 PROCEDURE — 3044F PR MOST RECENT HEMOGLOBIN A1C LEVEL <7.0%: ICD-10-PCS | Mod: CPTII,95,, | Performed by: PSYCHIATRY & NEUROLOGY

## 2021-11-09 PROCEDURE — 3044F HG A1C LEVEL LT 7.0%: CPT | Mod: CPTII,95,, | Performed by: PSYCHIATRY & NEUROLOGY

## 2021-11-09 PROCEDURE — 99213 OFFICE O/P EST LOW 20 MIN: CPT | Mod: 95,,, | Performed by: PSYCHIATRY & NEUROLOGY

## 2021-11-09 PROCEDURE — 99213 PR OFFICE/OUTPT VISIT, EST, LEVL III, 20-29 MIN: ICD-10-PCS | Mod: 95,,, | Performed by: PSYCHIATRY & NEUROLOGY

## 2021-11-09 RX ORDER — TRAZODONE HYDROCHLORIDE 100 MG/1
TABLET ORAL
Qty: 270 TABLET | Refills: 0 | Status: SHIPPED | OUTPATIENT
Start: 2021-11-09 | End: 2022-06-17 | Stop reason: SDUPTHER

## 2021-11-09 RX ORDER — BUPROPION HYDROCHLORIDE 150 MG/1
450 TABLET ORAL DAILY
Qty: 90 TABLET | Refills: 3 | Status: SHIPPED | OUTPATIENT
Start: 2021-11-09 | End: 2022-06-17 | Stop reason: SDUPTHER

## 2021-11-09 RX ORDER — BUSPIRONE HYDROCHLORIDE 15 MG/1
30 TABLET ORAL 2 TIMES DAILY
Qty: 120 TABLET | Refills: 3 | Status: SHIPPED | OUTPATIENT
Start: 2021-11-09 | End: 2022-06-17 | Stop reason: SDUPTHER

## 2021-11-09 RX ORDER — DULOXETIN HYDROCHLORIDE 60 MG/1
CAPSULE, DELAYED RELEASE ORAL
Qty: 30 CAPSULE | Refills: 3 | Status: SHIPPED | OUTPATIENT
Start: 2021-11-09 | End: 2022-04-08

## 2021-11-09 RX ORDER — CLONAZEPAM 0.5 MG/1
0.5 TABLET ORAL DAILY PRN
Qty: 30 TABLET | Refills: 3 | Status: SHIPPED | OUTPATIENT
Start: 2021-11-09 | End: 2022-05-12 | Stop reason: SDUPTHER

## 2021-11-10 ENCOUNTER — TELEPHONE (OUTPATIENT)
Dept: OBSTETRICS AND GYNECOLOGY | Facility: CLINIC | Age: 41
End: 2021-11-10
Payer: COMMERCIAL

## 2021-11-18 ENCOUNTER — PATIENT OUTREACH (OUTPATIENT)
Dept: ADMINISTRATIVE | Facility: OTHER | Age: 41
End: 2021-11-18
Payer: COMMERCIAL

## 2021-11-19 ENCOUNTER — OFFICE VISIT (OUTPATIENT)
Dept: OBSTETRICS AND GYNECOLOGY | Facility: CLINIC | Age: 41
End: 2021-11-19
Payer: COMMERCIAL

## 2021-11-19 ENCOUNTER — HOSPITAL ENCOUNTER (OUTPATIENT)
Dept: RADIOLOGY | Facility: HOSPITAL | Age: 41
Discharge: HOME | End: 2021-11-19
Attending: FAMILY MEDICINE
Payer: COMMERCIAL

## 2021-11-19 VITALS
WEIGHT: 199.94 LBS | SYSTOLIC BLOOD PRESSURE: 102 MMHG | HEIGHT: 71 IN | BODY MASS INDEX: 27.99 KG/M2 | DIASTOLIC BLOOD PRESSURE: 74 MMHG

## 2021-11-19 DIAGNOSIS — D06.9 CIN III (CERVICAL INTRAEPITHELIAL NEOPLASIA GRADE III) WITH SEVERE DYSPLASIA: ICD-10-CM

## 2021-11-19 DIAGNOSIS — Z11.3 SCREEN FOR STD (SEXUALLY TRANSMITTED DISEASE): ICD-10-CM

## 2021-11-19 DIAGNOSIS — Z01.419 ENCOUNTER FOR GYNECOLOGICAL EXAMINATION WITHOUT ABNORMAL FINDING: Primary | ICD-10-CM

## 2021-11-19 DIAGNOSIS — Z12.31 OTHER SCREENING MAMMOGRAM: ICD-10-CM

## 2021-11-19 PROBLEM — N87.1 CIN II (CERVICAL INTRAEPITHELIAL NEOPLASIA II): Status: ACTIVE | Noted: 2021-11-19

## 2021-11-19 PROBLEM — N87.1 CIN II (CERVICAL INTRAEPITHELIAL NEOPLASIA II): Status: RESOLVED | Noted: 2021-11-19 | Resolved: 2021-11-19

## 2021-11-19 PROBLEM — N87.9 CERVICAL DYSPLASIA: Status: RESOLVED | Noted: 2019-11-27 | Resolved: 2021-11-19

## 2021-11-19 PROCEDURE — 1159F PR MEDICATION LIST DOCUMENTED IN MEDICAL RECORD: ICD-10-PCS | Mod: CPTII,S$GLB,, | Performed by: OBSTETRICS & GYNECOLOGY

## 2021-11-19 PROCEDURE — 3078F PR MOST RECENT DIASTOLIC BLOOD PRESSURE < 80 MM HG: ICD-10-PCS | Mod: CPTII,S$GLB,, | Performed by: OBSTETRICS & GYNECOLOGY

## 2021-11-19 PROCEDURE — 1159F MED LIST DOCD IN RCRD: CPT | Mod: CPTII,S$GLB,, | Performed by: OBSTETRICS & GYNECOLOGY

## 2021-11-19 PROCEDURE — 3008F PR BODY MASS INDEX (BMI) DOCUMENTED: ICD-10-PCS | Mod: CPTII,S$GLB,, | Performed by: OBSTETRICS & GYNECOLOGY

## 2021-11-19 PROCEDURE — 87624 HPV HI-RISK TYP POOLED RSLT: CPT | Performed by: OBSTETRICS & GYNECOLOGY

## 2021-11-19 PROCEDURE — 99999 PR PBB SHADOW E&M-EST. PATIENT-LVL III: ICD-10-PCS | Mod: PBBFAC,,, | Performed by: OBSTETRICS & GYNECOLOGY

## 2021-11-19 PROCEDURE — 77067 SCR MAMMO BI INCL CAD: CPT | Mod: TC

## 2021-11-19 PROCEDURE — 99999 PR PBB SHADOW E&M-EST. PATIENT-LVL III: CPT | Mod: PBBFAC,,, | Performed by: OBSTETRICS & GYNECOLOGY

## 2021-11-19 PROCEDURE — 87591 N.GONORRHOEAE DNA AMP PROB: CPT | Performed by: OBSTETRICS & GYNECOLOGY

## 2021-11-19 PROCEDURE — 3008F BODY MASS INDEX DOCD: CPT | Mod: CPTII,S$GLB,, | Performed by: OBSTETRICS & GYNECOLOGY

## 2021-11-19 PROCEDURE — 88175 CYTOPATH C/V AUTO FLUID REDO: CPT | Performed by: OBSTETRICS & GYNECOLOGY

## 2021-11-19 PROCEDURE — 77067 SCR MAMMO BI INCL CAD: CPT | Mod: 26,,, | Performed by: RADIOLOGY

## 2021-11-19 PROCEDURE — 3074F SYST BP LT 130 MM HG: CPT | Mod: CPTII,S$GLB,, | Performed by: OBSTETRICS & GYNECOLOGY

## 2021-11-19 PROCEDURE — 3044F HG A1C LEVEL LT 7.0%: CPT | Mod: CPTII,S$GLB,, | Performed by: OBSTETRICS & GYNECOLOGY

## 2021-11-19 PROCEDURE — 3078F DIAST BP <80 MM HG: CPT | Mod: CPTII,S$GLB,, | Performed by: OBSTETRICS & GYNECOLOGY

## 2021-11-19 PROCEDURE — 87491 CHLMYD TRACH DNA AMP PROBE: CPT | Performed by: OBSTETRICS & GYNECOLOGY

## 2021-11-19 PROCEDURE — 77067 MAMMO DIGITAL SCREENING BILAT WITH TOMO: ICD-10-PCS | Mod: 26,,, | Performed by: RADIOLOGY

## 2021-11-19 PROCEDURE — 99396 PR PREVENTIVE VISIT,EST,40-64: ICD-10-PCS | Mod: S$GLB,,, | Performed by: OBSTETRICS & GYNECOLOGY

## 2021-11-19 PROCEDURE — 77063 MAMMO DIGITAL SCREENING BILAT WITH TOMO: ICD-10-PCS | Mod: 26,,, | Performed by: RADIOLOGY

## 2021-11-19 PROCEDURE — 3074F PR MOST RECENT SYSTOLIC BLOOD PRESSURE < 130 MM HG: ICD-10-PCS | Mod: CPTII,S$GLB,, | Performed by: OBSTETRICS & GYNECOLOGY

## 2021-11-19 PROCEDURE — 99396 PREV VISIT EST AGE 40-64: CPT | Mod: S$GLB,,, | Performed by: OBSTETRICS & GYNECOLOGY

## 2021-11-19 PROCEDURE — 77063 BREAST TOMOSYNTHESIS BI: CPT | Mod: 26,,, | Performed by: RADIOLOGY

## 2021-11-19 PROCEDURE — 3044F PR MOST RECENT HEMOGLOBIN A1C LEVEL <7.0%: ICD-10-PCS | Mod: CPTII,S$GLB,, | Performed by: OBSTETRICS & GYNECOLOGY

## 2021-11-23 LAB
C TRACH DNA SPEC QL NAA+PROBE: NOT DETECTED
N GONORRHOEA DNA SPEC QL NAA+PROBE: NOT DETECTED

## 2021-11-24 LAB
HPV HR 12 DNA SPEC QL NAA+PROBE: NEGATIVE
HPV16 AG SPEC QL: NEGATIVE
HPV18 DNA SPEC QL NAA+PROBE: NEGATIVE

## 2021-11-26 ENCOUNTER — PATIENT MESSAGE (OUTPATIENT)
Dept: OBSTETRICS AND GYNECOLOGY | Facility: CLINIC | Age: 41
End: 2021-11-26
Payer: COMMERCIAL

## 2021-11-26 LAB
FINAL PATHOLOGIC DIAGNOSIS: NORMAL
Lab: NORMAL

## 2021-11-29 ENCOUNTER — PATIENT MESSAGE (OUTPATIENT)
Dept: OBSTETRICS AND GYNECOLOGY | Facility: CLINIC | Age: 41
End: 2021-11-29
Payer: COMMERCIAL

## 2021-11-29 RX ORDER — METRONIDAZOLE 500 MG/1
500 TABLET ORAL EVERY 12 HOURS
Qty: 14 TABLET | Refills: 0 | Status: SHIPPED | OUTPATIENT
Start: 2021-11-29 | End: 2021-12-06

## 2021-12-22 ENCOUNTER — PATIENT MESSAGE (OUTPATIENT)
Dept: NEUROLOGY | Facility: CLINIC | Age: 41
End: 2021-12-22
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
## 2022-01-31 ENCOUNTER — PATIENT MESSAGE (OUTPATIENT)
Dept: NEUROLOGY | Facility: CLINIC | Age: 42
End: 2022-01-31
Payer: MEDICAID

## 2022-02-17 ENCOUNTER — OFFICE VISIT (OUTPATIENT)
Dept: URGENT CARE | Facility: CLINIC | Age: 42
End: 2022-02-17
Payer: MEDICAID

## 2022-02-17 VITALS
RESPIRATION RATE: 20 BRPM | SYSTOLIC BLOOD PRESSURE: 119 MMHG | BODY MASS INDEX: 26.18 KG/M2 | TEMPERATURE: 99 F | OXYGEN SATURATION: 98 % | HEART RATE: 88 BPM | DIASTOLIC BLOOD PRESSURE: 73 MMHG | HEIGHT: 71 IN | WEIGHT: 187 LBS

## 2022-02-17 DIAGNOSIS — H92.09: ICD-10-CM

## 2022-02-17 DIAGNOSIS — Z20.822 LAB TEST NEGATIVE FOR COVID-19 VIRUS: ICD-10-CM

## 2022-02-17 DIAGNOSIS — J02.9 ACUTE SORE THROAT: Primary | ICD-10-CM

## 2022-02-17 PROCEDURE — 87651 STREP A DNA AMP PROBE: CPT | Mod: QW,S$GLB,, | Performed by: PHYSICIAN ASSISTANT

## 2022-02-17 PROCEDURE — 1160F RVW MEDS BY RX/DR IN RCRD: CPT | Mod: CPTII,S$GLB,, | Performed by: PHYSICIAN ASSISTANT

## 2022-02-17 PROCEDURE — 1159F MED LIST DOCD IN RCRD: CPT | Mod: CPTII,S$GLB,, | Performed by: PHYSICIAN ASSISTANT

## 2022-02-17 PROCEDURE — 99214 PR OFFICE/OUTPT VISIT, EST, LEVL IV, 30-39 MIN: ICD-10-PCS | Mod: S$GLB,,, | Performed by: PHYSICIAN ASSISTANT

## 2022-02-17 PROCEDURE — 99214 OFFICE O/P EST MOD 30 MIN: CPT | Mod: S$GLB,,, | Performed by: PHYSICIAN ASSISTANT

## 2022-02-17 PROCEDURE — U0002 COVID-19 LAB TEST NON-CDC: HCPCS | Mod: QW,S$GLB,, | Performed by: PHYSICIAN ASSISTANT

## 2022-02-17 PROCEDURE — 3008F BODY MASS INDEX DOCD: CPT | Mod: CPTII,S$GLB,, | Performed by: PHYSICIAN ASSISTANT

## 2022-02-17 PROCEDURE — 3008F PR BODY MASS INDEX (BMI) DOCUMENTED: ICD-10-PCS | Mod: CPTII,S$GLB,, | Performed by: PHYSICIAN ASSISTANT

## 2022-02-17 PROCEDURE — 87651 POCT STREP A MOLECULAR: ICD-10-PCS | Mod: QW,S$GLB,, | Performed by: PHYSICIAN ASSISTANT

## 2022-02-17 PROCEDURE — 3074F PR MOST RECENT SYSTOLIC BLOOD PRESSURE < 130 MM HG: ICD-10-PCS | Mod: CPTII,S$GLB,, | Performed by: PHYSICIAN ASSISTANT

## 2022-02-17 PROCEDURE — 3078F DIAST BP <80 MM HG: CPT | Mod: CPTII,S$GLB,, | Performed by: PHYSICIAN ASSISTANT

## 2022-02-17 PROCEDURE — U0002: ICD-10-PCS | Mod: QW,S$GLB,, | Performed by: PHYSICIAN ASSISTANT

## 2022-02-17 PROCEDURE — 1159F PR MEDICATION LIST DOCUMENTED IN MEDICAL RECORD: ICD-10-PCS | Mod: CPTII,S$GLB,, | Performed by: PHYSICIAN ASSISTANT

## 2022-02-17 PROCEDURE — 1160F PR REVIEW ALL MEDS BY PRESCRIBER/CLIN PHARMACIST DOCUMENTED: ICD-10-PCS | Mod: CPTII,S$GLB,, | Performed by: PHYSICIAN ASSISTANT

## 2022-02-17 PROCEDURE — 3078F PR MOST RECENT DIASTOLIC BLOOD PRESSURE < 80 MM HG: ICD-10-PCS | Mod: CPTII,S$GLB,, | Performed by: PHYSICIAN ASSISTANT

## 2022-02-17 PROCEDURE — 3074F SYST BP LT 130 MM HG: CPT | Mod: CPTII,S$GLB,, | Performed by: PHYSICIAN ASSISTANT

## 2022-02-17 RX ORDER — IBUPROFEN 200 MG
400 TABLET ORAL
Status: COMPLETED | OUTPATIENT
Start: 2022-02-17 | End: 2022-02-17

## 2022-02-17 RX ADMIN — Medication 400 MG: at 10:02

## 2022-02-17 NOTE — LETTER
[id number]  [facility] [address]  Phone: [phone]          Return to Work/School    Patient: Arielle Verde  YOB: 1980   Date: 02/17/2022     To Whom It May Concern:     Arielle Verde was in contact with/seen in my office on 02/17/2022. COVID-19 is present in our communities across the [facility]. There is limited testing for COVID at this time, so not all patients can be tested. In this situation, your employee meets the following criteria:     Arielle Verde has met the criteria for COVID-19 testing and has a NEGATIVE result. The employee can return to work once they are asymptomatic for 24 hours without the use of fever reducing medications (Tylenol, Motrin, etc).     If you have any questions or concerns, or if I can be of further assistance, please do not hesitate to contact me.     Sincerely,    Roseline Serrano PA-C

## 2022-02-17 NOTE — PROGRESS NOTES
"Subjective:       Patient ID: Arielle Verde is a 41 y.o. female.    Vitals:  height is 5' 11" (1.803 m) and weight is 84.8 kg (187 lb). Her temperature is 99.1 °F (37.3 °C). Her blood pressure is 119/73 and her pulse is 88. Her respiration is 20 and oxygen saturation is 98%.     Chief Complaint: Otalgia    Patient presents to clinic with complaint of right ear pain and right throat pain.  Symptoms starting yesterday morning.  Reports that she has a history of strep.  Denies trying any medications prior to arrival.    Otalgia   There is pain in the right ear. This is a new problem. The current episode started yesterday. The problem occurs constantly. The problem has been unchanged. There has been no fever. The pain is at a severity of 4/10. The pain is mild. Associated symptoms include a sore throat. Pertinent negatives include no abdominal pain, coughing, diarrhea, ear discharge, headaches, hearing loss, rash, rhinorrhea or vomiting. She has tried nothing for the symptoms. The treatment provided no relief. Her past medical history is significant for a chronic ear infection.       Constitution: Negative for chills, fatigue, generalized weakness and international travel in last 60 days.   HENT: Positive for ear pain and sore throat. Negative for ear discharge, foreign body in ear, tinnitus, hearing loss, drooling, tongue pain, facial swelling, congestion, postnasal drip, trouble swallowing and voice change.    Neck: Negative for neck stiffness and neck swelling.   Cardiovascular: Negative for chest pain and palpitations.   Eyes: Negative for eye pain, photophobia and vision loss.   Respiratory: Negative for chest tightness, cough, shortness of breath and wheezing.    Gastrointestinal: Negative for abdominal pain, nausea, vomiting, constipation and diarrhea.   Genitourinary: Negative for dysuria and hematuria.   Musculoskeletal: Negative for pain and back pain.   Skin: Negative for rash, erythema and " "bruising.   Neurological: Negative for dizziness, light-headedness, speech difficulty, headaches, altered mental status, loss of consciousness, numbness and tingling.   Psychiatric/Behavioral: Negative for altered mental status.       Objective:       Vitals:    02/17/22 1010   BP: 119/73   Pulse: 88   Resp: 20   Temp: 99.1 °F (37.3 °C)   SpO2: 98%   Weight: 84.8 kg (187 lb)   Height: 5' 11" (1.803 m)       Physical Exam   Constitutional: She is oriented to person, place, and time. She appears well-developed. She is cooperative.  Non-toxic appearance. She does not appear ill. No distress. awake  HENT:   Head: Normocephalic and atraumatic.   Ears:   Right Ear: Hearing, tympanic membrane, external ear and ear canal normal. No drainage or tenderness. Tympanic membrane is not erythematous. No middle ear effusion. impacted cerumen  Left Ear: Hearing, tympanic membrane, external ear and ear canal normal. No drainage or tenderness. Tympanic membrane is not erythematous.  No middle ear effusion. impacted cerumen  Nose: Nose normal. No mucosal edema, rhinorrhea, purulent discharge, nasal deformity or congestion. No epistaxis. Right sinus exhibits no maxillary sinus tenderness and no frontal sinus tenderness. Left sinus exhibits no maxillary sinus tenderness and no frontal sinus tenderness.   Mouth/Throat: Uvula is midline and mucous membranes are normal. Mucous membranes are moist. No oral lesions. No trismus in the jaw. Normal dentition. No uvula swelling. No oropharyngeal exudate, posterior oropharyngeal edema, posterior oropharyngeal erythema, tonsillar abscesses or cobblestoning. Tonsils are 0 on the right. Tonsils are 0 on the left. No tonsillar exudate. Oropharynx is clear.   Eyes: Conjunctivae and lids are normal. Pupils are equal, round, and reactive to light. No visual field deficit is present. Right eye exhibits no discharge. Left eye exhibits no discharge. No scleral icterus. Right eye exhibits no nystagmus. Left " eye exhibits no nystagmus.      extraocular movement intact vision grossly intact gaze aligned appropriately periorbital hyperpigmentation  Neck: Trachea normal and phonation normal. Neck supple. No torticollis present. No neck rigidity present. No decreased range of motion present. No pain with movement present.   Cardiovascular: Normal rate, regular rhythm, S1 normal, S2 normal, normal heart sounds and normal pulses. Exam reveals no decreased pulses.   Pulmonary/Chest: Effort normal and breath sounds normal. No accessory muscle usage. No tachypnea and no bradypnea. No respiratory distress. She has no decreased breath sounds. She has no wheezes. She has no rhonchi. She exhibits no tenderness.   Abdominal: Normal appearance and bowel sounds are normal. She exhibits no distension, no pulsatile midline mass and no mass. Soft. There is no abdominal tenderness. There is no rebound, no guarding, no left CVA tenderness and no right CVA tenderness.   Musculoskeletal: Normal range of motion.         General: No deformity. Normal range of motion.      Cervical back: She exhibits no tenderness.      Right lower leg: No edema.      Left lower leg: No edema.   Lymphadenopathy:     She has cervical adenopathy.   Neurological: no focal deficit. She is alert, oriented to person, place, and time and at baseline. She has normal sensation and intact cranial nerves. She displays no weakness and no dysarthria. No cranial nerve deficit. She exhibits normal muscle tone. Gait normal. Coordination and gait normal.   Skin: Skin is warm, dry, intact, not diaphoretic, not pale and no rash. Capillary refill takes less than 2 seconds. No erythema and No lesion   Psychiatric: Her speech is normal and behavior is normal. Judgment and thought content normal.   Nursing note and vitals reviewed.        Assessment:       1. Acute sore throat    2. Unilateral earache    3. Lab test negative for COVID-19 virus        Results for orders placed or  performed in visit on 02/17/22   POCT Strep A, Molecular   Result Value Ref Range    Molecular Strep A, POC Negative Negative     Acceptable Yes    POCT COVID-19 Rapid Screening   Result Value Ref Range    POC Rapid COVID Negative Negative     Acceptable Yes        Plan:         Acute sore throat  -     POCT Strep A, Molecular  -     POCT COVID-19 Rapid Screening  -     ibuprofen tablet 400 mg    Unilateral earache  -     ibuprofen tablet 400 mg    Lab test negative for COVID-19 virus                  Patient Instructions   Sore throat -You can purchase cough drops over the counter to soothe your sore throat.  You may gargle with hot salt water to alleviate some of your throat discomfort.  Drink plenty of fluids, recommend warm tea with honey.   Avoid spicy food, citrus fruits, and red sauces- as this may irritate the throat more.    You can also take a daily anti-histamine such as Zyrtec, Claritin, Xyzal, Allegra, etc to help with runny nose/sneezing/sore throat/cough.    Strep negative  -Take Tylenol every 4 hours and/or Motrin every 6-8 hours for fever and pain control    -You will need to purchase a new toothbrush     -If your symptoms worsen, you will need to follow-up with primary care or go to the Emergency Department    - You must understand that you have received an Urgent Care treatment only and that you may be released before all of your medical problems are known or treated.   - You, the patient, will arrange for follow up care as instructed with your primary care provider or recommended specialist.   - If your condition worsens or fails to improve we recommend that you receive another evaluation at the ER immediately or contact your PCP to discuss your concerns, or return here.   - Please do not drive or make any important decisions for 24 hours if you have received any pain medications, sedatives or mood altering drugs during your visit.    Disclaimer: This document was  drafted with the use of a voice recognition device and is likely to have sound alike errors.   Patient Education       Viral Pharyngitis   About this topic   Viral pharyngitis is also known as a sore throat. It is an infection of the throat caused by a tiny germ called a virus. A sore throat can easily spread from person to person. Coughing, sneezing, and touching something with the germ on it spreads the sore throat.  Viral infections most often go away after 7 to 10 days. You may not need any treatment. Some can cause very serious health problems.     What are the causes?   A germ called a virus causes this condition.  What can make this more likely to happen?   You are more likely to get viral pharyngitis with a cold or the flu. These illnesses spread easily from one person to others. You are more likely to have a sore throat if you have a weak immune system.  What are the main signs?   · Sore throat. May also have trouble swallowing or breathing.  · Swollen tonsils or glands  · Throat appears red  · Muscle aches and joint pain  · Feeling tired  · Fever of 100.4°F (38°C) or higher  How does the doctor diagnose this health problem?   Your doctor will take your history and do an exam. Your doctor will look at your throat. The doctor may order a throat swab to test for a strep infection. They may also test you for the flu.  How does the doctor treat this health problem?   There is no specific treatment to cure a virus. Antibiotics will not work. The goal will be to treat your signs.  Your doctor may suggest:  · Gargle with warm salt water 3 to 4 times each day. Mix 1/2 teaspoon (2.5 grams) salt with a cup (240 mL) of warm water.  · Suck on hard candy or cough drops.  · Use a cool mist humidifier to help you breathe easier.  · Do not smoke. Stay away from others who are smoking.  What drugs may be needed?   The doctor may order drugs to:  · Help with pain   · Soothe the throat  · Lower fever  What changes to diet are  needed?   · If your throat feels too sore to eat solid foods, you may drink water, juice, milk, milkshakes, or soups.  · Do not drink sports drinks, soft drinks, undiluted fruit juice, or drinks with a lot of sugar. These may cause fluid loss and bother your throat.  · Stay away from caffeine; acidic juices like orange juice or lemonade; and beer, wine, and mixed drinks (alcohol). These can worsen your signs.  What problems could happen?   · Ear or sinus infection  · Asthma attack  · Abscess in the throat  · Lung problems like pneumonia or bronchitis  · Very bad fluid loss. This is dehydration.  What can be done to prevent this health problem?   · Wash your hands often with soap and water for at least 20 seconds, especially after coughing or sneezing. Alcohol-based hand sanitizers also work to kill germs.  · If you are sick, cover your mouth and nose with tissue when you cough or sneeze. You can also cough or sneeze into your elbow. Throw away tissues in the trash and wash your hands after touching used tissues.  · Do not get too close (kissing, hugging) to people who are sick.  · Avoid going to crowded places.  · Avoid sharing your towels or hankies with anyone who is sick. Clean often handled things like door handles, remotes, toys, and phones. Wipe them with a disinfectant.  · Do not share knives and forks or drinking glasses with someone who has a sore throat. Wash these objects with hot, soapy water.  · Get at least 6 to 8 hours of sleep each night.  · Get a flu shot each year.  Where can I learn more?   American Academy of Otolaryngology - Head and Neck Surgery  https://www.enthealth.org/conditions/sore-throats/   American Academy of Otolaryngology - Head and Neck Surgery  https://www.enthealth.org/conditions/tonsillitis/   Ministry of Health  https://www.health.govt.nz/your-health/conditions-and-treatments/diseases-and-illnesses/sore-throat   NHS  Choices  http://www.nhs.uk/conditions/Sore-throat/Pages/Introduction.aspx   Last Reviewed Date   2020-05-12  Consumer Information Use and Disclaimer   This information is not specific medical advice and does not replace information you receive from your health care provider. This is only a brief summary of general information. It does NOT include all information about conditions, illnesses, injuries, tests, procedures, treatments, therapies, discharge instructions or life-style choices that may apply to you. You must talk with your health care provider for complete information about your health and treatment options. This information should not be used to decide whether or not to accept your health care providers advice, instructions or recommendations. Only your health care provider has the knowledge and training to provide advice that is right for you.  Copyright   Copyright © 2021 UpToDate, Inc. and its affiliates and/or licensors. All rights reserved.

## 2022-02-17 NOTE — PATIENT INSTRUCTIONS
Sore throat -You can purchase cough drops over the counter to soothe your sore throat.  You may gargle with hot salt water to alleviate some of your throat discomfort.  Drink plenty of fluids, recommend warm tea with honey.   Avoid spicy food, citrus fruits, and red sauces- as this may irritate the throat more.    You can also take a daily anti-histamine such as Zyrtec, Claritin, Xyzal, Allegra, etc to help with runny nose/sneezing/sore throat/cough.    Strep negative  -Take Tylenol every 4 hours and/or Motrin every 6-8 hours for fever and pain control    -You will need to purchase a new toothbrush     -If your symptoms worsen, you will need to follow-up with primary care or go to the Emergency Department    - You must understand that you have received an Urgent Care treatment only and that you may be released before all of your medical problems are known or treated.   - You, the patient, will arrange for follow up care as instructed with your primary care provider or recommended specialist.   - If your condition worsens or fails to improve we recommend that you receive another evaluation at the ER immediately or contact your PCP to discuss your concerns, or return here.   - Please do not drive or make any important decisions for 24 hours if you have received any pain medications, sedatives or mood altering drugs during your visit.    Disclaimer: This document was drafted with the use of a voice recognition device and is likely to have sound alike errors.   Patient Education       Viral Pharyngitis   About this topic   Viral pharyngitis is also known as a sore throat. It is an infection of the throat caused by a tiny germ called a virus. A sore throat can easily spread from person to person. Coughing, sneezing, and touching something with the germ on it spreads the sore throat.  Viral infections most often go away after 7 to 10 days. You may not need any treatment. Some can cause very serious health problems.      What are the causes?   A germ called a virus causes this condition.  What can make this more likely to happen?   You are more likely to get viral pharyngitis with a cold or the flu. These illnesses spread easily from one person to others. You are more likely to have a sore throat if you have a weak immune system.  What are the main signs?   · Sore throat. May also have trouble swallowing or breathing.  · Swollen tonsils or glands  · Throat appears red  · Muscle aches and joint pain  · Feeling tired  · Fever of 100.4°F (38°C) or higher  How does the doctor diagnose this health problem?   Your doctor will take your history and do an exam. Your doctor will look at your throat. The doctor may order a throat swab to test for a strep infection. They may also test you for the flu.  How does the doctor treat this health problem?   There is no specific treatment to cure a virus. Antibiotics will not work. The goal will be to treat your signs.  Your doctor may suggest:  · Gargle with warm salt water 3 to 4 times each day. Mix 1/2 teaspoon (2.5 grams) salt with a cup (240 mL) of warm water.  · Suck on hard candy or cough drops.  · Use a cool mist humidifier to help you breathe easier.  · Do not smoke. Stay away from others who are smoking.  What drugs may be needed?   The doctor may order drugs to:  · Help with pain   · Soothe the throat  · Lower fever  What changes to diet are needed?   · If your throat feels too sore to eat solid foods, you may drink water, juice, milk, milkshakes, or soups.  · Do not drink sports drinks, soft drinks, undiluted fruit juice, or drinks with a lot of sugar. These may cause fluid loss and bother your throat.  · Stay away from caffeine; acidic juices like orange juice or lemonade; and beer, wine, and mixed drinks (alcohol). These can worsen your signs.  What problems could happen?   · Ear or sinus infection  · Asthma attack  · Abscess in the throat  · Lung problems like pneumonia or  bronchitis  · Very bad fluid loss. This is dehydration.  What can be done to prevent this health problem?   · Wash your hands often with soap and water for at least 20 seconds, especially after coughing or sneezing. Alcohol-based hand sanitizers also work to kill germs.  · If you are sick, cover your mouth and nose with tissue when you cough or sneeze. You can also cough or sneeze into your elbow. Throw away tissues in the trash and wash your hands after touching used tissues.  · Do not get too close (kissing, hugging) to people who are sick.  · Avoid going to crowded places.  · Avoid sharing your towels or hankies with anyone who is sick. Clean often handled things like door handles, remotes, toys, and phones. Wipe them with a disinfectant.  · Do not share knives and forks or drinking glasses with someone who has a sore throat. Wash these objects with hot, soapy water.  · Get at least 6 to 8 hours of sleep each night.  · Get a flu shot each year.  Where can I learn more?   American Academy of Otolaryngology - Head and Neck Surgery  https://www.enthealth.org/conditions/sore-throats/   American Academy of Otolaryngology - Head and Neck Surgery  https://www.enthealth.org/conditions/tonsillitis/   Ministry of Health  https://www.health.govt.nz/your-health/conditions-and-treatments/diseases-and-illnesses/sore-throat   NHS Choices  http://www.nhs.uk/conditions/Sore-throat/Pages/Introduction.aspx   Last Reviewed Date   2020-05-12  Consumer Information Use and Disclaimer   This information is not specific medical advice and does not replace information you receive from your health care provider. This is only a brief summary of general information. It does NOT include all information about conditions, illnesses, injuries, tests, procedures, treatments, therapies, discharge instructions or life-style choices that may apply to you. You must talk with your health care provider for complete information about your health and  treatment options. This information should not be used to decide whether or not to accept your health care providers advice, instructions or recommendations. Only your health care provider has the knowledge and training to provide advice that is right for you.  Copyright   Copyright © 2021 UpToDate, Inc. and its affiliates and/or licensors. All rights reserved.

## 2022-02-18 ENCOUNTER — TELEPHONE (OUTPATIENT)
Dept: INTERNAL MEDICINE | Facility: CLINIC | Age: 42
End: 2022-02-18
Payer: MEDICAID

## 2022-02-18 NOTE — TELEPHONE ENCOUNTER
----- Message from Chanel Alas sent at 2/18/2022  8:07 AM CST -----  Contact: [phone]  Type:  Same Day Appointment Request    Caller is requesting a same day appointment.  Caller declined first available appointment listed below.    Name of Caller:Arielle   When is the first available appointment?07/13/2022  Symptoms:ear and throat hurt and headaches   Best Call Back Number:[phone]   Additional Information:

## 2022-02-18 NOTE — TELEPHONE ENCOUNTER
Called and spoke with patient. Patient went to urgent care on yesterday. She was negative for strep and covid. She said her ears are hurting she has a headache on her right side and has some swelling in her glands. She said that they didn't prescribe her anything either. Please Advise.    No one had any openings at [facility] that I could override to schedule her.    She asked if you send anything in can you [facility] to [facility] on [employer]

## 2022-02-21 ENCOUNTER — PATIENT MESSAGE (OUTPATIENT)
Dept: INTERNAL MEDICINE | Facility: CLINIC | Age: 42
End: 2022-02-21
Payer: MEDICAID

## 2022-02-21 DIAGNOSIS — B97.89 SORE THROAT (VIRAL): Primary | ICD-10-CM

## 2022-02-21 DIAGNOSIS — J02.8 SORE THROAT (VIRAL): Primary | ICD-10-CM

## 2022-02-21 RX ORDER — METHYLPREDNISOLONE 4 MG/1
TABLET ORAL
Qty: 21 EACH | Refills: 0 | Status: SHIPPED | OUTPATIENT
Start: 2022-02-21 | End: 2022-03-14

## 2022-03-07 ENCOUNTER — PROCEDURE VISIT (OUTPATIENT)
Dept: NEUROLOGY | Facility: CLINIC | Age: 42
End: 2022-03-07
Payer: MEDICAID

## 2022-03-07 VITALS
OXYGEN SATURATION: 98 % | SYSTOLIC BLOOD PRESSURE: 124 MMHG | HEART RATE: 119 BPM | BODY MASS INDEX: 26.51 KG/M2 | HEIGHT: 71 IN | RESPIRATION RATE: 16 BRPM | DIASTOLIC BLOOD PRESSURE: 78 MMHG | WEIGHT: 189.38 LBS

## 2022-03-07 DIAGNOSIS — G43.019 COMMON MIGRAINE WITH INTRACTABLE MIGRAINE: Primary | ICD-10-CM

## 2022-03-07 PROCEDURE — 64615 CHEMODENERV MUSC MIGRAINE: CPT | Mod: S$PBB,,, | Performed by: PSYCHIATRY & NEUROLOGY

## 2022-03-07 PROCEDURE — 64615 CHEMODENERV MUSC MIGRAINE: CPT | Mod: PBBFAC | Performed by: PSYCHIATRY & NEUROLOGY

## 2022-03-07 PROCEDURE — 64615 PR CHEMODENERVATION OF MUSCLE FOR CHRONIC MIGRAINE: ICD-10-PCS | Mod: S$PBB,,, | Performed by: PSYCHIATRY & NEUROLOGY

## 2022-03-07 RX ADMIN — ONABOTULINUMTOXINA 200 UNITS: 100 INJECTION, POWDER, LYOPHILIZED, FOR SOLUTION INTRADERMAL; INTRAMUSCULAR at 02:03

## 2022-05-12 ENCOUNTER — PATIENT MESSAGE (OUTPATIENT)
Dept: PSYCHIATRY | Facility: CLINIC | Age: 42
End: 2022-05-12
Payer: MEDICAID

## 2022-05-12 ENCOUNTER — PATIENT MESSAGE (OUTPATIENT)
Dept: INTERNAL MEDICINE | Facility: CLINIC | Age: 42
End: 2022-05-12
Payer: MEDICAID

## 2022-05-12 RX ORDER — CLONAZEPAM 0.5 MG/1
0.5 TABLET ORAL DAILY PRN
Qty: 30 TABLET | Refills: 0 | Status: SHIPPED | OUTPATIENT
Start: 2022-05-12 | End: 2022-06-17 | Stop reason: SDUPTHER

## 2022-05-17 ENCOUNTER — OFFICE VISIT (OUTPATIENT)
Dept: PSYCHIATRY | Facility: CLINIC | Age: 42
End: 2022-05-17
Payer: MEDICAID

## 2022-05-17 ENCOUNTER — PATIENT MESSAGE (OUTPATIENT)
Dept: PSYCHIATRY | Facility: CLINIC | Age: 42
End: 2022-05-17

## 2022-05-17 DIAGNOSIS — F33.0 MILD EPISODE OF RECURRENT MAJOR DEPRESSIVE DISORDER: ICD-10-CM

## 2022-05-17 DIAGNOSIS — F41.9 ANXIETY: Primary | ICD-10-CM

## 2022-05-17 PROCEDURE — 90837 PSYTX W PT 60 MINUTES: CPT | Mod: AJ,HB,95, | Performed by: SOCIAL WORKER

## 2022-05-17 PROCEDURE — 90837 PR PSYCHOTHERAPY W/PATIENT, 60 MIN: ICD-10-PCS | Mod: AJ,HB,95, | Performed by: SOCIAL WORKER

## 2022-05-17 NOTE — PATIENT INSTRUCTIONS
Mindfulness, Anxiety, and Depression Skills Phone Applications:   Please note, I do not professionally/personally endorse any of the applications below and am not liable/responsible for any resulting action/inaction taken thereof. All phone applications below were recommended by the [employer] [facility].     CBT Thought Diary  [id number] (an kevin designed by an agency in the [employer] to teach belly breathing)  Virtual Hope Box (an kevin produced by the [facility] that offers support in emotional regulation and stress reduction)  Headspace: Two-week free trial for the general public.  Health Minds Program Kevin: Always free. Meditation and mindfulness skills.  Calm: Seven-day free trial. A meditation, sleep, and relaxation kevin.  Stop, Breathe & Think: Always free, and for kids too.  Insight Timer: Always free. This is not a daily kevin, but rather a great library where you can search for various types of meditations and lengths by excellent teachers.  10% Happier: Free and paid options available. (Seven days free, then $99 per year.)  [facility] VOLITIONRX Kevin: Free. Meditation by Yamile Willoughby.  Mindfulness : Mindfulness  2.0 was developed to help veterans, service members, and others learn how to practice mindfulness. The kevin provides a gradual, self-guided training program designed to help you understand and adopt a simple mindfulness practice.  CBT-i : Free cognitive behavioral therapy for insomnia, available for iOS and Android.  [place]: A program for reducing stress and treating anxiety and depression that includes a  or groups.  [facility]: A program for clinical anxiety or depression that uses an kevin and therapist, and biofeedback monitor is optional. Referral from a health care provider is necessary.  Happify: Some free content, including stress reduction and cognitive techniques to address anxiety.  MindShift CBT: Free content, including cognitive  behavioral therapy strategies to address general worry, social anxiety, and panic.  PTSD : Created by [facility]s National [facility] for PTSD and the Department of Defenses National [employer]. This dickson provides you with education about post-traumatic Stress Disorder (PTSD), information about professional care, a self-assessment for PTSD, opportunities to find support, and tools that can help you manage the stresses of daily life with PTSD.  PTSD : In conjunction with PTSD , the PTSD  dickson is for family members of those living with PTSD. The dickson provides extensive information about PTSD, how to take care of yourself, how to take care of your relationship with your loved one or with children, and how to help your loved one get the treatment they deserve.
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
## 2022-06-06 ENCOUNTER — OFFICE VISIT (OUTPATIENT)
Dept: INTERNAL MEDICINE | Facility: CLINIC | Age: 42
End: 2022-06-06
Payer: MEDICAID

## 2022-06-06 ENCOUNTER — PATIENT MESSAGE (OUTPATIENT)
Dept: INTERNAL MEDICINE | Facility: CLINIC | Age: 42
End: 2022-06-06

## 2022-06-06 DIAGNOSIS — R73.03 PREDIABETES: ICD-10-CM

## 2022-06-06 DIAGNOSIS — Z00.00 ANNUAL PHYSICAL EXAM: Primary | ICD-10-CM

## 2022-06-06 PROBLEM — F33.41 RECURRENT MAJOR DEPRESSIVE DISORDER, IN PARTIAL REMISSION: Status: RESOLVED | Noted: 2018-01-16 | Resolved: 2022-06-06

## 2022-06-06 PROCEDURE — 1160F PR REVIEW ALL MEDS BY PRESCRIBER/CLIN PHARMACIST DOCUMENTED: ICD-10-PCS | Mod: CPTII,95,, | Performed by: FAMILY MEDICINE

## 2022-06-06 PROCEDURE — 1159F PR MEDICATION LIST DOCUMENTED IN MEDICAL RECORD: ICD-10-PCS | Mod: CPTII,95,, | Performed by: FAMILY MEDICINE

## 2022-06-06 PROCEDURE — 1159F MED LIST DOCD IN RCRD: CPT | Mod: CPTII,95,, | Performed by: FAMILY MEDICINE

## 2022-06-06 PROCEDURE — 1160F RVW MEDS BY RX/DR IN RCRD: CPT | Mod: CPTII,95,, | Performed by: FAMILY MEDICINE

## 2022-06-06 PROCEDURE — 99213 OFFICE O/P EST LOW 20 MIN: CPT | Mod: 95,,, | Performed by: FAMILY MEDICINE

## 2022-06-06 PROCEDURE — 99213 PR OFFICE/OUTPT VISIT, EST, LEVL III, 20-29 MIN: ICD-10-PCS | Mod: 95,,, | Performed by: FAMILY MEDICINE

## 2022-06-06 RX ORDER — METFORMIN HYDROCHLORIDE 500 MG/1
500 TABLET ORAL 2 TIMES DAILY
COMMUNITY
Start: 2022-05-08 | End: 2022-09-01 | Stop reason: SDUPTHER

## 2022-06-06 NOTE — PROGRESS NOTES
Arielle Verde  06/06/2022  [id number]    Vera Broussard MD  Patient Care Team:  Vera Broussard MD as PCP - General (Family Medicine)  Luly Gonzalez LPN as Care Coordinator (Internal Medicine)      The patient location is: home  The chief complaint leading to consultation is: check up    Visit type: audiovisual    Face to Face time with patient: 3:23-3:30  15 minutes of total time spent on the encounter, which includes face to face time and non-face to face time preparing to see the patient (eg, review of tests), Obtaining and/or reviewing separately obtained history, Documenting clinical information in the electronic or other health record, Independently interpreting results (not separately reported) and communicating results to the patient/family/caregiver, or Care coordination (not separately reported).         Each patient to whom he or she provides medical services by telemedicine is:  (1) informed of the relationship between the physician and patient and the respective role of any other health care provider with respect to management of the patient; and (2) notified that he or she may decline to receive medical services by telemedicine and may withdraw from such care at any time.    Notes:     Visit Type:a scheduled routine follow-up visit    Chief Complaint:  No chief complaint on file.      History of Present Illness:  41 year old  Requesting annual  Virtual    History of Migraines, Depression,   Followed with Dr. Anderson. Botox injections.  She will see him tomorrow.  She reports she is noting forgetful more. She feels foggy all the time.      She also seems [facility] for counseling and med management.  She has Klonopin as needed. Wellbutrin, Cymbalta, Buspar. Trazodone for sleep.  She reports 3 life events, lost grandma, then terminated from job, then broke up with Boyfriend.  She started new job.  NeuroRehab Director.    She is on Metformin for IR    She is up to date on  marleny        Health Maintenance Due   Topic Date Due    Pneumococcal Vaccines (Age 0-64) (1 - PCV) Never done    COVID-19 Vaccine (3 - Booster for Moderna series) 08/25/2021     She is on Metformin for IR  Highest A1c 6.1  Due for recheck    She is interested in GLP-1  She needs labs first      History:  Past Medical History:   Diagnosis Date    Anxiety     Chronic headaches     SALLIE III (cervical intraepithelial neoplasia grade III) with severe dysplasia 2019    s/p LEEP    Depression     Migraine      Past Surgical History:   Procedure Laterality Date    ADENOIDECTOMY      AUGMENTATION OF BREAST  06/2005    saline    BREAST SURGERY      breast augmentatin    COLD KNIFE CONIZATION OF CERVIX N/A 11/27/2019    Procedure: CONE BIOPSY, CERVIX, USING COLD;  Surgeon: Rachele Willis MD;  Location: [facility] OR;  Service: OB/GYN;  Laterality: N/A;    Hyperhydrosis      LIPOSUCTION OF NECK      REMOVAL OF INTRAUTERINE DEVICE (IUD)  11/27/2019    Procedure: REMOVAL, INTRAUTERINE DEVICE;  Surgeon: Rachele Willis MD;  Location: [facility] OR;  Service: OB/GYN;;    TILT TABLE TEST N/A 1/11/2019    Procedure: TILT TABLE TEST;  Surgeon: Justin Freeman MD;  Location: [facility] CATH LAB;  Service: Cardiology;  Laterality: N/A;  Rodriguez pt/pt req 930 start time    TONSILLECTOMY       Family History   Problem Relation Age of Onset    Lupus Mother     Ulcerative colitis Mother     Kidney failure Father     Drug abuse Father     Breast cancer Paternal Grandmother     Colon cancer Neg Hx     Ovarian cancer Neg Hx      Social History     Socioeconomic History    Marital status: Single   Occupational History    Occupation: Podiatry nurse    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     Comment: Mirena 3/30/20 LOT [id number] exp 4.2022     Patient Active Problem List   Diagnosis    Anxiety associated with depression    Chronic bilateral low back pain without sciatica    Generalized headaches    PLMD (periodic limb movement disorder)    Postural hypotension    Heart palpitations    Common migraine with intractable migraine    Use of medication with teratogenic potential in female of reproductive age    Orthostasis    Abnormal vaginal bleeding    Low grade squamous intraepithelial lesion (LGSIL) on cervical Pap smear    Migraine without aura and without status migrainosus, not intractable    SALLIE III (cervical intraepithelial neoplasia grade III) with severe dysplasia     Review of patient's allergies indicates:   Allergen Reactions    Emgality [galcanezumab-gnlm] Hives and Itching    Topiramate Itching       The following were reviewed at this visit: active problem list, medication list, allergies, family history, social history, and health maintenance.    Medications:  Current Outpatient Medications on File Prior to Visit   Medication Sig Dispense Refill    buPROPion (WELLBUTRIN XL) 150 MG TB24 tablet Take 3 tablets (450 mg total) by mouth once daily. 90 tablet 3    busPIRone (BUSPAR) 15 MG tablet Take 2 tablets (30 mg total) by mouth 2 (two) times daily. 120 tablet 3    clonazePAM (KLONOPIN) 0.5 MG tablet Take 1 tablet (0.5 mg total) by mouth daily as needed for Anxiety. 30 tablet 0    rizatriptan (MAXALT) 10 MG tablet Take 10 mg by mouth daily as needed.      albuterol (PROVENTIL/VENTOLIN HFA) 90 mcg/actuation inhaler Inhale 2 puffs into the lungs every 6 (six) hours as needed for Wheezing. Dispense with spacer. 6.7 g 0    ASCORBATE CALCIUM (VITAMIN C ORAL) Take 1 tablet by mouth every evening.       cetirizine (ZYRTEC) 10 MG tablet Take 10 mg by mouth daily as needed.   0    DULoxetine (CYMBALTA) 60 MG  capsule TAKE 1 CAPSULE BY MOUTH IN THE EVENING 30 capsule 1    EPINEPHrine (EPIPEN 2-DUANE) 0.3 mg/0.3 mL AtIn Inject 0.6 mLs (0.6 mg total) into the muscle once. for 1 dose 0.6 mL 0    metFORMIN (GLUCOPHAGE) 500 MG tablet Take 500 mg by mouth 2 (two) times daily.      traZODone (DESYREL) 100 MG tablet Take 1-3 tablets at bedtime as needed for sleep. 270 tablet 0    triamcinolone acetonide 0.1% (KENALOG) 0.1 % cream Apply topically 2 (two) times daily. 80 g 1    [DISCONTINUED] benzonatate (TESSALON) 100 MG capsule Take 100 mg by mouth 3 (three) times daily as needed for Cough.      [DISCONTINUED] metFORMIN (GLUCOPHAGE) 1000 MG tablet Take 1 tablet (1,000 mg total) by mouth 2 (two) times daily with meals. 180 tablet 3     Current Facility-Administered Medications on File Prior to Visit   Medication Dose Route Frequency Provider Last Rate Last Admin    levonorgestreL 20 mcg/24 hours (5 yrs) 52 mg IUD 1 Intra Uterine Device  1 each Intrauterine 1 time in Clinic/HOD Rachele Willis MD        onabotulinumtoxina injection 200 Units  200 Units Intramuscular Q90 Days Amer MRon Anderson MD   200 Units at 03/07/22 1432       Medications have been reviewed and reconciled with patient at this visit.  Barriers to medications reviewed with patient.    Adverse reactions to current medications reviewed with patient..    Over the counter medications reviewed and reconciled with patient.    Exam:  Wt Readings from Last 3 Encounters:   03/07/22 85.9 kg (189 lb 6 oz)   02/17/22 84.8 kg (187 lb)   11/19/21 90.7 kg (199 lb 15.3 oz)     Temp Readings from Last 3 Encounters:   02/17/22 99.1 °F (37.3 °C)   10/19/21 98.4 °F (36.9 °C) (Oral)   09/23/21 98.8 °F (37.1 °C) (Temporal)     BP Readings from Last 3 Encounters:   03/07/22 124/78   02/17/22 119/73   11/19/21 102/74     Pulse Readings from Last 3 Encounters:   03/07/22 (!) 119   02/17/22 88   09/23/21 109     There is no height or weight on file to calculate BMI.      Review of  Systems   Constitutional: Negative.  Negative for chills and fever.   HENT: Negative.  Negative for congestion, sinus pain and sore throat.    Eyes: Negative for blurred vision and double vision.   Respiratory: Negative for cough, sputum production, shortness of breath and wheezing.    Cardiovascular: Negative for chest pain, palpitations and leg swelling.   Gastrointestinal: Negative for abdominal pain, constipation, diarrhea, heartburn, nausea and vomiting.   Genitourinary: Negative.    Musculoskeletal: Negative.    Skin: Negative.  Negative for rash.   Neurological: Negative.    Endo/Heme/Allergies: Negative.  Negative for polydipsia. Does not bruise/bleed easily.   Psychiatric/Behavioral: Negative for depression and substance abuse.     Physical Exam  Nursing note reviewed.   Pulmonary:      Effort: Pulmonary effort is normal. No respiratory distress.   Neurological:      Mental Status: She is alert and oriented to person, place, and time.   Psychiatric:         Mood and Affect: Mood normal.         Behavior: Behavior normal.         Thought Content: Thought content normal.         Judgment: Judgment normal.         Laboratory Reviewed ({Yes)  Lab Results   Component Value Date    WBC 6.90 11/18/2019    HGB 11.9 (L) 11/18/2019    HCT 39.5 11/18/2019     11/18/2019    CHOL 192 04/20/2017    TRIG 91 04/20/2017    HDL 46 04/20/2017    ALT 35 10/21/2019    AST 31 10/21/2019     04/19/2021    K 3.6 04/19/2021     04/19/2021    CREATININE 0.8 04/19/2021    BUN 13 04/19/2021    CO2 24 04/19/2021    TSH 0.773 04/19/2021    HGBA1C 5.9 (H) 08/12/2021       Diagnoses and all orders for this visit:    Annual physical exam  -     Comprehensive Metabolic Panel; Future  -     Hemoglobin A1C; Future  -     CBC Without Differential; Future  -     Lipid Panel; Future    Prediabetes  -     Hemoglobin A1C; Future        Plan to send Trulicity for pRe DM to [facility] once labs resulted  Will need PA      Answers for  HPI/ROS submitted by the patient on 6/6/2022  activity change: Yes  unexpected weight change: No  rhinorrhea: No  trouble swallowing: No  visual disturbance: No  chest tightness: No  polyuria: No  difficulty urinating: No  menstrual problem: No  joint swelling: No  arthralgias: No  confusion: No  dysphoric mood: Yes        Care Plan/Goals: Reviewed    Goals    None         Follow up: No follow-ups on file.    After visit summary was printed and given to patient upon discharge today.  Patient goals and care plan are included in After Visit Summary.

## 2022-06-07 ENCOUNTER — OFFICE VISIT (OUTPATIENT)
Dept: NEUROLOGY | Facility: CLINIC | Age: 42
End: 2022-06-07
Payer: MEDICAID

## 2022-06-07 ENCOUNTER — PROCEDURE VISIT (OUTPATIENT)
Dept: NEUROLOGY | Facility: CLINIC | Age: 42
End: 2022-06-07
Payer: MEDICAID

## 2022-06-07 VITALS
SYSTOLIC BLOOD PRESSURE: 112 MMHG | HEART RATE: 78 BPM | DIASTOLIC BLOOD PRESSURE: 74 MMHG | BODY MASS INDEX: 26.15 KG/M2 | OXYGEN SATURATION: 98 % | HEIGHT: 71 IN | WEIGHT: 186.75 LBS

## 2022-06-07 DIAGNOSIS — G43.019 COMMON MIGRAINE WITH INTRACTABLE MIGRAINE: ICD-10-CM

## 2022-06-07 DIAGNOSIS — G89.29 CHRONIC BILATERAL LOW BACK PAIN WITHOUT SCIATICA: ICD-10-CM

## 2022-06-07 DIAGNOSIS — R51.9 GENERALIZED HEADACHES: ICD-10-CM

## 2022-06-07 DIAGNOSIS — G43.009 MIGRAINE WITHOUT AURA AND WITHOUT STATUS MIGRAINOSUS, NOT INTRACTABLE: Primary | ICD-10-CM

## 2022-06-07 DIAGNOSIS — Z79.899 POLYPHARMACY: ICD-10-CM

## 2022-06-07 DIAGNOSIS — G47.61 PLMD (PERIODIC LIMB MOVEMENT DISORDER): ICD-10-CM

## 2022-06-07 DIAGNOSIS — D06.9 CIN III (CERVICAL INTRAEPITHELIAL NEOPLASIA GRADE III) WITH SEVERE DYSPLASIA: ICD-10-CM

## 2022-06-07 DIAGNOSIS — R40.20 LOSS OF CONSCIOUSNESS: ICD-10-CM

## 2022-06-07 DIAGNOSIS — R00.2 HEART PALPITATIONS: ICD-10-CM

## 2022-06-07 DIAGNOSIS — I95.1 ORTHOSTASIS: ICD-10-CM

## 2022-06-07 DIAGNOSIS — I95.1 POSTURAL HYPOTENSION: Chronic | ICD-10-CM

## 2022-06-07 DIAGNOSIS — G43.009 MIGRAINE WITHOUT AURA AND WITHOUT STATUS MIGRAINOSUS, NOT INTRACTABLE: ICD-10-CM

## 2022-06-07 DIAGNOSIS — R87.612 LOW GRADE SQUAMOUS INTRAEPITHELIAL LESION (LGSIL) ON CERVICAL PAP SMEAR: ICD-10-CM

## 2022-06-07 DIAGNOSIS — R41.3 OTHER AMNESIA: ICD-10-CM

## 2022-06-07 DIAGNOSIS — R41.9 COGNITIVE COMPLAINTS: Primary | ICD-10-CM

## 2022-06-07 DIAGNOSIS — N93.9 ABNORMAL VAGINAL BLEEDING: ICD-10-CM

## 2022-06-07 DIAGNOSIS — M54.50 CHRONIC BILATERAL LOW BACK PAIN WITHOUT SCIATICA: ICD-10-CM

## 2022-06-07 DIAGNOSIS — Z79.899 USE OF MEDICATION WITH TERATOGENIC POTENTIAL IN FEMALE OF REPRODUCTIVE AGE: ICD-10-CM

## 2022-06-07 DIAGNOSIS — F41.8 ANXIETY ASSOCIATED WITH DEPRESSION: ICD-10-CM

## 2022-06-07 PROCEDURE — 99215 PR OFFICE/OUTPT VISIT, EST, LEVL V, 40-54 MIN: ICD-10-PCS | Mod: 24,S$PBB,, | Performed by: PSYCHIATRY & NEUROLOGY

## 2022-06-07 PROCEDURE — 99999 PR PBB SHADOW E&M-EST. PATIENT-LVL I: CPT | Mod: PBBFAC,,, | Performed by: PSYCHIATRY & NEUROLOGY

## 2022-06-07 PROCEDURE — 99215 OFFICE O/P EST HI 40 MIN: CPT | Mod: 24,S$PBB,, | Performed by: PSYCHIATRY & NEUROLOGY

## 2022-06-07 PROCEDURE — 64615 CHEMODENERV MUSC MIGRAINE: CPT | Mod: PBBFAC | Performed by: PSYCHIATRY & NEUROLOGY

## 2022-06-07 PROCEDURE — 64615 PR CHEMODENERVATION OF MUSCLE FOR CHRONIC MIGRAINE: ICD-10-PCS | Mod: S$PBB,,, | Performed by: PSYCHIATRY & NEUROLOGY

## 2022-06-07 PROCEDURE — 99211 OFF/OP EST MAY X REQ PHY/QHP: CPT | Mod: PBBFAC,25 | Performed by: PSYCHIATRY & NEUROLOGY

## 2022-06-07 PROCEDURE — 99999 PR PBB SHADOW E&M-EST. PATIENT-LVL I: ICD-10-PCS | Mod: PBBFAC,,, | Performed by: PSYCHIATRY & NEUROLOGY

## 2022-06-07 PROCEDURE — 64615 CHEMODENERV MUSC MIGRAINE: CPT | Mod: S$PBB,,, | Performed by: PSYCHIATRY & NEUROLOGY

## 2022-06-07 RX ADMIN — ONABOTULINUMTOXINA 200 UNITS: 100 INJECTION, POWDER, LYOPHILIZED, FOR SOLUTION INTRADERMAL; INTRAMUSCULAR at 02:06

## 2022-06-07 NOTE — PROCEDURES
1. PROCEDURE: Botox injections      2. INDICATION: Intractable Migraine      3. TIME OUT: The procedure was discussed in details with the patient and the consent form was signed. The patient verbalized understanding of the procedure . I discussed the risks that may include serious immune reaction and distant spread that could results in loss of breathing. Other side effects may include droopy eyelids, trouble swallowing and cardiac arrhythmias. It was also stressed that it is contraindicated in pregnancy.      3. COURSE:   The standard protocol was followed. the procedure was very smooth.      4. COMPLICATIONS: None. The patient tolerated the procedure very well.      5. AFTERCARE: I encouraged the patient to use ice if the sites of injection get tender and call me with any problems or complications.               As per the standard protocol, a total 155 units were injected in 31 sites.            RT  5 units in 1 site     LT  5 units in 1 site      Procerus 5 units in 1 site      RT Frontalis 10 units in 2 sites      LT Frontalis 10 units in 2 site      RT Temporalis 20 units in 4 sites     LT Temporalis 20 units in 4 sites     RT Occipitalis 15 units in 3 sites     LT Occipitalis 15  units in 3 sites      RT Cervical Paraspinals 10 units in 2 sites     LT Cervical Paraspinals 10 units in 2 sites     RT Trapezius 15 units in 3 sites     LT Trapezius 15 units in 3 sites         Per the standard of care protocol we use 155 units and waste 45 units. I gave the patient the option of following the standard protocol vs.using the extra 45 units to target areas where the pain is maximum which could potentially provide extra help with headache control. I explained to the patient that this will be an off-label use, not the standard protocol, not evidence-based and could result in more pronounced side effects. The patient verbalized full understanding of all the facts and the risks and benefits and  elected to use the extra 45 units for the following sites:                 Procerus 5 units in 1 site      RT Frontalis 10 units in 2 sites      LT Frontalis 10 units in 2 site      RT Temporalis 10 units in 2 sites

## 2022-06-08 ENCOUNTER — TELEPHONE (OUTPATIENT)
Dept: NEUROLOGY | Facility: CLINIC | Age: 42
End: 2022-06-08
Payer: MEDICAID

## 2022-06-08 PROBLEM — Z79.899 POLYPHARMACY: Status: ACTIVE | Noted: 2022-06-08

## 2022-06-08 PROBLEM — R41.9 COGNITIVE COMPLAINTS: Status: ACTIVE | Noted: 2022-06-08

## 2022-06-08 NOTE — TELEPHONE ENCOUNTER
----- Message from Jose Luis Anderson MD sent at 6/8/2022  9:24 AM CDT -----      Ordered CTH and referral to Dr. Douglass

## 2022-06-08 NOTE — PROGRESS NOTES
"Subjective:       Patient ID: Arielle Verde is a 41 y.o. female.    Chief Complaint: No chief complaint on file.          HPI   BACKGROUND HISTORY        The patient was referred by Dr. Broussard for evaluation of headache and dizziness.        The headaches started at the age of 16 and  progress from different areas and involves different sides with a throbbing nature. It can involve the right side, the left side or both sides. No visual aura. History is significant for prodromal irritability and urinary frequency.  The headaches are daily with severe ones every week The headaches range from 6-8/10 headaches that cause significant morbidity. The headache is associated with nausea and light, sound, temperature and smell sensitivity. The patient also vomits occasionally.  Abortive meds like OTC NSAIDs and triptans have been partially helpful.  The headaches last for several hours. The patient feels "down" during the headache with no racing. Physical and emotional stressors provoke and aggravate the headache.  The headache builds up slowly towards the middle of the day or the end of the day. The headache is associated with scalp sensitivity. Lack of sleep is a significant trigger of the headache.  No neck pain with radiation. No TMJ problems and she has been wearing a .  The patient denies blurry vision except when she stands uop and no ear ringing. The headache is not positional or postural but worsens with movement and valsalva. Sleep help lessen the headache. No history of head trauma. No history of seizures. No history of smoking. No caffeine overuses. No vertigo. No blacking out.  No fever, chills or rigors. No history of significant memory loss. No history of strokes.  The patient cannot think of food that aggravates the headache. Family history is not remarkable for migraine. No family history of early dementia. Brain MRI and MRA in 2017 were reported as unremarkable. She could not tolerate " TPM and SSRIs have not helped. She was started on  mg QHS by Dr. Barbour with modest benefit. I added Aimovig 140 mg SQ monthly.Aimbovig seems to have helped but caused severe constipation. We tried Emgality.    Emgality has caused allergic reaction. Did not want to try Ajovy so we proceeded with Botox.        In addition, she describes lightheadedness and blurry vision when she stands up. No LOC spells. BP is low at baseline an drops further when she stands up.  Cardiac eval has been unremarkable and Tilt-Table is NL  Recommended orthostatic measures.            INTERVAL HISTORY         Botox continues to help with reducing migraine frequency and severity.      No LOC spells. Tilt table results are NL.    The patient is here for memory loss. The patient is presenting with 1-year history of memory loss. The main problems the patient has are related to some word finding difficulties. Remains highly functional and independent.  The patient is driving and denies getting lost. The patient is not losing personal items and does not put them in odd places. No confusion around and inside the house. No trouble remembering the date and time, keeping up with medications and appointments and keeping up with major holidays and political changes. The patient is independent in handling finances. The patient is still independent with ADLs. No agitation or aggression. No hallucinations or delusions. No seizures. No language problems. No problems handling tools. No history of strokes. No history of headaches. No history of hypothyroidism. No history of alcoholism (young onset or old onset). No history of B12 deficiency. Significant history of anxiety-depression. No history of bipolar disorder. No history of Untreated Syphilis.  No history of HIV infection. No toxic exposures.  No history of traumatic brain injury. No tremors or abnormal movements. No falls or instability. No urinary incontinence. No change in sleep and  appetite. No family history of early onset dementia. The 2017 RECORDS showed Brain MRI CMCs, Brain MRA Unremarkable. On 10- B12-HC NL. From 7318-6530 HIV, RPR -ve 04- TFT NL. Today (06-) MOCA 30/30.          Review of Systems   Constitutional: Negative for appetite change and fatigue.   HENT: Negative for hearing loss and tinnitus.    Eyes: Negative for photophobia and visual disturbance.   Respiratory: Negative for apnea and shortness of breath.    Cardiovascular: Negative for chest pain and palpitations.   Gastrointestinal: Negative for nausea and vomiting.   Endocrine: Negative for cold intolerance and heat intolerance.   Genitourinary: Negative for difficulty urinating and urgency.   Musculoskeletal: Negative for arthralgias, back pain, gait problem, joint swelling, myalgias, neck pain and neck stiffness.   Skin: Negative for color change and rash.   Allergic/Immunologic: Negative for environmental allergies and immunocompromised state.   Neurological: Positive for headaches. Negative for dizziness, tremors, seizures, syncope, facial asymmetry, speech difficulty, weakness, light-headedness and numbness.   Hematological: Negative for adenopathy. Does not bruise/bleed easily.   Psychiatric/Behavioral: Positive for dysphoric mood and sleep disturbance. Negative for agitation, behavioral problems, confusion, decreased concentration, hallucinations, self-injury and suicidal ideas. The patient is nervous/anxious. The patient is not hyperactive.                  Current Outpatient Medications:     albuterol (PROVENTIL/VENTOLIN HFA) 90 mcg/actuation inhaler, Inhale 2 puffs into the lungs every 6 (six) hours as needed for Wheezing. Dispense with spacer., Disp: 6.7 g, Rfl: 0    ASCORBATE CALCIUM (VITAMIN C ORAL), Take 1 tablet by mouth every evening. , Disp: , Rfl:     buPROPion (WELLBUTRIN XL) 150 MG TB24 tablet, Take 3 tablets (450 mg total) by mouth once daily., Disp: 90 tablet, Rfl: 3    busPIRone  (BUSPAR) 15 MG tablet, Take 2 tablets (30 mg total) by mouth 2 (two) times daily., Disp: 120 tablet, Rfl: 3    cetirizine (ZYRTEC) 10 MG tablet, Take 10 mg by mouth daily as needed. , Disp: , Rfl: 0    clonazePAM (KLONOPIN) 0.5 MG tablet, Take 1 tablet (0.5 mg total) by mouth daily as needed for Anxiety., Disp: 30 tablet, Rfl: 0    DULoxetine (CYMBALTA) 60 MG capsule, TAKE 1 CAPSULE BY MOUTH IN THE EVENING, Disp: 30 capsule, Rfl: 1    EPINEPHrine (EPIPEN 2-DUANE) 0.3 mg/0.3 mL AtIn, Inject 0.6 mLs (0.6 mg total) into the muscle once. for 1 dose, Disp: 0.6 mL, Rfl: 0    metFORMIN (GLUCOPHAGE) 500 MG tablet, Take 500 mg by mouth 2 (two) times daily., Disp: , Rfl:     rizatriptan (MAXALT) 10 MG tablet, Take 10 mg by mouth daily as needed., Disp: , Rfl:     traZODone (DESYREL) 100 MG tablet, Take 1-3 tablets at bedtime as needed for sleep., Disp: 270 tablet, Rfl: 0    triamcinolone acetonide 0.1% (KENALOG) 0.1 % cream, Apply topically 2 (two) times daily., Disp: 80 g, Rfl: 1    Current Facility-Administered Medications:     levonorgestreL 20 mcg/24 hours (5 yrs) 52 mg IUD 1 Intra Uterine Device, 1 each, Intrauterine, 1 time in Clinic/HOD, Rachele Willis MD    onabotulinumtoxina injection 200 Units, 200 Units, Intramuscular, Q90 Days, Jose Luis Anderson MD, 200 Units at 06/07/22 1406  Past Medical History:   Diagnosis Date    Anxiety     Chronic headaches     SALLIE III (cervical intraepithelial neoplasia grade III) with severe dysplasia 2019    s/p LEEP    Depression     Migraine      Past Surgical History:   Procedure Laterality Date    ADENOIDECTOMY      AUGMENTATION OF BREAST  06/2005    saline    BREAST SURGERY      breast augmentatin    COLD KNIFE CONIZATION OF CERVIX N/A 11/27/2019    Procedure: CONE BIOPSY, CERVIX, USING COLD;  Surgeon: Rachele Willis MD;  Location: [facility];  Service: OB/GYN;  Laterality: N/A;    Hyperhydrosis      LIPOSUCTION OF NECK      REMOVAL OF INTRAUTERINE DEVICE (IUD)  11/27/2019     Procedure: REMOVAL, INTRAUTERINE DEVICE;  Surgeon: Rachele Willis MD;  Location: [facility] OR;  Service: OB/GYN;;    TILT TABLE TEST N/A 1/11/2019    Procedure: TILT TABLE TEST;  Surgeon: Justin Freeman MD;  Location: [facility] CATH LAB;  Service: Cardiology;  Laterality: N/A;  Michael pt/pt req 930 start time    TONSILLECTOMY       Social History     Socioeconomic History    Marital status: Single   Occupational History    Occupation: Podiatry nurse    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     Comment: Mirena 3/30/20 LOT [id number] exp 4.2022             Past/Current Medical/Surgical History, Past/Current Social History, Past/Current Family History and Past/Current Medications were reviewed in detail.        Objective:           VITAL SIGNS WERE REVIEWED      GENERAL APPEARANCE:     The patient looks comfortable.    BMI 26    No signs of respiratory distress.    Normal breathing pattern.    No dysmorphic features    Normal eye contact.     GENERAL MEDICAL EXAM:    HEENT:  Head is atraumatic normocephalic. Fundoscopic (Ophthalmoscopic) exam showed no disc edema.      Neck and Axillae: No JVD. No visible lesions.    Cardiopulmonary: No cyanosis. No tachypnea. Normal respiratory effort.    Gastrointestinal/Urogenital:  No jaundice. No stomas or lesions. No visible hernias. No catheters.     Skin, Hair and Nails: No pathognonomic skin rash. No neurofibromatosis. No visible lesions.No stigmata of autoimmune disease. No clubbing.    Limbs: No varicose veins. No visible swelling.    Muskoskeletal: No visible deformities.No visible lesions.           Neurologic Exam     Mental Status   Oriented to person, place, and time.   Follows 3  step commands.   Attention: normal. Concentration: normal.   Speech: speech is normal   Level of consciousness: alert  Able to name object. Able to repeat. Normal comprehension.     MOCA 30/30    Visuospatial/Executive 5/5    Naming                            3/3    Attention                         6/6    Language                         3/3    Abstraction                    2/2    Recall                                5/5    Orientation                     6/6           Cranial Nerves   Cranial nerves II through XII intact.     CN II   Visual fields full to confrontation.   Visual acuity: normal  Right visual field deficit: none  Left visual field deficit: none     CN III, IV, VI   Pupils are equal, round, and reactive to light.  Extraocular motions are normal.   Right pupil: Size: 2 mm. Shape: regular. Reactivity: brisk. Consensual response: intact. Accommodation: intact.   Left pupil: Size: 2 mm. Shape: regular. Reactivity: brisk. Consensual response: intact. Accommodation: intact.   CN III: no CN III palsy  CN VI: no CN VI palsy  Nystagmus: none   Diplopia: none  Ophthalmoparesis: none  Upgaze: normal  Downgaze: normal  Conjugate gaze: present  Vestibulo-ocular reflex: present    CN V   Facial sensation intact.   Right facial sensation deficit: none  Left facial sensation deficit: none  Jaw jerk: normal    CN VII   Facial expression full, symmetric.   Right facial weakness: none  Left facial weakness: none    CN VIII   CN VIII normal.   Hearing: intact    CN IX, X   CN IX normal.   CN X normal.   Palate: symmetric    CN XI   CN XI normal.   Right sternocleidomastoid strength: normal  Left sternocleidomastoid strength: normal  Right trapezius strength: normal  Left trapezius strength: normal    CN XII   CN XII normal.   Tongue: not atrophic  Fasciculations: absent  Tongue deviation: none    Motor Exam   Muscle bulk: normal  Overall muscle tone: normal  Right arm tone: normal  Left arm tone: normal  Right arm  pronator drift: absent  Left arm pronator drift: absent  Right leg tone: normal  Left leg tone: normal    Strength   Strength 5/5 throughout.   Right neck flexion: 5/5  Left neck flexion: 5/5  Right neck extension: 5/5  Left neck extension: 5/5  Right deltoid: 5/5  Left deltoid: 5/5  Right biceps: 5/5  Left biceps: 5/5  Right triceps: 5/5  Left triceps: 5/5  Right wrist flexion: 5/5  Left wrist flexion: 5/5  Right wrist extension: 5/5  Left wrist extension: 5/5  Right interossei: 5/5  Left interossei: 5/5  Right iliopsoas: 5/5  Left iliopsoas: 5/5  Right quadriceps: 5/5  Left quadriceps: 5/5  Right hamstrin/5  Left hamstrin/5  Right glutei: 5/5  Left glutei: 5/5  Right anterior tibial: 5/5  Left anterior tibial: 5/5  Right posterior tibial: 5/5  Left posterior tibial: 5/5  Right peroneal: 5/5  Left peroneal: 5/5  Right gastroc: 5/5  Left gastroc: 5/5    Sensory Exam   Light touch normal.   Right arm light touch: normal  Left arm light touch: normal  Right leg light touch: normal  Left leg light touch: normal  Vibration normal.   Right arm vibration: normal  Left arm vibration: normal  Right leg vibration: normal  Left leg vibration: normal  Proprioception normal.   Right arm proprioception: normal  Left arm proprioception: normal  Right leg proprioception: normal  Left leg proprioception: normal  Pinprick normal.   Right arm pinprick: normal  Left arm pinprick: normal  Right leg pinprick: normal  Left leg pinprick: normal  Graphesthesia: normal  Stereognosis: normal    Gait, Coordination, and Reflexes     Gait  Gait: normal    Coordination   Romberg: negative  Finger to nose coordination: normal  Heel to shin coordination: normal  Tandem walking coordination: normal    Tremor   Resting tremor: absent  Intention tremor: absent  Action tremor: absent    Reflexes   Right brachioradialis: 2+  Left brachioradialis: 2+  Right biceps: 2+  Left biceps: 2+  Right triceps: 2+  Left triceps: 2+  Right patellar:  2+  Left patellar: 2+  Right achilles: 2+  Left achilles: 2+  Right plantar: normal  Left plantar: normal  Right Reynaga: absent  Left Reynaga: absent  Right ankle clonus: absent  Left ankle clonus: absent  Right pendular knee jerk: absent  Left pendular knee jerk: absent      Lab Results   Component Value Date    WBC 6.90 11/18/2019    HGB 11.9 (L) 11/18/2019    HCT 39.5 11/18/2019    MCV 98 11/18/2019     11/18/2019     Sodium   Date Value Ref Range Status   04/19/2021 140 136 - 145 mmol/L Final     Potassium   Date Value Ref Range Status   04/19/2021 3.6 3.5 - 5.1 mmol/L Final     Chloride   Date Value Ref Range Status   04/19/2021 106 95 - 110 mmol/L Final     CO2   Date Value Ref Range Status   04/19/2021 24 23 - 29 mmol/L Final     Glucose   Date Value Ref Range Status   04/19/2021 100 70 - 110 mg/dL Final     BUN   Date Value Ref Range Status   04/19/2021 13 6 - 20 mg/dL Final     Creatinine   Date Value Ref Range Status   04/19/2021 0.8 0.5 - 1.4 mg/dL Final     Calcium   Date Value Ref Range Status   04/19/2021 8.6 (L) 8.7 - 10.5 mg/dL Final     Total Protein   Date Value Ref Range Status   10/21/2019 7.4 6.0 - 8.4 g/dL Final     Albumin   Date Value Ref Range Status   04/19/2021 4.2 3.6 - 5.1 g/dL Final     Comment:     For additional information, please refer to   http://education.[employer]/faq/OXZ329 (This link is   being provided for informational/ educational purposes only.)    This test was developed and its analytical performance   characteristics have been determined by [employer] [facility]  [facility]. It has not been cleared or approved by the   [place] Food and Drug Administration. This assay has been validated   pursuant to the CLIA regulations and is used for clinical   purposes.  @ Test Performed By:  [employer] [facility] [place]  Jefferson Rhodes M.D.,   [address] [place] [id number]  CLIA  [id number]       Total Bilirubin    Date Value Ref Range Status   10/21/2019 0.3 0.1 - 1.0 mg/dL Final     Comment:     For infants and newborns, interpretation of results should be based  on gestational age, weight and in agreement with clinical  observations.  Premature Infant recommended reference ranges:  Up to 24 hours.............<8.0 mg/dL  Up to 48 hours............<12.0 mg/dL  3-5 days..................<15.0 mg/dL  6-29 days.................<15.0 mg/dL       Alkaline Phosphatase   Date Value Ref Range Status   10/21/2019 43 (L) 55 - 135 U/L Final     AST   Date Value Ref Range Status   10/21/2019 31 10 - 40 U/L Final     ALT   Date Value Ref Range Status   10/21/2019 35 10 - 44 U/L Final     Anion Gap   Date Value Ref Range Status   04/19/2021 10 8 - 16 mmol/L Final     eGFR if    Date Value Ref Range Status   04/19/2021 >60.0 >60 mL/min/1.73 m^2 Final     eGFR if non    Date Value Ref Range Status   04/19/2021 >60.0 >60 mL/min/1.73 m^2 Final     Comment:     Calculation used to obtain the estimated glomerular filtration  rate (eGFR) is the CKD-EPI equation.        No results found for: [id number]  Lab Results   Component Value Date    TSH 0.773 04/19/2021    [id number] 97 04/19/2021    FREET4 1.00 04/19/2021          2017 RECORDS       Brain MRI CMCs     Brain MRA Unremarkable       10-    B12-HC NL       [id number]    HIV, RPR -ve      04-    TFT NL    06-    CTH WO    No acute abnormality.      09-    EEG A/S NL      Reviewed the neuroimaging independently       Assessment:       1. Cognitive complaints    2. Anxiety associated with depression    3. Generalized headaches    4. Chronic bilateral low back pain without sciatica    5. Postural hypotension    6. PLMD (periodic limb movement disorder)    7. Heart palpitations    8. Common migraine with intractable migraine    9. Orthostasis    10. Use of medication with teratogenic potential in female of reproductive age    11. Abnormal  vaginal bleeding    12. Low grade squamous intraepithelial lesion (LGSIL) on cervical Pap smear    13. Migraine without aura and without status migrainosus, not intractable    14. SALLIE III (cervical intraepithelial neoplasia grade III) with severe dysplasia    15. Polypharmacy        Plan:             MULTIPLE NEUROLOGICAL PROBLEMS        COMMON MIGRAINE WITHOUT AURA, EPISODIC, WELL-CONTROLLED ON BOTOX         HEADACHE CALENDAR      LIFESTYLE CHANGES      Good sleep hygiene  Avoid triggers like certain foods, TV and computer work   Minimize physical and emotional stress  Smoking avoidance and cessation  Tapering off caffeine   Good hydration   Small frequent meals   Moderate 30-minute-long aerobic exercises 3 times/week            ABORTIVE (RESUCE MEDICATIONS) ACUTE       Should only be taken 2-3 times/week to avoid rebound and overuse headaches.    Take at the ONSET of the headache [id number] mg  PO in combination with naproxen 500 mg PO or Ibuprofen 800 mg PO OR Treximet  for headache without nausea or vomiting. This regimen can be repeated only once in 24 hours after 2 hours.       PREVENTATIVE (REDUCTIVE MEDICATIONS) CHRONIC         Continue Botox 200 units Q 3 months.    TPM was not tolerated      ZNS failed     SSRIs failed     BB is not appropriate with low BP     VPA is not appropriate for CBP     Elavil is not appropriate while on multiple SSRIs     Aimovig (erenumab) 140 mg monthly caused constipation.     Emgality 120 mg SQ monthly caused allergic reaction      Does not want Ajovy.      She uses Mirena for birth control           ORTHOSTASIS, NO SYNCOPE       Avoid standing for a long time     Slow transition from lying down to sitting to standing     Cross legs while standing     Increase fluid intake     Increase salt intake      Wearing thigh high stockings         COGNITIVE COMPLAINTS, NORMAL COGNITIVE EXAM: ANXIETY-DEPRESSION, ATTENTION-CONCENTRATION DIFFICULTIES        CTH WO.     CNP  evaluation.    Optimization of ANA-MDD Management.    Sleep Hygiene.    Healthy lifestyle (diet, exercise (physical and cognitive))         MEDICAL/SURGICAL COMORBIDITIES     All relevant medical comorbidities noted and managed by primary care physician and medical care team.          HEALTHY LIFESTYLE AND PREVENTATIVE CARE    The patient to adhere to the age-appropriate health maintenance guidelines including screening tests and vaccinations. The patient to adhere to  healthy lifestyle, optimal weight, exercise, healthy diet, good sleep hygiene and avoiding drugs including smoking, alcohol and recreational drugs.          Jose Luis Anderson MD, FAAN    Attending Neurologist/Epileptologist         Diplomate, American Board of Psychiatry and Neurology    Diplomate, American Board of Clinical Neurophysiology     Fellow, American Academy of Neurology

## 2022-06-16 ENCOUNTER — HOSPITAL ENCOUNTER (OUTPATIENT)
Dept: RADIOLOGY | Facility: HOSPITAL | Age: 42
Discharge: HOME | End: 2022-06-16
Attending: PSYCHIATRY & NEUROLOGY
Payer: MEDICAID

## 2022-06-16 DIAGNOSIS — R41.3 OTHER AMNESIA: ICD-10-CM

## 2022-06-16 PROCEDURE — 70450 CT HEAD/BRAIN W/O DYE: CPT | Mod: TC

## 2022-06-17 ENCOUNTER — OFFICE VISIT (OUTPATIENT)
Dept: PSYCHIATRY | Facility: CLINIC | Age: 42
End: 2022-06-17
Payer: MEDICAID

## 2022-06-17 ENCOUNTER — PATIENT MESSAGE (OUTPATIENT)
Dept: PSYCHIATRY | Facility: CLINIC | Age: 42
End: 2022-06-17
Payer: MEDICAID

## 2022-06-17 ENCOUNTER — PATIENT MESSAGE (OUTPATIENT)
Dept: INTERNAL MEDICINE | Facility: CLINIC | Age: 42
End: 2022-06-17
Payer: MEDICAID

## 2022-06-17 DIAGNOSIS — G47.00 INSOMNIA, UNSPECIFIED TYPE: ICD-10-CM

## 2022-06-17 DIAGNOSIS — F32.A CHRONIC DEPRESSION: Primary | ICD-10-CM

## 2022-06-17 DIAGNOSIS — R41.840 DISTURBED CONCENTRATION: ICD-10-CM

## 2022-06-17 PROCEDURE — 99214 PR OFFICE/OUTPT VISIT, EST, LEVL IV, 30-39 MIN: ICD-10-PCS | Mod: AF,HB,95, | Performed by: PSYCHIATRY & NEUROLOGY

## 2022-06-17 PROCEDURE — 99214 OFFICE O/P EST MOD 30 MIN: CPT | Mod: AF,HB,95, | Performed by: PSYCHIATRY & NEUROLOGY

## 2022-06-17 PROCEDURE — 3044F HG A1C LEVEL LT 7.0%: CPT | Mod: AF,HB,CPTII,95 | Performed by: PSYCHIATRY & NEUROLOGY

## 2022-06-17 PROCEDURE — 3044F PR MOST RECENT HEMOGLOBIN A1C LEVEL <7.0%: ICD-10-PCS | Mod: AF,HB,CPTII,95 | Performed by: PSYCHIATRY & NEUROLOGY

## 2022-06-17 RX ORDER — ATOMOXETINE 40 MG/1
CAPSULE ORAL
Qty: 60 CAPSULE | Refills: 5 | Status: SHIPPED | OUTPATIENT
Start: 2022-06-17 | End: 2022-10-12

## 2022-06-17 RX ORDER — BUSPIRONE HYDROCHLORIDE 15 MG/1
30 TABLET ORAL 2 TIMES DAILY
Qty: 120 TABLET | Refills: 5 | Status: SHIPPED | OUTPATIENT
Start: 2022-06-17 | End: 2022-10-12 | Stop reason: SDUPTHER

## 2022-06-17 RX ORDER — TRAZODONE HYDROCHLORIDE 100 MG/1
TABLET ORAL
Qty: 270 TABLET | Refills: 1 | Status: SHIPPED | OUTPATIENT
Start: 2022-06-17 | End: 2022-10-12 | Stop reason: SDUPTHER

## 2022-06-17 RX ORDER — BUPROPION HYDROCHLORIDE 150 MG/1
450 TABLET ORAL DAILY
Qty: 90 TABLET | Refills: 5 | Status: SHIPPED | OUTPATIENT
Start: 2022-06-17 | End: 2022-10-12 | Stop reason: SDUPTHER

## 2022-06-17 RX ORDER — DULOXETIN HYDROCHLORIDE 20 MG/1
CAPSULE, DELAYED RELEASE ORAL
Qty: 21 CAPSULE | Refills: 0 | Status: SHIPPED | OUTPATIENT
Start: 2022-06-17 | End: 2022-08-10 | Stop reason: CLARIF

## 2022-06-17 RX ORDER — CLONAZEPAM 0.5 MG/1
0.5 TABLET ORAL DAILY PRN
Qty: 30 TABLET | Refills: 3 | Status: SHIPPED | OUTPATIENT
Start: 2022-06-17 | End: 2022-10-12 | Stop reason: SDUPTHER
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
## 2022-06-20 ENCOUNTER — PATIENT MESSAGE (OUTPATIENT)
Dept: PSYCHIATRY | Facility: CLINIC | Age: 42
End: 2022-06-20
Payer: MEDICAID

## 2022-06-23 ENCOUNTER — PATIENT MESSAGE (OUTPATIENT)
Dept: INTERNAL MEDICINE | Facility: CLINIC | Age: 42
End: 2022-06-23
Payer: MEDICAID

## 2022-06-23 RX ORDER — CARBOXYMETHYLCELLULOSE/CITRIC 0.75 G
3 CAPSULE ORAL 2 TIMES DAILY
Qty: 180 CAPSULE | Refills: 1 | Status: SHIPPED | OUTPATIENT
Start: 2022-06-23

## 2022-07-15 ENCOUNTER — TELEPHONE (OUTPATIENT)
Dept: PSYCHIATRY | Facility: CLINIC | Age: 42
End: 2022-07-15
Payer: MEDICAID

## 2022-07-19 ENCOUNTER — PATIENT MESSAGE (OUTPATIENT)
Dept: PSYCHIATRY | Facility: CLINIC | Age: 42
End: 2022-07-19
Payer: MEDICAID

## 2022-07-19 ENCOUNTER — TELEPHONE (OUTPATIENT)
Dept: PSYCHIATRY | Facility: CLINIC | Age: 42
End: 2022-07-19
Payer: MEDICAID

## 2022-07-19 NOTE — TELEPHONE ENCOUNTER
lvm & sent msg for pt that appt needs to be r/s due to provider will be out the rest of the day//Nancy

## 2022-07-29 ENCOUNTER — TELEPHONE (OUTPATIENT)
Dept: PSYCHIATRY | Facility: CLINIC | Age: 42
End: 2022-07-29
Payer: MEDICAID

## 2022-08-10 ENCOUNTER — TELEPHONE (OUTPATIENT)
Dept: INTERNAL MEDICINE | Facility: CLINIC | Age: 42
End: 2022-08-10
Payer: COMMERCIAL

## 2022-08-10 ENCOUNTER — HOSPITAL ENCOUNTER (OUTPATIENT)
Facility: HOSPITAL | Age: 42
Discharge: HOME | End: 2022-08-11
Attending: EMERGENCY MEDICINE | Admitting: STUDENT IN AN ORGANIZED HEALTH CARE EDUCATION/TRAINING PROGRAM
Payer: COMMERCIAL

## 2022-08-10 DIAGNOSIS — R42 DIZZINESS: ICD-10-CM

## 2022-08-10 DIAGNOSIS — R55 SYNCOPE: ICD-10-CM

## 2022-08-10 DIAGNOSIS — I95.1 POSTURAL HYPOTENSION: Chronic | ICD-10-CM

## 2022-08-10 DIAGNOSIS — R55 SYNCOPE, UNSPECIFIED SYNCOPE TYPE: Primary | ICD-10-CM

## 2022-08-10 LAB
ALBUMIN SERPL BCP-MCNC: 4.2 G/DL (ref 3.5–5.2)
ALP SERPL-CCNC: 41 U/L (ref 55–135)
ALT SERPL W/O P-5'-P-CCNC: 30 U/L (ref 10–44)
ANION GAP SERPL CALC-SCNC: 10 MMOL/L (ref 8–16)
AST SERPL-CCNC: 24 U/L (ref 10–40)
BASOPHILS # BLD AUTO: 0.04 K/UL (ref 0–0.2)
BASOPHILS NFR BLD: 0.5 % (ref 0–1.9)
BILIRUB SERPL-MCNC: 0.4 MG/DL (ref 0.1–1)
BUN SERPL-MCNC: 11 MG/DL (ref 6–20)
CALCIUM SERPL-MCNC: 9.4 MG/DL (ref 8.7–10.5)
CHLORIDE SERPL-SCNC: 103 MMOL/L (ref 95–110)
CO2 SERPL-SCNC: 25 MMOL/L (ref 23–29)
CREAT SERPL-MCNC: 0.8 MG/DL (ref 0.5–1.4)
DIFFERENTIAL METHOD: NORMAL
EOSINOPHIL # BLD AUTO: 0.1 K/UL (ref 0–0.5)
EOSINOPHIL NFR BLD: 1.4 % (ref 0–8)
ERYTHROCYTE [DISTWIDTH] IN BLOOD BY AUTOMATED COUNT: 12.6 % (ref 11.5–14.5)
EST. GFR  (NO RACE VARIABLE): >60 ML/MIN/1.73 M^2
GLUCOSE SERPL-MCNC: 85 MG/DL (ref 70–110)
HCT VFR BLD AUTO: 39.4 % (ref 37–48.5)
HGB BLD-MCNC: 12.6 G/DL (ref 12–16)
IMM GRANULOCYTES # BLD AUTO: 0.02 K/UL (ref 0–0.04)
IMM GRANULOCYTES NFR BLD AUTO: 0.3 % (ref 0–0.5)
LYMPHOCYTES # BLD AUTO: 3.2 K/UL (ref 1–4.8)
LYMPHOCYTES NFR BLD: 41 % (ref 18–48)
MCH RBC QN AUTO: 29.5 PG (ref 27–31)
MCHC RBC AUTO-ENTMCNC: 32 G/DL (ref 32–36)
MCV RBC AUTO: 92 FL (ref 82–98)
MONOCYTES # BLD AUTO: 0.5 K/UL (ref 0.3–1)
MONOCYTES NFR BLD: 6.4 % (ref 4–15)
NEUTROPHILS # BLD AUTO: 3.9 K/UL (ref 1.8–7.7)
NEUTROPHILS NFR BLD: 50.4 % (ref 38–73)
NRBC BLD-RTO: 0 /100 WBC
PLATELET # BLD AUTO: 279 K/UL (ref 150–450)
PMV BLD AUTO: 9.2 FL (ref 9.2–12.9)
POTASSIUM SERPL-SCNC: 4 MMOL/L (ref 3.5–5.1)
PROT SERPL-MCNC: 6.8 G/DL (ref 6–8.4)
RBC # BLD AUTO: 4.27 M/UL (ref 4–5.4)
SARS-COV-2 RDRP RESP QL NAA+PROBE: NEGATIVE
SODIUM SERPL-SCNC: 138 MMOL/L (ref 136–145)
WBC # BLD AUTO: 7.81 K/UL (ref 3.9–12.7)

## 2022-08-10 PROCEDURE — 80053 COMPREHEN METABOLIC PANEL: CPT | Performed by: NURSE PRACTITIONER

## 2022-08-10 PROCEDURE — 96365 THER/PROPH/DIAG IV INF INIT: CPT

## 2022-08-10 PROCEDURE — G0378 HOSPITAL OBSERVATION PER HR: HCPCS

## 2022-08-10 PROCEDURE — 93010 ELECTROCARDIOGRAM REPORT: CPT | Mod: ,,, | Performed by: INTERNAL MEDICINE

## 2022-08-10 PROCEDURE — 96375 TX/PRO/DX INJ NEW DRUG ADDON: CPT

## 2022-08-10 PROCEDURE — 99285 EMERGENCY DEPT VISIT HI MDM: CPT | Mod: 25

## 2022-08-10 PROCEDURE — 25000003 PHARM REV CODE 250: Performed by: EMERGENCY MEDICINE

## 2022-08-10 PROCEDURE — 85025 COMPLETE CBC W/AUTO DIFF WBC: CPT | Performed by: NURSE PRACTITIONER

## 2022-08-10 PROCEDURE — U0002 COVID-19 LAB TEST NON-CDC: HCPCS | Performed by: EMERGENCY MEDICINE

## 2022-08-10 PROCEDURE — 93005 ELECTROCARDIOGRAM TRACING: CPT

## 2022-08-10 PROCEDURE — 63600175 PHARM REV CODE 636 W HCPCS: Performed by: EMERGENCY MEDICINE

## 2022-08-10 PROCEDURE — 93010 EKG 12-LEAD: ICD-10-PCS | Mod: ,,, | Performed by: INTERNAL MEDICINE

## 2022-08-10 RX ORDER — GUAIFENESIN 100 MG/5ML
200 SOLUTION ORAL EVERY 4 HOURS PRN
Status: DISCONTINUED | OUTPATIENT
Start: 2022-08-10 | End: 2022-08-11 | Stop reason: HOSPADM

## 2022-08-10 RX ORDER — ACETAMINOPHEN 325 MG/1
650 TABLET ORAL EVERY 6 HOURS PRN
Status: DISCONTINUED | OUTPATIENT
Start: 2022-08-10 | End: 2022-08-11 | Stop reason: HOSPADM

## 2022-08-10 RX ORDER — IPRATROPIUM BROMIDE AND ALBUTEROL SULFATE 2.5; .5 MG/3ML; MG/3ML
3 SOLUTION RESPIRATORY (INHALATION) EVERY 4 HOURS PRN
Status: DISCONTINUED | OUTPATIENT
Start: 2022-08-10 | End: 2022-08-11 | Stop reason: HOSPADM

## 2022-08-10 RX ORDER — ONDANSETRON 2 MG/ML
4 INJECTION INTRAMUSCULAR; INTRAVENOUS EVERY 8 HOURS PRN
Status: DISCONTINUED | OUTPATIENT
Start: 2022-08-10 | End: 2022-08-11 | Stop reason: HOSPADM

## 2022-08-10 RX ORDER — SODIUM CHLORIDE 9 MG/ML
INJECTION, SOLUTION INTRAVENOUS CONTINUOUS
Status: DISCONTINUED | OUTPATIENT
Start: 2022-08-10 | End: 2022-08-11 | Stop reason: HOSPADM

## 2022-08-10 RX ORDER — MORPHINE SULFATE 4 MG/ML
6 INJECTION, SOLUTION INTRAMUSCULAR; INTRAVENOUS
Status: COMPLETED | OUTPATIENT
Start: 2022-08-10 | End: 2022-08-10

## 2022-08-10 RX ORDER — MAG HYDROX/ALUMINUM HYD/SIMETH 200-200-20
30 SUSPENSION, ORAL (FINAL DOSE FORM) ORAL EVERY 6 HOURS PRN
Status: DISCONTINUED | OUTPATIENT
Start: 2022-08-10 | End: 2022-08-11 | Stop reason: HOSPADM

## 2022-08-10 RX ADMIN — PROMETHAZINE HYDROCHLORIDE 12.5 MG: 25 INJECTION INTRAMUSCULAR; INTRAVENOUS at 09:08

## 2022-08-10 RX ADMIN — MORPHINE SULFATE 6 MG: 4 INJECTION INTRAVENOUS at 09:08

## 2022-08-10 NOTE — TELEPHONE ENCOUNTER
Called and spoke with patient. She fainted and hit her head. She is going to the ER and will be following up with us.

## 2022-08-10 NOTE — TELEPHONE ENCOUNTER
----- Message from Tita Harper sent at 8/10/2022  8:46 AM CDT -----  Contact: self/[phone]  Type:  Same Day Appointment Request    Caller is requesting a same day appointment.  Caller declined first available appointment listed below.    Name of Caller:Patient  When is the first available appointment?9/1/2022  Symptoms:fainted and hit her head  Best Call Back Number:[phone]  Additional Information:

## 2022-08-10 NOTE — Clinical Note
Diagnosis: Syncope, unspecified syncope type [9972149]   Future Attending Provider: MARIKA COE [[id number]]   Admitting Provider:: MARIKA COE [[id number]]   Special Needs:: Fall Risk [15]

## 2022-08-11 ENCOUNTER — TELEPHONE (OUTPATIENT)
Dept: PAIN MEDICINE | Facility: CLINIC | Age: 42
End: 2022-08-11
Payer: COMMERCIAL

## 2022-08-11 VITALS
SYSTOLIC BLOOD PRESSURE: 136 MMHG | DIASTOLIC BLOOD PRESSURE: 85 MMHG | HEART RATE: 99 BPM | RESPIRATION RATE: 18 BRPM | WEIGHT: 184 LBS | OXYGEN SATURATION: 98 % | HEIGHT: 71 IN | TEMPERATURE: 99 F | BODY MASS INDEX: 25.76 KG/M2

## 2022-08-11 LAB
ALBUMIN SERPL BCP-MCNC: 3.6 G/DL (ref 3.5–5.2)
ALP SERPL-CCNC: 37 U/L (ref 55–135)
ALT SERPL W/O P-5'-P-CCNC: 25 U/L (ref 10–44)
ANION GAP SERPL CALC-SCNC: 7 MMOL/L (ref 8–16)
AORTIC ROOT ANNULUS: 2.65 CM
ASCENDING AORTA: 2.24 CM
AST SERPL-CCNC: 20 U/L (ref 10–40)
AV INDEX (PROSTH): 0.97
AV MEAN GRADIENT: 3 MMHG
AV PEAK GRADIENT: 5 MMHG
AV VALVE AREA: 2.68 CM2
AV VELOCITY RATIO: 0.8
BASOPHILS # BLD AUTO: 0.02 K/UL (ref 0–0.2)
BASOPHILS NFR BLD: 0.3 % (ref 0–1.9)
BILIRUB SERPL-MCNC: 0.5 MG/DL (ref 0.1–1)
BSA FOR ECHO PROCEDURE: 2.04 M2
BUN SERPL-MCNC: 10 MG/DL (ref 6–20)
CALCIUM SERPL-MCNC: 8.5 MG/DL (ref 8.7–10.5)
CHLORIDE SERPL-SCNC: 105 MMOL/L (ref 95–110)
CO2 SERPL-SCNC: 27 MMOL/L (ref 23–29)
CREAT SERPL-MCNC: 0.8 MG/DL (ref 0.5–1.4)
CV ECHO LV RWT: 0.61 CM
DIFFERENTIAL METHOD: ABNORMAL
DOP CALC AO PEAK VEL: 1.13 M/S
DOP CALC AO VTI: 21 CM
DOP CALC LVOT AREA: 2.8 CM2
DOP CALC LVOT DIAMETER: 1.88 CM
DOP CALC LVOT PEAK VEL: 0.9 M/S
DOP CALC LVOT STROKE VOLUME: 56.32 CM3
DOP CALC RVOT PEAK VEL: 0.68 M/S
DOP CALC RVOT VTI: 15.8 CM
DOP CALCLVOT PEAK VEL VTI: 20.3 CM
E WAVE DECELERATION TIME: 205.21 MSEC
E/A RATIO: 1.05
E/E' RATIO: 6 M/S
ECHO LV POSTERIOR WALL: 1.19 CM (ref 0.6–1.1)
EJECTION FRACTION: 60 %
EOSINOPHIL # BLD AUTO: 0.1 K/UL (ref 0–0.5)
EOSINOPHIL NFR BLD: 1.6 % (ref 0–8)
ERYTHROCYTE [DISTWIDTH] IN BLOOD BY AUTOMATED COUNT: 12.8 % (ref 11.5–14.5)
EST. GFR  (NO RACE VARIABLE): >60 ML/MIN/1.73 M^2
FRACTIONAL SHORTENING: 33 % (ref 28–44)
GLUCOSE SERPL-MCNC: 89 MG/DL (ref 70–110)
HCT VFR BLD AUTO: 35.3 % (ref 37–48.5)
HGB BLD-MCNC: 11 G/DL (ref 12–16)
IMM GRANULOCYTES # BLD AUTO: 0.01 K/UL (ref 0–0.04)
IMM GRANULOCYTES NFR BLD AUTO: 0.2 % (ref 0–0.5)
INTERVENTRICULAR SEPTUM: 0.95 CM (ref 0.6–1.1)
IVC DIAMETER: 1.53 CM
IVRT: 79.92 MSEC
LA MAJOR: 3.21 CM
LA MINOR: 3.43 CM
LA WIDTH: 2.3 CM
LEFT ATRIUM SIZE: 2.32 CM
LEFT ATRIUM VOLUME INDEX: 7.4 ML/M2
LEFT ATRIUM VOLUME: 15.04 CM3
LEFT INTERNAL DIMENSION IN SYSTOLE: 2.63 CM (ref 2.1–4)
LEFT VENTRICLE DIASTOLIC VOLUME INDEX: 32.9 ML/M2
LEFT VENTRICLE DIASTOLIC VOLUME: 67.12 ML
LEFT VENTRICLE MASS INDEX: 67 G/M2
LEFT VENTRICLE SYSTOLIC VOLUME INDEX: 12.5 ML/M2
LEFT VENTRICLE SYSTOLIC VOLUME: 25.43 ML
LEFT VENTRICULAR INTERNAL DIMENSION IN DIASTOLE: 3.93 CM (ref 3.5–6)
LEFT VENTRICULAR MASS: 136.17 G
LV LATERAL E/E' RATIO: 6.5 M/S
LV SEPTAL E/E' RATIO: 5.57 M/S
LVOT MG: 1.94 MMHG
LVOT MV: 0.66 CM/S
LYMPHOCYTES # BLD AUTO: 2.5 K/UL (ref 1–4.8)
LYMPHOCYTES NFR BLD: 41.8 % (ref 18–48)
MAGNESIUM SERPL-MCNC: 1.8 MG/DL (ref 1.6–2.6)
MCH RBC QN AUTO: 29.2 PG (ref 27–31)
MCHC RBC AUTO-ENTMCNC: 31.2 G/DL (ref 32–36)
MCV RBC AUTO: 94 FL (ref 82–98)
MONOCYTES # BLD AUTO: 0.5 K/UL (ref 0.3–1)
MONOCYTES NFR BLD: 7.6 % (ref 4–15)
MV PEAK A VEL: 0.74 M/S
MV PEAK E VEL: 0.78 M/S
MV STENOSIS PRESSURE HALF TIME: 59.51 MS
MV VALVE AREA P 1/2 METHOD: 3.7 CM2
NEUTROPHILS # BLD AUTO: 3 K/UL (ref 1.8–7.7)
NEUTROPHILS NFR BLD: 48.5 % (ref 38–73)
NRBC BLD-RTO: 0 /100 WBC
PISA TR MAX VEL: 2.27 M/S
PLATELET # BLD AUTO: 245 K/UL (ref 150–450)
PMV BLD AUTO: 9.4 FL (ref 9.2–12.9)
POTASSIUM SERPL-SCNC: 3.9 MMOL/L (ref 3.5–5.1)
PROT SERPL-MCNC: 6 G/DL (ref 6–8.4)
PV MEAN GRADIENT: 1.06 MMHG
RA MAJOR: 2.99 CM
RA PRESSURE: 3 MMHG
RA WIDTH: 2.36 CM
RBC # BLD AUTO: 3.77 M/UL (ref 4–5.4)
SINUS: 2.55 CM
SODIUM SERPL-SCNC: 139 MMOL/L (ref 136–145)
STJ: 2.38 CM
TDI LATERAL: 0.12 M/S
TDI SEPTAL: 0.14 M/S
TDI: 0.13 M/S
TR MAX PG: 21 MMHG
TRICUSPID ANNULAR PLANE SYSTOLIC EXCURSION: 1.95 CM
TROPONIN I SERPL DL<=0.01 NG/ML-MCNC: <0.006 NG/ML (ref 0–0.03)
TROPONIN I SERPL DL<=0.01 NG/ML-MCNC: <0.006 NG/ML (ref 0–0.03)
TV REST PULMONARY ARTERY PRESSURE: 24 MMHG
WBC # BLD AUTO: 6.08 K/UL (ref 3.9–12.7)

## 2022-08-11 PROCEDURE — 96361 HYDRATE IV INFUSION ADD-ON: CPT

## 2022-08-11 PROCEDURE — 25000003 PHARM REV CODE 250: Performed by: NURSE PRACTITIONER

## 2022-08-11 PROCEDURE — G0378 HOSPITAL OBSERVATION PER HR: HCPCS

## 2022-08-11 PROCEDURE — 83735 ASSAY OF MAGNESIUM: CPT | Performed by: INTERNAL MEDICINE

## 2022-08-11 PROCEDURE — 25000003 PHARM REV CODE 250: Performed by: INTERNAL MEDICINE

## 2022-08-11 PROCEDURE — 85025 COMPLETE CBC W/AUTO DIFF WBC: CPT | Performed by: INTERNAL MEDICINE

## 2022-08-11 PROCEDURE — 99219 PR INITIAL OBSERVATION CARE,LEVL II: ICD-10-PCS | Mod: 25,,, | Performed by: INTERNAL MEDICINE

## 2022-08-11 PROCEDURE — 36415 COLL VENOUS BLD VENIPUNCTURE: CPT | Performed by: INTERNAL MEDICINE

## 2022-08-11 PROCEDURE — 84484 ASSAY OF TROPONIN QUANT: CPT | Mod: 91 | Performed by: INTERNAL MEDICINE

## 2022-08-11 PROCEDURE — 99219 PR INITIAL OBSERVATION CARE,LEVL II: CPT | Mod: 25,,, | Performed by: INTERNAL MEDICINE

## 2022-08-11 PROCEDURE — 84484 ASSAY OF TROPONIN QUANT: CPT | Performed by: INTERNAL MEDICINE

## 2022-08-11 PROCEDURE — 80053 COMPREHEN METABOLIC PANEL: CPT | Performed by: INTERNAL MEDICINE

## 2022-08-11 RX ORDER — TRAMADOL HYDROCHLORIDE 50 MG/1
50 TABLET ORAL EVERY 6 HOURS
Qty: 20 TABLET | Refills: 0 | Status: SHIPPED | OUTPATIENT
Start: 2022-08-11 | End: 2022-08-16

## 2022-08-11 RX ORDER — HYDROCODONE BITARTRATE AND ACETAMINOPHEN 5; 325 MG/1; MG/1
1 TABLET ORAL EVERY 6 HOURS PRN
Status: DISCONTINUED | OUTPATIENT
Start: 2022-08-11 | End: 2022-08-11 | Stop reason: HOSPADM

## 2022-08-11 RX ORDER — MIDODRINE HYDROCHLORIDE 5 MG/1
5 TABLET ORAL 2 TIMES DAILY WITH MEALS
Qty: 60 TABLET | Refills: 1 | Status: SHIPPED | OUTPATIENT
Start: 2022-08-11 | End: 2022-08-12

## 2022-08-11 RX ORDER — HYDROCODONE BITARTRATE AND ACETAMINOPHEN 5; 325 MG/1; MG/1
1 TABLET ORAL EVERY 6 HOURS PRN
Status: DISCONTINUED | OUTPATIENT
Start: 2022-08-11 | End: 2022-08-11

## 2022-08-11 RX ADMIN — SODIUM CHLORIDE: 0.9 INJECTION, SOLUTION INTRAVENOUS at 12:08

## 2022-08-11 RX ADMIN — HYDROCODONE BITARTRATE AND ACETAMINOPHEN 1 TABLET: 5; 325 TABLET ORAL at 01:08

## 2022-08-11 RX ADMIN — SODIUM CHLORIDE: 0.9 INJECTION, SOLUTION INTRAVENOUS at 08:08

## 2022-08-11 RX ADMIN — HYDROCODONE BITARTRATE AND ACETAMINOPHEN 1 TABLET: 5; 325 TABLET ORAL at 02:08

## 2022-08-11 RX ADMIN — HYDROCODONE BITARTRATE AND ACETAMINOPHEN 1 TABLET: 5; 325 TABLET ORAL at 08:08

## 2022-08-11 NOTE — HOSPITAL COURSE
Arielle Verde was placed into observation for further work up and treatment of syncope and orthostasis. HR increased during orthostatic vitals. IVF given overnight. Patient with extensive history of dizziness and orthostasis. Work up essentially negative. Patient is stable and appropriate for discharge. Resume midodrine twice daily as needed for hypotension. Referral to pain clinic for back pain, Electrophysiology, cardiology, PCP, and neurosurgery spine.

## 2022-08-11 NOTE — ASSESSMENT & PLAN NOTE
-HR increased from  from lying to standing position.  BP remained stable.  -continue IV fluids.  -

## 2022-08-11 NOTE — PLAN OF CARE
[facility] - Med Surg 3  Discharge Final Note    Primary Care Provider: Vera Broussard MD    Expected Discharge Date: 8/11/2022    Final Discharge Note (most recent)     Final Note - 08/11/22 1604        Final Note    Assessment Type Final Discharge Note     Anticipated Discharge Disposition Home or Self Care     Hospital Resources/Appts/Education Provided Appointments scheduled by Navigator/Coordinator;Appointments scheduled and added to AVS        Post-Acute Status    Discharge Delays None known at this time                 Important Message from Medicare             Contact Info     José Vidal MD   Specialty: Electrophysiology, Cardiology    [facility] [facility] [address]   Phone: [phone]       Next Steps: Schedule an appointment as soon as possible for a visit

## 2022-08-11 NOTE — HPI
Ms. Chaves is a 42-year-old  female with PMH significant for chronic migraines, depression, chronic orthostatic dizziness, followed in the neurology clinic by Dr. Anderson, presented to the ED of consistent syncopal episode at home today.  Up getting a low bed, she started complaining of severe dizziness, and within went black, she fell and hit the ground.  Lost consciousness.  Reports a history of chronic dizziness but never had syncopal episode.  CT head is negative for acute intracranial pathology.  Lumbar spine x-ray reveals degenerative changes.  Laboratory workup is unremarkable.  Patient placed in observation for IV fluids and cardiology evaluation for syncope.

## 2022-08-11 NOTE — SUBJECTIVE & OBJECTIVE
Past Medical History:   Diagnosis Date    Anxiety     Chronic headaches     SALLIE III (cervical intraepithelial neoplasia grade III) with severe dysplasia 2019    s/p LEEP    Depression     Migraine        Past Surgical History:   Procedure Laterality Date    ADENOIDECTOMY      AUGMENTATION OF BREAST  06/2005    saline    BREAST SURGERY      breast augmentatin    COLD KNIFE CONIZATION OF CERVIX N/A 11/27/2019    Procedure: CONE BIOPSY, CERVIX, USING COLD;  Surgeon: Rachele Willis MD;  Location: [facility] OR;  Service: OB/GYN;  Laterality: N/A;    Hyperhydrosis      LIPOSUCTION OF NECK      REMOVAL OF INTRAUTERINE DEVICE (IUD)  11/27/2019    Procedure: REMOVAL, INTRAUTERINE DEVICE;  Surgeon: Rachele Willis MD;  Location: [facility] OR;  Service: OB/GYN;;    TILT TABLE TEST N/A 1/11/2019    Procedure: TILT TABLE TEST;  Surgeon: Justin Freeman MD;  Location: [facility] CATH LAB;  Service: Cardiology;  Laterality: N/A;  Rodriguez pt/pt req 930 start time    TONSILLECTOMY         Review of patient's allergies indicates:   Allergen Reactions    Emgality [[id number]] Hives and Itching    Topiramate Itching       No current facility-administered medications on file prior to encounter.     Current Outpatient Medications on File Prior to Encounter   Medication Sig    atomoxetine (STRATTERA) 40 MG capsule Take 1 daily for 7 days then 2 daily thereafter.    buPROPion (WELLBUTRIN XL) 150 MG TB24 tablet Take 3 tablets (450 mg total) by mouth once daily.    busPIRone (BUSPAR) 15 MG tablet Take 2 tablets (30 mg total) by mouth 2 (two) times daily.    cetirizine (ZYRTEC) 10 MG tablet Take 10 mg by mouth daily as needed.     clonazePAM (KLONOPIN) 0.5 MG tablet Take 1 tablet (0.5 mg total) by mouth daily as needed for Anxiety.    metFORMIN (GLUCOPHAGE) 500 MG tablet Take 500 mg by mouth 2 (two) times daily.    traZODone (DESYREL) 100 MG tablet Take 1-3 tablets at bedtime as needed for sleep.    albuterol (PROVENTIL/VENTOLIN HFA) 90  mcg/actuation inhaler Inhale 2 puffs into the lungs every 6 (six) hours as needed for Wheezing. Dispense with spacer.    carboxymethylcellulose-citric (PLENITY, WELCOME KIT,) 0.75 gram Cap Take 3 capsules by mouth 2 (two) times a day.    EPINEPHrine (EPIPEN 2-DUANE) 0.3 mg/0.3 mL AtIn Inject 0.6 mLs (0.6 mg total) into the muscle once. for 1 dose    rizatriptan (MAXALT) 10 MG tablet Take 10 mg by mouth daily as needed.    triamcinolone acetonide 0.1% (KENALOG) 0.1 % cream Apply topically 2 (two) times daily.     Family History       Problem Relation (Age of Onset)    Breast cancer Paternal Grandmother    Drug abuse Father    Kidney failure Father    Lupus Mother    Ulcerative colitis Mother          Tobacco Use    Smoking status: Light Tobacco Smoker    Smokeless tobacco: Never Used    Tobacco comment: no smoking after m.n prior to sx   Substance and Sexual Activity    Alcohol use: Yes     Comment: social     Drug use: No    Sexual activity: Not Currently     Partners: Male     Birth control/protection: I.U.D.     Comment: Mirena 3/30/20 LOT [id number] exp 4.2022     Review of Systems   Constitutional: Positive for malaise/fatigue.   HENT: Negative.     Eyes: Negative.    Cardiovascular:  Positive for syncope.   Respiratory: Negative.     Endocrine: Negative.    Hematologic/Lymphatic: Negative.    Skin: Negative.    Musculoskeletal: Negative.    Gastrointestinal:  Positive for diarrhea (one episode post syncopal spell).   Neurological:  Positive for dizziness and light-headedness.   Psychiatric/Behavioral: Negative.     Allergic/Immunologic: Negative.    Objective:     Vital Signs (Most Recent):  Temp: 98.2 °F (36.8 °C) (08/11/22 0815)  Pulse: 78 (08/11/22 0815)  Resp: 18 (08/11/22 0820)  BP: 97/60 (08/11/22 0815)  SpO2: 98 % (08/11/22 0815) Vital Signs (24h Range):  Temp:  [97.8 °F (36.6 °C)-98.2 °F (36.8 °C)] 98.2 °F (36.8 °C)  Pulse:  [] 78  Resp:  [16-25] 18  SpO2:  [97 %-99 %] 98 %  BP: ()/(51-77) 97/60      Weight: 83.5 kg (184 lb)  Body mass index is 25.66 kg/m².    SpO2: 98 %  O2 Device (Oxygen Therapy): room air    No intake or output data in the 24 hours ending 08/11/22 1026    Lines/Drains/Airways       Peripheral Intravenous Line  Duration                  Peripheral IV - Single Lumen 08/10/22 1929 20 G Right Antecubital <1 day                    Physical Exam  Vitals and nursing note reviewed.   Constitutional:       General: She is not in acute distress.     Appearance: Normal appearance. She is well-developed. She is not diaphoretic.   HENT:      Head: Normocephalic and atraumatic.   Eyes:      General:         Right eye: No discharge.         Left eye: No discharge.      Pupils: Pupils are equal, round, and reactive to light.   Neck:      Thyroid: No thyromegaly.      Vascular: No JVD.      Trachea: No tracheal deviation.   Cardiovascular:      Rate and Rhythm: Normal rate and regular rhythm.      Heart sounds: Normal heart sounds, S1 normal and S2 normal. No murmur heard.  Pulmonary:      Effort: Pulmonary effort is normal. No respiratory distress.      Breath sounds: Normal breath sounds. No wheezing or rales.   Abdominal:      General: There is no distension.      Palpations: Abdomen is soft.      Tenderness: There is no rebound.   Musculoskeletal:      Cervical back: Neck supple.      Right lower leg: No edema.      Left lower leg: No edema.   Skin:     General: Skin is warm and dry.      Findings: No erythema.   Neurological:      General: No focal deficit present.      Mental Status: She is alert and oriented to person, place, and time.   Psychiatric:         Mood and Affect: Mood normal.         Behavior: Behavior normal.         Thought Content: Thought content normal.       Significant Labs: CBC   Recent Labs   Lab 08/10/22  1928 08/11/22  0550   WBC 7.81 6.08   HGB 12.6 11.0*   HCT 39.4 35.3*    245   , INR No results for input(s): INR, PROTIME in the last 48 hours., Troponin   Recent  Labs   Lab 08/11/22  0003 08/11/22  0550   TROPONINI <0.006 <0.006   , and All pertinent lab results from the last 24 hours have been reviewed.    Significant Imaging: Echocardiogram: Transthoracic echo (TTE) complete (Cupid Only):   Results for orders placed or performed during the hospital encounter of 08/10/22   Echo   Result Value Ref Range    BSA 2.04 m2    TDI SEPTAL 0.14 m/s    LV LATERAL E/E' RATIO 6.50 m/s    LV SEPTAL E/E' RATIO 5.57 m/s    LA WIDTH 2.30 cm    IVC diameter 1.53 cm    Left Ventricular Outflow Tract Mean Velocity 0.75852960975 cm/s    Left Ventricular Outflow Tract Mean Gradient 1.94 mmHg    TDI LATERAL 0.12 m/s    LVIDd 3.93 3.5 - 6.0 cm    IVS 0.95 0.6 - 1.1 cm    Posterior Wall 1.19 (A) 0.6 - 1.1 cm    Ao root annulus 2.65 cm    LVIDs 2.63 2.1 - 4.0 cm    FS 33 28 - 44 %    LA volume 15.04 cm3    Sinus 2.55 cm    STJ 2.38 cm    Ascending aorta 2.24 cm    LV mass 136.17 g    LA size 2.32 cm    TAPSE 1.95 cm    Left Ventricle Relative Wall Thickness 0.61 cm    AV mean gradient 3 mmHg    AV valve area 2.68 cm2    AV Velocity Ratio 0.80     AV index (prosthetic) 0.97     MV valve area p 1/2 method 3.70 cm2    PV peak gradient 1.86 mmHg    E/A ratio 1.05     Mean e' 0.13 m/s    E wave deceleration time 205.046255506080732 msec    IVRT 79.579519244454760 msec    LVOT diameter 1.88 cm    LVOT area 2.8 cm2    LVOT peak rasheed 0.90 m/s    LVOT peak VTI 20.30 cm    Ao peak rasheed 1.13 m/s    Ao VTI 21.0 cm    RVOT peak rasheed 0.68 m/s    RVOT peak VTI 15.8 cm    LVOT stroke volume 56.32 cm3    AV peak gradient 5 mmHg    PV mean gradient 1.06 mmHg    E/E' ratio 6.00 m/s    MV Peak E Rasheed 0.78 m/s    TR Max Rasheed 2.27 m/s    MV stenosis pressure 1/2 time 59.357457295692402 ms    MV Peak A Rasheed 0.74 m/s    LV Systolic Volume 25.43 mL    LV Systolic Volume Index 12.5 mL/m2    LV Diastolic Volume 67.12 mL    LV Diastolic Volume Index 32.90 mL/m2    LA Volume Index 7.4 mL/m2    LV Mass Index 67 g/m2    RA Major Axis  2.99 cm    Left Atrium Minor Axis 3.43 cm    Left Atrium Major Axis 3.21 cm    Triscuspid Valve Regurgitation Peak Gradient 21 mmHg    RA Width 2.36 cm   , EKG: Reviewed

## 2022-08-11 NOTE — PLAN OF CARE
Patient admitted to unit from ICU d/t syncope episode  Oriented to room and encouraged to call for assistance before getting up d/t c/o dizziness  Pt remains fall free this shift.  Pt AAOx4, verbal, clear speech.  Skin warm and dry. No new skin issues.  Cardiac Telemonitoring in progress  Patient on RA  Continent x2   Assistance with transfers.  Bed low, side rails up x 2, wheels locked, call light in reach.  Bed alarm maintained for safety.  Hourly rounding completed.  24 hour chart check completed  POC reviewed with pt. Will continue POC.

## 2022-08-11 NOTE — DISCHARGE SUMMARY
[facility]  [facility]  Discharge Summary      Patient Name: Arielle Verde  MRN: [id number]  Patient Class: OP- Observation  Admission Date: 8/10/2022  Hospital Length of Stay: 0 days  Discharge Date and Time: No discharge date for patient encounter.  Attending Physician: Cat Lawler MD   Discharging Provider: Марина Vee NP  Primary Care Provider: Vera Broussard MD      HPI:   Ms. Chaves is a 42-year-old  female with PMH significant for chronic migraines, depression, chronic orthostatic dizziness, followed in the neurology clinic by Dr. Anderson, presented to the ED of consistent syncopal episode at home today.  Up getting a low bed, she started complaining of severe dizziness, and within went black, she fell and hit the ground.  Lost consciousness.  Reports a history of chronic dizziness but never had syncopal episode.  CT head is negative for acute intracranial pathology.  Lumbar spine x-ray reveals degenerative changes.  Laboratory workup is unremarkable.  Patient placed in observation for IV fluids and cardiology evaluation for syncope.      * No surgery found *      Hospital Course:   Arielle Verde was placed into observation for further work up and treatment of syncope and orthostasis. HR increased during orthostatic vitals. IVF given overnight. Patient with extensive history of dizziness and orthostasis. Work up essentially negative. Patient is stable and appropriate for discharge. Resume midodrine twice daily as needed for hypotension. Referral to pain clinic for back pain, Electrophysiology, cardiology, PCP, and neurosurgery spine.       Goals of Care Treatment Preferences:  Code Status: Full Code      Consults:   Consults (From admission, onward)        Status Ordering Provider     Inpatient consult to Cardiology  Once        Provider:  Jose Angel Chang MD    Completed SINDI HOOVER          * Syncope  -unclear etiology  -CT head negative for  acute intracranial pathology.  -continue NS at 125 cc/hour.  -monitor on telemetry.  -echocardiogram in a.m..  -cardiology evaluation    08/11/2022 ECHO normal  Tele normal  Cards consulted and rec f/u with ECP- referral placed      Orthostasis  -HR increased from  from lying to standing position.  BP remained stable.  -continue IV fluids.    08/11/2022  Midodrine at d/c as needed  F/u with PCP, cards, ECP        Common migraine with intractable migraine  -followed by Dr. Anderson in the Neurology Clinic.  -continue home medications      Anxiety associated with depression  Patient has persistent depression which is moderate and is currently controlled. Will Continue anti-depressant medications. We will not consult psychiatry at this time. Patient does not display psychosis at this time. Continue to monitor closely and adjust plan of care as needed.          Final Active Diagnoses:    Diagnosis Date Noted POA    PRINCIPAL PROBLEM:  Syncope [R55] 08/10/2022 Yes    Common migraine with intractable migraine [G43.019] 10/30/2018 Yes    Orthostasis [I95.1] 10/30/2018 Yes    Anxiety associated with depression [F41.8] 01/16/2018 Yes      Problems Resolved During this Admission:       Discharged Condition: good    Disposition: Home or Self Care    Follow Up:   Follow-up Information     José Vidal MD. Schedule an appointment as soon as possible for a visit.    Specialties: Electrophysiology, Cardiology  Contact information:  [address] [facility] [address]  [phone]             Vera Broussard MD Follow up in 3 day(s).    Specialty: Family Medicine  Contact information:  [address] [facility] [address]  [phone]             [facility] Follow up.    Specialty: Cardiology  Contact information:  [address] [facility] [address] [phone]  Additional information:  Please take [address] to the Medical Office Building. Check in on the 4th  floor.                     Patient Instructions:      Ambulatory referral/consult to Electrophysiology   Standing Status: Future   Referral Priority: Routine Referral Type: Consultation   Referral Reason: Specialty Services Required   Requested Specialty: Electrophysiology   Number of Visits Requested: 1     Ambulatory referral/consult to Pain Clinic   Standing Status: Future   Referral Priority: Routine Referral Type: Consultation   Referral Reason: Specialty Services Required   Requested Specialty: Pain Medicine   Number of Visits Requested: 1     Ambulatory referral/consult to Neurosurgery   Standing Status: Future   Referral Priority: Routine Referral Type: Consultation   Referral Reason: Specialty Services Required   Requested Specialty: Neurosurgery   Number of Visits Requested: 1     Notify your health care provider if you experience any of the following:  persistent nausea and vomiting or diarrhea     Notify your health care provider if you experience any of the following:  severe uncontrolled pain     Notify your health care provider if you experience any of the following:  redness, tenderness, or signs of infection (pain, swelling, redness, odor or green/yellow discharge around incision site)     Notify your health care provider if you experience any of the following:  difficulty breathing or increased cough     Notify your health care provider if you experience any of the following:  persistent dizziness, light-headedness, or visual disturbances     Notify your health care provider if you experience any of the following:  increased confusion or weakness     Activity as tolerated       Significant Diagnostic Studies: Labs:   CMP   Recent Labs   Lab 08/10/22  1928 08/11/22  0550    139   K 4.0 3.9    105   CO2 25 27   GLU 85 89   BUN 11 10   CREATININE 0.8 0.8   CALCIUM 9.4 8.5*   PROT 6.8 6.0   ALBUMIN 4.2 3.6   BILITOT 0.4 0.5   ALKPHOS 41* 37*   AST 24 20   ALT 30 25   ANIONGAP 10 7*   , CBC    Recent Labs   Lab 08/10/22  1928 08/11/22  0550   WBC 7.81 6.08   HGB 12.6 11.0*   HCT 39.4 35.3*    245    and All labs within the past 24 hours have been reviewed    Pending Diagnostic Studies:     None         Medications:  Reconciled Home Medications:      Medication List      START taking these medications    midodrine 5 MG Tab  Commonly known as: PROAMATINE  Take 1 tablet (5 mg total) by mouth 2 (two) times daily with meals.     traMADoL 50 mg tablet  Commonly known as: ULTRAM  Take 1 tablet (50 mg total) by mouth every 6 (six) hours. for 5 days        CONTINUE taking these medications    albuterol 90 mcg/actuation inhaler  Commonly known as: PROVENTIL/VENTOLIN HFA  Inhale 2 puffs into the lungs every 6 (six) hours as needed for Wheezing. Dispense with spacer.     atomoxetine 40 MG capsule  Commonly known as: STRATTERA  Take 1 daily for 7 days then 2 daily thereafter.     buPROPion 150 MG TB24 tablet  Commonly known as: WELLBUTRIN XL  Take 3 tablets (450 mg total) by mouth once daily.     busPIRone 15 MG tablet  Commonly known as: BUSPAR  Take 2 tablets (30 mg total) by mouth 2 (two) times daily.     cetirizine 10 MG tablet  Commonly known as: ZYRTEC  Take 10 mg by mouth daily as needed.     clonazePAM 0.5 MG tablet  Commonly known as: KlonoPIN  Take 1 tablet (0.5 mg total) by mouth daily as needed for Anxiety.     EPINEPHrine 0.3 mg/0.3 mL Atin  Commonly known as: EPIPEN 2-DUANE  Inject 0.6 mLs (0.6 mg total) into the muscle once. for 1 dose     metFORMIN 500 MG tablet  Commonly known as: GLUCOPHAGE  Take 500 mg by mouth 2 (two) times daily.     PLENITY (WELCOME KIT) 0.75 gram Cap  Generic drug: carboxymethylcellulose-citric  Take 3 capsules by mouth 2 (two) times a day.     rizatriptan 10 MG tablet  Commonly known as: MAXALT  Take 10 mg by mouth daily as needed.     traZODone 100 MG tablet  Commonly known as: DESYREL  Take 1-3 tablets at bedtime as needed for sleep.     triamcinolone acetonide 0.1%  0.1 % cream  Commonly known as: KENALOG  Apply topically 2 (two) times daily.            Indwelling Lines/Drains at time of discharge:   Lines/Drains/Airways     None                 Time spent on the discharge of patient: >30 minutes         Маирна Vee NP  Department of Hospital Medicine  [facility]

## 2022-08-11 NOTE — ASSESSMENT & PLAN NOTE
-unclear etiology  -CT head negative for acute intracranial pathology.  -continue NS at 125 cc/hour.  -monitor on telemetry.  -echocardiogram in a.m..  -cardiology evaluation

## 2022-08-11 NOTE — NURSING
Discharge instructions received and reviewed with pt and family at bedside.  Pt voiced understanding and all questions answered to satisfaction.  Stressed importance to making and keeping all follow up appointments.  Medications sent to pt pharmacy and reviewed with pt.  Tele monitor removed and brought to monitor tech.  IV d/c'd with tip intact, pressure dressing applied.  Pt transported to front of hospital via w/c by PCT to be discharged home.

## 2022-08-11 NOTE — ASSESSMENT & PLAN NOTE
-HR increased from  from lying to standing position.  BP remained stable.  -continue IV fluids.    08/11/2022  Midodrine at d/c as needed  F/u with PCP, cards, ECP

## 2022-08-11 NOTE — TELEPHONE ENCOUNTER
Patient has referral for both NRSX and Pain Med.  Spoke with patient.  She is not interested in surgical consult at this time.  She had an abnormal xray during hospital visit. Made appt with IPM.  Patient v/u.    Flaca Jones RN

## 2022-08-11 NOTE — ASSESSMENT & PLAN NOTE
-Seems mediated by orthostasis, happened after standing from a seated position  -Troponin negative  -Echo pending  -Patient previously on midodrine but reported it was ineffective over time  -Discussed adequate hydration with electrolyte supplementation and compression socks  -OP EP referral, can consider Florinef  -Prior tilt table in 2019 WNL

## 2022-08-11 NOTE — HPI
"Ms. Verde is a 42 year old female patient whose current medical conditions include chronic migraines, depression, and orthostatic hypotension who presented to [facility] ED yesterday s/p syncopal episode. Patient got up from her bed to assist her daughter out of the bathtub when she experienced severe dizziness. She reported her vision "went black" and she passed out, likely hitting her head on the door frame on the way down. Duration of LOC was unknown however patient awakened to her daughter shaking her. She reported an episode of loose stools post syncopal spell. No associated sidney chest pain, SOB, fever, or chills. She initially was going to manage her symptoms at home, but the dizziness intensified, prompting her to go to ED. Initial workup un-revealing and patient was subsequently admitted for further evaluation and treatment. Cardiology consulted to assist with management. Patient seen and examined today, resting in bed. Feels ok. Weak. Reports chronic positional dizzy spells over the years in setting of orthostasis, followed as OP by neurology. Previously was on midodrine but states it was ineffective over time. Prior tilt table test in 2019 WNL. Labs reviewed. Troponin negative. Echo pending. CT of head negative for any acute findings.       "

## 2022-08-11 NOTE — ED PROVIDER NOTES
SCRIBE #1 NOTE: I, Carola Schneider, am scribing for, and in the presence of, Santa Ugarte MD. I have scribed the entire note.       History     Chief Complaint   Patient presents with    Loss of Consciousness     CC of right knee/back pain. Dizziness episode with LOC this morning. Hit head on wall. Sees Neurology.      Review of patient's allergies indicates:   Allergen Reactions    Emgality [galcanezumab-gnlm] Hives and Itching    Topiramate Itching         History of Present Illness     HPI    8/10/2022, 8:22 PM  History obtained from the patient      History of Present Illness: Arielle Verde is a 42 y.o. female patient with a PMHx of anxiety, chronic headaches, SALLIE III, and depression who presents to the Emergency Department for evaluation of syncope which onset suddenly around 7AM today. Pt states that she got up out of bed and everything started to go black. She tried to hold herself up with the sink, but she fainted and hit her head. Pt c/o back pain and R knee pain secondary to falling after losing consciousness. Pt reports that she has had near-syncopal episodes in the past, but this is the first time she has passed out. Symptoms are constant and moderate in severity. No mitigating or exacerbating factors reported. Associated sxs include HA, dizziness, and lightheadedness. Patient denies any fever, chills, numbness, CP, SOB and all other sxs at this time. Prior Tx includes an oxycodone this morning with no pain relief. No further complaints or concerns at this time.       Arrival mode: Personal vehicle     PCP: Vera Broussard MD        Past Medical History:  Past Medical History:   Diagnosis Date    Anxiety     Chronic headaches     SALLIE III (cervical intraepithelial neoplasia grade III) with severe dysplasia 2019    s/p LEEP    Depression     Migraine        Past Surgical History:  Past Surgical History:   Procedure Laterality Date    ADENOIDECTOMY      AUGMENTATION OF BREAST  06/2005     saline    BREAST SURGERY      breast augmentatin    COLD KNIFE CONIZATION OF CERVIX N/A 11/27/2019    Procedure: CONE BIOPSY, CERVIX, USING COLD;  Surgeon: Rachele Willis MD;  Location: [facility] OR;  Service: OB/GYN;  Laterality: N/A;    Hyperhydrosis      LIPOSUCTION OF NECK      REMOVAL OF INTRAUTERINE DEVICE (IUD)  11/27/2019    Procedure: REMOVAL, INTRAUTERINE DEVICE;  Surgeon: Rachele Willis MD;  Location: [facility] OR;  Service: OB/GYN;;    TILT TABLE TEST N/A 1/11/2019    Procedure: TILT TABLE TEST;  Surgeon: Justin Freeman MD;  Location: [facility] CATH LAB;  Service: Cardiology;  Laterality: N/A;  Rodriguez pt/pt req 930 start time    TONSILLECTOMY           Family History:  Family History   Problem Relation Age of Onset    Lupus Mother     Ulcerative colitis Mother     Kidney failure Father     Drug abuse Father     Breast cancer Paternal Grandmother     Colon cancer Neg Hx     Ovarian cancer Neg Hx        Social History:  Social History     Tobacco Use    Smoking status: Light Tobacco Smoker    Smokeless tobacco: Never Used    Tobacco comment: no smoking after m.n prior to sx   Substance and Sexual Activity    Alcohol use: Yes     Comment: social     Drug use: No    Sexual activity: Not Currently     Partners: Male     Birth control/protection: I.U.D.     Comment: Mirena 3/30/20 LOT [id number] exp 4.2022        Review of Systems     Review of Systems   Constitutional: Negative for chills and fever.   HENT: Negative for sore throat.    Respiratory: Negative for shortness of breath.    Cardiovascular: Negative for chest pain.   Gastrointestinal: Negative for nausea.   Genitourinary: Negative for dysuria.   Musculoskeletal: Positive for arthralgias (R knee) and back pain.   Skin: Negative for rash.   Neurological: Positive for dizziness, syncope, light-headedness and headaches. Negative for weakness and numbness.   Hematological: Does not bruise/bleed easily.   All other systems reviewed  "and are negative.       Physical Exam     Initial Vitals [08/10/22 1907]   BP Pulse Resp Temp SpO2   116/76 85 18 98 °F (36.7 °C) 98 %      MAP       --          Physical Exam  Nursing Notes and Vital Signs Reviewed.  Constitutional: Patient is in no acute distress. Well-developed and well-nourished.  Head: Small contusion to forehead.  Eyes: PERRL. EOM intact. Conjunctivae are not pale. No scleral icterus.  ENT: Mucous membranes are moist. Oropharynx is clear and symmetric.    Neck: Supple. Full ROM. No lymphadenopathy.  Cardiovascular: Regular rate. Regular rhythm. No murmurs, rubs, or gallops. Distal pulses are 2+ and symmetric.  Pulmonary/Chest: No respiratory distress. Clear to auscultation bilaterally. No wheezing or rales.  Abdominal: Soft and non-distended.  There is no tenderness.  No rebound, guarding, or rigidity.   Musculoskeletal: Moves all extremities. No obvious deformities. No edema. Midline lower back tenderness.   Skin: Warm and dry.  Neurological:  Alert, awake, and appropriate.  Normal speech.  No acute focal neurological deficits are appreciated.  Psychiatric: Normal affect. Good eye contact. Appropriate in content.     ED Course   Procedures  ED Vital Signs:  Vitals:    08/10/22 1907 08/10/22 1950 08/10/22 1951 08/10/22 1955   BP: 116/76 111/67 106/64 (!) 112/58   Pulse: 85 95 91 110   Resp: 18 20 20 (!) 25   Temp: 98 °F (36.7 °C)      TempSrc: Oral   Other (see comments)   SpO2: 98% 98% 98% 99%   Weight:       Height: 5' 11" (1.803 m)       08/10/22 2107 08/10/22 2326 08/11/22 0133 08/11/22 0432   BP: 125/77 110/66  (!) 87/51   Pulse: 78 88  71   Resp: 16 18 18 18   Temp:  98.1 °F (36.7 °C)  97.8 °F (36.6 °C)   TempSrc:  Oral  Oral   SpO2: 98% 98%  97%   Weight:  83.5 kg (184 lb 1.4 oz)     Height:  5' 11" (1.803 m)      08/11/22 0458   BP: (!) 92/55   Pulse: 71   Resp: 18   Temp:    TempSrc:    SpO2:    Weight:    Height:        Abnormal Lab Results:  Labs Reviewed   COMPREHENSIVE METABOLIC " PANEL - Abnormal; Notable for the following components:       Result Value    Alkaline Phosphatase 41 (*)     All other components within normal limits   CBC W/ AUTO DIFFERENTIAL   SARS-COV-2 RNA AMPLIFICATION, QUAL   URINALYSIS, REFLEX TO URINE CULTURE        All Lab Results:  Results for orders placed or performed during the hospital encounter of 08/10/22   CBC auto differential   Result Value Ref Range    WBC 7.81 3.90 - 12.70 K/uL    RBC 4.27 4.00 - 5.40 M/uL    Hemoglobin 12.6 12.0 - 16.0 g/dL    Hematocrit 39.4 37.0 - 48.5 %    MCV 92 82 - 98 fL    MCH 29.5 27.0 - 31.0 pg    MCHC 32.0 32.0 - 36.0 g/dL    RDW 12.6 11.5 - 14.5 %    Platelets 279 150 - 450 K/uL    MPV 9.2 9.2 - 12.9 fL    Immature Granulocytes 0.3 0.0 - 0.5 %    Gran # (ANC) 3.9 1.8 - 7.7 K/uL    Immature Grans (Abs) 0.02 0.00 - 0.04 K/uL    Lymph # 3.2 1.0 - 4.8 K/uL    Mono # 0.5 0.3 - 1.0 K/uL    Eos # 0.1 0.0 - 0.5 K/uL    Baso # 0.04 0.00 - 0.20 K/uL    nRBC 0 0 /100 WBC    Gran % 50.4 38.0 - 73.0 %    Lymph % 41.0 18.0 - 48.0 %    Mono % 6.4 4.0 - 15.0 %    Eosinophil % 1.4 0.0 - 8.0 %    Basophil % 0.5 0.0 - 1.9 %    Differential Method Automated    Comprehensive metabolic panel   Result Value Ref Range    Sodium 138 136 - 145 mmol/L    Potassium 4.0 3.5 - 5.1 mmol/L    Chloride 103 95 - 110 mmol/L    CO2 25 23 - 29 mmol/L    Glucose 85 70 - 110 mg/dL    BUN 11 6 - 20 mg/dL    Creatinine 0.8 0.5 - 1.4 mg/dL    Calcium 9.4 8.7 - 10.5 mg/dL    Total Protein 6.8 6.0 - 8.4 g/dL    Albumin 4.2 3.5 - 5.2 g/dL    Total Bilirubin 0.4 0.1 - 1.0 mg/dL    Alkaline Phosphatase 41 (L) 55 - 135 U/L    AST 24 10 - 40 U/L    ALT 30 10 - 44 U/L    Anion Gap 10 8 - 16 mmol/L    eGFR >60 >60 mL/min/1.73 m^2   COVID-19 Rapid Screening   Result Value Ref Range    SARS-CoV-2 RNA, Amplification, Qual Negative Negative   Troponin I   Result Value Ref Range    Troponin I <0.006 0.000 - 0.026 ng/mL         Imaging Results:  Imaging Results          X-Ray Lumbar  Spine 5 View (Final result)  Result time 08/10/22 21:09:38    Final result by Maicol Yap MD (08/10/22 21:09:38)                 Impression:      Degenerative change without definite acute abnormality.      Electronically signed by: Maicol Yap  Date:    08/10/2022  Time:    21:09             Narrative:    EXAMINATION:  XR LUMBAR SPINE COMPLETE 5 VIEW    TECHNIQUE:  Five views of the lumbar spine were performed.    COMPARISON:  01/06/2020.    FINDINGS:  Alignment: Alignment is maintained.    Vertebrae: Vertebral body heights are maintained.  No suspicious appearing lytic or blastic lesions.    Discs and facets: Disc space height loss L4-S1. Facet arthropathy L4 through S1.    Miscellaneous: No additional findings.                               CT Head Without Contrast (Final result)  Result time 08/10/22 19:51:44    Final result by Maicol Yap MD (08/10/22 19:51:44)                 Impression:      No acute intracranial CT abnormality.  Clinical correlation and further evaluation as warranted.    All CT scans at this facility are performed  using dose modulation techniques as appropriate to performed exam including the following:  automated exposure control; adjustment of mA and/or kV according to the patients size (this includes techniques or standardized protocols for targeted exams where dose is matched to indication/reason for exam: i.e. extremities or head);  iterative reconstruction technique.      Electronically signed by: Maicol Yap  Date:    08/10/2022  Time:    19:51             Narrative:    EXAMINATION:  CT HEAD WITHOUT CONTRAST    CLINICAL HISTORY:  Syncope, recurrent;    TECHNIQUE:  Low dose axial CT images obtained throughout the head without intravenous contrast. Sagittal and coronal reconstructions were performed.    COMPARISON:  Multiple priors.    FINDINGS:  Intracranial compartment:    Ventricles and sulci are normal in size for age without evidence of hydrocephalus. No  extra-axial blood or fluid collections.    The brain parenchyma appears normal. No parenchymal mass, hemorrhage, edema or major vascular distribution infarct.    Skull/extracranial contents (limited evaluation): No fracture. Mastoid air cells and paranasal sinuses are essentially clear.                                 The EKG was ordered, reviewed, and independently interpreted by the ED provider.  Interpretation time: 19:08  Rate: 88 BPM  Rhythm: normal sinus rhythm  Interpretation: Possible left atrial enlargement. No STEMI.             The Emergency Provider reviewed the vital signs and test results, which are outlined above.     ED Discussion       10:25 PM: Discussed case with Margoth Martin NP ([facility]). Dr. Lawler agrees with current care and management of pt and accepts admission.   Admitting Service: Hospital Medicine  Admitting Physician: Dr. Lawler  Admit to: Obs unit    10:25 PM: Re-evaluated pt. I have discussed test results, shared treatment plan, and the need for admission with patient and family at bedside. Pt and family express understanding at this time and agree with all information. All questions answered. Pt and family have no further questions or concerns at this time. Pt is ready for admit.         Medical Decision Making:   Clinical Tests:   Lab Tests: Ordered and Reviewed  Radiological Study: Ordered and Reviewed  Medical Tests: Ordered and Reviewed           ED Medication(s):  Medications   acetaminophen tablet 650 mg (has no administration in time range)   ondansetron injection 4 mg (has no administration in time range)   guaiFENesin 100 mg/5 ml syrup 200 mg (has no administration in time range)   aluminum-magnesium hydroxide-simethicone 200-200-20 mg/5 mL suspension 30 mL (has no administration in time range)   0.9%  NaCl infusion ( Intravenous New Bag 8/11/22 0004)   albuterol-ipratropium 2.5 mg-0.5 mg/3 mL nebulizer solution 3 mL (has no administration in time range)    HYDROcodone-acetaminophen 5-325 mg per tablet 1 tablet (1 tablet Oral Given 8/11/22 0133)   morphine injection 6 mg (6 mg Intravenous Given 8/10/22 2107)   promethazine (PHENERGAN) 12.5 mg in dextrose 5 % 50 mL IVPB (0 mg Intravenous Stopped 8/10/22 2155)       Current Discharge Medication List                  Scribe Attestation:   Scribe #1: I performed the above scribed service and the documentation accurately describes the services I performed. I attest to the accuracy of the note.     Attending:   Physician Attestation Statement for Scribe #1: I, Santa Ugarte MD, personally performed the services described in this documentation, as scribed by Carola Schneider, in my presence, and it is both accurate and complete.           Clinical Impression       ICD-10-CM ICD-9-CM   1. Syncope, unspecified syncope type  R55 780.2   2. Dizziness  R42 780.4   3. Syncope  R55 780.2       Disposition:   Disposition: Admitted  Condition: Fair         Santa Ugarte MD  08/11/22 4616

## 2022-08-11 NOTE — H&P
[facility] [address] [place] [address] Medicine  History & Physical    Patient Name: Arielle Verde  MRN: [id number]  Patient Class: OP- Observation  Admission Date: 8/10/2022  Attending Physician: Cat Lawler MD   Primary Care Provider: Vera Broussard MD         Patient information was obtained from patient, past medical records and ER records.     Subjective:     Principal Problem:Syncope    Chief Complaint:   Chief Complaint   Patient presents with    Loss of Consciousness     CC of right knee/back pain. Dizziness episode with LOC this morning. Hit head on wall. Sees Neurology.         HPI: Ms. Chaves is a 42-year-old  female with PMH significant for chronic migraines, depression, chronic orthostatic dizziness, followed in the neurology clinic by Dr. Anderson, presented to the ED of consistent syncopal episode at home today.  Up getting a low bed, she started complaining of severe dizziness, and within went black, she fell and hit the ground.  Lost consciousness.  Reports a history of chronic dizziness but never had syncopal episode.  CT head is negative for acute intracranial pathology.  Lumbar spine x-ray reveals degenerative changes.  Laboratory workup is unremarkable.  Patient placed in observation for IV fluids and cardiology evaluation for syncope.      Past Medical History:   Diagnosis Date    Anxiety     Chronic headaches     SALLIE III (cervical intraepithelial neoplasia grade III) with severe dysplasia 2019    s/p LEEP    Depression     Migraine        Past Surgical History:   Procedure Laterality Date    ADENOIDECTOMY      AUGMENTATION OF BREAST  06/2005    saline    BREAST SURGERY      breast augmentatin    COLD KNIFE CONIZATION OF CERVIX N/A 11/27/2019    Procedure: CONE BIOPSY, CERVIX, USING COLD;  Surgeon: Rachele Willis MD;  Location: [facility];  Service: OB/GYN;  Laterality: N/A;    Hyperhydrosis      LIPOSUCTION OF NECK      REMOVAL OF INTRAUTERINE DEVICE (IUD)   11/27/2019    Procedure: REMOVAL, INTRAUTERINE DEVICE;  Surgeon: Rachele Willis MD;  Location: [facility] OR;  Service: OB/GYN;;    TILT TABLE TEST N/A 1/11/2019    Procedure: TILT TABLE TEST;  Surgeon: Justin Freeman MD;  Location: [facility] CATH LAB;  Service: Cardiology;  Laterality: N/A;  Rodriguez pt/pt req 930 start time    TONSILLECTOMY         Review of patient's allergies indicates:   Allergen Reactions    Emgality [galcanezumab-gnlm] Hives and Itching    Topiramate Itching       Current Facility-Administered Medications on File Prior to Encounter   Medication    levonorgestreL 20 mcg/24 hours (5 yrs) 52 mg IUD 1 Intra Uterine Device    onabotulinumtoxina injection 200 Units     Current Outpatient Medications on File Prior to Encounter   Medication Sig    albuterol (PROVENTIL/VENTOLIN HFA) 90 mcg/actuation inhaler Inhale 2 puffs into the lungs every 6 (six) hours as needed for Wheezing. Dispense with spacer.    atomoxetine (STRATTERA) 40 MG capsule Take 1 daily for 7 days then 2 daily thereafter.    buPROPion (WELLBUTRIN XL) 150 MG TB24 tablet Take 3 tablets (450 mg total) by mouth once daily.    busPIRone (BUSPAR) 15 MG tablet Take 2 tablets (30 mg total) by mouth 2 (two) times daily.    carboxymethylcellulose-citric (PLENITY, WELCOME KIT,) 0.75 gram Cap Take 3 capsules by mouth 2 (two) times a day.    cetirizine (ZYRTEC) 10 MG tablet Take 10 mg by mouth daily as needed.     clonazePAM (KLONOPIN) 0.5 MG tablet Take 1 tablet (0.5 mg total) by mouth daily as needed for Anxiety.    EPINEPHrine (EPIPEN 2-DUANE) 0.3 mg/0.3 mL AtIn Inject 0.6 mLs (0.6 mg total) into the muscle once. for 1 dose    metFORMIN (GLUCOPHAGE) 500 MG tablet Take 500 mg by mouth 2 (two) times daily.    rizatriptan (MAXALT) 10 MG tablet Take 10 mg by mouth daily as needed.    traZODone (DESYREL) 100 MG tablet Take 1-3 tablets at bedtime as needed for sleep.    triamcinolone acetonide 0.1% (KENALOG) 0.1 % cream Apply  topically 2 (two) times daily.    [DISCONTINUED] ASCORBATE CALCIUM (VITAMIN C ORAL) Take 1 tablet by mouth every evening.     [DISCONTINUED] DULoxetine (CYMBALTA) 20 MG capsule Take 2 daily for 7 days then 1 daily for 7 days then stop     Family History       Problem Relation (Age of Onset)    Breast cancer Paternal Grandmother    Drug abuse Father    Kidney failure Father    Lupus Mother    Ulcerative colitis Mother          Tobacco Use    Smoking status: Light Tobacco Smoker    Smokeless tobacco: Never Used    Tobacco comment: no smoking after m.n prior to sx   Substance and Sexual Activity    Alcohol use: Yes     Comment: social     Drug use: No    Sexual activity: Not Currently     Partners: Male     Birth control/protection: I.U.D.     Comment: Mirena 3/30/20 LOT [id number] exp 4.2022     Review of Systems   Constitutional: Negative.  Negative for chills and fever.   HENT: Negative.  Negative for congestion, rhinorrhea, sore throat and trouble swallowing.    Eyes: Negative.  Negative for visual disturbance.   Respiratory: Negative.  Negative for cough, shortness of breath and wheezing.    Cardiovascular: Negative.  Negative for chest pain and palpitations.   Gastrointestinal: Negative.  Negative for abdominal pain, diarrhea, nausea and vomiting.   Endocrine: Negative.    Genitourinary: Negative.  Negative for dysuria and flank pain.   Musculoskeletal: Negative.  Negative for back pain.   Skin: Negative.  Negative for rash.   Allergic/Immunologic: Negative.    Neurological:  Positive for dizziness and syncope. Negative for speech difficulty, weakness, numbness and headaches.   Hematological: Negative.    Psychiatric/Behavioral: Negative.  Negative for hallucinations. The patient is not nervous/anxious.    All other systems reviewed and are negative.  Objective:     Vital Signs (Most Recent):  Temp: 98 °F (36.7 °C) (08/10/22 1907)  Pulse: 78 (08/10/22 2107)  Resp: 16 (08/10/22 2107)  BP: 125/77 (08/10/22  2107)  SpO2: 98 % (08/10/22 2107) Vital Signs (24h Range):  Temp:  [98 °F (36.7 °C)] 98 °F (36.7 °C)  Pulse:  [] 78  Resp:  [16-25] 16  SpO2:  [98 %-99 %] 98 %  BP: (106-125)/(58-77) 125/77        Body mass index is 26.04 kg/m².    Physical Exam  Vitals and nursing note reviewed.   Constitutional:       General: She is awake. She is not in acute distress.     Appearance: She is not ill-appearing.   HENT:      Head: Normocephalic and atraumatic.      Mouth/Throat:      Mouth: Mucous membranes are moist.   Eyes:      General: No scleral icterus.     Conjunctiva/sclera: Conjunctivae normal.   Cardiovascular:      Rate and Rhythm: Normal rate and regular rhythm.      Heart sounds: No murmur heard.  Pulmonary:      Effort: Pulmonary effort is normal. No respiratory distress.      Breath sounds: Normal breath sounds. No wheezing.   Abdominal:      Palpations: Abdomen is soft.      Tenderness: There is no abdominal tenderness.   Musculoskeletal:         General: No swelling. Normal range of motion.      Cervical back: Normal range of motion and neck supple.   Skin:     General: Skin is warm.      Coloration: Skin is not jaundiced.   Neurological:      General: No focal deficit present.      Mental Status: She is alert and oriented to person, place, and time. Mental status is at baseline.   Psychiatric:         Attention and Perception: Attention normal.         Mood and Affect: Mood normal.         Speech: Speech normal.         Behavior: Behavior normal. Behavior is cooperative.           Significant Labs: All pertinent labs within the past 24 hours have been reviewed.  BMP:   Recent Labs   Lab 08/10/22  1928   GLU 85      K 4.0      CO2 25   BUN 11   CREATININE 0.8   CALCIUM 9.4     CBC:   Recent Labs   Lab 08/10/22  1928   WBC 7.81   HGB 12.6   HCT 39.4        CMP:   Recent Labs   Lab 08/10/22  1928      K 4.0      CO2 25   GLU 85   BUN 11   CREATININE 0.8   CALCIUM 9.4   PROT 6.8    ALBUMIN 4.2   BILITOT 0.4   ALKPHOS 41*   AST 24   ALT 30   ANIONGAP 10     Troponin: No results for input(s): TROPONINI in the last 48 hours.    Significant Imaging: I have reviewed all pertinent imaging results/findings within the past 24 hours.  I have reviewed and interpreted all pertinent imaging results/findings within the past 24 hours.    Imaging Results              X-Ray Lumbar Spine 5 View (Final result)  Result time 08/10/22 21:09:38      Final result by Maicol Yap MD (08/10/22 21:09:38)                   Impression:      Degenerative change without definite acute abnormality.      Electronically signed by: Maicol Yap  Date:    08/10/2022  Time:    21:09               Narrative:    EXAMINATION:  XR LUMBAR SPINE COMPLETE 5 VIEW    TECHNIQUE:  Five views of the lumbar spine were performed.    COMPARISON:  01/06/2020.    FINDINGS:  Alignment: Alignment is maintained.    Vertebrae: Vertebral body heights are maintained.  No suspicious appearing lytic or blastic lesions.    Discs and facets: Disc space height loss L4-S1. Facet arthropathy L4 through S1.    Miscellaneous: No additional findings.                                       CT Head Without Contrast (Final result)  Result time 08/10/22 19:51:44      Final result by Maicol Yap MD (08/10/22 19:51:44)                   Impression:      No acute intracranial CT abnormality.  Clinical correlation and further evaluation as warranted.    All CT scans at this facility are performed  using dose modulation techniques as appropriate to performed exam including the following:  automated exposure control; adjustment of mA and/or kV according to the patients size (this includes techniques or standardized protocols for targeted exams where dose is matched to indication/reason for exam: i.e. extremities or head);  iterative reconstruction technique.      Electronically signed by: Maicol Yap  Date:    08/10/2022  Time:    19:51                Narrative:    EXAMINATION:  CT HEAD WITHOUT CONTRAST    CLINICAL HISTORY:  Syncope, recurrent;    TECHNIQUE:  Low dose axial CT images obtained throughout the head without intravenous contrast. Sagittal and coronal reconstructions were performed.    COMPARISON:  Multiple priors.    FINDINGS:  Intracranial compartment:    Ventricles and sulci are normal in size for age without evidence of hydrocephalus. No extra-axial blood or fluid collections.    The brain parenchyma appears normal. No parenchymal mass, hemorrhage, edema or major vascular distribution infarct.    Skull/extracranial contents (limited evaluation): No fracture. Mastoid air cells and paranasal sinuses are essentially clear.                                      I have independently reviewed and interpreted the EKG.     I have independently reviewed all pertinent labs within the past 24 hours.    I have independently reviewed, visualized and interpreted all pertinent imaging results within the past 24 hours and discussed the findings with the ED physician, Santa Ugarte MD          Assessment/Plan:     * Syncope  -unclear etiology  -CT head negative for acute intracranial pathology.  -continue NS at 125 cc/hour.  -monitor on telemetry.  -echocardiogram in a.m..  -cardiology evaluation      Orthostasis  -HR increased from  from lying to standing position.  BP remained stable.  -continue IV fluids.  -      Common migraine with intractable migraine  -followed by Dr. Anderson in the Neurology Clinic.  -continue home medications      Anxiety associated with depression  Patient has persistent depression which is moderate and is currently controlled. Will Continue anti-depressant medications. We will not consult psychiatry at this time. Patient does not display psychosis at this time. Continue to monitor closely and adjust plan of care as needed.          VTE Risk Mitigation (From admission, onward)         Ordered     Place sequential compression device   Until discontinued         08/10/22 2240                The patient is placed in OBSERVATION status.       Cameron Salgado MD  Department of Hospital Medicine   [facility]

## 2022-08-11 NOTE — ASSESSMENT & PLAN NOTE
-unclear etiology  -CT head negative for acute intracranial pathology.  -continue NS at 125 cc/hour.  -monitor on telemetry.  -echocardiogram in a.m..  -cardiology evaluation    08/11/2022 ECHO normal  Tele normal  Cards consulted and rec f/u with ECP- referral placed

## 2022-08-11 NOTE — PLAN OF CARE
[facility]  Discharge Assessment    Primary Care Provider: Vera Broussard MD     Discharge Assessment (most recent)     BRIEF DISCHARGE ASSESSMENT - 08/11/22 3362        Discharge Planning    Assessment Type Discharge Planning Brief Assessment     Resource/Environmental Concerns none     Support Systems Parent     Assistance Needed none     Equipment Currently Used at Home none     Current Living Arrangements home/apartment/condo     Patient/Family Anticipates Transition to home     Patient/Family Anticipated Services at Transition none     DME Needed Upon Discharge  none     Discharge Plan A Home with family                 Patient will discharge to mothers home and mother will be help at home.

## 2022-08-11 NOTE — SUBJECTIVE & OBJECTIVE
Past Medical History:   Diagnosis Date    Anxiety     Chronic headaches     SALLIE III (cervical intraepithelial neoplasia grade III) with severe dysplasia 2019    s/p LEEP    Depression     Migraine        Past Surgical History:   Procedure Laterality Date    ADENOIDECTOMY      AUGMENTATION OF BREAST  06/2005    saline    BREAST SURGERY      breast augmentatin    COLD KNIFE CONIZATION OF CERVIX N/A 11/27/2019    Procedure: CONE BIOPSY, CERVIX, USING COLD;  Surgeon: Rachele Willis MD;  Location: [facility] OR;  Service: OB/GYN;  Laterality: N/A;    Hyperhydrosis      LIPOSUCTION OF NECK      REMOVAL OF INTRAUTERINE DEVICE (IUD)  11/27/2019    Procedure: REMOVAL, INTRAUTERINE DEVICE;  Surgeon: Rachele Willis MD;  Location: [facility] OR;  Service: OB/GYN;;    TILT TABLE TEST N/A 1/11/2019    Procedure: TILT TABLE TEST;  Surgeon: Justin Freeman MD;  Location: [facility] CATH LAB;  Service: Cardiology;  Laterality: N/A;  Rodriguez pt/pt req 930 start time    TONSILLECTOMY         Review of patient's allergies indicates:   Allergen Reactions    Emgality [galcanezumab-gnlm] Hives and Itching    Topiramate Itching       Current Facility-Administered Medications on File Prior to Encounter   Medication    levonorgestreL 20 mcg/24 hours (5 yrs) 52 mg IUD 1 Intra Uterine Device    onabotulinumtoxina injection 200 Units     Current Outpatient Medications on File Prior to Encounter   Medication Sig    albuterol (PROVENTIL/VENTOLIN HFA) 90 mcg/actuation inhaler Inhale 2 puffs into the lungs every 6 (six) hours as needed for Wheezing. Dispense with spacer.    atomoxetine (STRATTERA) 40 MG capsule Take 1 daily for 7 days then 2 daily thereafter.    buPROPion (WELLBUTRIN XL) 150 MG TB24 tablet Take 3 tablets (450 mg total) by mouth once daily.    busPIRone (BUSPAR) 15 MG tablet Take 2 tablets (30 mg total) by mouth 2 (two) times daily.    carboxymethylcellulose-citric (PLENITY, WELCOME KIT,) 0.75 gram Cap Take 3 capsules by mouth 2 (two)  times a day.    cetirizine (ZYRTEC) 10 MG tablet Take 10 mg by mouth daily as needed.     clonazePAM (KLONOPIN) 0.5 MG tablet Take 1 tablet (0.5 mg total) by mouth daily as needed for Anxiety.    EPINEPHrine (EPIPEN 2-DUANE) 0.3 mg/0.3 mL AtIn Inject 0.6 mLs (0.6 mg total) into the muscle once. for 1 dose    metFORMIN (GLUCOPHAGE) 500 MG tablet Take 500 mg by mouth 2 (two) times daily.    rizatriptan (MAXALT) 10 MG tablet Take 10 mg by mouth daily as needed.    traZODone (DESYREL) 100 MG tablet Take 1-3 tablets at bedtime as needed for sleep.    triamcinolone acetonide 0.1% (KENALOG) 0.1 % cream Apply topically 2 (two) times daily.    [DISCONTINUED] ASCORBATE CALCIUM (VITAMIN C ORAL) Take 1 tablet by mouth every evening.     [DISCONTINUED] DULoxetine (CYMBALTA) 20 MG capsule Take 2 daily for 7 days then 1 daily for 7 days then stop     Family History       Problem Relation (Age of Onset)    Breast cancer Paternal Grandmother    Drug abuse Father    Kidney failure Father    Lupus Mother    Ulcerative colitis Mother          Tobacco Use    Smoking status: Light Tobacco Smoker    Smokeless tobacco: Never Used    Tobacco comment: no smoking after m.n prior to sx   Substance and Sexual Activity    Alcohol use: Yes     Comment: social     Drug use: No    Sexual activity: Not Currently     Partners: Male     Birth control/protection: I.U.D.     Comment: Mirena 3/30/20 LOT [id number] exp 4.2022     Review of Systems   Constitutional: Negative.  Negative for chills and fever.   HENT: Negative.  Negative for congestion, rhinorrhea, sore throat and trouble swallowing.    Eyes: Negative.  Negative for visual disturbance.   Respiratory: Negative.  Negative for cough, shortness of breath and wheezing.    Cardiovascular: Negative.  Negative for chest pain and palpitations.   Gastrointestinal: Negative.  Negative for abdominal pain, diarrhea, nausea and vomiting.   Endocrine: Negative.    Genitourinary: Negative.  Negative for dysuria  and flank pain.   Musculoskeletal: Negative.  Negative for back pain.   Skin: Negative.  Negative for rash.   Allergic/Immunologic: Negative.    Neurological:  Positive for dizziness and syncope. Negative for speech difficulty, weakness, numbness and headaches.   Hematological: Negative.    Psychiatric/Behavioral: Negative.  Negative for hallucinations. The patient is not nervous/anxious.    All other systems reviewed and are negative.  Objective:     Vital Signs (Most Recent):  Temp: 98 °F (36.7 °C) (08/10/22 1907)  Pulse: 78 (08/10/22 2107)  Resp: 16 (08/10/22 2107)  BP: 125/77 (08/10/22 2107)  SpO2: 98 % (08/10/22 2107) Vital Signs (24h Range):  Temp:  [98 °F (36.7 °C)] 98 °F (36.7 °C)  Pulse:  [] 78  Resp:  [16-25] 16  SpO2:  [98 %-99 %] 98 %  BP: (106-125)/(58-77) 125/77        Body mass index is 26.04 kg/m².    Physical Exam  Vitals and nursing note reviewed.   Constitutional:       General: She is awake. She is not in acute distress.     Appearance: She is not ill-appearing.   HENT:      Head: Normocephalic and atraumatic.      Mouth/Throat:      Mouth: Mucous membranes are moist.   Eyes:      General: No scleral icterus.     Conjunctiva/sclera: Conjunctivae normal.   Cardiovascular:      Rate and Rhythm: Normal rate and regular rhythm.      Heart sounds: No murmur heard.  Pulmonary:      Effort: Pulmonary effort is normal. No respiratory distress.      Breath sounds: Normal breath sounds. No wheezing.   Abdominal:      Palpations: Abdomen is soft.      Tenderness: There is no abdominal tenderness.   Musculoskeletal:         General: No swelling. Normal range of motion.      Cervical back: Normal range of motion and neck supple.   Skin:     General: Skin is warm.      Coloration: Skin is not jaundiced.   Neurological:      General: No focal deficit present.      Mental Status: She is alert and oriented to person, place, and time. Mental status is at baseline.   Psychiatric:         Attention and  Perception: Attention normal.         Mood and Affect: Mood normal.         Speech: Speech normal.         Behavior: Behavior normal. Behavior is cooperative.           Significant Labs: All pertinent labs within the past 24 hours have been reviewed.  BMP:   Recent Labs   Lab 08/10/22  1928   GLU 85      K 4.0      CO2 25   BUN 11   CREATININE 0.8   CALCIUM 9.4     CBC:   Recent Labs   Lab 08/10/22  1928   WBC 7.81   HGB 12.6   HCT 39.4        CMP:   Recent Labs   Lab 08/10/22  1928      K 4.0      CO2 25   GLU 85   BUN 11   CREATININE 0.8   CALCIUM 9.4   PROT 6.8   ALBUMIN 4.2   BILITOT 0.4   ALKPHOS 41*   AST 24   ALT 30   ANIONGAP 10     Troponin: No results for input(s): TROPONINI in the last 48 hours.    Significant Imaging: I have reviewed all pertinent imaging results/findings within the past 24 hours.  I have reviewed and interpreted all pertinent imaging results/findings within the past 24 hours.    Imaging Results              X-Ray Lumbar Spine 5 View (Final result)  Result time 08/10/22 21:09:38      Final result by Maicol Yap MD (08/10/22 21:09:38)                   Impression:      Degenerative change without definite acute abnormality.      Electronically signed by: Maicol Yap  Date:    08/10/2022  Time:    21:09               Narrative:    EXAMINATION:  XR LUMBAR SPINE COMPLETE 5 VIEW    TECHNIQUE:  Five views of the lumbar spine were performed.    COMPARISON:  01/06/2020.    FINDINGS:  Alignment: Alignment is maintained.    Vertebrae: Vertebral body heights are maintained.  No suspicious appearing lytic or blastic lesions.    Discs and facets: Disc space height loss L4-S1. Facet arthropathy L4 through S1.    Miscellaneous: No additional findings.                                       CT Head Without Contrast (Final result)  Result time 08/10/22 19:51:44      Final result by Maicol Yap MD (08/10/22 19:51:44)                   Impression:      No acute  intracranial CT abnormality.  Clinical correlation and further evaluation as warranted.    All CT scans at this facility are performed  using dose modulation techniques as appropriate to performed exam including the following:  automated exposure control; adjustment of mA and/or kV according to the patients size (this includes techniques or standardized protocols for targeted exams where dose is matched to indication/reason for exam: i.e. extremities or head);  iterative reconstruction technique.      Electronically signed by: Maicol Yap  Date:    08/10/2022  Time:    19:51               Narrative:    EXAMINATION:  CT HEAD WITHOUT CONTRAST    CLINICAL HISTORY:  Syncope, recurrent;    TECHNIQUE:  Low dose axial CT images obtained throughout the head without intravenous contrast. Sagittal and coronal reconstructions were performed.    COMPARISON:  Multiple priors.    FINDINGS:  Intracranial compartment:    Ventricles and sulci are normal in size for age without evidence of hydrocephalus. No extra-axial blood or fluid collections.    The brain parenchyma appears normal. No parenchymal mass, hemorrhage, edema or major vascular distribution infarct.    Skull/extracranial contents (limited evaluation): No fracture. Mastoid air cells and paranasal sinuses are essentially clear.                                      I have independently reviewed and interpreted the EKG.     I have independently reviewed all pertinent labs within the past 24 hours.    I have independently reviewed, visualized and interpreted all pertinent imaging results within the past 24 hours and discussed the findings with the ED physician, Santa Ugarte MD

## 2022-08-11 NOTE — ASSESSMENT & PLAN NOTE
Patient has persistent depression which is moderate and is currently controlled. Will Continue anti-depressant medications. We will not consult psychiatry at this time. Patient does not display psychosis at this time. Continue to monitor closely and adjust plan of care as needed.

## 2022-08-11 NOTE — DISCHARGE INSTRUCTIONS
Start midodrine tablets for hypotension can take 2.5 mg up to 10 mg twice per day. Titrate up on medication depending on your blood pressure trends.  Take nuun electrolyte tablets daily.  Increase water intake  Follow up with pain clinic, neurosurgery-spine, cardiology, electrophysiologist, and PCP.  Change positions slowly.

## 2022-08-11 NOTE — CONSULTS
"[facility]  Cardiology  Consult Note    Patient Name: Arielle Verde  MRN: [id number]  Admission Date: 8/10/2022  Hospital Length of Stay: 0 days  Code Status: Prior   Attending Provider: Cat Lawler MD   Consulting Provider: Kenyatta Olivarez PA-C  Primary Care Physician: Vera Broussard MD  Principal Problem:Syncope    Patient information was obtained from patient, past medical records and ER records.     Inpatient consult to Cardiology  Consult performed by: Kenyatta Olivarez PA-C  Consult ordered by: Cameron Salgado MD        Subjective:     Chief Complaint:  Syncope    HPI:   Ms. Verde is a 42 year old female patient whose current medical conditions include chronic migraines, depression, and orthostatic hypotension who presented to [facility] ED yesterday s/p syncopal episode. Patient got up from her bed to assist her daughter out of the bathtub when she experienced severe dizziness. She reported her vision "went black" and she passed out, likely hitting her head on the door frame on the way down. Duration of LOC was unknown however patient awakened to her daughter shaking her. She reported an episode of loose stools post syncopal spell. No associated sidney chest pain, SOB, fever, or chills. She initially was going to manage her symptoms at home, but the dizziness intensified, prompting her to go to ED. Initial workup un-revealing and patient was subsequently admitted for further evaluation and treatment. Cardiology consulted to assist with management. Patient seen and examined today, resting in bed. Feels ok. Weak. Reports chronic positional dizzy spells over the years in setting of orthostasis, followed as OP by neurology. Previously was on midodrine but states it was ineffective over time. Prior tilt table test in 2019 WNL. Labs reviewed. Troponin negative. Echo pending. CT of head negative for any acute findings.           Past Medical History:   Diagnosis Date    Anxiety     " Chronic headaches     SALLIE III (cervical intraepithelial neoplasia grade III) with severe dysplasia 2019    s/p LEEP    Depression     Migraine        Past Surgical History:   Procedure Laterality Date    ADENOIDECTOMY      AUGMENTATION OF BREAST  06/2005    saline    BREAST SURGERY      breast augmentatin    COLD KNIFE CONIZATION OF CERVIX N/A 11/27/2019    Procedure: CONE BIOPSY, CERVIX, USING COLD;  Surgeon: Rachele Willis MD;  Location: [facility] OR;  Service: OB/GYN;  Laterality: N/A;    Hyperhydrosis      LIPOSUCTION OF NECK      REMOVAL OF INTRAUTERINE DEVICE (IUD)  11/27/2019    Procedure: REMOVAL, INTRAUTERINE DEVICE;  Surgeon: Rachele Willis MD;  Location: [facility] OR;  Service: OB/GYN;;    TILT TABLE TEST N/A 1/11/2019    Procedure: TILT TABLE TEST;  Surgeon: Justin Freeman MD;  Location: [facility] CATH LAB;  Service: Cardiology;  Laterality: N/A;  Rodriguez pt/pt req 930 start time    TONSILLECTOMY         Review of patient's allergies indicates:   Allergen Reactions    Emgality [galcanezumab-gnlm] Hives and Itching    Topiramate Itching       No current facility-administered medications on file prior to encounter.     Current Outpatient Medications on File Prior to Encounter   Medication Sig    atomoxetine (STRATTERA) 40 MG capsule Take 1 daily for 7 days then 2 daily thereafter.    buPROPion (WELLBUTRIN XL) 150 MG TB24 tablet Take 3 tablets (450 mg total) by mouth once daily.    busPIRone (BUSPAR) 15 MG tablet Take 2 tablets (30 mg total) by mouth 2 (two) times daily.    cetirizine (ZYRTEC) 10 MG tablet Take 10 mg by mouth daily as needed.     clonazePAM (KLONOPIN) 0.5 MG tablet Take 1 tablet (0.5 mg total) by mouth daily as needed for Anxiety.    metFORMIN (GLUCOPHAGE) 500 MG tablet Take 500 mg by mouth 2 (two) times daily.    traZODone (DESYREL) 100 MG tablet Take 1-3 tablets at bedtime as needed for sleep.    albuterol (PROVENTIL/VENTOLIN HFA) 90 mcg/actuation inhaler Inhale 2  puffs into the lungs every 6 (six) hours as needed for Wheezing. Dispense with spacer.    carboxymethylcellulose-citric (PLENITY, WELCOME KIT,) 0.75 gram Cap Take 3 capsules by mouth 2 (two) times a day.    EPINEPHrine (EPIPEN 2-DUANE) 0.3 mg/0.3 mL AtIn Inject 0.6 mLs (0.6 mg total) into the muscle once. for 1 dose    rizatriptan (MAXALT) 10 MG tablet Take 10 mg by mouth daily as needed.    triamcinolone acetonide 0.1% (KENALOG) 0.1 % cream Apply topically 2 (two) times daily.     Family History       Problem Relation (Age of Onset)    Breast cancer Paternal Grandmother    Drug abuse Father    Kidney failure Father    Lupus Mother    Ulcerative colitis Mother          Tobacco Use    Smoking status: Light Tobacco Smoker    Smokeless tobacco: Never Used    Tobacco comment: no smoking after m.n prior to sx   Substance and Sexual Activity    Alcohol use: Yes     Comment: social     Drug use: No    Sexual activity: Not Currently     Partners: Male     Birth control/protection: I.U.D.     Comment: Mirena 3/30/20 LOT [id number] exp 4.2022     Review of Systems   Constitutional: Positive for malaise/fatigue.   HENT: Negative.     Eyes: Negative.    Cardiovascular:  Positive for syncope.   Respiratory: Negative.     Endocrine: Negative.    Hematologic/Lymphatic: Negative.    Skin: Negative.    Musculoskeletal: Negative.    Gastrointestinal:  Positive for diarrhea (one episode post syncopal spell).   Neurological:  Positive for dizziness and light-headedness.   Psychiatric/Behavioral: Negative.     Allergic/Immunologic: Negative.    Objective:     Vital Signs (Most Recent):  Temp: 98.2 °F (36.8 °C) (08/11/22 0815)  Pulse: 78 (08/11/22 0815)  Resp: 18 (08/11/22 0820)  BP: 97/60 (08/11/22 0815)  SpO2: 98 % (08/11/22 0815) Vital Signs (24h Range):  Temp:  [97.8 °F (36.6 °C)-98.2 °F (36.8 °C)] 98.2 °F (36.8 °C)  Pulse:  [] 78  Resp:  [16-25] 18  SpO2:  [97 %-99 %] 98 %  BP: ()/(51-77) 97/60     Weight: 83.5 kg  (184 lb)  Body mass index is 25.66 kg/m².    SpO2: 98 %  O2 Device (Oxygen Therapy): room air    No intake or output data in the 24 hours ending 08/11/22 1026    Lines/Drains/Airways       Peripheral Intravenous Line  Duration                  Peripheral IV - Single Lumen 08/10/22 1929 20 G Right Antecubital <1 day                    Physical Exam  Vitals and nursing note reviewed.   Constitutional:       General: She is not in acute distress.     Appearance: Normal appearance. She is well-developed. She is not diaphoretic.   HENT:      Head: Normocephalic and atraumatic.   Eyes:      General:         Right eye: No discharge.         Left eye: No discharge.      Pupils: Pupils are equal, round, and reactive to light.   Neck:      Thyroid: No thyromegaly.      Vascular: No JVD.      Trachea: No tracheal deviation.   Cardiovascular:      Rate and Rhythm: Normal rate and regular rhythm.      Heart sounds: Normal heart sounds, S1 normal and S2 normal. No murmur heard.  Pulmonary:      Effort: Pulmonary effort is normal. No respiratory distress.      Breath sounds: Normal breath sounds. No wheezing or rales.   Abdominal:      General: There is no distension.      Palpations: Abdomen is soft.      Tenderness: There is no rebound.   Musculoskeletal:      Cervical back: Neck supple.      Right lower leg: No edema.      Left lower leg: No edema.   Skin:     General: Skin is warm and dry.      Findings: No erythema.   Neurological:      General: No focal deficit present.      Mental Status: She is alert and oriented to person, place, and time.   Psychiatric:         Mood and Affect: Mood normal.         Behavior: Behavior normal.         Thought Content: Thought content normal.       Significant Labs: CBC   Recent Labs   Lab 08/10/22  1928 08/11/22  0550   WBC 7.81 6.08   HGB 12.6 11.0*   HCT 39.4 35.3*    245   , INR No results for input(s): INR, PROTIME in the last 48 hours., Troponin   Recent Labs   Lab  08/11/22  0003 08/11/22  0550   TROPONINI <0.006 <0.006   , and All pertinent lab results from the last 24 hours have been reviewed.    Significant Imaging: Echocardiogram: Transthoracic echo (TTE) complete (Cupid Only):   Results for orders placed or performed during the hospital encounter of 08/10/22   Echo   Result Value Ref Range    BSA 2.04 m2    TDI SEPTAL 0.14 m/s    LV LATERAL E/E' RATIO 6.50 m/s    LV SEPTAL E/E' RATIO 5.57 m/s    LA WIDTH 2.30 cm    IVC diameter 1.53 cm    Left Ventricular Outflow Tract Mean Velocity 0.23287276085 cm/s    Left Ventricular Outflow Tract Mean Gradient 1.94 mmHg    TDI LATERAL 0.12 m/s    LVIDd 3.93 3.5 - 6.0 cm    IVS 0.95 0.6 - 1.1 cm    Posterior Wall 1.19 (A) 0.6 - 1.1 cm    Ao root annulus 2.65 cm    LVIDs 2.63 2.1 - 4.0 cm    FS 33 28 - 44 %    LA volume 15.04 cm3    Sinus 2.55 cm    STJ 2.38 cm    Ascending aorta 2.24 cm    LV mass 136.17 g    LA size 2.32 cm    TAPSE 1.95 cm    Left Ventricle Relative Wall Thickness 0.61 cm    AV mean gradient 3 mmHg    AV valve area 2.68 cm2    AV Velocity Ratio 0.80     AV index (prosthetic) 0.97     MV valve area p 1/2 method 3.70 cm2    PV peak gradient 1.86 mmHg    E/A ratio 1.05     Mean e' 0.13 m/s    E wave deceleration time 205.005809594689891 msec    IVRT 79.026424660447376 msec    LVOT diameter 1.88 cm    LVOT area 2.8 cm2    LVOT peak rasheed 0.90 m/s    LVOT peak VTI 20.30 cm    Ao peak rasheed 1.13 m/s    Ao VTI 21.0 cm    RVOT peak rasheed 0.68 m/s    RVOT peak VTI 15.8 cm    LVOT stroke volume 56.32 cm3    AV peak gradient 5 mmHg    PV mean gradient 1.06 mmHg    E/E' ratio 6.00 m/s    MV Peak E Rasheed 0.78 m/s    TR Max Rasheed 2.27 m/s    MV stenosis pressure 1/2 time 59.970426586735407 ms    MV Peak A Rasheed 0.74 m/s    LV Systolic Volume 25.43 mL    LV Systolic Volume Index 12.5 mL/m2    LV Diastolic Volume 67.12 mL    LV Diastolic Volume Index 32.90 mL/m2    LA Volume Index 7.4 mL/m2    LV Mass Index 67 g/m2    RA Major Axis 2.99 cm     Left Atrium Minor Axis 3.43 cm    Left Atrium Major Axis 3.21 cm    Triscuspid Valve Regurgitation Peak Gradient 21 mmHg    RA Width 2.36 cm   , EKG: Reviewed    Assessment and Plan:   Patient who presents s/p syncopal episode, likely orthostasis medicated. Echo pending. Discussed adequate hydration, electrolyte supplementation, and compression socks. OP EP referral, can consider Florinef.     * Syncope  -Seems mediated by orthostasis, happened after standing from a seated position  -Troponin negative  -Echo pending  -Patient previously on midodrine but reported it was ineffective over time  -Discussed adequate hydration with electrolyte supplementation and compression socks  -OP EP referral, can consider Florinef  -Prior tilt table in 2019 WNL    Orthostasis  -IVF  -Adequate po hydration upon d/c with electrolyte supplementation  -Compression socks        VTE Risk Mitigation (From admission, onward)         Ordered     Place sequential compression device  Until discontinued         08/10/22 8248                Thank you for your consult. I will follow-up with patient. Please contact us if you have any additional questions.    Kenyatta Olivarez PA-C  Cardiology   [facility]

## 2022-08-12 ENCOUNTER — TELEPHONE (OUTPATIENT)
Dept: CARDIOLOGY | Facility: HOSPITAL | Age: 42
End: 2022-08-12
Payer: COMMERCIAL

## 2022-08-12 ENCOUNTER — PATIENT OUTREACH (OUTPATIENT)
Dept: ADMINISTRATIVE | Facility: HOSPITAL | Age: 42
End: 2022-08-12
Payer: COMMERCIAL

## 2022-08-12 ENCOUNTER — OFFICE VISIT (OUTPATIENT)
Dept: CARDIOLOGY | Facility: CLINIC | Age: 42
End: 2022-08-12
Payer: COMMERCIAL

## 2022-08-12 VITALS
HEART RATE: 95 BPM | WEIGHT: 177 LBS | BODY MASS INDEX: 24.78 KG/M2 | DIASTOLIC BLOOD PRESSURE: 80 MMHG | SYSTOLIC BLOOD PRESSURE: 118 MMHG | HEIGHT: 71 IN

## 2022-08-12 DIAGNOSIS — I95.1 POSTURAL HYPOTENSION: Chronic | ICD-10-CM

## 2022-08-12 DIAGNOSIS — R55 SYNCOPE, UNSPECIFIED SYNCOPE TYPE: ICD-10-CM

## 2022-08-12 DIAGNOSIS — I95.1 ORTHOSTASIS: ICD-10-CM

## 2022-08-12 DIAGNOSIS — R00.2 HEART PALPITATIONS: Primary | ICD-10-CM

## 2022-08-12 DIAGNOSIS — R55 VASOVAGAL SYNCOPE: Primary | ICD-10-CM

## 2022-08-12 PROCEDURE — 3044F HG A1C LEVEL LT 7.0%: CPT | Mod: CPTII,S$GLB,, | Performed by: INTERNAL MEDICINE

## 2022-08-12 PROCEDURE — 99205 OFFICE O/P NEW HI 60 MIN: CPT | Mod: S$GLB,,, | Performed by: INTERNAL MEDICINE

## 2022-08-12 PROCEDURE — 99999 PR PBB SHADOW E&M-EST. PATIENT-LVL IV: ICD-10-PCS | Mod: PBBFAC,,, | Performed by: INTERNAL MEDICINE

## 2022-08-12 PROCEDURE — 3074F SYST BP LT 130 MM HG: CPT | Mod: CPTII,S$GLB,, | Performed by: INTERNAL MEDICINE

## 2022-08-12 PROCEDURE — 3008F PR BODY MASS INDEX (BMI) DOCUMENTED: ICD-10-PCS | Mod: CPTII,S$GLB,, | Performed by: INTERNAL MEDICINE

## 2022-08-12 PROCEDURE — 3079F PR MOST RECENT DIASTOLIC BLOOD PRESSURE 80-89 MM HG: ICD-10-PCS | Mod: CPTII,S$GLB,, | Performed by: INTERNAL MEDICINE

## 2022-08-12 PROCEDURE — 1159F PR MEDICATION LIST DOCUMENTED IN MEDICAL RECORD: ICD-10-PCS | Mod: CPTII,S$GLB,, | Performed by: INTERNAL MEDICINE

## 2022-08-12 PROCEDURE — 99999 PR PBB SHADOW E&M-EST. PATIENT-LVL IV: CPT | Mod: PBBFAC,,, | Performed by: INTERNAL MEDICINE

## 2022-08-12 PROCEDURE — 1159F MED LIST DOCD IN RCRD: CPT | Mod: CPTII,S$GLB,, | Performed by: INTERNAL MEDICINE

## 2022-08-12 PROCEDURE — 3074F PR MOST RECENT SYSTOLIC BLOOD PRESSURE < 130 MM HG: ICD-10-PCS | Mod: CPTII,S$GLB,, | Performed by: INTERNAL MEDICINE

## 2022-08-12 PROCEDURE — 3044F PR MOST RECENT HEMOGLOBIN A1C LEVEL <7.0%: ICD-10-PCS | Mod: CPTII,S$GLB,, | Performed by: INTERNAL MEDICINE

## 2022-08-12 PROCEDURE — 3008F BODY MASS INDEX DOCD: CPT | Mod: CPTII,S$GLB,, | Performed by: INTERNAL MEDICINE

## 2022-08-12 PROCEDURE — 3079F DIAST BP 80-89 MM HG: CPT | Mod: CPTII,S$GLB,, | Performed by: INTERNAL MEDICINE

## 2022-08-12 PROCEDURE — 99205 PR OFFICE/OUTPT VISIT, NEW, LEVL V, 60-74 MIN: ICD-10-PCS | Mod: S$GLB,,, | Performed by: INTERNAL MEDICINE

## 2022-08-12 RX ORDER — MIDODRINE HYDROCHLORIDE 5 MG/1
5 TABLET ORAL
Qty: 90 TABLET | Refills: 11 | Status: SHIPPED | OUTPATIENT
Start: 2022-08-12 | End: 2022-10-11 | Stop reason: SDUPTHER

## 2022-08-12 NOTE — PROGRESS NOTES
Called patient to confirm hospital follow up scheduled on 8/15/2022.   Patient is not confirmed. LM to confirm appt.

## 2022-08-12 NOTE — PROGRESS NOTES
Subjective:   Patient ID:  Arielle Verde is a 42 y.o. female who presents for follow-up of Loss of Consciousness  Pt with recent admission for syncope. Past tilt table normal.   Pt stopped midodrine because of weight gain . Echo nml        Dizziness:   Chronicity:  Recurrent  Onset:  1 to 4 weeks ago  Progression since onset:  Waxing and waning  Frequency:  Every few days  Severity:  Moderate  Dizziness characteristics:  Blacking out and lightheaded/impending faintno palpitations and no chest pain.  Aggravated by:  Getting up  Treatments tried: midodrine.  Improvements on treatment:  Moderate      Review of Systems   Constitutional: Negative. Negative for weight gain.   HENT: Negative.    Eyes: Negative.    Cardiovascular: Negative.  Negative for chest pain, leg swelling and palpitations.   Respiratory: Negative.  Negative for shortness of breath.    Endocrine: Negative.    Hematologic/Lymphatic: Negative.    Skin: Negative.    Musculoskeletal: Negative for muscle weakness.   Gastrointestinal: Negative.    Genitourinary: Negative.    Neurological: Positive for dizziness.   Psychiatric/Behavioral: Negative.    Allergic/Immunologic: Negative.      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     Comment: Mirena 3/30/20 LOT [id number] exp 4.2022     Current Outpatient Medications on File Prior to Visit   Medication Sig Dispense Refill    albuterol (PROVENTIL/VENTOLIN HFA) 90 mcg/actuation inhaler Inhale 2 puffs into the lungs every 6 (six) hours as needed for Wheezing. Dispense with spacer. 6.7 g 0    atomoxetine (STRATTERA) 40 MG capsule Take 1 daily for 7 days then 2 daily thereafter. 60 capsule 5    buPROPion (WELLBUTRIN XL) 150 MG TB24 tablet Take 3 tablets (450 mg total) by mouth once daily. 90 tablet 5    busPIRone (BUSPAR) 15 MG tablet Take 2 tablets (30 mg total) by mouth 2 (two) times daily. 120 tablet 5    cetirizine (ZYRTEC) 10 MG tablet Take 10 mg by mouth daily as needed.   0    clonazePAM (KLONOPIN) 0.5 MG tablet Take 1 tablet (0.5 mg total) by mouth daily as needed for Anxiety. 30 tablet 3    metFORMIN (GLUCOPHAGE) 500 MG tablet Take 500 mg by mouth 2 (two) times daily.      midodrine (PROAMATINE) 5 MG Tab Take 1 tablet (5 mg total) by mouth 2 (two) times daily with meals. 60 tablet 1    rizatriptan (MAXALT) 10 MG tablet Take 10 mg by mouth daily as needed.      traMADoL (ULTRAM) 50 mg tablet Take 1 tablet (50 mg total) by mouth every 6 (six) hours. for 5 days 20 tablet 0    traZODone (DESYREL) 100 MG tablet Take 1-3 tablets at bedtime as needed for sleep. 270 tablet 1    carboxymethylcellulose-citric (PLENITY, WELCOME KIT,) 0.75 gram Cap Take 3 capsules by mouth 2 (two) times a day. 180 capsule 1    EPINEPHrine (EPIPEN 2-DUANE) 0.3 mg/0.3 mL AtIn Inject 0.6 mLs (0.6 mg total) into the muscle once. for 1 dose 0.6 mL 0    triamcinolone acetonide 0.1% (KENALOG) 0.1 % cream Apply topically 2 (two) times daily. 80 g 1     Current Facility-Administered Medications on File Prior to Visit   Medication Dose Route Frequency Provider Last Rate  Last Admin    levonorgestreL 20 mcg/24 hours (5 yrs) 52 mg IUD 1 Intra Uterine Device  1 each Intrauterine 1 time in Clinic/HOD Rachele Willis MD        onabotulinumtoxina injection 200 Units  200 Units Intramuscular Q90 Days Jose Luis Anderson MD   200 Units at 06/07/22 1406    [DISCONTINUED] 0.9%  NaCl infusion   Intravenous Continuous Cameron Salgado  mL/hr at 08/11/22 0820 New Bag at 08/11/22 0820    [DISCONTINUED] acetaminophen tablet 650 mg  650 mg Oral Q6H PRN Cameron Salgado MD        [DISCONTINUED] albuterol-ipratropium 2.5 mg-0.5 mg/3 mL nebulizer solution 3 mL  3 mL Nebulization Q4H PRN Cameron Salgado MD        [DISCONTINUED] aluminum-magnesium hydroxide-simethicone 200-200-20 mg/5 mL suspension 30 mL  30 mL Oral Q6H PRN Cameron Salgado MD        [DISCONTINUED] guaiFENesin 100 mg/5 ml syrup 200 mg  200 mg Oral Q4H PRN Cameron Salgado MD        [DISCONTINUED] HYDROcodone-acetaminophen 5-325 mg per tablet 1 tablet  1 tablet Oral Q6H PRN Margoth Martin, NP   1 tablet at 08/11/22 1454    [DISCONTINUED] ondansetron injection 4 mg  4 mg Intravenous Q8H PRN Cameron Salgado MD         Review of patient's allergies indicates:   Allergen Reactions    Emgality [galcanezumab-gnlm] Hives and Itching    Topiramate Itching       Objective:     Physical Exam  Vitals and nursing note reviewed.   Constitutional:       Appearance: She is well-developed.   HENT:      Head: Normocephalic and atraumatic.   Eyes:      Conjunctiva/sclera: Conjunctivae normal.      Pupils: Pupils are equal, round, and reactive to light.   Cardiovascular:      Rate and Rhythm: Normal rate and regular rhythm.      Pulses: Intact distal pulses.      Heart sounds: Normal heart sounds.   Pulmonary:      Effort: Pulmonary effort is normal.      Breath sounds: Normal breath sounds.   Abdominal:      General: Bowel sounds are normal.      Palpations: Abdomen is soft.   Musculoskeletal:         General: Normal range of motion.      Cervical back:  Normal range of motion and neck supple.   Skin:     General: Skin is warm and dry.   Neurological:      Mental Status: She is alert and oriented to person, place, and time.         Assessment:     1. Heart palpitations    2. Orthostasis    3. Postural hypotension    4. Syncope, unspecified syncope type    5. Vasodepressor syncope    Plan:     Heart palpitations    Orthostasis    Postural hypotension    Syncope, unspecified syncope type      Increase midodrine to tid  EP appt

## 2022-08-17 NOTE — PROGRESS NOTES
Subjective:   Patient ID:  Arielle Verde is a 42 y.o. female who presents for evaluation of No chief complaint on file.      HPI     Arielle Verde is a 42 year old female who presents to Arrhythmia clinic due to recurrent syncopal events. Her current medical conditions include Syncope, orthostatic hypotension, anxiety, chronic Headaches, Depression. She returns today and states she is doing ok.     Her resting heart rate has been ranging in 80-90s, used to get to 70s but has not in a little while now.     Feels chest pain and palps when her heart rates increase to the 90-110s.     Is staying properly hydrated with 2L of fluid intake every day.   Has not had any recurrent syncope but near syncope events since discharge.     . No shortness of breath, ROSE or palpitations. Denies orthopnea, PND or abdominal bloating. Reports regular walking without any issues lately. NO leg swelling or claudications. Reports compliance with medications and dietary restrictions. NO CNS complaints to suggest TIA or CVA today. No signs of abnormal bleeding.    Recommended to increase her midodrine to 5 mg every 4 hours for 3 doses daily    Has noted improvement in blood pressure since change in midodrine dosage.     Has noted worsening symptoms of near syncope.     She has lost about 40  Pounds recently.         TILT TABLE  TEST DESCRIPTION   INDICATION:     The patient is a 38 year old female that was referred for head-up tilt test by Dr. Justin Freeman for Presyncope and Orthostatic syncope.       PROCEDURE:   Informed consent was obtained from the patient.       SUMMARY:   1. Baseline values: blood pressure of 86/55 mmHg and heart rate of 65 bpm.   2. Normal response to head up tilt.     3. The maximum heart rate noted was 107 bpm at the 59th minute of tilting.     CONCLUSIONS     1. Compression stockings.   2. Consider midodrine.   3. Consider fludrocortisone.   4. Follow-up with  Dr. Johnson Rodriguez in 1  week.           This document has been electronically    SIGNED BY: Justin Freeman MD On: 01/11/2019 13:22      Past Medical History:   Diagnosis Date    Anxiety     Chronic headaches     SALLIE III (cervical intraepithelial neoplasia grade III) with severe dysplasia 2019    s/p LEEP    Depression     Migraine        Past Surgical History:   Procedure Laterality Date    ADENOIDECTOMY      AUGMENTATION OF BREAST  06/2005    saline    BREAST SURGERY      breast augmentatin    COLD KNIFE CONIZATION OF CERVIX N/A 11/27/2019    Procedure: CONE BIOPSY, CERVIX, USING COLD;  Surgeon: Rachele Willis MD;  Location: [facility] OR;  Service: OB/GYN;  Laterality: N/A;    Hyperhydrosis      LIPOSUCTION OF NECK      REMOVAL OF INTRAUTERINE DEVICE (IUD)  11/27/2019    Procedure: REMOVAL, INTRAUTERINE DEVICE;  Surgeon: Rachele Willis MD;  Location: [facility] OR;  Service: OB/GYN;;    TILT TABLE TEST N/A 1/11/2019    Procedure: TILT TABLE TEST;  Surgeon: Justin Freeman MD;  Location: [facility] CATH LAB;  Service: Cardiology;  Laterality: N/A;  Rodriguez pt/pt req 930 start time    TONSILLECTOMY         Social History     Tobacco Use    Smoking status: Light Tobacco Smoker    Smokeless tobacco: Never Used    Tobacco comment: no smoking after m.n prior to sx   Substance Use Topics    Alcohol use: Yes     Comment: social     Drug use: No       Family History   Problem Relation Age of Onset    Lupus Mother     Ulcerative colitis Mother     Kidney failure Father     Drug abuse Father     Breast cancer Paternal Grandmother     Colon cancer Neg Hx     Ovarian cancer Neg Hx      Wt Readings from Last 3 Encounters:   08/18/22 80.8 kg (178 lb 2.1 oz)   08/12/22 80.3 kg (177 lb 0.5 oz)   08/11/22 83.5 kg (184 lb)     Temp Readings from Last 3 Encounters:   08/11/22 99.2 °F (37.3 °C) (Oral)   02/17/22 99.1 °F (37.3 °C)   10/19/21 98.4 °F (36.9 °C) (Oral)     BP Readings from Last 3 Encounters:   08/18/22 98/68   08/12/22  118/80   08/11/22 136/85     Pulse Readings from Last 3 Encounters:   08/18/22 (!) 111   08/12/22 95   08/11/22 99     Current Outpatient Medications on File Prior to Visit   Medication Sig Dispense Refill    albuterol (PROVENTIL/VENTOLIN HFA) 90 mcg/actuation inhaler Inhale 2 puffs into the lungs every 6 (six) hours as needed for Wheezing. Dispense with spacer. 6.7 g 0    atomoxetine (STRATTERA) 40 MG capsule Take 1 daily for 7 days then 2 daily thereafter. 60 capsule 5    buPROPion (WELLBUTRIN XL) 150 MG TB24 tablet Take 3 tablets (450 mg total) by mouth once daily. 90 tablet 5    busPIRone (BUSPAR) 15 MG tablet Take 2 tablets (30 mg total) by mouth 2 (two) times daily. 120 tablet 5    cetirizine (ZYRTEC) 10 MG tablet Take 10 mg by mouth daily as needed.   0    clonazePAM (KLONOPIN) 0.5 MG tablet Take 1 tablet (0.5 mg total) by mouth daily as needed for Anxiety. 30 tablet 3    EPINEPHrine (EPIPEN 2-DUANE) 0.3 mg/0.3 mL AtIn Inject 0.6 mLs (0.6 mg total) into the muscle once. for 1 dose 0.6 mL 0    metFORMIN (GLUCOPHAGE) 500 MG tablet Take 500 mg by mouth 2 (two) times daily.      midodrine (PROAMATINE) 5 MG Tab Take 1 tablet (5 mg total) by mouth 3 (three) times daily with meals. 90 tablet 11    rizatriptan (MAXALT) 10 MG tablet Take 10 mg by mouth daily as needed.      traZODone (DESYREL) 100 MG tablet Take 1-3 tablets at bedtime as needed for sleep. 270 tablet 1    carboxymethylcellulose-citric (PLENITY, WELCOME KIT,) 0.75 gram Cap Take 3 capsules by mouth 2 (two) times a day. (Patient not taking: Reported on 8/18/2022) 180 capsule 1    triamcinolone acetonide 0.1% (KENALOG) 0.1 % cream Apply topically 2 (two) times daily. 80 g 1     Current Facility-Administered Medications on File Prior to Visit   Medication Dose Route Frequency Provider Last Rate Last Admin    levonorgestreL 20 mcg/24 hours (5 yrs) 52 mg IUD 1 Intra Uterine Device  1 each Intrauterine 1 time in Clinic/HOD Rachele Willis MD      "   onabotulinumtoxina injection 200 Units  200 Units Intramuscular Q90 Days Jose Luis Anderson MD   200 Units at 06/07/22 1406       Review of Systems   Constitutional: Positive for malaise/fatigue.   HENT: Negative for hearing loss and hoarse voice.    Eyes: Negative for blurred vision and visual disturbance.   Cardiovascular: Positive for near-syncope. Negative for chest pain, claudication, dyspnea on exertion, irregular heartbeat, leg swelling, orthopnea, palpitations, paroxysmal nocturnal dyspnea and syncope.   Respiratory: Negative for cough, hemoptysis, shortness of breath, sleep disturbances due to breathing, snoring and wheezing.    Endocrine: Negative for cold intolerance and heat intolerance.   Hematologic/Lymphatic: Does not bruise/bleed easily.   Skin: Negative for color change, dry skin and nail changes.   Musculoskeletal: Positive for arthritis and back pain. Negative for joint pain and myalgias.   Gastrointestinal: Negative for bloating, abdominal pain, constipation, nausea and vomiting.   Genitourinary: Negative for dysuria, flank pain, hematuria and hesitancy.   Neurological: Positive for dizziness and light-headedness. Negative for headaches, loss of balance, numbness, paresthesias and weakness.   Psychiatric/Behavioral: Negative for altered mental status.   Allergic/Immunologic: Negative for environmental allergies.     BP 98/68 (BP Location: Left arm, Patient Position: Sitting)   Pulse (!) 111   Ht 5' 11" (1.803 m)   Wt 80.8 kg (178 lb 2.1 oz)   SpO2 97%   BMI 24.84 kg/m²     Objective:   Physical Exam  Vitals and nursing note reviewed.   Constitutional:       General: She is not in acute distress.     Appearance: Normal appearance. She is well-developed. She is not ill-appearing.   HENT:      Head: Normocephalic and atraumatic.      Nose: Nose normal.      Mouth/Throat:      Mouth: Mucous membranes are moist.   Eyes:      Pupils: Pupils are equal, round, and reactive to light.   Neck:      " Thyroid: No thyromegaly.      Vascular: No JVD.      Trachea: No tracheal deviation.   Cardiovascular:      Rate and Rhythm: Regular rhythm. Tachycardia present.      Chest Wall: PMI is not displaced.      Pulses: Intact distal pulses.           Radial pulses are 2+ on the right side and 2+ on the left side.        Dorsalis pedis pulses are 2+ on the right side and 2+ on the left side.      Heart sounds: S1 normal and S2 normal. Heart sounds not distant. No murmur heard.  Pulmonary:      Effort: Pulmonary effort is normal. No respiratory distress.      Breath sounds: Normal breath sounds and air entry. No decreased breath sounds, wheezing or rales.   Abdominal:      General: Bowel sounds are normal. There is no distension.      Palpations: Abdomen is soft.      Tenderness: There is no abdominal tenderness.   Musculoskeletal:         General: No swelling. Normal range of motion.      Cervical back: Full passive range of motion without pain, normal range of motion and neck supple.      Right ankle: No swelling.      Left ankle: No swelling.   Skin:     General: Skin is warm and dry.      Capillary Refill: Capillary refill takes less than 2 seconds.      Nails: There is no clubbing.   Neurological:      General: No focal deficit present.      Mental Status: She is alert and oriented to person, place, and time.      Motor: No weakness.   Psychiatric:         Mood and Affect: Mood normal.         Speech: Speech normal.         Behavior: Behavior normal.         Thought Content: Thought content normal.         Judgment: Judgment normal.         Lab Results   Component Value Date    CHOL 170 06/16/2022    CHOL 192 04/20/2017     Lab Results   Component Value Date    HDL 67 06/16/2022    HDL 46 04/20/2017     Lab Results   Component Value Date    LDLCALC 87.4 06/16/2022    LDLCALC 128 04/20/2017     Lab Results   Component Value Date    TRIG 78 06/16/2022    TRIG 91 04/20/2017     Lab Results   Component Value Date     CHOLHDL 39.4 06/16/2022       Chemistry        Component Value Date/Time     08/11/2022 0550    K 3.9 08/11/2022 0550     08/11/2022 0550    CO2 27 08/11/2022 0550    BUN 10 08/11/2022 0550    CREATININE 0.8 08/11/2022 0550    GLU 89 08/11/2022 0550        Component Value Date/Time    CALCIUM 8.5 (L) 08/11/2022 0550    ALKPHOS 37 (L) 08/11/2022 0550    AST 20 08/11/2022 0550    ALT 25 08/11/2022 0550    BILITOT 0.5 08/11/2022 0550    ESTGFRAFRICA >60 06/16/2022 0948    EGFRNONAA >60 06/16/2022 0948          Lab Results   Component Value Date    TSH 0.773 04/19/2021     No results found for: INR, PROTIME  Lab Results   Component Value Date    WBC 6.08 08/11/2022    HGB 11.0 (L) 08/11/2022    HCT 35.3 (L) 08/11/2022    MCV 94 08/11/2022     08/11/2022     Results for orders placed during the hospital encounter of 08/10/22    Echo    Interpretation Summary  · Concentric remodeling and normal systolic function.  · The estimated ejection fraction is 60%.  · Normal left ventricular diastolic function.  · Normal right ventricular size with normal right ventricular systolic function.  · Normal central venous pressure (3 mmHg).  · The estimated PA systolic pressure is 24 mmHg.     Assessment:      1. Postural hypotension    2. Syncope, unspecified syncope type    3. Vasodepressor syncope    4. Orthostasis    5. Heart palpitations        Plan:     48 hour holter monitor to assess vagal tone  OBtain HUT with blood and likely Isuprel  Stop Midodrine 3 days prior to HUT testing  Continue adequate hydration  Profile BP/HR at home  RTC post HUT to discuss further treatment strategies    Nicole May, FNP-C [facility]

## 2022-08-18 ENCOUNTER — OFFICE VISIT (OUTPATIENT)
Dept: CARDIOLOGY | Facility: CLINIC | Age: 42
End: 2022-08-18
Payer: COMMERCIAL

## 2022-08-18 ENCOUNTER — TELEPHONE (OUTPATIENT)
Dept: CARDIOLOGY | Facility: HOSPITAL | Age: 42
End: 2022-08-18
Payer: COMMERCIAL

## 2022-08-18 VITALS
DIASTOLIC BLOOD PRESSURE: 68 MMHG | OXYGEN SATURATION: 97 % | BODY MASS INDEX: 24.94 KG/M2 | SYSTOLIC BLOOD PRESSURE: 98 MMHG | WEIGHT: 178.13 LBS | HEART RATE: 111 BPM | HEIGHT: 71 IN

## 2022-08-18 DIAGNOSIS — I95.1 POSTURAL HYPOTENSION: Primary | ICD-10-CM

## 2022-08-18 DIAGNOSIS — R00.2 HEART PALPITATIONS: ICD-10-CM

## 2022-08-18 DIAGNOSIS — R55 SYNCOPE, UNSPECIFIED SYNCOPE TYPE: ICD-10-CM

## 2022-08-18 DIAGNOSIS — R55 VASODEPRESSOR SYNCOPE: ICD-10-CM

## 2022-08-18 DIAGNOSIS — I95.1 ORTHOSTASIS: ICD-10-CM

## 2022-08-18 PROCEDURE — 3008F PR BODY MASS INDEX (BMI) DOCUMENTED: ICD-10-PCS | Mod: CPTII,S$GLB,, | Performed by: NURSE PRACTITIONER

## 2022-08-18 PROCEDURE — 3078F DIAST BP <80 MM HG: CPT | Mod: CPTII,S$GLB,, | Performed by: NURSE PRACTITIONER

## 2022-08-18 PROCEDURE — 3044F PR MOST RECENT HEMOGLOBIN A1C LEVEL <7.0%: ICD-10-PCS | Mod: CPTII,S$GLB,, | Performed by: NURSE PRACTITIONER

## 2022-08-18 PROCEDURE — 3008F BODY MASS INDEX DOCD: CPT | Mod: CPTII,S$GLB,, | Performed by: NURSE PRACTITIONER

## 2022-08-18 PROCEDURE — 3074F SYST BP LT 130 MM HG: CPT | Mod: CPTII,S$GLB,, | Performed by: NURSE PRACTITIONER

## 2022-08-18 PROCEDURE — 99215 PR OFFICE/OUTPT VISIT, EST, LEVL V, 40-54 MIN: ICD-10-PCS | Mod: S$GLB,,, | Performed by: NURSE PRACTITIONER

## 2022-08-18 PROCEDURE — 1159F MED LIST DOCD IN RCRD: CPT | Mod: CPTII,S$GLB,, | Performed by: NURSE PRACTITIONER

## 2022-08-18 PROCEDURE — 1159F PR MEDICATION LIST DOCUMENTED IN MEDICAL RECORD: ICD-10-PCS | Mod: CPTII,S$GLB,, | Performed by: NURSE PRACTITIONER

## 2022-08-18 PROCEDURE — 3078F PR MOST RECENT DIASTOLIC BLOOD PRESSURE < 80 MM HG: ICD-10-PCS | Mod: CPTII,S$GLB,, | Performed by: NURSE PRACTITIONER

## 2022-08-18 PROCEDURE — 99215 OFFICE O/P EST HI 40 MIN: CPT | Mod: S$GLB,,, | Performed by: NURSE PRACTITIONER

## 2022-08-18 PROCEDURE — 3044F HG A1C LEVEL LT 7.0%: CPT | Mod: CPTII,S$GLB,, | Performed by: NURSE PRACTITIONER

## 2022-08-18 PROCEDURE — 99999 PR PBB SHADOW E&M-EST. PATIENT-LVL IV: ICD-10-PCS | Mod: PBBFAC,,, | Performed by: NURSE PRACTITIONER

## 2022-08-18 PROCEDURE — 99999 PR PBB SHADOW E&M-EST. PATIENT-LVL IV: CPT | Mod: PBBFAC,,, | Performed by: NURSE PRACTITIONER

## 2022-08-18 PROCEDURE — 3074F PR MOST RECENT SYSTOLIC BLOOD PRESSURE < 130 MM HG: ICD-10-PCS | Mod: CPTII,S$GLB,, | Performed by: NURSE PRACTITIONER

## 2022-08-23 ENCOUNTER — HOSPITAL ENCOUNTER (OUTPATIENT)
Dept: CARDIOLOGY | Facility: HOSPITAL | Age: 42
Discharge: HOME | End: 2022-08-23
Attending: NURSE PRACTITIONER
Payer: COMMERCIAL

## 2022-08-23 ENCOUNTER — PATIENT MESSAGE (OUTPATIENT)
Dept: CARDIOLOGY | Facility: CLINIC | Age: 42
End: 2022-08-23
Payer: COMMERCIAL

## 2022-08-23 DIAGNOSIS — R55 SYNCOPE, UNSPECIFIED SYNCOPE TYPE: ICD-10-CM

## 2022-08-23 PROCEDURE — 93226 XTRNL ECG REC<48 HR SCAN A/R: CPT

## 2022-08-23 PROCEDURE — 93227 HOLTER MONITOR - 48 HOUR (CUPID ONLY): ICD-10-PCS | Mod: ,,, | Performed by: INTERNAL MEDICINE

## 2022-08-23 PROCEDURE — 93227 XTRNL ECG REC<48 HR R&I: CPT | Mod: ,,, | Performed by: INTERNAL MEDICINE

## 2022-08-24 ENCOUNTER — PATIENT MESSAGE (OUTPATIENT)
Dept: PSYCHIATRY | Facility: CLINIC | Age: 42
End: 2022-08-24
Payer: COMMERCIAL

## 2022-08-25 ENCOUNTER — PATIENT MESSAGE (OUTPATIENT)
Dept: CARDIOLOGY | Facility: CLINIC | Age: 42
End: 2022-08-25
Payer: COMMERCIAL

## 2022-08-27 LAB
OHS CV EVENT MONITOR DAY: 0
OHS CV HOLTER LENGTH DECIMAL HOURS: 48
OHS CV HOLTER LENGTH HOURS: 48
OHS CV HOLTER LENGTH MINUTES: 0
OHS CV HOLTER SINUS AVERAGE HR: 92
OHS CV HOLTER SINUS MAX HR: 145
OHS CV HOLTER SINUS MIN HR: 58

## 2022-08-29 ENCOUNTER — TELEPHONE (OUTPATIENT)
Dept: CARDIOLOGY | Facility: CLINIC | Age: 42
End: 2022-08-29
Payer: COMMERCIAL

## 2022-08-29 NOTE — TELEPHONE ENCOUNTER
----- Message from GIOVANNY Oneil-AGNIESZKA sent at 8/29/2022  4:15 AM CDT -----  Please notify patient    Holter monitor reviewed    1st 24 hours revealed tachycardia   Rare skipped beats  Average heart rate in 90s    Thanks

## 2022-08-30 ENCOUNTER — TELEPHONE (OUTPATIENT)
Dept: NEUROLOGY | Facility: CLINIC | Age: 42
End: 2022-08-30
Payer: COMMERCIAL

## 2022-09-01 ENCOUNTER — PATIENT MESSAGE (OUTPATIENT)
Dept: INTERNAL MEDICINE | Facility: CLINIC | Age: 42
End: 2022-09-01
Payer: COMMERCIAL

## 2022-09-01 DIAGNOSIS — R55 VASODEPRESSOR SYNCOPE: Primary | ICD-10-CM

## 2022-09-01 DIAGNOSIS — R00.2 HEART PALPITATIONS: ICD-10-CM

## 2022-09-01 RX ORDER — BETAXOLOL 10 MG/1
10 TABLET, FILM COATED ORAL DAILY
Qty: 30 TABLET | Refills: 11 | Status: SHIPPED | OUTPATIENT
Start: 2022-09-01 | End: 2023-09-01

## 2022-09-01 RX ORDER — METFORMIN HYDROCHLORIDE 500 MG/1
500 TABLET ORAL 2 TIMES DAILY
Qty: 60 TABLET | Refills: 3 | Status: SHIPPED | OUTPATIENT
Start: 2022-09-01 | End: 2023-01-11 | Stop reason: SDUPTHER

## 2022-09-01 NOTE — TELEPHONE ENCOUNTER
No new care gaps identified.  [facility] Embedded Care Gaps. Reference number: [id number]. 9/01/2022   7:33:34 AM LARAT

## 2022-09-01 NOTE — TELEPHONE ENCOUNTER
Refill Routing Note   Medication(s) are not appropriate for processing by [facility] for the following reason(s):      - Patient has been seen in the ED/Hospital since the last PCP visit    [facility] action(s):  Defer          Medication reconciliation completed: No     Appointments  past 12m or future 3m with PCP    Date Provider   Last Visit   6/6/2022 Vera Broussard MD   Next Visit   Visit date not found Vera Broussard MD   ED visits in past 90 days: 0        Note composed:8:55 AM 09/01/2022

## 2022-09-01 NOTE — TELEPHONE ENCOUNTER
Cydney Smith, JUANA Lamas Staff  Caller: Unspecified (3 days ago,  4:51 PM)  Start Betaxolol 2.5 mg daily until she can complete tilt table test.           Previous Messages      The patient has been notified of this information and all questions answered.

## 2022-09-08 ENCOUNTER — PATIENT MESSAGE (OUTPATIENT)
Dept: CARDIOLOGY | Facility: CLINIC | Age: 42
End: 2022-09-08
Payer: COMMERCIAL

## 2022-09-08 ENCOUNTER — TELEPHONE (OUTPATIENT)
Dept: NEUROLOGY | Facility: CLINIC | Age: 42
End: 2022-09-08

## 2022-09-08 ENCOUNTER — PROCEDURE VISIT (OUTPATIENT)
Dept: NEUROLOGY | Facility: CLINIC | Age: 42
End: 2022-09-08
Payer: COMMERCIAL

## 2022-09-08 VITALS
RESPIRATION RATE: 16 BRPM | HEIGHT: 71 IN | OXYGEN SATURATION: 96 % | SYSTOLIC BLOOD PRESSURE: 106 MMHG | BODY MASS INDEX: 24.41 KG/M2 | HEART RATE: 96 BPM | DIASTOLIC BLOOD PRESSURE: 72 MMHG | WEIGHT: 174.38 LBS

## 2022-09-08 DIAGNOSIS — G43.009 MIGRAINE WITHOUT AURA AND WITHOUT STATUS MIGRAINOSUS, NOT INTRACTABLE: Primary | ICD-10-CM

## 2022-09-08 DIAGNOSIS — R55 SYNCOPE, UNSPECIFIED SYNCOPE TYPE: ICD-10-CM

## 2022-09-08 PROCEDURE — 64615 PR CHEMODENERVATION OF MUSCLE FOR CHRONIC MIGRAINE: ICD-10-PCS | Mod: S$GLB,,, | Performed by: PSYCHIATRY & NEUROLOGY

## 2022-09-08 PROCEDURE — 64615 CHEMODENERV MUSC MIGRAINE: CPT | Mod: S$GLB,,, | Performed by: PSYCHIATRY & NEUROLOGY

## 2022-09-08 RX ADMIN — ONABOTULINUMTOXINA 200 UNITS: 100 INJECTION, POWDER, LYOPHILIZED, FOR SOLUTION INTRADERMAL; INTRAMUSCULAR at 02:09

## 2022-09-08 NOTE — PROCEDURES
BOTOX CONTINUES TO HELP WITH MIGRAINE CONTROL.          1. PROCEDURE: Botox injections      2. INDICATION: Intractable Migraine      3. TIME OUT: The procedure was discussed in details with the patient and the consent form was signed. The patient verbalized understanding of the procedure . I discussed the risks that may include serious immune reaction and distant spread that could results in loss of breathing. Other side effects may include droopy eyelids, trouble swallowing and cardiac arrhythmias. It was also stressed that it is contraindicated in pregnancy.      3. COURSE:   The standard protocol was followed. the procedure was very smooth.      4. COMPLICATIONS: None. The patient tolerated the procedure very well.      5. AFTERCARE: I encouraged the patient to use ice if the sites of injection get tender and call me with any problems or complications.               As per the standard protocol, a total 155 units were injected in 31 sites.            RT  5 units in 1 site     LT  5 units in 1 site      Procerus 5 units in 1 site      RT Frontalis 10 units in 2 sites      LT Frontalis 10 units in 2 site      RT Temporalis 20 units in 4 sites     LT Temporalis 20 units in 4 sites     RT Occipitalis 15 units in 3 sites     LT Occipitalis 15  units in 3 sites      RT Cervical Paraspinals 10 units in 2 sites     LT Cervical Paraspinals 10 units in 2 sites     RT Trapezius 15 units in 3 sites     LT Trapezius 15 units in 3 sites         Per the standard of care protocol we use 155 units and waste 45 units. I gave the patient the option of following the standard protocol vs.using the extra 45 units to target areas where the pain is maximum which could potentially provide extra help with headache control. I explained to the patient that this will be an off-label use, not the standard protocol, not evidence-based and could result in more pronounced side effects. The patient verbalized full  understanding of all the facts and the risks and benefits and elected to use the extra 45 units for the following sites:                 Procerus 5 units in 1 site      RT Frontalis 10 units in 2 sites      LT Frontalis 10 units in 2 site      RT Temporalis 10 units in 2 sites

## 2022-09-08 NOTE — TELEPHONE ENCOUNTER
Hey  this pt said someone reached out to her for interview with Dr. Rafat hampton  scheduled . Do you know anything about this ? 0/10/22

## 2022-09-12 ENCOUNTER — TELEPHONE (OUTPATIENT)
Dept: CARDIOLOGY | Facility: CLINIC | Age: 42
End: 2022-09-12
Payer: COMMERCIAL

## 2022-09-12 NOTE — TELEPHONE ENCOUNTER
Betaxolol Approved today  PA Case: [id number], Status: Approved, Coverage Starts on: 9/12/2022 12:00:00 AM, Coverage Ends on: 9/12/2023 12:00:00 AM.

## 2022-09-14 ENCOUNTER — HOSPITAL ENCOUNTER (OUTPATIENT)
Dept: PULMONOLOGY | Facility: HOSPITAL | Age: 42
Discharge: HOME | End: 2022-09-14
Attending: FAMILY MEDICINE
Payer: COMMERCIAL

## 2022-09-14 PROCEDURE — 95819 PR EEG,W/AWAKE & ASLEEP RECORD: ICD-10-PCS | Mod: 26,,, | Performed by: PSYCHIATRY & NEUROLOGY

## 2022-09-14 PROCEDURE — 95819 EEG AWAKE AND ASLEEP: CPT | Mod: 26,,, | Performed by: PSYCHIATRY & NEUROLOGY

## 2022-09-14 PROCEDURE — 95819 EEG AWAKE AND ASLEEP: CPT

## 2022-09-15 ENCOUNTER — TELEPHONE (OUTPATIENT)
Dept: NEUROLOGY | Facility: CLINIC | Age: 42
End: 2022-09-15

## 2022-09-15 NOTE — TELEPHONE ENCOUNTER
----- Message from Jose Luis Anderson MD sent at 9/15/2022  9:03 AM CDT -----      EEG NL    Ordered AEEG

## 2022-09-15 NOTE — PROCEDURES
DATE EEG PERFORMED:  2022.      DATE EEG INTERPRETED: 09-.                   DURATION OF EE MINUTES.        LEVEL OF CONSCIOUSENESS    Awake and Sleep.         EEG BACKGROUND    The posterior dominant basic rhythm reaches 9-10 Hz, symmetric, reactive, well-modulated and well-sustained.         EEG CLASSIFICATION    Normal        IMPRESSION      The EEG is normal in the awake and sleep states.       There are no epileptiform discharges or lateralizing signs. No typical events were recorded. There is no electrographic evidence of seizure.There is no electrographic evidence of status epilepticus.         PLEASE NOTE THAT A NON-EPILEPTIFORM EEG DOES NOT RULE OUT EPILEPSY.        RADHA GALLAGHER MD, FAAN    Diplomate, American Board of Psychiatry and Neurology    Diplomate, American Board of Clinical Neurophysiology

## 2022-09-20 ENCOUNTER — TELEPHONE (OUTPATIENT)
Dept: CARDIOLOGY | Facility: HOSPITAL | Age: 42
End: 2022-09-20
Payer: COMMERCIAL

## 2022-10-05 ENCOUNTER — PATIENT MESSAGE (OUTPATIENT)
Dept: CARDIOLOGY | Facility: CLINIC | Age: 42
End: 2022-10-05
Payer: COMMERCIAL

## 2022-10-09 ENCOUNTER — PATIENT MESSAGE (OUTPATIENT)
Dept: NEUROLOGY | Facility: CLINIC | Age: 42
End: 2022-10-09
Payer: COMMERCIAL

## 2022-10-12 ENCOUNTER — OFFICE VISIT (OUTPATIENT)
Dept: PSYCHIATRY | Facility: CLINIC | Age: 42
End: 2022-10-12
Payer: COMMERCIAL

## 2022-10-12 DIAGNOSIS — G47.00 INSOMNIA, UNSPECIFIED TYPE: ICD-10-CM

## 2022-10-12 DIAGNOSIS — F32.A CHRONIC DEPRESSION: Primary | ICD-10-CM

## 2022-10-12 DIAGNOSIS — R41.840 DISTURBED CONCENTRATION: ICD-10-CM

## 2022-10-12 PROCEDURE — 99211 OFF/OP EST MAY X REQ PHY/QHP: CPT | Performed by: PSYCHIATRY & NEUROLOGY

## 2022-10-12 PROCEDURE — 99214 PR OFFICE/OUTPT VISIT, EST, LEVL IV, 30-39 MIN: ICD-10-PCS | Mod: 95,,, | Performed by: PSYCHIATRY & NEUROLOGY

## 2022-10-12 PROCEDURE — 3044F HG A1C LEVEL LT 7.0%: CPT | Mod: CPTII,95,, | Performed by: PSYCHIATRY & NEUROLOGY

## 2022-10-12 PROCEDURE — 99214 OFFICE O/P EST MOD 30 MIN: CPT | Mod: 95,,, | Performed by: PSYCHIATRY & NEUROLOGY

## 2022-10-12 PROCEDURE — 3044F PR MOST RECENT HEMOGLOBIN A1C LEVEL <7.0%: ICD-10-PCS | Mod: CPTII,95,, | Performed by: PSYCHIATRY & NEUROLOGY

## 2022-10-12 RX ORDER — BUSPIRONE HYDROCHLORIDE 15 MG/1
30 TABLET ORAL 2 TIMES DAILY
Qty: 120 TABLET | Refills: 5 | Status: SHIPPED | OUTPATIENT
Start: 2022-10-12 | End: 2023-09-24 | Stop reason: SDUPTHER

## 2022-10-12 RX ORDER — ATOMOXETINE 80 MG/1
80 CAPSULE ORAL DAILY
Qty: 90 CAPSULE | Refills: 1 | Status: SHIPPED | OUTPATIENT
Start: 2022-10-12 | End: 2023-02-07

## 2022-10-12 RX ORDER — TRAZODONE HYDROCHLORIDE 100 MG/1
TABLET ORAL
Qty: 270 TABLET | Refills: 1 | Status: SHIPPED | OUTPATIENT
Start: 2022-10-12 | End: 2023-07-12

## 2022-10-12 RX ORDER — BUPROPION HYDROCHLORIDE 150 MG/1
450 TABLET ORAL DAILY
Qty: 90 TABLET | Refills: 5 | Status: SHIPPED | OUTPATIENT
Start: 2022-10-12 | End: 2022-11-22 | Stop reason: SDUPTHER

## 2022-10-12 RX ORDER — CLONAZEPAM 0.5 MG/1
0.5 TABLET ORAL DAILY PRN
Qty: 30 TABLET | Refills: 5 | Status: SHIPPED | OUTPATIENT
Start: 2022-10-12 | End: 2023-04-19
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
## 2022-10-17 ENCOUNTER — OFFICE VISIT (OUTPATIENT)
Dept: CARDIOLOGY | Facility: CLINIC | Age: 42
End: 2022-10-17
Payer: COMMERCIAL

## 2022-10-17 VITALS
SYSTOLIC BLOOD PRESSURE: 102 MMHG | DIASTOLIC BLOOD PRESSURE: 68 MMHG | WEIGHT: 177.25 LBS | HEART RATE: 70 BPM | OXYGEN SATURATION: 97 % | HEIGHT: 71 IN | BODY MASS INDEX: 24.81 KG/M2

## 2022-10-17 DIAGNOSIS — I95.1 ORTHOSTASIS: ICD-10-CM

## 2022-10-17 DIAGNOSIS — R07.89 CHEST TIGHTNESS: ICD-10-CM

## 2022-10-17 DIAGNOSIS — I95.1 POSTURAL HYPOTENSION: Chronic | ICD-10-CM

## 2022-10-17 DIAGNOSIS — R55 VASODEPRESSOR SYNCOPE: ICD-10-CM

## 2022-10-17 DIAGNOSIS — R00.2 HEART PALPITATIONS: Primary | ICD-10-CM

## 2022-10-17 PROCEDURE — 3044F HG A1C LEVEL LT 7.0%: CPT | Mod: CPTII,S$GLB,, | Performed by: INTERNAL MEDICINE

## 2022-10-17 PROCEDURE — 1160F PR REVIEW ALL MEDS BY PRESCRIBER/CLIN PHARMACIST DOCUMENTED: ICD-10-PCS | Mod: CPTII,S$GLB,, | Performed by: INTERNAL MEDICINE

## 2022-10-17 PROCEDURE — 3078F PR MOST RECENT DIASTOLIC BLOOD PRESSURE < 80 MM HG: ICD-10-PCS | Mod: CPTII,S$GLB,, | Performed by: INTERNAL MEDICINE

## 2022-10-17 PROCEDURE — 3078F DIAST BP <80 MM HG: CPT | Mod: CPTII,S$GLB,, | Performed by: INTERNAL MEDICINE

## 2022-10-17 PROCEDURE — 99999 PR PBB SHADOW E&M-EST. PATIENT-LVL IV: CPT | Mod: PBBFAC,,, | Performed by: INTERNAL MEDICINE

## 2022-10-17 PROCEDURE — 99999 PR PBB SHADOW E&M-EST. PATIENT-LVL IV: ICD-10-PCS | Mod: PBBFAC,,, | Performed by: INTERNAL MEDICINE

## 2022-10-17 PROCEDURE — 3074F SYST BP LT 130 MM HG: CPT | Mod: CPTII,S$GLB,, | Performed by: INTERNAL MEDICINE

## 2022-10-17 PROCEDURE — 3044F PR MOST RECENT HEMOGLOBIN A1C LEVEL <7.0%: ICD-10-PCS | Mod: CPTII,S$GLB,, | Performed by: INTERNAL MEDICINE

## 2022-10-17 PROCEDURE — 1159F MED LIST DOCD IN RCRD: CPT | Mod: CPTII,S$GLB,, | Performed by: INTERNAL MEDICINE

## 2022-10-17 PROCEDURE — 99214 PR OFFICE/OUTPT VISIT, EST, LEVL IV, 30-39 MIN: ICD-10-PCS | Mod: S$GLB,,, | Performed by: INTERNAL MEDICINE

## 2022-10-17 PROCEDURE — 3074F PR MOST RECENT SYSTOLIC BLOOD PRESSURE < 130 MM HG: ICD-10-PCS | Mod: CPTII,S$GLB,, | Performed by: INTERNAL MEDICINE

## 2022-10-17 PROCEDURE — 1159F PR MEDICATION LIST DOCUMENTED IN MEDICAL RECORD: ICD-10-PCS | Mod: CPTII,S$GLB,, | Performed by: INTERNAL MEDICINE

## 2022-10-17 PROCEDURE — 1160F RVW MEDS BY RX/DR IN RCRD: CPT | Mod: CPTII,S$GLB,, | Performed by: INTERNAL MEDICINE

## 2022-10-17 PROCEDURE — 99214 OFFICE O/P EST MOD 30 MIN: CPT | Mod: S$GLB,,, | Performed by: INTERNAL MEDICINE

## 2022-10-17 NOTE — PROGRESS NOTES
Subjective:   Patient ID:  Arielle Verde is a 42 y.o. female who presents for follow-up of Shortness of Breath and Chest Pain  Tilt table scheduled.  Pt states CP ( chronic) and SOB.  Ex smoker 1 pack per month  Kerlone has helped the symptoms and midodrine has helped BP      Shortness of Breath  Associated symptoms include chest pain. Pertinent negatives include no leg swelling.   Chest Pain   This is a chronic problem. The current episode started more than 1 month ago. The onset quality is gradual. The pain is present in the substernal region. The quality of the pain is described as tightness. The pain does not radiate. Associated symptoms include shortness of breath. Pertinent negatives include no dizziness or palpitations. The pain is aggravated by nothing.   Pertinent negatives for past medical history include no muscle weakness.     Review of Systems   Constitutional: Negative. Negative for weight gain.   HENT: Negative.     Eyes: Negative.    Cardiovascular:  Positive for chest pain. Negative for leg swelling and palpitations.   Respiratory:  Positive for shortness of breath.    Endocrine: Negative.    Hematologic/Lymphatic: Negative.    Skin: Negative.    Musculoskeletal:  Negative for muscle weakness.   Gastrointestinal: Negative.    Genitourinary: Negative.    Neurological: Negative.  Negative for dizziness.   Psychiatric/Behavioral: Negative.     Allergic/Immunologic: Negative.    All other systems reviewed and are negative.  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     Comment: Mirena 3/30/20 LOT [id number] exp 4.2022     Current Outpatient Medications on File Prior to Visit   Medication Sig Dispense Refill    albuterol (PROVENTIL/VENTOLIN HFA) 90 mcg/actuation inhaler Inhale 2 puffs into the lungs every 6 (six) hours as needed for Wheezing. Dispense with spacer. 6.7 g 0    atomoxetine (STRATTERA) 80 MG capsule Take 1 capsule (80 mg total) by mouth once daily. 90 capsule 1    betaxoloL (KERLONE) 10 mg Tab Take 1 tablet (10 mg total) by mouth once daily. Start taking 2.5 mg daily 30 tablet 11    buPROPion (WELLBUTRIN XL) 150 MG TB24 tablet Take 3 tablets (450 mg total) by mouth once daily. 90 tablet 5    busPIRone (BUSPAR) 15 MG tablet Take 2 tablets (30 mg total) by mouth 2 (two) times daily. 120 tablet 5    carboxymethylcellulose-citric (PLENITY, WELCOME KIT,) 0.75 gram Cap Take 3 capsules by mouth 2 (two) times a day. 180 capsule 1    cetirizine (ZYRTEC) 10 MG tablet Take 10 mg by mouth daily as needed.   0    clonazePAM (KLONOPIN) 0.5 MG tablet Take 1 tablet (0.5 mg total) by mouth daily as needed for Anxiety. 30 tablet 5    EPINEPHrine (EPIPEN 2-DUANE) 0.3 mg/0.3 mL AtIn Inject 0.6 mLs (0.6 mg total) into the muscle once. for 1 dose 0.6 mL 0    midodrine (PROAMATINE) 5 MG Tab Take 1 tablet (5 mg total) by mouth 3 (three) times daily with meals. 90 tablet 11    rizatriptan (MAXALT) 10 MG tablet Take 10 mg by mouth daily as needed.      traZODone (DESYREL) 100 MG tablet Take 1-3 tablets at bedtime as needed for sleep. 270 tablet 1    triamcinolone acetonide 0.1% (KENALOG)  0.1 % cream Apply topically 2 (two) times daily. 80 g 1    metFORMIN (GLUCOPHAGE) 500 MG tablet Take 1 tablet (500 mg total) by mouth 2 (two) times daily. 60 tablet 3     Current Facility-Administered Medications on File Prior to Visit   Medication Dose Route Frequency Provider Last Rate Last Admin    levonorgestreL 20 mcg/24 hours (5 yrs) 52 mg IUD 1 Intra Uterine Device  1 each Intrauterine 1 time in Clinic/HOD Rachele Willis MD        onabotulinumtoxina injection 200 Units  200 Units Intramuscular Q90 Days Jose Luis Anderson MD   200 Units at 09/08/22 1414     Review of patient's allergies indicates:   Allergen Reactions    Emgality [[id number]] Hives and Itching    Topiramate Itching       Objective:     Physical Exam  Vitals and nursing note reviewed.   Constitutional:       Appearance: She is well-developed.   HENT:      Head: Normocephalic and atraumatic.   Eyes:      Conjunctiva/sclera: Conjunctivae normal.      Pupils: Pupils are equal, round, and reactive to light.   Cardiovascular:      Rate and Rhythm: Normal rate and regular rhythm.      Pulses: Intact distal pulses.      Heart sounds: Normal heart sounds.   Pulmonary:      Effort: Pulmonary effort is normal.      Breath sounds: Normal breath sounds.   Abdominal:      General: Bowel sounds are normal.      Palpations: Abdomen is soft.   Musculoskeletal:         General: Normal range of motion.      Cervical back: Normal range of motion and neck supple.   Skin:     General: Skin is warm and dry.   Neurological:      Mental Status: She is alert and oriented to person, place, and time.       Assessment:     1. Heart palpitations    2. Orthostasis    3. Vasodepressor syncope    4. Postural hypotension        Plan:     Heart palpitations    Orthostasis    Vasodepressor syncope    Postural hypotension      Stress test  Possible move up date for tilt table?

## 2022-10-25 ENCOUNTER — TELEPHONE (OUTPATIENT)
Dept: CARDIOLOGY | Facility: CLINIC | Age: 42
End: 2022-10-25
Payer: COMMERCIAL

## 2022-10-25 ENCOUNTER — HOSPITAL ENCOUNTER (OUTPATIENT)
Dept: CARDIOLOGY | Facility: HOSPITAL | Age: 42
Discharge: HOME | End: 2022-10-25
Attending: INTERNAL MEDICINE
Payer: COMMERCIAL

## 2022-10-25 ENCOUNTER — TELEPHONE (OUTPATIENT)
Dept: CARDIOLOGY | Facility: HOSPITAL | Age: 42
End: 2022-10-25
Payer: COMMERCIAL

## 2022-10-25 VITALS
HEART RATE: 106 BPM | BODY MASS INDEX: 24.78 KG/M2 | HEIGHT: 71 IN | WEIGHT: 177 LBS | SYSTOLIC BLOOD PRESSURE: 97 MMHG | DIASTOLIC BLOOD PRESSURE: 67 MMHG

## 2022-10-25 DIAGNOSIS — R07.89 CHEST TIGHTNESS: ICD-10-CM

## 2022-10-25 LAB
CV STRESS BASE HR: 100 BPM
DIASTOLIC BLOOD PRESSURE: 67 MMHG
OHS CV CPX 1 MINUTE RECOVERY HEART RATE: 150 BPM
OHS CV CPX 85 PERCENT MAX PREDICTED HEART RATE MALE: 144
OHS CV CPX ESTIMATED METS: 9
OHS CV CPX MAX PREDICTED HEART RATE: 170
OHS CV CPX PATIENT IS FEMALE: 1
OHS CV CPX PATIENT IS MALE: 0
OHS CV CPX PEAK DIASTOLIC BLOOD PRESSURE: 84 MMHG
OHS CV CPX PEAK HEAR RATE: 153 BPM
OHS CV CPX PEAK RATE PRESSURE PRODUCT: NORMAL
OHS CV CPX PEAK SYSTOLIC BLOOD PRESSURE: 138 MMHG
OHS CV CPX PERCENT MAX PREDICTED HEART RATE ACHIEVED: 90
OHS CV CPX RATE PRESSURE PRODUCT PRESENTING: 9700
STRESS ECHO POST EXERCISE DUR MIN: 6 MINUTES
STRESS ECHO POST EXERCISE DUR SEC: 31 SECONDS
SYSTOLIC BLOOD PRESSURE: 97 MMHG

## 2022-10-25 PROCEDURE — 93018 CV STRESS TEST I&R ONLY: CPT | Mod: ,,, | Performed by: INTERNAL MEDICINE

## 2022-10-25 PROCEDURE — 93018 EXERCISE STRESS - EKG (CUPID ONLY): ICD-10-PCS | Mod: ,,, | Performed by: INTERNAL MEDICINE

## 2022-10-25 PROCEDURE — 93016 EXERCISE STRESS - EKG (CUPID ONLY): ICD-10-PCS | Mod: ,,, | Performed by: INTERNAL MEDICINE

## 2022-10-25 PROCEDURE — 93017 CV STRESS TEST TRACING ONLY: CPT

## 2022-10-25 PROCEDURE — 93016 CV STRESS TEST SUPVJ ONLY: CPT | Mod: ,,, | Performed by: INTERNAL MEDICINE

## 2022-10-25 NOTE — TELEPHONE ENCOUNTER
----- Message from Justin Freeman MD sent at 10/25/2022 11:03 AM CDT -----  Please tell pt:  Stress test is normal

## 2022-10-25 NOTE — TELEPHONE ENCOUNTER
Patient wanted to know if you felt like the tilt test was still medically necessary. Please advise    The patient has been notified of this information and all questions answered.  Stress test is normal. Patient verbalized understanding.

## 2022-10-26 ENCOUNTER — TELEPHONE (OUTPATIENT)
Dept: CARDIOLOGY | Facility: CLINIC | Age: 42
End: 2022-10-26
Payer: COMMERCIAL

## 2022-10-26 NOTE — TELEPHONE ENCOUNTER
----- Message from Justin Freeman MD sent at 10/26/2022 11:46 AM CDT -----  Please tell pt:  Stress test is normal

## 2022-10-31 ENCOUNTER — HOSPITAL ENCOUNTER (OUTPATIENT)
Dept: CARDIOLOGY | Facility: HOSPITAL | Age: 42
Discharge: HOME | End: 2022-10-31
Attending: NURSE PRACTITIONER
Payer: COMMERCIAL

## 2022-10-31 ENCOUNTER — PATIENT MESSAGE (OUTPATIENT)
Dept: CARDIOLOGY | Facility: HOSPITAL | Age: 42
End: 2022-10-31
Payer: COMMERCIAL

## 2022-10-31 DIAGNOSIS — R55 VASODEPRESSOR SYNCOPE: Primary | ICD-10-CM

## 2022-10-31 DIAGNOSIS — I95.1 POSTURAL HYPOTENSION: ICD-10-CM

## 2022-10-31 DIAGNOSIS — R55 SYNCOPE, UNSPECIFIED SYNCOPE TYPE: ICD-10-CM

## 2022-10-31 DIAGNOSIS — I95.1 POSTURAL HYPOTENSION: Primary | Chronic | ICD-10-CM

## 2022-10-31 PROCEDURE — 93660 TILT TABLE EVALUATION: CPT | Mod: 26,,, | Performed by: INTERNAL MEDICINE

## 2022-10-31 PROCEDURE — 36415 COLL VENOUS BLD VENIPUNCTURE: CPT | Performed by: NURSE PRACTITIONER

## 2022-10-31 PROCEDURE — 83835 ASSAY OF METANEPHRINES: CPT | Performed by: NURSE PRACTITIONER

## 2022-10-31 PROCEDURE — 82088 ASSAY OF ALDOSTERONE: CPT | Performed by: NURSE PRACTITIONER

## 2022-10-31 PROCEDURE — 93660 TILT TABLE EVALUATION: CPT

## 2022-10-31 PROCEDURE — 93660 TILT TABLE (CUPID ONLY): ICD-10-PCS | Mod: 26,,, | Performed by: INTERNAL MEDICINE

## 2022-10-31 PROCEDURE — 82384 ASSAY THREE CATECHOLAMINES: CPT | Performed by: NURSE PRACTITIONER

## 2022-10-31 PROCEDURE — 84244 ASSAY OF RENIN: CPT | Mod: 91 | Performed by: NURSE PRACTITIONER

## 2022-10-31 NOTE — PROGRESS NOTES
Patient presented to Cardiology clinic for tilt table testing.   Consent signed and witnessed prior to testing.     40 ml of Isuprel infused during supine isuprel test.     PIV removed at the conclusion of HUT.     JUANA Howard

## 2022-11-03 ENCOUNTER — LAB VISIT (OUTPATIENT)
Dept: LAB | Facility: HOSPITAL | Age: 42
End: 2022-11-03
Attending: FAMILY MEDICINE
Payer: COMMERCIAL

## 2022-11-03 DIAGNOSIS — I95.1 POSTURAL HYPOTENSION: Chronic | ICD-10-CM

## 2022-11-03 LAB
ALDOST SERPL-MCNC: 3.7 NG/DL
ALDOST SERPL-MCNC: <3 NG/DL
POTASSIUM 24H UR-SRATE: 1.7 MMOL/HR (ref 1–5)
POTASSIUM UR-SCNC: 24 MMOL/L (ref 15–95)
POTASSIUM URINE (MMOL/SPEC): 39.6 MMOL/SPEC
SODIUM 24H UR-SRATE: 5.3 MMOL/HR (ref 2–9)
SODIUM UR-SCNC: 77 MMOL/L (ref 20–250)
SODIUM URINE (MMOL/SPEC): 127 MMOL/SPEC
URINE COLLECTION DURATION: 24 HR
URINE COLLECTION DURATION: 24 HR
URINE VOLUME: 1650 ML
URINE VOLUME: 1650 ML

## 2022-11-03 PROCEDURE — 84300 ASSAY OF URINE SODIUM: CPT | Performed by: NURSE PRACTITIONER

## 2022-11-03 PROCEDURE — 84133 ASSAY OF URINE POTASSIUM: CPT | Performed by: NURSE PRACTITIONER

## 2022-11-04 LAB
RENIN PLAS-CCNC: <0.6 NG/ML/H
RENIN PLAS-CCNC: <0.6 NG/ML/H

## 2022-11-05 ENCOUNTER — PATIENT MESSAGE (OUTPATIENT)
Dept: OBSTETRICS AND GYNECOLOGY | Facility: CLINIC | Age: 42
End: 2022-11-05
Payer: COMMERCIAL

## 2022-11-05 DIAGNOSIS — R55 SYNCOPE, UNSPECIFIED SYNCOPE TYPE: Primary | ICD-10-CM

## 2022-11-05 RX ORDER — FLUDROCORTISONE ACETATE 0.1 MG/1
100 TABLET ORAL 2 TIMES DAILY
Qty: 180 TABLET | Refills: 3 | Status: SHIPPED | OUTPATIENT
Start: 2022-11-05 | End: 2023-07-06

## 2022-11-05 RX ORDER — POTASSIUM CHLORIDE 20 MEQ/1
20 TABLET, EXTENDED RELEASE ORAL 2 TIMES DAILY
Qty: 180 TABLET | Refills: 3 | Status: SHIPPED | OUTPATIENT
Start: 2022-11-05

## 2022-11-05 NOTE — PROGRESS NOTES
See comments below and call patient to discuss.   Please close encounter when done -- no need to route back to me.  Thanks  Her renin/keri axis is somewhat suppressed. This could be appropriate in view of her salt replete state  (7.5 gm salt a day and 45 mEq KCL excretion) but I suspect that   She would benefit from Florinef - I will send for that and additional KCL.  From tilt data she could also benefit from a BBs - which we have started-  + also Midodrine (but will wait on Midodrine until after BBs/ Florinef Rx for a month

## 2022-11-05 NOTE — PROGRESS NOTES
See comments below and call patient to discuss.   Please close encounter when done -- no need to route back to me.  Thanks  Milo is on the low end  Response to tilt is WNL  No new recs at this time

## 2022-11-07 ENCOUNTER — TELEPHONE (OUTPATIENT)
Dept: CARDIOLOGY | Facility: CLINIC | Age: 42
End: 2022-11-07
Payer: COMMERCIAL

## 2022-11-07 ENCOUNTER — PATIENT MESSAGE (OUTPATIENT)
Dept: CARDIOLOGY | Facility: CLINIC | Age: 42
End: 2022-11-07
Payer: COMMERCIAL

## 2022-11-07 NOTE — TELEPHONE ENCOUNTER
Portal message sent to the pt pb      ----- Message from José Vidal MD sent at 11/5/2022  9:45 AM CDT -----  See comments below and call patient to discuss.   Please close encounter when done -- no need to route back to me.  Thanks  Milo is on the low end  Response to tilt is WNL  No new recs at this time

## 2022-11-07 NOTE — TELEPHONE ENCOUNTER
Message sent via portal pb----- Message from José Vidal MD sent at 11/5/2022 10:08 AM CDT -----  See comments below and call patient to discuss.   Please close encounter when done -- no need to route back to me.  Thanks  Her renin/keri axis is somewhat suppressed. This could be appropriate in view of her salt replete state  (7.5 gm salt a day and 45 mEq KCL excretion) but I suspect that   She would benefit from Florinef - I will send for that and additional KCL.  From tilt data she could also benefit from a BBs - which we have started-  + also Midodrine (but will wait on Midodrine until after BBs/ Florinef Rx for a month

## 2022-11-09 LAB
METANEPH FREE SERPL-MCNC: <25 PG/ML
METANEPH FREE SERPL-MCNC: <25 PG/ML
METANEPHS SERPL-MCNC: 30 PG/ML
METANEPHS SERPL-MCNC: 30 PG/ML
NORMETANEPH FREE SERPL-MCNC: 30 PG/ML
NORMETANEPH FREE SERPL-MCNC: 30 PG/ML

## 2022-11-10 ENCOUNTER — PATIENT MESSAGE (OUTPATIENT)
Dept: CARDIOLOGY | Facility: CLINIC | Age: 42
End: 2022-11-10
Payer: COMMERCIAL

## 2022-11-10 NOTE — PROGRESS NOTES
"See comments below and call patient to discuss.   Please close encounter when done -- no need to route back to me.  Thanks  Metanephrines are WNL but on the very low side  - keep in mind that pat's HUT Dx is "partial efferent  autonomic dysfunction"  No change in plan - for now   "

## 2022-11-11 ENCOUNTER — TELEPHONE (OUTPATIENT)
Dept: CARDIOLOGY | Facility: CLINIC | Age: 42
End: 2022-11-11
Payer: COMMERCIAL

## 2022-11-11 NOTE — TELEPHONE ENCOUNTER
"Pt notified and verbalized. pb      ----- Message from José Vidal MD sent at 11/10/2022  8:06 AM CST -----  See comments below and call patient to discuss.   Please close encounter when done -- no need to route back to me.  Thanks  Metanephrines are WNL but on the very low side  - keep in mind that pat's HUT Dx is "partial efferent  autonomic dysfunction"  No change in plan - for now     "

## 2022-11-13 LAB
CATECHOLS PLAS-MCNC: 160 PG/ML
CATECHOLS PLAS-MCNC: 205 PG/ML
DOPAMINE SERPL-MCNC: 13 PG/ML
DOPAMINE SERPL-MCNC: <10 PG/ML
EPINEPH PLAS-MCNC: <20 PG/ML
EPINEPH PLAS-MCNC: <20 PG/ML
NOREPINEPH PLAS-MCNC: 147 PG/ML
NOREPINEPH PLAS-MCNC: 205 PG/ML

## 2022-11-22 ENCOUNTER — PATIENT MESSAGE (OUTPATIENT)
Dept: CARDIOLOGY | Facility: CLINIC | Age: 42
End: 2022-11-22
Payer: COMMERCIAL

## 2022-11-22 ENCOUNTER — TELEPHONE (OUTPATIENT)
Dept: CARDIOLOGY | Facility: CLINIC | Age: 42
End: 2022-11-22
Payer: COMMERCIAL

## 2022-11-22 RX ORDER — BUPROPION HYDROCHLORIDE 150 MG/1
450 TABLET ORAL DAILY
Qty: 90 TABLET | Refills: 2 | Status: SHIPPED | OUTPATIENT
Start: 2022-11-22 | End: 2023-05-29

## 2022-11-22 NOTE — TELEPHONE ENCOUNTER
Pt was notified via portal pb    ----- Message from José Vidal MD sent at 11/21/2022 10:51 PM CST -----  See comments below and call patient to discuss.   Please close encounter when done -- no need to route back to me.  Thanks  Catecholamines are within normal limits.  No change in therapy

## 2022-11-22 NOTE — PROGRESS NOTES
See comments below and call patient to discuss.   Please close encounter when done -- no need to route back to me.  Thanks  Catecholamines are within normal limits.  No change in therapy

## 2022-11-30 ENCOUNTER — PATIENT MESSAGE (OUTPATIENT)
Dept: OBSTETRICS AND GYNECOLOGY | Facility: CLINIC | Age: 42
End: 2022-11-30
Payer: COMMERCIAL

## 2022-12-07 DIAGNOSIS — Z12.31 OTHER SCREENING MAMMOGRAM: ICD-10-CM

## 2022-12-09 ENCOUNTER — PATIENT OUTREACH (OUTPATIENT)
Dept: ADMINISTRATIVE | Facility: HOSPITAL | Age: 42
End: 2022-12-09
Payer: COMMERCIAL

## 2022-12-12 ENCOUNTER — PROCEDURE VISIT (OUTPATIENT)
Dept: NEUROLOGY | Facility: CLINIC | Age: 42
End: 2022-12-12
Payer: COMMERCIAL

## 2022-12-12 VITALS
RESPIRATION RATE: 14 BRPM | WEIGHT: 175.25 LBS | HEART RATE: 92 BPM | HEIGHT: 71 IN | BODY MASS INDEX: 24.53 KG/M2 | DIASTOLIC BLOOD PRESSURE: 62 MMHG | SYSTOLIC BLOOD PRESSURE: 94 MMHG

## 2022-12-12 DIAGNOSIS — G43.019 COMMON MIGRAINE WITH INTRACTABLE MIGRAINE: Primary | ICD-10-CM

## 2022-12-12 PROCEDURE — 64615 PR CHEMODENERVATION OF MUSCLE FOR CHRONIC MIGRAINE: ICD-10-PCS | Mod: S$GLB,,, | Performed by: PSYCHIATRY & NEUROLOGY

## 2022-12-12 PROCEDURE — 64615 CHEMODENERV MUSC MIGRAINE: CPT | Mod: S$GLB,,, | Performed by: PSYCHIATRY & NEUROLOGY

## 2022-12-12 RX ADMIN — ONABOTULINUMTOXINA 200 UNITS: 100 INJECTION, POWDER, LYOPHILIZED, FOR SOLUTION INTRADERMAL; INTRAMUSCULAR at 02:12

## 2022-12-12 NOTE — PROCEDURES
Procedures          BOTOX CONTINUES TO HELP WITH MIGRAINE CONTROL.              1. PROCEDURE: Botox injections      2. INDICATION: Intractable Migraine      3. TIME OUT: The procedure was discussed in details with the patient and the consent form was signed. The patient verbalized understanding of the procedure . I discussed the risks that may include serious immune reaction and distant spread that could results in loss of breathing. Other side effects may include droopy eyelids, trouble swallowing and cardiac arrhythmias. It was also stressed that it is contraindicated in pregnancy.      3. COURSE:   The standard protocol was followed. the procedure was very smooth.      4. COMPLICATIONS: None. The patient tolerated the procedure very well.      5. AFTERCARE: I encouraged the patient to use ice if the sites of injection get tender and call me with any problems or complications.               As per the standard protocol, a total 155 units were injected in 31 sites.            RT  5 units in 1 site     LT  5 units in 1 site      Procerus 5 units in 1 site      RT Frontalis 10 units in 2 sites      LT Frontalis 10 units in 2 site      RT Temporalis 20 units in 4 sites     LT Temporalis 20 units in 4 sites     RT Occipitalis 15 units in 3 sites     LT Occipitalis 15  units in 3 sites      RT Cervical Paraspinals 10 units in 2 sites     LT Cervical Paraspinals 10 units in 2 sites     RT Trapezius 15 units in 3 sites     LT Trapezius 15 units in 3 sites         Per the standard of care protocol we use 155 units and waste 45 units. I gave the patient the option of following the standard protocol vs.using the extra 45 units to target areas where the pain is maximum which could potentially provide extra help with headache control. I explained to the patient that this will be an off-label use, not the standard protocol, not evidence-based and could result in more pronounced side effects. The patient  verbalized full understanding of all the facts and the risks and benefits and elected to use the extra 45 units for the following sites:                 Procerus 5 units in 1 site      RT Frontalis 10 units in 2 sites      LT Frontalis 10 units in 2 site      RT Temporalis 10 units in 2 sites

## 2022-12-14 DIAGNOSIS — Z12.31 OTHER SCREENING MAMMOGRAM: ICD-10-CM

## 2023-01-11 ENCOUNTER — OFFICE VISIT (OUTPATIENT)
Dept: DERMATOLOGY | Facility: CLINIC | Age: 43
End: 2023-01-11
Payer: COMMERCIAL

## 2023-01-11 DIAGNOSIS — R23.4 OILY SKIN: ICD-10-CM

## 2023-01-11 DIAGNOSIS — L70.0 ACNE VULGARIS: Primary | ICD-10-CM

## 2023-01-11 DIAGNOSIS — L81.1 MELASMA: ICD-10-CM

## 2023-01-11 PROCEDURE — 1159F PR MEDICATION LIST DOCUMENTED IN MEDICAL RECORD: ICD-10-PCS | Mod: CPTII,S$GLB,, | Performed by: PHYSICIAN ASSISTANT

## 2023-01-11 PROCEDURE — 99203 PR OFFICE/OUTPT VISIT, NEW, LEVL III, 30-44 MIN: ICD-10-PCS | Mod: S$GLB,,, | Performed by: PHYSICIAN ASSISTANT

## 2023-01-11 PROCEDURE — 1159F MED LIST DOCD IN RCRD: CPT | Mod: CPTII,S$GLB,, | Performed by: PHYSICIAN ASSISTANT

## 2023-01-11 PROCEDURE — 99999 PR PBB SHADOW E&M-EST. PATIENT-LVL III: CPT | Mod: PBBFAC,,, | Performed by: PHYSICIAN ASSISTANT

## 2023-01-11 PROCEDURE — 1160F PR REVIEW ALL MEDS BY PRESCRIBER/CLIN PHARMACIST DOCUMENTED: ICD-10-PCS | Mod: CPTII,S$GLB,, | Performed by: PHYSICIAN ASSISTANT

## 2023-01-11 PROCEDURE — 99203 OFFICE O/P NEW LOW 30 MIN: CPT | Mod: S$GLB,,, | Performed by: PHYSICIAN ASSISTANT

## 2023-01-11 PROCEDURE — 1160F RVW MEDS BY RX/DR IN RCRD: CPT | Mod: CPTII,S$GLB,, | Performed by: PHYSICIAN ASSISTANT

## 2023-01-11 PROCEDURE — 99999 PR PBB SHADOW E&M-EST. PATIENT-LVL III: ICD-10-PCS | Mod: PBBFAC,,, | Performed by: PHYSICIAN ASSISTANT

## 2023-01-11 RX ORDER — TRETINOIN 0.25 MG/G
CREAM TOPICAL
Qty: 20 G | Refills: 6 | Status: SHIPPED | OUTPATIENT
Start: 2023-01-11 | End: 2024-01-11

## 2023-01-11 RX ORDER — CLINDAMYCIN PHOSPHATE 10 MG/ML
SOLUTION TOPICAL 2 TIMES DAILY
Qty: 60 EACH | Refills: 5 | Status: SHIPPED | OUTPATIENT
Start: 2023-01-11 | End: 2024-01-11

## 2023-01-11 NOTE — PROGRESS NOTES
Subjective:       Patient ID:  Arielle Verde is a 42 y.o. female who presents for   Chief Complaint   Patient presents with    Spot     C/o red spots on chest and neck      History of Present Illness: The patient presents with chief complaint of bumps.  Location: chest and neck and right shoulder  Duration: 8 hours, woke up with this morning  Signs/Symptoms: scattered, raised, red bumps. Denies itching, burning, or pain.     Prior treatments: otc acne treatment, acne lotion      Questions about oily skin. Using Cera Ve wash, makeup wipes (neutrogena).       Review of Systems   Constitutional:  Negative for fever and chills.   Gastrointestinal:  Negative for nausea and vomiting.   Skin:  Positive for rash and activity-related sunscreen use. Negative for itching, dry skin, sun sensitivity, daily sunscreen use, recent sunburn, dry lips and abscesses.   Hematologic/Lymphatic: Does not bruise/bleed easily.      Objective:    Physical Exam   Constitutional: She appears well-developed and well-nourished. No distress.   Neurological: She is alert and oriented to person, place, and time. She is not disoriented.   Psychiatric: She has a normal mood and affect.   Skin:   Areas Examined (abnormalities noted in diagram):   Head / Face Inspection Performed  Neck Inspection Performed  Chest / Axilla Inspection Performed  Back Inspection Performed  RUE Inspected  LUE Inspection Performed                 Diagram Legend     Erythematous scaling macule/papule c/w actinic keratosis       Vascular papule c/w angioma      Pigmented verrucoid papule/plaque c/w seborrheic keratosis      Yellow umbilicated papule c/w sebaceous hyperplasia      Irregularly shaped tan macule c/w lentigo     1-2 mm smooth white papules consistent with Milia      Movable subcutaneous cyst with punctum c/w epidermal inclusion cyst      Subcutaneous movable cyst c/w pilar cyst      Firm pink to brown papule c/w dermatofibroma      Pedunculated  fleshy papule(s) c/w skin tag(s)      Evenly pigmented macule c/w junctional nevus     Mildly variegated pigmented, slightly irregular-bordered macule c/w mildly atypical nevus      Flesh colored to evenly pigmented papule c/w intradermal nevus       Pink pearly papule/plaque c/w basal cell carcinoma      Erythematous hyperkeratotic cursted plaque c/w SCC      Surgical scar with no sign of skin cancer recurrence      Open and closed comedones      Inflammatory papules and pustules      Verrucoid papule consistent consistent with wart     Erythematous eczematous patches and plaques     Dystrophic onycholytic nail with subungual debris c/w onychomycosis     Umbilicated papule    Erythematous-base heme-crusted tan verrucoid plaque consistent with inflamed seborrheic keratosis     Erythematous Silvery Scaling Plaque c/w Psoriasis     See annotation      Assessment / Plan:        Acne vulgaris  -     clindamycin (CLEOCIN T) 1 % Swab; Apply topically 2 (two) times daily. AAA qd to bid as tolerated for acne. Decrease frequency if any dryness.  Dispense: 60 each; Refill: 5  -     tretinoin (RETIN-A) 0.025 % cream; Apply a pea-sized amount to the entire face at bedtime.  Use every third night and increase as tolerated to nightly.  Dispense: 20 g; Refill: 6  Trial of above rx meds.    Oily skin  -     clindamycin (CLEOCIN T) 1 % Swab; Apply topically 2 (two) times daily. AAA qd to bid as tolerated for acne. Decrease frequency if any dryness.  Dispense: 60 each; Refill: 5  -     tretinoin (RETIN-A) 0.025 % cream; Apply a pea-sized amount to the entire face at bedtime.  Use every third night and increase as tolerated to nightly.  Dispense: 20 g; Refill: 6  Trial of retinoid. Conisder Cetahil Pro Oil wash.    Melasma  Mild, discussed association w/sun exposure. Encouraged vigilant daily spf and clothing, avoidance of peak sun hours. Trial of retinoid and may reconsider fade cream in future.            Follow up in about 3 months  (around 4/11/2023), or if symptoms worsen or fail to improve.

## 2023-01-30 ENCOUNTER — OFFICE VISIT (OUTPATIENT)
Dept: CARDIOLOGY | Facility: CLINIC | Age: 43
End: 2023-01-30
Payer: COMMERCIAL

## 2023-01-30 VITALS
SYSTOLIC BLOOD PRESSURE: 94 MMHG | DIASTOLIC BLOOD PRESSURE: 60 MMHG | HEART RATE: 85 BPM | BODY MASS INDEX: 25.22 KG/M2 | OXYGEN SATURATION: 95 % | HEIGHT: 71 IN | WEIGHT: 180.13 LBS

## 2023-01-30 DIAGNOSIS — R00.2 HEART PALPITATIONS: Primary | ICD-10-CM

## 2023-01-30 DIAGNOSIS — I95.1 POSTURAL HYPOTENSION: Chronic | ICD-10-CM

## 2023-01-30 DIAGNOSIS — R55 VASODEPRESSOR SYNCOPE: ICD-10-CM

## 2023-01-30 PROCEDURE — 1160F RVW MEDS BY RX/DR IN RCRD: CPT | Mod: CPTII,S$GLB,, | Performed by: INTERNAL MEDICINE

## 2023-01-30 PROCEDURE — 1159F PR MEDICATION LIST DOCUMENTED IN MEDICAL RECORD: ICD-10-PCS | Mod: CPTII,S$GLB,, | Performed by: INTERNAL MEDICINE

## 2023-01-30 PROCEDURE — 3008F PR BODY MASS INDEX (BMI) DOCUMENTED: ICD-10-PCS | Mod: CPTII,S$GLB,, | Performed by: INTERNAL MEDICINE

## 2023-01-30 PROCEDURE — 99214 PR OFFICE/OUTPT VISIT, EST, LEVL IV, 30-39 MIN: ICD-10-PCS | Mod: S$GLB,,, | Performed by: INTERNAL MEDICINE

## 2023-01-30 PROCEDURE — 1160F PR REVIEW ALL MEDS BY PRESCRIBER/CLIN PHARMACIST DOCUMENTED: ICD-10-PCS | Mod: CPTII,S$GLB,, | Performed by: INTERNAL MEDICINE

## 2023-01-30 PROCEDURE — 3074F SYST BP LT 130 MM HG: CPT | Mod: CPTII,S$GLB,, | Performed by: INTERNAL MEDICINE

## 2023-01-30 PROCEDURE — 99214 OFFICE O/P EST MOD 30 MIN: CPT | Mod: S$GLB,,, | Performed by: INTERNAL MEDICINE

## 2023-01-30 PROCEDURE — 99999 PR PBB SHADOW E&M-EST. PATIENT-LVL IV: ICD-10-PCS | Mod: PBBFAC,,, | Performed by: INTERNAL MEDICINE

## 2023-01-30 PROCEDURE — 3074F PR MOST RECENT SYSTOLIC BLOOD PRESSURE < 130 MM HG: ICD-10-PCS | Mod: CPTII,S$GLB,, | Performed by: INTERNAL MEDICINE

## 2023-01-30 PROCEDURE — 3078F DIAST BP <80 MM HG: CPT | Mod: CPTII,S$GLB,, | Performed by: INTERNAL MEDICINE

## 2023-01-30 PROCEDURE — 99999 PR PBB SHADOW E&M-EST. PATIENT-LVL IV: CPT | Mod: PBBFAC,,, | Performed by: INTERNAL MEDICINE

## 2023-01-30 PROCEDURE — 3008F BODY MASS INDEX DOCD: CPT | Mod: CPTII,S$GLB,, | Performed by: INTERNAL MEDICINE

## 2023-01-30 PROCEDURE — 1159F MED LIST DOCD IN RCRD: CPT | Mod: CPTII,S$GLB,, | Performed by: INTERNAL MEDICINE

## 2023-01-30 PROCEDURE — 3078F PR MOST RECENT DIASTOLIC BLOOD PRESSURE < 80 MM HG: ICD-10-PCS | Mod: CPTII,S$GLB,, | Performed by: INTERNAL MEDICINE

## 2023-01-30 NOTE — PROGRESS NOTES
"Subjective:   Patient ID:  Arielle Verde is a 42 y.o. female who presents for follow-up of No chief complaint on file.    BP high on midodrine tid/ changed to bid dose BP better  Dizzy upon standing or during walking  Vision turns "black" BL on occasion  Hypotensive at home: 80's/50's  Nausea, photophobia, migranes  Photophobia: recently tinted windows at house  Recent history of facial papules/pustules and on chest, back  No change in symptoms since last visit    Fam Hx: Mom SLE, Thyroid problems on on mothers side requiring thyroidectomies    CAD RF: DM, Possible fam hx in father    Coronary Artery Disease  Presents for initial visit. The disease course has been improving. Symptoms include dizziness, palpitations and shortness of breath. Pertinent negatives include no chest pain, leg swelling, muscle weakness or weight gain. Risk factors include diabetes and premature CAD in family. Risk factors do not include decreased physical activity, hypertension or obesity. Past treatments include beta blockers. There is no history of angina pectoris, arrhythmia, cardiomyopathy or CHF.   Shortness of Breath  Associated symptoms include orthopnea. Pertinent negatives include no chest pain, claudication, leg swelling or syncope. Her past medical history is significant for CAD.   Chest Pain   This is a chronic problem. The current episode started more than 1 month ago. The onset quality is gradual. The pain is present in the substernal region. The quality of the pain is described as tightness. The pain does not radiate. Associated symptoms include dizziness, irregular heartbeat, near-syncope, orthopnea, palpitations and shortness of breath. Pertinent negatives include no claudication or syncope. The pain is aggravated by nothing.   Her past medical history is significant for CAD.   Pertinent negatives for past medical history include no arrhythmia, no CHF, no hypertension and no muscle weakness.   Her family medical " history is significant for CAD.     Review of Systems   Constitutional: Negative. Negative for weight gain.   HENT: Negative.     Eyes: Negative.    Cardiovascular:  Positive for irregular heartbeat, near-syncope, orthopnea and palpitations. Negative for chest pain, claudication, cyanosis, leg swelling and syncope.   Respiratory:  Positive for shortness of breath.    Endocrine: Negative.    Hematologic/Lymphatic: Negative.    Skin: Negative.    Musculoskeletal:  Negative for muscle weakness.   Gastrointestinal: Negative.    Genitourinary: Negative.    Neurological:  Positive for dizziness.   Psychiatric/Behavioral: Negative.     Allergic/Immunologic: Negative.    All other systems reviewed and are negative.  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     Comment: Mirena 3/30/20 LOT [id number] exp 4.2022     Current Outpatient Medications on File Prior to Visit   Medication Sig Dispense Refill    atomoxetine (STRATTERA) 80 MG capsule Take 1 capsule (80 mg total) by mouth once daily. 90 capsule 1    betaxoloL (KERLONE) 10 mg Tab Take 1 tablet (10 mg total) by mouth once daily. Start taking 2.5  mg daily 30 tablet 11    buPROPion (WELLBUTRIN XL) 150 MG TB24 tablet Take 3 tablets (450 mg total) by mouth once daily. 90 tablet 2    busPIRone (BUSPAR) 15 MG tablet Take 2 tablets (30 mg total) by mouth 2 (two) times daily. 120 tablet 5    carboxymethylcellulose-citric (PLENITY, WELCOME KIT,) 0.75 gram Cap Take 3 capsules by mouth 2 (two) times a day. 180 capsule 1    cetirizine (ZYRTEC) 10 MG tablet Take 10 mg by mouth daily as needed.   0    clindamycin (CLEOCIN T) 1 % Swab Apply topically 2 (two) times daily. AAA qd to bid as tolerated for acne. Decrease frequency if any dryness. 60 each 5    clonazePAM (KLONOPIN) 0.5 MG tablet Take 1 tablet (0.5 mg total) by mouth daily as needed for Anxiety. 30 tablet 5    EPINEPHrine (EPIPEN 2-DUANE) 0.3 mg/0.3 mL AtIn Inject 0.6 mLs (0.6 mg total) into the muscle once. for 1 dose 0.6 mL 0    fludrocortisone (FLORINEF) 0.1 mg Tab Take 1 tablet (100 mcg total) by mouth 2 (two) times daily. 180 tablet 3    metFORMIN (GLUCOPHAGE) 500 MG tablet Take 1 tablet (500 mg total) by mouth 2 (two) times daily. 60 tablet 3    midodrine (PROAMATINE) 5 MG Tab Take 1 tablet (5 mg total) by mouth 3 (three) times daily with meals. 90 tablet 11    potassium chloride SA (K-DUR,KLOR-CON) 20 MEQ tablet Take 1 tablet (20 mEq total) by mouth 2 (two) times daily. 180 tablet 3    rizatriptan (MAXALT) 10 MG tablet Take 10 mg by mouth daily as needed.      traZODone (DESYREL) 100 MG tablet Take 1-3 tablets at bedtime as needed for sleep. 270 tablet 1    tretinoin (RETIN-A) 0.025 % cream Apply a pea-sized amount to the entire face at bedtime.  Use every third night and increase as tolerated to nightly. 20 g 6    triamcinolone acetonide 0.1% (KENALOG) 0.1 % cream Apply topically 2 (two) times daily. 80 g 1    albuterol (PROVENTIL/VENTOLIN HFA) 90 mcg/actuation inhaler Inhale 2 puffs into the lungs every 6 (six) hours as needed for Wheezing. Dispense with spacer. 6.7 g 0     Current Facility-Administered  Medications on File Prior to Visit   Medication Dose Route Frequency Provider Last Rate Last Admin    levonorgestreL 20 mcg/24 hours (5 yrs) 52 mg IUD 1 Intra Uterine Device  1 each Intrauterine 1 time in Clinic/HOD Rachele Willis MD        onabotulinumtoxina injection 200 Units  200 Units Intramuscular Q90 Days Jose Luis Anderson MD   200 Units at 12/12/22 1408     Review of patient's allergies indicates:   Allergen Reactions    Emgality [[id number]] Hives and Itching    Topiramate Itching       Objective:     Physical Exam  Vitals and nursing note reviewed.   Constitutional:       Appearance: She is well-developed.   HENT:      Head: Normocephalic and atraumatic.   Eyes:      Conjunctiva/sclera: Conjunctivae normal.      Pupils: Pupils are equal, round, and reactive to light.   Cardiovascular:      Rate and Rhythm: Normal rate and regular rhythm.      Pulses: Intact distal pulses.      Heart sounds: Normal heart sounds.   Pulmonary:      Effort: Pulmonary effort is normal.      Breath sounds: Normal breath sounds.   Abdominal:      General: Bowel sounds are normal.      Palpations: Abdomen is soft.   Musculoskeletal:         General: Normal range of motion.      Cervical back: Normal range of motion and neck supple.   Skin:     General: Skin is warm and dry.   Neurological:      Mental Status: She is alert and oriented to person, place, and time.       Assessment:     1. Heart palpitations    2. Vasodepressor syncope    3. Postural hypotension        Plan:     Heart palpitations    Vasodepressor syncope    Postural hypotension      F/u EP Abisamra soon  Continue kerlone, midodrine, florinef- possible POTS

## 2023-01-31 ENCOUNTER — PATIENT MESSAGE (OUTPATIENT)
Dept: CARDIOLOGY | Facility: CLINIC | Age: 43
End: 2023-01-31
Payer: COMMERCIAL

## 2023-02-06 ENCOUNTER — OFFICE VISIT (OUTPATIENT)
Dept: CARDIOLOGY | Facility: CLINIC | Age: 43
End: 2023-02-06
Payer: COMMERCIAL

## 2023-02-06 ENCOUNTER — TELEPHONE (OUTPATIENT)
Dept: CARDIOLOGY | Facility: CLINIC | Age: 43
End: 2023-02-06
Payer: COMMERCIAL

## 2023-02-06 VITALS
HEIGHT: 71 IN | OXYGEN SATURATION: 98 % | SYSTOLIC BLOOD PRESSURE: 74 MMHG | BODY MASS INDEX: 25.1 KG/M2 | HEART RATE: 101 BPM | DIASTOLIC BLOOD PRESSURE: 50 MMHG | WEIGHT: 179.25 LBS

## 2023-02-06 DIAGNOSIS — Z79.899 POLYPHARMACY: ICD-10-CM

## 2023-02-06 DIAGNOSIS — F41.8 ANXIETY ASSOCIATED WITH DEPRESSION: ICD-10-CM

## 2023-02-06 DIAGNOSIS — G47.61 PLMD (PERIODIC LIMB MOVEMENT DISORDER): ICD-10-CM

## 2023-02-06 DIAGNOSIS — G43.019 COMMON MIGRAINE WITH INTRACTABLE MIGRAINE: ICD-10-CM

## 2023-02-06 DIAGNOSIS — R55 VASODEPRESSOR SYNCOPE: Primary | ICD-10-CM

## 2023-02-06 DIAGNOSIS — R00.2 HEART PALPITATIONS: ICD-10-CM

## 2023-02-06 DIAGNOSIS — D06.9 CIN III (CERVICAL INTRAEPITHELIAL NEOPLASIA GRADE III) WITH SEVERE DYSPLASIA: ICD-10-CM

## 2023-02-06 DIAGNOSIS — R87.612 LOW GRADE SQUAMOUS INTRAEPITHELIAL LESION (LGSIL) ON CERVICAL PAP SMEAR: ICD-10-CM

## 2023-02-06 DIAGNOSIS — Z79.899 USE OF MEDICATION WITH TERATOGENIC POTENTIAL IN FEMALE OF REPRODUCTIVE AGE: ICD-10-CM

## 2023-02-06 DIAGNOSIS — I95.1 POSTURAL HYPOTENSION: Chronic | ICD-10-CM

## 2023-02-06 PROCEDURE — 3008F PR BODY MASS INDEX (BMI) DOCUMENTED: ICD-10-PCS | Mod: CPTII,S$GLB,, | Performed by: INTERNAL MEDICINE

## 2023-02-06 PROCEDURE — 99204 OFFICE O/P NEW MOD 45 MIN: CPT | Mod: S$GLB,,, | Performed by: INTERNAL MEDICINE

## 2023-02-06 PROCEDURE — 3078F PR MOST RECENT DIASTOLIC BLOOD PRESSURE < 80 MM HG: ICD-10-PCS | Mod: CPTII,S$GLB,, | Performed by: INTERNAL MEDICINE

## 2023-02-06 PROCEDURE — 3008F BODY MASS INDEX DOCD: CPT | Mod: CPTII,S$GLB,, | Performed by: INTERNAL MEDICINE

## 2023-02-06 PROCEDURE — 99999 PR PBB SHADOW E&M-EST. PATIENT-LVL V: CPT | Mod: PBBFAC,,, | Performed by: INTERNAL MEDICINE

## 2023-02-06 PROCEDURE — 99999 PR PBB SHADOW E&M-EST. PATIENT-LVL V: ICD-10-PCS | Mod: PBBFAC,,, | Performed by: INTERNAL MEDICINE

## 2023-02-06 PROCEDURE — 99204 PR OFFICE/OUTPT VISIT, NEW, LEVL IV, 45-59 MIN: ICD-10-PCS | Mod: S$GLB,,, | Performed by: INTERNAL MEDICINE

## 2023-02-06 PROCEDURE — 3074F PR MOST RECENT SYSTOLIC BLOOD PRESSURE < 130 MM HG: ICD-10-PCS | Mod: CPTII,S$GLB,, | Performed by: INTERNAL MEDICINE

## 2023-02-06 PROCEDURE — 3078F DIAST BP <80 MM HG: CPT | Mod: CPTII,S$GLB,, | Performed by: INTERNAL MEDICINE

## 2023-02-06 PROCEDURE — 3074F SYST BP LT 130 MM HG: CPT | Mod: CPTII,S$GLB,, | Performed by: INTERNAL MEDICINE

## 2023-02-06 NOTE — PROGRESS NOTES
Subjective:   Patient ID:  Arielle Verde is a 42 y.o. female     Chief complaint:  Syncope/POTS    HPI  New patient to me. (02/12/2023 )  Referred by Dr Freeman for evaluation and management of syncope/POTS   --   Background as gleaned from patient's records and today's interview :  42 year old female.  Her current medical conditions include Syncope, orthostatic hypotension, anxiety, chronic Headaches, Depression.   She has lost about 40  Pounds recently.      Had HUT:  >>  Probably abnormal arterial cher-receptor reflex arch function.  Data suggest partial efferrent autonomic dysfunction/ true POTS  Excessive venous pooling.  Excessive chronotropic response consistent with the reflex tachycardia of POTS.  With tilting there was an eventual inability to maintain diastolic hypertension again consistent with partial efferrent autonomic dysfunction.     Blood collections for renin, aldosterone and catecholamines were obtained during this baseline tilt test. These will be reported under separate cover.   >>  Her renin/keri axis is somewhat suppressed. This could be appropriate in view of her salt replete state  (7.5 gm salt a day and 45 mEq KCL excretion) but I suspect that   She would benefit from Florinef - I will send for that and additional KCL.  From tilt data she could also benefit from a BBs - which we have started-  + also Midodrine (but will wait on Midodrine until after BBs/ Florinef Rx for a month    Clinically, patient continues to have the orthostatic dizziness and at times blackening of her vision.  This is persistent, recurrent and occurs daily.  She has had 2 episodes of syncope the 1st on August 9th while in bathroom in the 2nd was on [place].    She did have prior symptoms in her late 20s that lasted till her mid 30s.  She now has recurrent symptoms.    Is staying properly hydrated with 2L of fluid intake every day.   Current Outpatient Medications   Medication Sig    betaxoloL  (KERLONE) 10 mg Tab Take 1 tablet (10 mg total) by mouth once daily. Start taking 2.5 mg daily    buPROPion (WELLBUTRIN XL) 150 MG TB24 tablet Take 3 tablets (450 mg total) by mouth once daily.    busPIRone (BUSPAR) 15 MG tablet Take 2 tablets (30 mg total) by mouth 2 (two) times daily.    carboxymethylcellulose-citric (PLENITY, WELCOME KIT,) 0.75 gram Cap Take 3 capsules by mouth 2 (two) times a day.    cetirizine (ZYRTEC) 10 MG tablet Take 10 mg by mouth daily as needed.     clindamycin (CLEOCIN T) 1 % Swab Apply topically 2 (two) times daily. AAA qd to bid as tolerated for acne. Decrease frequency if any dryness.    clonazePAM (KLONOPIN) 0.5 MG tablet Take 1 tablet (0.5 mg total) by mouth daily as needed for Anxiety.    EPINEPHrine (EPIPEN 2-DUANE) 0.3 mg/0.3 mL AtIn Inject 0.6 mLs (0.6 mg total) into the muscle once. for 1 dose    fludrocortisone (FLORINEF) 0.1 mg Tab Take 1 tablet (100 mcg total) by mouth 2 (two) times daily.    metFORMIN (GLUCOPHAGE) 500 MG tablet Take 1 tablet (500 mg total) by mouth 2 (two) times daily.    midodrine (PROAMATINE) 5 MG Tab Take 1 tablet (5 mg total) by mouth 3 (three) times daily with meals.    potassium chloride SA (K-DUR,KLOR-CON) 20 MEQ tablet Take 1 tablet (20 mEq total) by mouth 2 (two) times daily.    rizatriptan (MAXALT) 10 MG tablet Take 10 mg by mouth daily as needed.    traZODone (DESYREL) 100 MG tablet Take 1-3 tablets at bedtime as needed for sleep.    tretinoin (RETIN-A) 0.025 % cream Apply a pea-sized amount to the entire face at bedtime.  Use every third night and increase as tolerated to nightly.    triamcinolone acetonide 0.1% (KENALOG) 0.1 % cream Apply topically 2 (two) times daily.    amoxicillin-clavulanate 875-125mg (AUGMENTIN) 875-125 mg per tablet Take 1 tablet by mouth 2 (two) times daily. for 10 days     Current Facility-Administered Medications   Medication    levonorgestreL 20 mcg/24 hours (5 yrs) 52 mg IUD 1 Intra Uterine Device    onabotulinumtoxina  injection 200 Units       Review of Systems     Constitutional: Reviewed  for decreased appetite, weight gain and weight loss.   HENT: Reviewed for nosebleeds.    Eyes:  Reviewed for blurred vision and visual disturbance.   Cardiovascular: Reviewed for chest pain, claudication, cyanosis,dyspnea on exertion, leg swelling, orthopnea,paroxysmal nocturnal dyspnearregular heartbeats, palpitations, near-syncope, and syncope.   Respiratory: Reviewed for cough, shortness of breath, wheezing, sleep disturbances due to breathing and snoring, .    Endocrine: Reviewed for heat intolerance.   Hematologic/Lymphatic: Reviewed for easy bruisability/bleeding.   Skin: Reviewed for rash.   Musculoskeletal: Reviewed for muscle weakness and myalgias.   Gastrointestinal: Reviewed for abdominal pain, anorexia, melena, nausea and vomiting.   Genitourinary: Reviewed for menorrhagia, frequency, nocturia and incontinence.   Neurological: Reviewed for excessive daytime sleepiness, dizziness, vertigo, weakness, headaches, loss of balance and seizures,   Psychiatric/Behavioral:  Reviewed for insomnia, altered mental status, depression, anxiety and nervousness.       All symptoms reviewed above were negative except for fatigue, hearing loss and some vision loss.  She also complains of discomfort while walking, dyspnea on exertion, chest pain and palpitations with intermittent syncope.    A complaints of abdominal pain and constipation as well as difficulty swallowing with heartburn.  Neurological complaints include daytime sleepiness, generalized weakness, headaches and lightheadedness.  There are some arthritic complaints.    She endorses depression and anxiety.  She drinks less than 3 drinks a week.     Social History     Tobacco Use   Smoking Status Light Smoker   Smokeless Tobacco Never   Tobacco Comments    no smoking after m.n prior to sx       reports current alcohol use.   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     Comment: Mirena 3/30/20 LOT [id number] exp 4.2022     Past Surgical History:   Procedure Laterality Date    ADENOIDECTOMY      AUGMENTATION OF BREAST  06/2005    saline    BREAST SURGERY      breast augmentatin    COLD KNIFE CONIZATION OF CERVIX N/A 11/27/2019    Procedure: CONE BIOPSY, CERVIX, USING COLD;  Surgeon: Rachele Willis MD;  Location: [facility] OR;  Service: OB/GYN;  Laterality: N/A;    Hyperhydrosis      LIPOSUCTION OF NECK      REMOVAL OF INTRAUTERINE DEVICE (IUD)  11/27/2019    Procedure: REMOVAL, INTRAUTERINE DEVICE;  Surgeon: Rachele Willis MD;  Location: [facility] OR;  Service: OB/GYN;;    TILT TABLE TEST N/A 01/11/2019    Procedure: TILT TABLE TEST;  Surgeon: Justin Freeman MD;  Location: [facility] CATH LAB;  Service: Cardiology;  Laterality: N/A;  Rodriguez pt/pt req 930 start time    TONSILLECTOMY         Objective:   Physical Exam  Vitals and nursing note reviewed.   Constitutional:       Appearance: She is well-developed.   HENT:      Head: Normocephalic and atraumatic.      Right Ear: External ear normal.      Left Ear: External ear normal.   Neck:      Thyroid: No thyromegaly.  "  Cardiovascular:      Rate and Rhythm: Normal rate and regular rhythm.      Pulses: Intact distal pulses.           Carotid pulses are 2+ on the right side and 2+ on the left side.       Radial pulses are 2+ on the right side and 2+ on the left side.        Dorsalis pedis pulses are 2+ on the right side and 2+ on the left side.        Posterior tibial pulses are 2+ on the right side and 2+ on the left side.      Heart sounds: Normal heart sounds. No midsystolic click and no opening snap. No murmur heard.    No friction rub. No gallop. No S3 or S4 sounds.      Comments: Orthostatic test in clinic:   Sitting BP 88/64, pulse 75   Standing 1 minute BP 82/58, pulse 95   Standing 3 minutes BP 74/50, pulse 99   Standing 5 minutes BP 74/50, pulse 101.  Pulmonary:      Effort: Pulmonary effort is normal.      Breath sounds: Normal breath sounds.   Abdominal:      General: There is no distension.      Palpations: Abdomen is soft.      Tenderness: There is no abdominal tenderness.   Musculoskeletal:      Cervical back: Normal range of motion and neck supple.      Right lower leg: No swelling.      Left lower leg: No swelling.      Right ankle: No swelling.      Left ankle: No swelling.   Skin:     General: Skin is warm.      Findings: No rash.      Nails: There is no clubbing.   Neurological:      Mental Status: She is alert and oriented to person, place, and time.      Cranial Nerves: No cranial nerve deficit.      Gait: Gait normal.   Psychiatric:         Speech: Speech normal.         Behavior: Behavior normal.         Thought Content: Thought content normal.     BP (!) 74/50 Comment: peripheral vision off.with dizziness  Pulse 101   Ht 5' 11" (1.803 m)   Wt 81.3 kg (179 lb 3.7 oz)   SpO2 98%   BMI 25.00 kg/m²          Results for orders placed during the hospital encounter of 08/10/22    Echo    Interpretation Summary  · Concentric remodeling and normal systolic function.  · The estimated ejection fraction is 60%.  · " Normal left ventricular diastolic function.  · Normal right ventricular size with normal right ventricular systolic function.  · Normal central venous pressure (3 mmHg).  · The estimated PA systolic pressure is 24 mmHg.    WBC   Date Value Ref Range Status   08/11/2022 6.08 3.90 - 12.70 K/uL Final     Hematocrit   Date Value Ref Range Status   08/11/2022 35.3 (L) 37.0 - 48.5 % Final     Hemoglobin   Date Value Ref Range Status   08/11/2022 11.0 (L) 12.0 - 16.0 g/dL Final     Lab Results   Component Value Date     08/11/2022     Lab Results   Component Value Date    CREATININE 0.8 08/11/2022    EGFRNORACEVR >60 08/11/2022    K 3.9 08/11/2022     No results found for: BNP      Assessment:    Continues to have significant orthostatic symptoms.  1. Vasodepressor syncope    2. Postural hypotension    3. Heart palpitations    4. Anxiety associated with depression    5. Common migraine with intractable migraine    6. SALLIE III (cervical intraepithelial neoplasia grade III) with severe dysplasia    7. Low grade squamous intraepithelial lesion (LGSIL) on cervical Pap smear    8. Use of medication with teratogenic potential in female of reproductive age    9. PLMD (periodic limb movement disorder)    10. Polypharmacy        Plan:    Take midodrine 5 mg every 4 hours while awake.  No orders of the defined types were placed in this encounter.    No follow-ups on file.  There are no discontinued medications.  Outpatient Encounter Medications as of 2/6/2023   Medication Sig Dispense Refill    betaxoloL (KERLONE) 10 mg Tab Take 1 tablet (10 mg total) by mouth once daily. Start taking 2.5 mg daily 30 tablet 11    buPROPion (WELLBUTRIN XL) 150 MG TB24 tablet Take 3 tablets (450 mg total) by mouth once daily. 90 tablet 2    busPIRone (BUSPAR) 15 MG tablet Take 2 tablets (30 mg total) by mouth 2 (two) times daily. 120 tablet 5    carboxymethylcellulose-citric (PLENITY, WELCOME KIT,) 0.75 gram Cap Take 3 capsules by mouth 2 (two)  times a day. 180 capsule 1    cetirizine (ZYRTEC) 10 MG tablet Take 10 mg by mouth daily as needed.   0    clindamycin (CLEOCIN T) 1 % Swab Apply topically 2 (two) times daily. AAA qd to bid as tolerated for acne. Decrease frequency if any dryness. 60 each 5    clonazePAM (KLONOPIN) 0.5 MG tablet Take 1 tablet (0.5 mg total) by mouth daily as needed for Anxiety. 30 tablet 5    EPINEPHrine (EPIPEN 2-DUANE) 0.3 mg/0.3 mL AtIn Inject 0.6 mLs (0.6 mg total) into the muscle once. for 1 dose 0.6 mL 0    fludrocortisone (FLORINEF) 0.1 mg Tab Take 1 tablet (100 mcg total) by mouth 2 (two) times daily. 180 tablet 3    metFORMIN (GLUCOPHAGE) 500 MG tablet Take 1 tablet (500 mg total) by mouth 2 (two) times daily. 60 tablet 3    midodrine (PROAMATINE) 5 MG Tab Take 1 tablet (5 mg total) by mouth 3 (three) times daily with meals. 90 tablet 11    potassium chloride SA (K-DUR,KLOR-CON) 20 MEQ tablet Take 1 tablet (20 mEq total) by mouth 2 (two) times daily. 180 tablet 3    rizatriptan (MAXALT) 10 MG tablet Take 10 mg by mouth daily as needed.      traZODone (DESYREL) 100 MG tablet Take 1-3 tablets at bedtime as needed for sleep. 270 tablet 1    tretinoin (RETIN-A) 0.025 % cream Apply a pea-sized amount to the entire face at bedtime.  Use every third night and increase as tolerated to nightly. 20 g 6    triamcinolone acetonide 0.1% (KENALOG) 0.1 % cream Apply topically 2 (two) times daily. 80 g 1    [DISCONTINUED] albuterol (PROVENTIL/VENTOLIN HFA) 90 mcg/actuation inhaler Inhale 2 puffs into the lungs every 6 (six) hours as needed for Wheezing. Dispense with spacer. (Patient not taking: Reported on 2/6/2023) 6.7 g 0    [DISCONTINUED] atomoxetine (STRATTERA) 80 MG capsule Take 1 capsule (80 mg total) by mouth once daily. (Patient not taking: Reported on 2/6/2023) 90 capsule 1     Facility-Administered Encounter Medications as of 2/6/2023   Medication Dose Route Frequency Provider Last Rate Last Admin    levonorgestreL 20 mcg/24  hours (5 yrs) 52 mg IUD 1 Intra Uterine Device  1 each Intrauterine 1 time in Clinic/HOD Rachele Willis MD        onabotulinumtoxina injection 200 Units  200 Units Intramuscular Q90 Days Karloer SHARON Anderson MD   200 Units at 12/12/22 1408     Medication List with Changes/Refills   New Medications    AMOXICILLIN-CLAVULANATE 875-125MG (AUGMENTIN) 875-125 MG PER TABLET    Take 1 tablet by mouth 2 (two) times daily. for 10 days   Current Medications    BETAXOLOL (KERLONE) 10 MG TAB    Take 1 tablet (10 mg total) by mouth once daily. Start taking 2.5 mg daily    BUPROPION (WELLBUTRIN XL) 150 MG TB24 TABLET    Take 3 tablets (450 mg total) by mouth once daily.    BUSPIRONE (BUSPAR) 15 MG TABLET    Take 2 tablets (30 mg total) by mouth 2 (two) times daily.    CARBOXYMETHYLCELLULOSE-CITRIC (PLENITY, WELCOME KIT,) 0.75 GRAM CAP    Take 3 capsules by mouth 2 (two) times a day.    CETIRIZINE (ZYRTEC) 10 MG TABLET    Take 10 mg by mouth daily as needed.     CLINDAMYCIN (CLEOCIN T) 1 % SWAB    Apply topically 2 (two) times daily. AAA qd to bid as tolerated for acne. Decrease frequency if any dryness.    CLONAZEPAM (KLONOPIN) 0.5 MG TABLET    Take 1 tablet (0.5 mg total) by mouth daily as needed for Anxiety.    EPINEPHRINE (EPIPEN 2-DUANE) 0.3 MG/0.3 ML ATIN    Inject 0.6 mLs (0.6 mg total) into the muscle once. for 1 dose    FLUDROCORTISONE (FLORINEF) 0.1 MG TAB    Take 1 tablet (100 mcg total) by mouth 2 (two) times daily.    METFORMIN (GLUCOPHAGE) 500 MG TABLET    Take 1 tablet (500 mg total) by mouth 2 (two) times daily.    MIDODRINE (PROAMATINE) 5 MG TAB    Take 1 tablet (5 mg total) by mouth 3 (three) times daily with meals.    POTASSIUM CHLORIDE SA (K-DUR,KLOR-CON) 20 MEQ TABLET    Take 1 tablet (20 mEq total) by mouth 2 (two) times daily.    RIZATRIPTAN (MAXALT) 10 MG TABLET    Take 10 mg by mouth daily as needed.    TRAZODONE (DESYREL) 100 MG TABLET    Take 1-3 tablets at bedtime as needed for sleep.    TRETINOIN (RETIN-A)  0.025 % CREAM    Apply a pea-sized amount to the entire face at bedtime.  Use every third night and increase as tolerated to nightly.    TRIAMCINOLONE ACETONIDE 0.1% (KENALOG) 0.1 % CREAM    Apply topically 2 (two) times daily.   Discontinued Medications    ALBUTEROL (PROVENTIL/VENTOLIN HFA) 90 MCG/ACTUATION INHALER    Inhale 2 puffs into the lungs every 6 (six) hours as needed for Wheezing. Dispense with spacer.    ATOMOXETINE (STRATTERA) 80 MG CAPSULE    Take 1 capsule (80 mg total) by mouth once daily.        This note is at least partially dictated using the M*Modal Fluency Direct word recognition program. There are word recognition mistakes that are occasionally missed on review.

## 2023-02-07 ENCOUNTER — OFFICE VISIT (OUTPATIENT)
Dept: OTOLARYNGOLOGY | Facility: CLINIC | Age: 43
End: 2023-02-07
Payer: COMMERCIAL

## 2023-02-07 VITALS — TEMPERATURE: 98 F | WEIGHT: 181.19 LBS | BODY MASS INDEX: 25.27 KG/M2

## 2023-02-07 DIAGNOSIS — R59.0 LOCALIZED ENLARGED LYMPH NODES: Primary | ICD-10-CM

## 2023-02-07 DIAGNOSIS — R60.0 SUBMANDIBULAR GLAND SWELLING: ICD-10-CM

## 2023-02-07 PROCEDURE — 1159F MED LIST DOCD IN RCRD: CPT | Mod: CPTII,S$GLB,, | Performed by: PHYSICIAN ASSISTANT

## 2023-02-07 PROCEDURE — 99214 OFFICE O/P EST MOD 30 MIN: CPT | Mod: S$GLB,,, | Performed by: PHYSICIAN ASSISTANT

## 2023-02-07 PROCEDURE — 1159F PR MEDICATION LIST DOCUMENTED IN MEDICAL RECORD: ICD-10-PCS | Mod: CPTII,S$GLB,, | Performed by: PHYSICIAN ASSISTANT

## 2023-02-07 PROCEDURE — 99214 PR OFFICE/OUTPT VISIT, EST, LEVL IV, 30-39 MIN: ICD-10-PCS | Mod: S$GLB,,, | Performed by: PHYSICIAN ASSISTANT

## 2023-02-07 PROCEDURE — 99999 PR PBB SHADOW E&M-EST. PATIENT-LVL III: ICD-10-PCS | Mod: PBBFAC,,, | Performed by: PHYSICIAN ASSISTANT

## 2023-02-07 PROCEDURE — 3008F PR BODY MASS INDEX (BMI) DOCUMENTED: ICD-10-PCS | Mod: CPTII,S$GLB,, | Performed by: PHYSICIAN ASSISTANT

## 2023-02-07 PROCEDURE — 99999 PR PBB SHADOW E&M-EST. PATIENT-LVL III: CPT | Mod: PBBFAC,,, | Performed by: PHYSICIAN ASSISTANT

## 2023-02-07 PROCEDURE — 3008F BODY MASS INDEX DOCD: CPT | Mod: CPTII,S$GLB,, | Performed by: PHYSICIAN ASSISTANT

## 2023-02-07 RX ORDER — AMOXICILLIN AND CLAVULANATE POTASSIUM 875; 125 MG/1; MG/1
1 TABLET, FILM COATED ORAL 2 TIMES DAILY
Qty: 20 TABLET | Refills: 0 | Status: SHIPPED | OUTPATIENT
Start: 2023-02-07 | End: 2023-02-17

## 2023-02-07 RX ORDER — FLUCONAZOLE 150 MG/1
150 TABLET ORAL DAILY
Qty: 3 TABLET | Refills: 0 | Status: SHIPPED | OUTPATIENT
Start: 2023-02-07 | End: 2023-02-10

## 2023-02-07 NOTE — PROGRESS NOTES
Subjective:       Patient ID: Arielle Verde is a 42 y.o. female.    Chief Complaint: Sore Throat    Patient is a pleasant 42 year old female here to see me today for evaluation of swollen area under right mandible that started about 3 weeks ago.  She was seen at  and tested negative for strep, flu, covid and mono.  Treated with Amoxil x 10 days and steroid injection.  Swollen area improved but seems to be getting bigger again now that she's off of antibiotics.  She denies any fluctuations in size when eating or drinking.  The area is very tender to touch but denies any erythema or drainage from overlying skin.  She had tonsillectomy as teen.  She denies gritty drainage in mouth but has had thicker saliva with dry mouth.  Denies sore throat or dysphagia.  Has had occasional reflux and nausea; takes Zofran.      She has not had fever.  She says the tender knot below her right mandible causes pain in right anterior neck as well.  Denies any otalgia.  Noted scant blood on Qtip when cleaning right ear earlier this week.  Denies hearing loss, ear drainage or tinnitus.  She does grind her teeth and wears a guard.  Denies drooling, cough or respiratory distress.  She also mentions new rash on her face/neck and chest.  Saw Dermatology about 6 weeks ago and treated with Clindamycin topically.  Seemed better for short time but now recurrence.  She mentions upcoming appt with Rheumatology as she has family hx of Lupus (mother) and per EP recs.     Here with me in 9/2021 with fullness of LEFT parotid tail.  Treated as sialoadenitis with course of Augmentin, heat, massage, sialogogues.  Had US done but did not cover that area.  Symptoms resolved at that time.      Review of Systems   Constitutional:  Negative for fever.   HENT:  Positive for facial swelling (under right mandible per HPI). Negative for dental problem, drooling, mouth sores, sore throat, trouble swallowing and voice change.    Gastrointestinal:   Positive for nausea and reflux.       Objective:      Physical Exam  Constitutional:       General: She is not in acute distress.     Appearance: She is well-developed.   HENT:      Head: Normocephalic and atraumatic.      Jaw: No trismus or swelling.      Right Ear: Tympanic membrane, ear canal and external ear normal. No drainage, swelling or tenderness. No middle ear effusion. Tympanic membrane is not erythematous.      Left Ear: Tympanic membrane, ear canal and external ear normal. No drainage, swelling or tenderness.  No middle ear effusion. Tympanic membrane is not erythematous.      Nose: Nose normal. No mucosal edema or rhinorrhea.      Right Sinus: No maxillary sinus tenderness or frontal sinus tenderness.      Left Sinus: No maxillary sinus tenderness or frontal sinus tenderness.      Mouth/Throat:      Mouth: Mucous membranes are moist. Mucous membranes are not pale and not dry.      Dentition: Normal dentition.      Pharynx: Uvula midline. No oropharyngeal exudate or posterior oropharyngeal erythema.      Tonsils: 0 on the right. 0 on the left.   Eyes:      General: Lids are normal. No scleral icterus.     Extraocular Movements:      Right eye: Normal extraocular motion and no nystagmus.      Left eye: Normal extraocular motion and no nystagmus.      Conjunctiva/sclera: Conjunctivae normal.      Right eye: Right conjunctiva is not injected. No chemosis.     Left eye: Left conjunctiva is not injected. No chemosis.     Pupils: Pupils are equal, round, and reactive to light.   Neck:      Trachea: Trachea and phonation normal. No tracheal tenderness or tracheal deviation.   Pulmonary:      Effort: Pulmonary effort is normal. No respiratory distress.      Breath sounds: No stridor.   Abdominal:      General: There is no distension.   Lymphadenopathy:      Head:      Right side of head: Submandibular adenopathy present. No submental, preauricular or posterior auricular adenopathy.      Left side of head: No  submental, submandibular, preauricular or posterior auricular adenopathy.      Cervical: No cervical adenopathy.      Comments: Enlarged right submandibular gland, 1.5 cm, mobile; tender to palpation, no overlying erythema or fluctuance; no obvious stone at [place]'s duct   Skin:     General: Skin is warm and dry.      Findings: No erythema or rash.   Neurological:      Mental Status: She is alert and oriented to person, place, and time.      Cranial Nerves: No cranial nerve deficit.   Psychiatric:         Behavior: Behavior normal. Behavior is cooperative.       Assessment:       Problem List Items Addressed This Visit    None  Visit Diagnoses       Localized enlarged lymph nodes    -  Primary    Relevant Orders    CT Soft Tissue Neck With Contrast    Submandibular gland swelling                  Plan:           Recommend resuming oral antibiotics and getting imaging.  Augmentin x 10 days.  CT Neck w/wo contrast for further evaluation and will contact her with results.  Discussed that if salivary stone is noted and no response to conservative management, it may be removed using minimally invasive techniques (sialoendoscopy). Occasionally, open surgical extraction of the stone is necessary.  Her exam today is consistent with sialoadenitis of the right submandibular.  I gave the patient a prescription for a course of oral antibiotics, Augmentin.  We also discussed conservative measures to help treat sialoadenitis, including massage, warm compresses, aggressive hydration, and oral sialogogues (lemon drops or peppermints).  Instructed her to call if her condition worsens in any way.  Agree with Rheumatology evaluation as autoimmune disease (Sjogren's) can also cause issues with recurrent sialoadenitis.  If minor salivary gland biopsy needed, she will need to followup with one of my supervising MDs.

## 2023-02-08 ENCOUNTER — HOSPITAL ENCOUNTER (OUTPATIENT)
Dept: RADIOLOGY | Facility: HOSPITAL | Age: 43
Discharge: HOME | End: 2023-02-08
Payer: COMMERCIAL

## 2023-02-08 ENCOUNTER — OFFICE VISIT (OUTPATIENT)
Dept: RHEUMATOLOGY | Facility: CLINIC | Age: 43
End: 2023-02-08
Payer: COMMERCIAL

## 2023-02-08 VITALS
BODY MASS INDEX: 25.03 KG/M2 | DIASTOLIC BLOOD PRESSURE: 69 MMHG | WEIGHT: 178.81 LBS | SYSTOLIC BLOOD PRESSURE: 102 MMHG | HEIGHT: 71 IN | HEART RATE: 87 BPM

## 2023-02-08 DIAGNOSIS — M35.00 SICCA SYNDROME: ICD-10-CM

## 2023-02-08 DIAGNOSIS — K11.20 SIALADENITIS: Primary | ICD-10-CM

## 2023-02-08 DIAGNOSIS — S00.522A BLISTER (NONTHERMAL) OF ORAL CAVITY, INITIAL ENCOUNTER: ICD-10-CM

## 2023-02-08 DIAGNOSIS — Z12.31 OTHER SCREENING MAMMOGRAM: ICD-10-CM

## 2023-02-08 DIAGNOSIS — Z82.69 FAMILY HISTORY OF SYSTEMIC LUPUS ERYTHEMATOSUS: ICD-10-CM

## 2023-02-08 PROCEDURE — 99204 OFFICE O/P NEW MOD 45 MIN: CPT | Mod: S$GLB,,, | Performed by: STUDENT IN AN ORGANIZED HEALTH CARE EDUCATION/TRAINING PROGRAM

## 2023-02-08 PROCEDURE — 1160F PR REVIEW ALL MEDS BY PRESCRIBER/CLIN PHARMACIST DOCUMENTED: ICD-10-PCS | Mod: CPTII,S$GLB,, | Performed by: STUDENT IN AN ORGANIZED HEALTH CARE EDUCATION/TRAINING PROGRAM

## 2023-02-08 PROCEDURE — 1159F PR MEDICATION LIST DOCUMENTED IN MEDICAL RECORD: ICD-10-PCS | Mod: CPTII,S$GLB,, | Performed by: STUDENT IN AN ORGANIZED HEALTH CARE EDUCATION/TRAINING PROGRAM

## 2023-02-08 PROCEDURE — 3074F SYST BP LT 130 MM HG: CPT | Mod: CPTII,S$GLB,, | Performed by: STUDENT IN AN ORGANIZED HEALTH CARE EDUCATION/TRAINING PROGRAM

## 2023-02-08 PROCEDURE — 3008F PR BODY MASS INDEX (BMI) DOCUMENTED: ICD-10-PCS | Mod: CPTII,S$GLB,, | Performed by: STUDENT IN AN ORGANIZED HEALTH CARE EDUCATION/TRAINING PROGRAM

## 2023-02-08 PROCEDURE — 3008F BODY MASS INDEX DOCD: CPT | Mod: CPTII,S$GLB,, | Performed by: STUDENT IN AN ORGANIZED HEALTH CARE EDUCATION/TRAINING PROGRAM

## 2023-02-08 PROCEDURE — 3078F DIAST BP <80 MM HG: CPT | Mod: CPTII,S$GLB,, | Performed by: STUDENT IN AN ORGANIZED HEALTH CARE EDUCATION/TRAINING PROGRAM

## 2023-02-08 PROCEDURE — 3074F PR MOST RECENT SYSTOLIC BLOOD PRESSURE < 130 MM HG: ICD-10-PCS | Mod: CPTII,S$GLB,, | Performed by: STUDENT IN AN ORGANIZED HEALTH CARE EDUCATION/TRAINING PROGRAM

## 2023-02-08 PROCEDURE — 1160F RVW MEDS BY RX/DR IN RCRD: CPT | Mod: CPTII,S$GLB,, | Performed by: STUDENT IN AN ORGANIZED HEALTH CARE EDUCATION/TRAINING PROGRAM

## 2023-02-08 PROCEDURE — 77067 SCR MAMMO BI INCL CAD: CPT | Mod: TC

## 2023-02-08 PROCEDURE — 77067 MAMMO DIGITAL SCREENING BILAT WITH TOMO: ICD-10-PCS | Mod: 26,,, | Performed by: RADIOLOGY

## 2023-02-08 PROCEDURE — 77063 BREAST TOMOSYNTHESIS BI: CPT | Mod: 26,,, | Performed by: RADIOLOGY

## 2023-02-08 PROCEDURE — 1159F MED LIST DOCD IN RCRD: CPT | Mod: CPTII,S$GLB,, | Performed by: STUDENT IN AN ORGANIZED HEALTH CARE EDUCATION/TRAINING PROGRAM

## 2023-02-08 PROCEDURE — 3078F PR MOST RECENT DIASTOLIC BLOOD PRESSURE < 80 MM HG: ICD-10-PCS | Mod: CPTII,S$GLB,, | Performed by: STUDENT IN AN ORGANIZED HEALTH CARE EDUCATION/TRAINING PROGRAM

## 2023-02-08 PROCEDURE — 77063 MAMMO DIGITAL SCREENING BILAT WITH TOMO: ICD-10-PCS | Mod: 26,,, | Performed by: RADIOLOGY

## 2023-02-08 PROCEDURE — 99204 PR OFFICE/OUTPT VISIT, NEW, LEVL IV, 45-59 MIN: ICD-10-PCS | Mod: S$GLB,,, | Performed by: STUDENT IN AN ORGANIZED HEALTH CARE EDUCATION/TRAINING PROGRAM

## 2023-02-08 PROCEDURE — 99999 PR PBB SHADOW E&M-EST. PATIENT-LVL IV: ICD-10-PCS | Mod: PBBFAC,,, | Performed by: STUDENT IN AN ORGANIZED HEALTH CARE EDUCATION/TRAINING PROGRAM

## 2023-02-08 PROCEDURE — 77067 SCR MAMMO BI INCL CAD: CPT | Mod: 26,,, | Performed by: RADIOLOGY

## 2023-02-08 PROCEDURE — 99999 PR PBB SHADOW E&M-EST. PATIENT-LVL IV: CPT | Mod: PBBFAC,,, | Performed by: STUDENT IN AN ORGANIZED HEALTH CARE EDUCATION/TRAINING PROGRAM

## 2023-02-08 NOTE — PROGRESS NOTES
RHEUMATOLOGY CLINIC INITIAL VISIT    Reason for consult:- possible autoimmune disease    Chief complaints, HPI, ROS, EXAM, Assessment & Plans:-    Arielle Verde is a 42 y.o. pleasant female who presents to be evaluated for possible autoimmune disease.  Patient has been following with ENT for recurrent submandibular gland swelling and has been diagnosed with sialadenitis.  She is had recurrent episodes of this that have been treated with antibiotics.  There is plan for CT to look for stone in the salivary duct.  ENT referral to Rheumatology as patient has a family history of SLE in her mother and concern for possible Sjogren's she also has a history of EBV.  She does report having dry mouth very frequently and chews gum and drinks plenty of water to help with this.  She does have a lot of fatigue and does not sleep well.  She is following closely with cardiology/EP for POTS and orthostatic hypotension.  She does report recently being evaluated by Dermatology for new rash on her upper chest which was treated with topical antibiotics which provided some initial relief but the rash came back.  She does state she is had some diffuse hair loss but not enlarged patches.  She also reports having occasional color changes to her fingers with cold exposure.  She is seeing a neurologist for ocular migraines.  She denies any joint swelling.  Does have some pain in her ankles and knees at times.  She also reports having recent development of some blisters that are painful underneath her tongue.  No hard palate ulcers.  Her eyes are sometimes itchy but not very dry. The patient denies fevers, patchy alopecia, nasal ulcers, photosensitivity, joint swelling, pleuritic chest pain, shortness of breath, cough, abdominal pain, diarrhea, bloody stool, weakness, numbness/tingling. No evidence of synovitis, dactylitis or enthesitis.  Few blisters underneath the tongue.  Scattered rash on upper chest appears follicular.        Reviewed all available old and outside pertinent medical records.    All lab results personally reviewed and interpreted by me.    1. Sialadenitis    2. Family history of systemic lupus erythematosus    3. Sicca syndrome    4. Blister (nonthermal) of oral cavity, initial encounter        Problem List Items Addressed This Visit    None  Visit Diagnoses       Sialadenitis    -  Primary    Relevant Orders    GABBI Screen w/Reflex    C3 Complement    C4 Complement    C-Reactive Protein    Protein Electrophoresis, Serum    Sedimentation rate    Sjogrens syndrome-A extractable nuclear antibody    Protein/Creatinine Ratio, Urine    Urinalysis (Completed)    IGG 1, 2, 3, AND 4    ANTI-SSB ANTIBODY    Family history of systemic lupus erythematosus        Relevant Orders    GABBI Screen w/Reflex    C3 Complement    C4 Complement    C-Reactive Protein    Protein Electrophoresis, Serum    Sedimentation rate    Sjogrens syndrome-A extractable nuclear antibody    Protein/Creatinine Ratio, Urine    Urinalysis (Completed)    IGG 1, 2, 3, AND 4    ANTI-SSB ANTIBODY    Sicca syndrome        Relevant Orders    GABBI Screen w/Reflex    C3 Complement    C4 Complement    C-Reactive Protein    Protein Electrophoresis, Serum    Sedimentation rate    Sjogrens syndrome-A extractable nuclear antibody    Protein/Creatinine Ratio, Urine    Urinalysis (Completed)    IGG 1, 2, 3, AND 4    ANTI-SSB ANTIBODY    Blister (nonthermal) of oral cavity, initial encounter        Relevant Orders    HERPES SIMPLEX 1&2 IGG            Patient presenting to be evaluated for possible autoimmune disease  She is following closely with ENT for recurrent sialadenitis  Has family history with a mother who has lupus  Is possible she has Sjogren's syndrome with her dry mouth recurrent glandular swelling  Will check labs today including GABBI, C3, C4, ESR/CRP, SPEP/REX, UA/UPCR, IGG 1,2,3,4 and SSA and SSB antibodies  Check HSV serology for oral blisters  ENT planning on  doing CT to look for stones and would consider also minor salivary gland biopsy which would be helpful to confirm a diagnosis of Sjogren's or IgG4    # Follow up if symptoms worsen or fail to improve.    Chronic comorbid conditions affecting medical decision making today:    Past Medical History:   Diagnosis Date    Anxiety     Chronic headaches     SALLIE III (cervical intraepithelial neoplasia grade III) with severe dysplasia 2019    s/p LEEP    Depression     Migraine        Past Surgical History:   Procedure Laterality Date    ADENOIDECTOMY      AUGMENTATION OF BREAST  06/2005    saline    BREAST SURGERY      breast augmentatin    COLD KNIFE CONIZATION OF CERVIX N/A 11/27/2019    Procedure: CONE BIOPSY, CERVIX, USING COLD;  Surgeon: Rachele Willis MD;  Location: [facility] OR;  Service: OB/GYN;  Laterality: N/A;    Hyperhydrosis      LIPOSUCTION OF NECK      REMOVAL OF INTRAUTERINE DEVICE (IUD)  11/27/2019    Procedure: REMOVAL, INTRAUTERINE DEVICE;  Surgeon: Rachele Willis MD;  Location: [facility] OR;  Service: OB/GYN;;    TILT TABLE TEST N/A 1/11/2019    Procedure: TILT TABLE TEST;  Surgeon: Justin Freeman MD;  Location: [facility] CATH LAB;  Service: Cardiology;  Laterality: N/A;  Rodriguez pt/pt req 930 start time    TONSILLECTOMY          Social History     Tobacco Use    Smoking status: Light Smoker    Smokeless tobacco: Never    Tobacco comments:     no smoking after m.n prior to sx   Substance Use Topics    Alcohol use: Yes     Comment: social     Drug use: No       Family History   Problem Relation Age of Onset    Lupus Mother     Ulcerative colitis Mother     Kidney failure Father     Drug abuse Father     Breast cancer Paternal Grandmother     Colon cancer Neg Hx     Ovarian cancer Neg Hx        Review of patient's allergies indicates:   Allergen Reactions    Emgality [galcanezumab-gnlm] Hives and Itching    Topiramate Itching       Medication List with Changes/Refills   Current Medications     AMOXICILLIN-CLAVULANATE 875-125MG (AUGMENTIN) 875-125 MG PER TABLET    Take 1 tablet by mouth 2 (two) times daily. for 10 days    BETAXOLOL (KERLONE) 10 MG TAB    Take 1 tablet (10 mg total) by mouth once daily. Start taking 2.5 mg daily    BUPROPION (WELLBUTRIN XL) 150 MG TB24 TABLET    Take 3 tablets (450 mg total) by mouth once daily.    BUSPIRONE (BUSPAR) 15 MG TABLET    Take 2 tablets (30 mg total) by mouth 2 (two) times daily.    CARBOXYMETHYLCELLULOSE-CITRIC (PLENITY, WELCOME KIT,) 0.75 GRAM CAP    Take 3 capsules by mouth 2 (two) times a day.    CETIRIZINE (ZYRTEC) 10 MG TABLET    Take 10 mg by mouth daily as needed.     CLINDAMYCIN (CLEOCIN T) 1 % SWAB    Apply topically 2 (two) times daily. AAA qd to bid as tolerated for acne. Decrease frequency if any dryness.    CLONAZEPAM (KLONOPIN) 0.5 MG TABLET    Take 1 tablet (0.5 mg total) by mouth daily as needed for Anxiety.    EPINEPHRINE (EPIPEN 2-DUANE) 0.3 MG/0.3 ML ATIN    Inject 0.6 mLs (0.6 mg total) into the muscle once. for 1 dose    FLUCONAZOLE (DIFLUCAN) 150 MG TAB    Take 1 tablet (150 mg total) by mouth once daily. for 3 days    FLUDROCORTISONE (FLORINEF) 0.1 MG TAB    Take 1 tablet (100 mcg total) by mouth 2 (two) times daily.    METFORMIN (GLUCOPHAGE) 500 MG TABLET    Take 1 tablet (500 mg total) by mouth 2 (two) times daily.    MIDODRINE (PROAMATINE) 5 MG TAB    Take 1 tablet (5 mg total) by mouth 3 (three) times daily with meals.    POTASSIUM CHLORIDE SA (K-DUR,KLOR-CON) 20 MEQ TABLET    Take 1 tablet (20 mEq total) by mouth 2 (two) times daily.    RIZATRIPTAN (MAXALT) 10 MG TABLET    Take 10 mg by mouth daily as needed.    TRAZODONE (DESYREL) 100 MG TABLET    Take 1-3 tablets at bedtime as needed for sleep.    TRETINOIN (RETIN-A) 0.025 % CREAM    Apply a pea-sized amount to the entire face at bedtime.  Use every third night and increase as tolerated to nightly.    TRIAMCINOLONE ACETONIDE 0.1% (KENALOG) 0.1 % CREAM    Apply topically 2 (two) times  daily.         Disclaimer: This note was prepared using voice recognition system and is likely to have sound alike errors and is not proofread.  Please message me with any questions.    45 minutes of total time spent on the encounter, which includes face to face time and non-face to face time preparing to see the patient (eg, review of tests), Obtaining and/or reviewing separately obtained history, Documenting clinical information in the electronic or other health record, Independently interpreting results (not separately reported) and communicating results to the patient/family/caregiver, or Care coordination (not separately reported).     Thank you for allowing me to participate in the care of Arielle Verde.    Sukhdeep Orr MD

## 2023-02-09 ENCOUNTER — PATIENT MESSAGE (OUTPATIENT)
Dept: OTOLARYNGOLOGY | Facility: CLINIC | Age: 43
End: 2023-02-09
Payer: COMMERCIAL

## 2023-02-09 ENCOUNTER — HOSPITAL ENCOUNTER (OUTPATIENT)
Dept: RADIOLOGY | Facility: HOSPITAL | Age: 43
Discharge: HOME | End: 2023-02-09
Attending: PHYSICIAN ASSISTANT
Payer: COMMERCIAL

## 2023-02-09 DIAGNOSIS — R59.0 LOCALIZED ENLARGED LYMPH NODES: ICD-10-CM

## 2023-02-09 PROCEDURE — 25500020 PHARM REV CODE 255: Mod: PN | Performed by: PHYSICIAN ASSISTANT

## 2023-02-09 PROCEDURE — 70491 CT SOFT TISSUE NECK W/DYE: CPT | Mod: 26,,, | Performed by: RADIOLOGY

## 2023-02-09 PROCEDURE — 70491 CT SOFT TISSUE NECK WITH CONTRAST: ICD-10-PCS | Mod: 26,,, | Performed by: RADIOLOGY

## 2023-02-09 PROCEDURE — 70491 CT SOFT TISSUE NECK W/DYE: CPT | Mod: TC,PN

## 2023-02-09 RX ADMIN — IOHEXOL 75 ML: 350 INJECTION, SOLUTION INTRAVENOUS at 09:02

## 2023-02-10 ENCOUNTER — PATIENT MESSAGE (OUTPATIENT)
Dept: RHEUMATOLOGY | Facility: CLINIC | Age: 43
End: 2023-02-10
Payer: COMMERCIAL

## 2023-02-10 ENCOUNTER — PATIENT MESSAGE (OUTPATIENT)
Dept: INTERNAL MEDICINE | Facility: CLINIC | Age: 43
End: 2023-02-10
Payer: COMMERCIAL

## 2023-02-13 ENCOUNTER — LAB VISIT (OUTPATIENT)
Dept: LAB | Facility: HOSPITAL | Age: 43
End: 2023-02-13
Attending: OBSTETRICS & GYNECOLOGY
Payer: COMMERCIAL

## 2023-02-13 ENCOUNTER — OFFICE VISIT (OUTPATIENT)
Dept: OBSTETRICS AND GYNECOLOGY | Facility: CLINIC | Age: 43
End: 2023-02-13
Payer: COMMERCIAL

## 2023-02-13 DIAGNOSIS — N95.1 MENOPAUSAL SYMPTOMS: ICD-10-CM

## 2023-02-13 DIAGNOSIS — D06.9 CIN III (CERVICAL INTRAEPITHELIAL NEOPLASIA GRADE III) WITH SEVERE DYSPLASIA: Primary | ICD-10-CM

## 2023-02-13 PROCEDURE — 1159F PR MEDICATION LIST DOCUMENTED IN MEDICAL RECORD: ICD-10-PCS | Mod: CPTII,S$GLB,, | Performed by: OBSTETRICS & GYNECOLOGY

## 2023-02-13 PROCEDURE — 99396 PREV VISIT EST AGE 40-64: CPT | Mod: S$GLB,,, | Performed by: OBSTETRICS & GYNECOLOGY

## 2023-02-13 PROCEDURE — 84439 ASSAY OF FREE THYROXINE: CPT | Performed by: OBSTETRICS & GYNECOLOGY

## 2023-02-13 PROCEDURE — 99999 PR PBB SHADOW E&M-EST. PATIENT-LVL III: CPT | Mod: PBBFAC,,, | Performed by: OBSTETRICS & GYNECOLOGY

## 2023-02-13 PROCEDURE — 99396 PR PREVENTIVE VISIT,EST,40-64: ICD-10-PCS | Mod: S$GLB,,, | Performed by: OBSTETRICS & GYNECOLOGY

## 2023-02-13 PROCEDURE — 36415 COLL VENOUS BLD VENIPUNCTURE: CPT | Performed by: OBSTETRICS & GYNECOLOGY

## 2023-02-13 PROCEDURE — 88175 CYTOPATH C/V AUTO FLUID REDO: CPT | Performed by: OBSTETRICS & GYNECOLOGY

## 2023-02-13 PROCEDURE — 84443 ASSAY THYROID STIM HORMONE: CPT | Performed by: OBSTETRICS & GYNECOLOGY

## 2023-02-13 PROCEDURE — 99999 PR PBB SHADOW E&M-EST. PATIENT-LVL III: ICD-10-PCS | Mod: PBBFAC,,, | Performed by: OBSTETRICS & GYNECOLOGY

## 2023-02-13 PROCEDURE — 1159F MED LIST DOCD IN RCRD: CPT | Mod: CPTII,S$GLB,, | Performed by: OBSTETRICS & GYNECOLOGY

## 2023-02-13 PROCEDURE — 83001 ASSAY OF GONADOTROPIN (FSH): CPT | Performed by: OBSTETRICS & GYNECOLOGY

## 2023-02-13 PROCEDURE — 84402 ASSAY OF FREE TESTOSTERONE: CPT | Performed by: OBSTETRICS & GYNECOLOGY

## 2023-02-14 LAB
FSH SERPL-ACNC: 5.11 MIU/ML
T4 FREE SERPL-MCNC: 0.79 NG/DL (ref 0.71–1.51)
TSH SERPL DL<=0.005 MIU/L-ACNC: 5.06 UIU/ML (ref 0.4–4)

## 2023-02-14 NOTE — PROGRESS NOTES
Subjective:       Patient ID: Arielle Verde is a 42 y.o. female.    Chief Complaint:  No chief complaint on file.      History of Present Illness  HPI  The patient presents for exam Mirena in place, no menses   Concern over hair loss, decrease desire sexually   Would like hormone check for symptoms to rule out menopause   Discussed symptoms.  Review of medications reveals several issues with desire suppression   Contraceptive measures are addressed.   Preventive testing reviewed and discussed.  Annual pap for history of SALLIE 3.       Health Maintenance   Topic Date Due    Mammogram  2024    TETANUS VACCINE  2026    Hepatitis C Screening  Completed    Lipid Panel  Completed     GYN & OB History  No LMP recorded. Patient has had an implant.   Date of Last Pap: 2023    OB History    Para Term  AB Living   3 1 1   2 1   SAB IAB Ectopic Multiple Live Births   2              # Outcome Date GA Lbr [place]/2nd Weight Sex Delivery Anes PTL Lv   3 Term            2 SAB            1 SAB                Review of Systems  Review of Systems        Objective:   There were no vitals taken for this visit.   Physical Exam:   Constitutional: She appears well-developed and well-nourished. No distress.      Neck: No JVD present. No thyroid mass and no thyromegaly present.    Cardiovascular:  Normal rate and regular rhythm.                  Abdominal: Soft. Bowel sounds are normal. No hernia. Hernia confirmed negative in the ventral area, confirmed negative in the right inguinal area and confirmed negative in the left inguinal area.     Genitourinary:    Vagina, uterus and rectum normal.   Labial bartholins normal.There is no rash, tenderness, lesion or injury on the right labia. There is no rash, tenderness, lesion or injury on the left labia. Cervix is normal. Right adnexum displays no mass, no tenderness and no fullness. Left adnexum displays no mass, no tenderness and no fullness. No erythema,   no vaginal discharge, tenderness or bleeding in the vagina.    No foreign body in the vagina.   Cervix exhibits no motion tenderness, no discharge and no friability.    pap smear completedUterus is not deviated, not enlarged, not fixed and not tender. IUD strings visualized.                   Assessment:        1. SALLIE III (cervical intraepithelial neoplasia grade III) with severe dysplasia    2. Menopausal symptoms                Plan:            Diagnoses and all orders for this visit:    SALLIE III (cervical intraepithelial neoplasia grade III) with severe dysplasia  -     Liquid-Based Pap Smear, Screening    Menopausal symptoms  -     Follicle Stimulating Hormone; Future  -     TSH; Future  -     Testosterone, Free; Future

## 2023-02-17 ENCOUNTER — PATIENT MESSAGE (OUTPATIENT)
Dept: INTERNAL MEDICINE | Facility: CLINIC | Age: 43
End: 2023-02-17
Payer: COMMERCIAL

## 2023-02-17 ENCOUNTER — LAB VISIT (OUTPATIENT)
Dept: LAB | Facility: HOSPITAL | Age: 43
End: 2023-02-17
Attending: FAMILY MEDICINE
Payer: COMMERCIAL

## 2023-02-17 DIAGNOSIS — R79.89 ABNORMAL TSH: Primary | ICD-10-CM

## 2023-02-17 DIAGNOSIS — R73.03 PREDIABETES: ICD-10-CM

## 2023-02-17 DIAGNOSIS — R73.03 PREDIABETES: Primary | ICD-10-CM

## 2023-02-17 LAB
ESTIMATED AVG GLUCOSE: 117 MG/DL (ref 68–131)
HBA1C MFR BLD: 5.7 % (ref 4–5.6)
TESTOST FREE SERPL-MCNC: 0.9 PG/ML

## 2023-02-17 PROCEDURE — 36415 COLL VENOUS BLD VENIPUNCTURE: CPT | Performed by: FAMILY MEDICINE

## 2023-02-17 PROCEDURE — 83036 HEMOGLOBIN GLYCOSYLATED A1C: CPT | Performed by: FAMILY MEDICINE

## 2023-02-17 RX ORDER — LEVOTHYROXINE SODIUM 25 UG/1
25 TABLET ORAL
Qty: 30 TABLET | Refills: 11 | Status: SHIPPED | OUTPATIENT
Start: 2023-02-17 | End: 2024-02-08

## 2023-02-20 ENCOUNTER — OFFICE VISIT (OUTPATIENT)
Dept: INTERNAL MEDICINE | Facility: CLINIC | Age: 43
End: 2023-02-20
Payer: COMMERCIAL

## 2023-02-20 VITALS
HEIGHT: 71 IN | BODY MASS INDEX: 25.12 KG/M2 | DIASTOLIC BLOOD PRESSURE: 64 MMHG | HEART RATE: 100 BPM | WEIGHT: 179.44 LBS | TEMPERATURE: 97 F | OXYGEN SATURATION: 98 % | RESPIRATION RATE: 17 BRPM | SYSTOLIC BLOOD PRESSURE: 104 MMHG

## 2023-02-20 DIAGNOSIS — R73.03 PREDIABETES: ICD-10-CM

## 2023-02-20 DIAGNOSIS — F41.8 ANXIETY ASSOCIATED WITH DEPRESSION: ICD-10-CM

## 2023-02-20 DIAGNOSIS — E03.9 HYPOTHYROIDISM, UNSPECIFIED TYPE: Primary | ICD-10-CM

## 2023-02-20 LAB
FINAL PATHOLOGIC DIAGNOSIS: NORMAL
Lab: NORMAL

## 2023-02-20 PROCEDURE — 3008F PR BODY MASS INDEX (BMI) DOCUMENTED: ICD-10-PCS | Mod: CPTII,S$GLB,, | Performed by: FAMILY MEDICINE

## 2023-02-20 PROCEDURE — 3044F PR MOST RECENT HEMOGLOBIN A1C LEVEL <7.0%: ICD-10-PCS | Mod: CPTII,S$GLB,, | Performed by: FAMILY MEDICINE

## 2023-02-20 PROCEDURE — 3078F DIAST BP <80 MM HG: CPT | Mod: CPTII,S$GLB,, | Performed by: FAMILY MEDICINE

## 2023-02-20 PROCEDURE — 3074F PR MOST RECENT SYSTOLIC BLOOD PRESSURE < 130 MM HG: ICD-10-PCS | Mod: CPTII,S$GLB,, | Performed by: FAMILY MEDICINE

## 2023-02-20 PROCEDURE — 99214 OFFICE O/P EST MOD 30 MIN: CPT | Mod: S$GLB,,, | Performed by: FAMILY MEDICINE

## 2023-02-20 PROCEDURE — 99214 PR OFFICE/OUTPT VISIT, EST, LEVL IV, 30-39 MIN: ICD-10-PCS | Mod: S$GLB,,, | Performed by: FAMILY MEDICINE

## 2023-02-20 PROCEDURE — 3008F BODY MASS INDEX DOCD: CPT | Mod: CPTII,S$GLB,, | Performed by: FAMILY MEDICINE

## 2023-02-20 PROCEDURE — 3078F PR MOST RECENT DIASTOLIC BLOOD PRESSURE < 80 MM HG: ICD-10-PCS | Mod: CPTII,S$GLB,, | Performed by: FAMILY MEDICINE

## 2023-02-20 PROCEDURE — 1160F PR REVIEW ALL MEDS BY PRESCRIBER/CLIN PHARMACIST DOCUMENTED: ICD-10-PCS | Mod: CPTII,S$GLB,, | Performed by: FAMILY MEDICINE

## 2023-02-20 PROCEDURE — 1160F RVW MEDS BY RX/DR IN RCRD: CPT | Mod: CPTII,S$GLB,, | Performed by: FAMILY MEDICINE

## 2023-02-20 PROCEDURE — 99999 PR PBB SHADOW E&M-EST. PATIENT-LVL IV: CPT | Mod: PBBFAC,,, | Performed by: FAMILY MEDICINE

## 2023-02-20 PROCEDURE — 1159F MED LIST DOCD IN RCRD: CPT | Mod: CPTII,S$GLB,, | Performed by: FAMILY MEDICINE

## 2023-02-20 PROCEDURE — 3044F HG A1C LEVEL LT 7.0%: CPT | Mod: CPTII,S$GLB,, | Performed by: FAMILY MEDICINE

## 2023-02-20 PROCEDURE — 99999 PR PBB SHADOW E&M-EST. PATIENT-LVL IV: ICD-10-PCS | Mod: PBBFAC,,, | Performed by: FAMILY MEDICINE

## 2023-02-20 PROCEDURE — 3074F SYST BP LT 130 MM HG: CPT | Mod: CPTII,S$GLB,, | Performed by: FAMILY MEDICINE

## 2023-02-20 PROCEDURE — 1159F PR MEDICATION LIST DOCUMENTED IN MEDICAL RECORD: ICD-10-PCS | Mod: CPTII,S$GLB,, | Performed by: FAMILY MEDICINE

## 2023-02-20 NOTE — PROGRESS NOTES
Arielle Verde  02/20/2023  [id number]    Vera Broussard MD  Patient Care Team:  Vera Broussard MD as PCP - General (Family Medicine)  Luly Gonzalez LPN as Care Coordinator (Internal Medicine)          Visit Type:a scheduled routine follow-up visit    Chief Complaint: Follow up    History of Present Illness:  SHe here for follow up    Went to GYN for hormone issues and hair loss  Found to have mild hypothyroid with normal T4    Had c/o weight gain    She had hga1c  PreDM on labs  Lab Results   Component Value Date    LABA1C 5.7 04/20/2017    HGBA1C 5.7 (H) 02/17/2023   On metformin    FOllowed by Psych for Anxiety and depression          History:  Past Medical History:   Diagnosis Date    Anxiety     Chronic headaches     SALLIE III (cervical intraepithelial neoplasia grade III) with severe dysplasia 2019    s/p LEEP    Depression     Migraine      Past Surgical History:   Procedure Laterality Date    ADENOIDECTOMY      AUGMENTATION OF BREAST  06/2005    saline    BREAST SURGERY      breast augmentatin    COLD KNIFE CONIZATION OF CERVIX N/A 11/27/2019    Procedure: CONE BIOPSY, CERVIX, USING COLD;  Surgeon: Rachele Willis MD;  Location: [facility] OR;  Service: OB/GYN;  Laterality: N/A;    Hyperhydrosis      LIPOSUCTION OF NECK      REMOVAL OF INTRAUTERINE DEVICE (IUD)  11/27/2019    Procedure: REMOVAL, INTRAUTERINE DEVICE;  Surgeon: Rachele Willis MD;  Location: [facility] OR;  Service: OB/GYN;;    TILT TABLE TEST N/A 01/11/2019    Procedure: TILT TABLE TEST;  Surgeon: Justin Freeman MD;  Location: [facility] CATH LAB;  Service: Cardiology;  Laterality: N/A;  Rodriguez pt/pt req 930 start time    TONSILLECTOMY       Family History   Problem Relation Age of Onset    Lupus Mother     Ulcerative colitis Mother     Kidney failure Father     Drug abuse Father     Breast cancer Paternal Grandmother     Colon cancer Neg Hx     Ovarian cancer Neg Hx      Social History     Socioeconomic History    Marital  status: Single   Occupational History    Occupation: Podiatry nurse    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     Comment: Mirena 3/30/20 LOT [id number] exp 4.2022     Patient Active Problem List   Diagnosis    Anxiety associated with depression    Chronic bilateral low back pain without sciatica    Generalized headaches    PLMD (periodic limb movement disorder)    Postural hypotension    Heart palpitations    Common migraine with intractable migraine    Use of medication with teratogenic potential in female of reproductive age    Orthostasis    Abnormal vaginal bleeding    Low grade squamous intraepithelial lesion (LGSIL) on cervical Pap smear    Migraine without aura and without status migrainosus, not intractable    SALLIE III (cervical intraepithelial neoplasia grade III) with severe dysplasia    Cognitive complaints    Polypharmacy    Vasodepressor syncope     Review of patient's allergies indicates:   Allergen Reactions    Emgality [galcanezumab-gnlm] Hives and Itching    Topiramate Itching       The following were reviewed at this visit: active problem list, medication list, allergies, family history, social history, and health maintenance.    Medications:  Current Outpatient Medications on File Prior to Visit   Medication Sig Dispense Refill    betaxoloL (KERLONE) 10 mg Tab Take 1 tablet (10 mg total) by mouth once daily. Start taking 2.5 mg daily 30 tablet 11    buPROPion (WELLBUTRIN XL) 150 MG TB24 tablet Take 3 tablets (450 mg total) by mouth once daily. 90 tablet 2    busPIRone (BUSPAR) 15 MG tablet Take 2 tablets (30 mg total) by mouth 2 (two) times daily. 120 tablet 5    carboxymethylcellulose-citric (PLENITY, WELCOME KIT,)  0.75 gram Cap Take 3 capsules by mouth 2 (two) times a day. 180 capsule 1    cetirizine (ZYRTEC) 10 MG tablet Take 10 mg by mouth daily as needed.   0    clindamycin (CLEOCIN T) 1 % Swab Apply topically 2 (two) times daily. AAA qd to bid as tolerated for acne. Decrease frequency if any dryness. 60 each 5    clonazePAM (KLONOPIN) 0.5 MG tablet Take 1 tablet (0.5 mg total) by mouth daily as needed for Anxiety. 30 tablet 5    EPINEPHrine (EPIPEN 2-DUANE) 0.3 mg/0.3 mL AtIn Inject 0.6 mLs (0.6 mg total) into the muscle once. for 1 dose 0.6 mL 0    fludrocortisone (FLORINEF) 0.1 mg Tab Take 1 tablet (100 mcg total) by mouth 2 (two) times daily. 180 tablet 3    levothyroxine (SYNTHROID) 25 MCG tablet Take 1 tablet (25 mcg total) by mouth before breakfast. 30 tablet 11    midodrine (PROAMATINE) 5 MG Tab Take 1 tablet (5 mg total) by mouth 3 (three) times daily with meals. 90 tablet 11    potassium chloride SA (K-DUR,KLOR-CON) 20 MEQ tablet Take 1 tablet (20 mEq total) by mouth 2 (two) times daily. 180 tablet 3    rizatriptan (MAXALT) 10 MG tablet Take 10 mg by mouth daily as needed.      traZODone (DESYREL) 100 MG tablet Take 1-3 tablets at bedtime as needed for sleep. 270 tablet 1    tretinoin (RETIN-A) 0.025 % cream Apply a pea-sized amount to the entire face at bedtime.  Use every third night and increase as tolerated to nightly. 20 g 6    triamcinolone acetonide 0.1% (KENALOG) 0.1 % cream Apply topically 2 (two) times daily. 80 g 1    metFORMIN (GLUCOPHAGE) 500 MG tablet Take 1 tablet (500 mg total) by mouth 2 (two) times daily. 60 tablet 3     Current Facility-Administered Medications on File Prior to Visit   Medication Dose Route Frequency Provider Last Rate Last Admin    levonorgestreL 20 mcg/24 hours (5 yrs) 52 mg IUD 1 Intra Uterine Device  1 each Intrauterine 1 time in Clinic/HOD Rachele Willis MD        onabotulinumtoxina injection 200 Units  200 Units Intramuscular Q90 Days Jose Luis Anderson MD   200 Units at  12/12/22 1408       Medications have been reviewed and reconciled with patient at this visit.  Barriers to medications reviewed with patient.    Adverse reactions to current medications reviewed with patient..    Over the counter medications reviewed and reconciled with patient.    Exam:  Wt Readings from Last 3 Encounters:   02/20/23 81.4 kg (179 lb 7.3 oz)   02/08/23 81.1 kg (178 lb 12.7 oz)   02/07/23 82.2 kg (181 lb 3.5 oz)     Temp Readings from Last 3 Encounters:   02/20/23 96.9 °F (36.1 °C) (Temporal)   02/07/23 97.9 °F (36.6 °C)   08/11/22 99.2 °F (37.3 °C) (Oral)     BP Readings from Last 3 Encounters:   02/20/23 104/64   02/08/23 102/69   02/06/23 (!) 74/50     Pulse Readings from Last 3 Encounters:   02/20/23 100   02/08/23 87   02/06/23 101     Body mass index is 25.03 kg/m².      Review of Systems   Constitutional: Negative.  Negative for chills and fever.   HENT: Negative.  Negative for congestion, sinus pain and sore throat.    Eyes:  Negative for blurred vision and double vision.   Respiratory:  Negative for cough, sputum production, shortness of breath and wheezing.    Cardiovascular:  Negative for chest pain, palpitations and leg swelling.   Gastrointestinal:  Negative for abdominal pain, constipation, diarrhea, heartburn, nausea and vomiting.   Genitourinary: Negative.    Musculoskeletal: Negative.    Skin: Negative.  Negative for rash.   Neurological: Negative.    Endo/Heme/Allergies: Negative.  Negative for polydipsia. Does not bruise/bleed easily.   Psychiatric/Behavioral:  Negative for depression and substance abuse.    Physical Exam  Nursing note reviewed.   Cardiovascular:      Rate and Rhythm: Normal rate and regular rhythm.   Pulmonary:      Effort: Pulmonary effort is normal. No respiratory distress.   Neurological:      Mental Status: She is alert and oriented to person, place, and time.   Psychiatric:         Mood and Affect: Mood normal.         Behavior: Behavior normal.          Thought Content: Thought content normal.         Judgment: Judgment normal.       Laboratory Reviewed ({Yes)  Lab Results   Component Value Date    WBC 6.08 08/11/2022    HGB 11.0 (L) 08/11/2022    HCT 35.3 (L) 08/11/2022     08/11/2022    CHOL 170 06/16/2022    TRIG 78 06/16/2022    HDL 67 06/16/2022    ALT 25 08/11/2022    AST 20 08/11/2022     08/11/2022    K 3.9 08/11/2022     08/11/2022    CREATININE 0.8 08/11/2022    BUN 10 08/11/2022    CO2 27 08/11/2022    TSH 5.058 (H) 02/13/2023    HGBA1C 5.7 (H) 02/17/2023       Diagnoses and all orders for this visit:    Hypothyroidism, unspecified type    Prediabetes    Anxiety associated with depression      Stay on Metformin  Start Synthroid    Lifestyle and wellness clinic if wants.    Did pap with GYN    Diet is key. BMP 25.    Recheck TSH in 6-8 weeks    Minodrine for vasodepressor syncope per Cards    Recheck HgA1c in 6-12 months          Care Plan/Goals: Reviewed    Goals    None         Follow up: Follow up in about 6 months (around 8/20/2023).    After visit summary was printed and given to patient upon discharge today.  Patient goals and care plan are included in After Visit Summary.

## 2023-02-22 ENCOUNTER — PATIENT MESSAGE (OUTPATIENT)
Dept: DERMATOLOGY | Facility: CLINIC | Age: 43
End: 2023-02-22
Payer: COMMERCIAL

## 2023-03-10 ENCOUNTER — TELEPHONE (OUTPATIENT)
Dept: NEUROLOGY | Facility: CLINIC | Age: 43
End: 2023-03-10
Payer: COMMERCIAL

## 2023-03-10 NOTE — TELEPHONE ENCOUNTER
Good morning, I attempted to contact pt, unable to reach her to reschedule appt for Botox . I left her a VM to contact our office.

## 2023-03-10 NOTE — TELEPHONE ENCOUNTER
Patient would like to keep appointment scheduled for Monday with . She would like to see if maybe her insurance get's it approved over the weekend. Advise if not she will rescheduled Monday morning.

## 2023-03-10 NOTE — TELEPHONE ENCOUNTER
----- Message from Antoinette Paredes sent at 3/10/2023  9:46 AM CST -----  Contact: [phone]  Type:  Patient Returning Call    Who Called:Arielle   Who Left Message for Patient:JAHAIRA  Does the patient know what this is regarding?:yes   Would the patient rather a call back or a response via [facility]? Call back   Best Call Back Number:[phone]  Additional Information: if no answer please leave a message.      Thanks KB

## 2023-03-12 ENCOUNTER — PATIENT MESSAGE (OUTPATIENT)
Dept: PSYCHIATRY | Facility: CLINIC | Age: 43
End: 2023-03-12
Payer: COMMERCIAL

## 2023-03-13 ENCOUNTER — PATIENT MESSAGE (OUTPATIENT)
Dept: PSYCHIATRY | Facility: CLINIC | Age: 43
End: 2023-03-13
Payer: COMMERCIAL

## 2023-03-13 ENCOUNTER — PATIENT MESSAGE (OUTPATIENT)
Dept: RHEUMATOLOGY | Facility: CLINIC | Age: 43
End: 2023-03-13
Payer: COMMERCIAL

## 2023-03-13 ENCOUNTER — TELEPHONE (OUTPATIENT)
Dept: NEUROLOGY | Facility: CLINIC | Age: 43
End: 2023-03-13
Payer: COMMERCIAL

## 2023-03-13 RX ORDER — ATOMOXETINE 80 MG/1
80 CAPSULE ORAL DAILY
Qty: 30 CAPSULE | Refills: 2 | Status: SHIPPED | OUTPATIENT
Start: 2023-03-13 | End: 2023-06-26

## 2023-03-13 NOTE — TELEPHONE ENCOUNTER
Spoke with patient in regards to rescheduling botox for 3/13/2023. Advised her that if we get it approved before 04/6/2023 we would let her know. See if we can get her moved up. Patient requested a call back from you.

## 2023-03-22 ENCOUNTER — PROCEDURE VISIT (OUTPATIENT)
Dept: NEUROLOGY | Facility: CLINIC | Age: 43
End: 2023-03-22
Payer: COMMERCIAL

## 2023-03-22 VITALS
BODY MASS INDEX: 25.31 KG/M2 | DIASTOLIC BLOOD PRESSURE: 71 MMHG | SYSTOLIC BLOOD PRESSURE: 106 MMHG | HEART RATE: 77 BPM | WEIGHT: 180.75 LBS | HEIGHT: 71 IN

## 2023-03-22 DIAGNOSIS — G43.019 COMMON MIGRAINE WITH INTRACTABLE MIGRAINE: Primary | ICD-10-CM

## 2023-03-22 PROCEDURE — 64615 PR CHEMODENERVATION OF MUSCLE FOR CHRONIC MIGRAINE: ICD-10-PCS | Mod: S$GLB,,, | Performed by: PSYCHIATRY & NEUROLOGY

## 2023-03-22 PROCEDURE — 64615 CHEMODENERV MUSC MIGRAINE: CPT | Mod: S$GLB,,, | Performed by: PSYCHIATRY & NEUROLOGY

## 2023-03-22 RX ADMIN — ONABOTULINUMTOXINA 200 UNITS: 100 INJECTION, POWDER, LYOPHILIZED, FOR SOLUTION INTRADERMAL; INTRAMUSCULAR at 10:03

## 2023-03-22 NOTE — PROCEDURES
Procedures          BOTOX  HAS PROVEN VERY EFFECTIVE IN SIGNIFICANTLY REDUCING THE SEVERITY AND FREQUENCY OF MIGRAINE HEADACHES             1. PROCEDURE:  Intramuscular Botox Injections. The procedure was discussed in detail with the patient and the consent form was signed. The patient verbalized understanding of the procedure . I discussed the risks that may include serious immune reaction and distant spread that could results in loss of breathing. Other side effects may include droopy eyelids, trouble swallowing and cardiac arrhythmias. It was also stressed that it is contraindicated in pregnancy.     2. INDICATION: Intractable Migraine.      3. TIME OUT: Time Out was called. Two patient identifiers were used (first and last name in addition to date of birth). The patient stated the procedure being done (Botox injections) in the scalp muscles (both sides).          As per the standard protocol, a total 155 units were injected in 31 sites.         RT  5 units in 1 site    LT  5 units in 1 site     Procerus 5 units in 1 site     RT Frontalis 10 units in 2 sites     LT Frontalis 10 units in 2 sites     RT Temporalis 20 units in 4 sites    LT Temporalis 20 units in 4 sites    RT Occipitalis 15 units in 3 sites    LT Occipitalis 15  units in 3 sites     RT Cervical Paraspinals 10 units in 2 sites    LT Cervical Paraspinals 10 units in 2 sites    RT Trapezius 15 units in 3 sites    LT Trapezius 15 units in 3 sites       Per the standard of care protocol we use 155 units and waste 45 units. I gave the patient the option of following the standard protocol vs.using the extra 45 units to target areas where the pain is maximum which could potentially provide extra help with headache control. I explained to the patient that this will be an off-label use, not the standard protocol, not evidence-based and could result in more pronounced side effects. The patient verbalized full understanding of all the facts  and the risks and benefits and elected to use the extra 45 units for the following sites:      Procerus 5 units in 1 site      RT Frontalis 10 units in 2 sites      LT Frontalis 10 units in 2 site      RT Temporalis 10 units in 2 sites      3. COURSE:   The standard protocol was followed. the procedure was very smooth.     4. COMPLICATIONS: None. The patient tolerated the procedure very well.     5. AFTERCARE: I encouraged the patient to use ice if the sites of injection get tender and call me with any problems or complications.

## 2023-04-04 ENCOUNTER — PATIENT MESSAGE (OUTPATIENT)
Dept: INTERNAL MEDICINE | Facility: CLINIC | Age: 43
End: 2023-04-04
Payer: COMMERCIAL

## 2023-04-04 ENCOUNTER — LAB VISIT (OUTPATIENT)
Dept: LAB | Facility: HOSPITAL | Age: 43
End: 2023-04-04
Attending: FAMILY MEDICINE
Payer: COMMERCIAL

## 2023-04-04 DIAGNOSIS — R55 SYNCOPE, UNSPECIFIED SYNCOPE TYPE: ICD-10-CM

## 2023-04-04 DIAGNOSIS — R79.89 ABNORMAL TSH: ICD-10-CM

## 2023-04-04 DIAGNOSIS — R73.03 PREDIABETES: Primary | ICD-10-CM

## 2023-04-04 DIAGNOSIS — R73.03 PREDIABETES: ICD-10-CM

## 2023-04-04 LAB
ANION GAP SERPL CALC-SCNC: 11 MMOL/L (ref 8–16)
BNP SERPL-MCNC: 45 PG/ML (ref 0–99)
BUN SERPL-MCNC: 10 MG/DL (ref 6–20)
CALCIUM SERPL-MCNC: 9.1 MG/DL (ref 8.7–10.5)
CHLORIDE SERPL-SCNC: 106 MMOL/L (ref 95–110)
CO2 SERPL-SCNC: 22 MMOL/L (ref 23–29)
CREAT SERPL-MCNC: 0.9 MG/DL (ref 0.5–1.4)
EST. GFR  (NO RACE VARIABLE): >60 ML/MIN/1.73 M^2
ESTIMATED AVG GLUCOSE: 108 MG/DL (ref 68–131)
GLUCOSE SERPL-MCNC: 106 MG/DL (ref 70–110)
HBA1C MFR BLD: 5.4 % (ref 4–5.6)
POTASSIUM SERPL-SCNC: 4 MMOL/L (ref 3.5–5.1)
SODIUM SERPL-SCNC: 139 MMOL/L (ref 136–145)
TSH SERPL DL<=0.005 MIU/L-ACNC: 1.11 UIU/ML (ref 0.4–4)

## 2023-04-04 PROCEDURE — 83880 ASSAY OF NATRIURETIC PEPTIDE: CPT | Performed by: INTERNAL MEDICINE

## 2023-04-04 PROCEDURE — 80048 BASIC METABOLIC PNL TOTAL CA: CPT | Performed by: INTERNAL MEDICINE

## 2023-04-04 PROCEDURE — 84443 ASSAY THYROID STIM HORMONE: CPT | Performed by: FAMILY MEDICINE

## 2023-04-04 PROCEDURE — 83036 HEMOGLOBIN GLYCOSYLATED A1C: CPT | Performed by: FAMILY MEDICINE

## 2023-04-04 PROCEDURE — 36415 COLL VENOUS BLD VENIPUNCTURE: CPT | Performed by: FAMILY MEDICINE

## 2023-04-04 PROCEDURE — 36415 COLL VENOUS BLD VENIPUNCTURE: CPT | Performed by: INTERNAL MEDICINE

## 2023-04-05 ENCOUNTER — PATIENT MESSAGE (OUTPATIENT)
Dept: CARDIOLOGY | Facility: CLINIC | Age: 43
End: 2023-04-05
Payer: COMMERCIAL

## 2023-04-05 NOTE — PROGRESS NOTES
See comments below and call patient to discuss.   Please close encounter when done -- no need to route back to me.  Thanks  BNP and BMP are OK - keep same

## 2023-04-06 ENCOUNTER — PATIENT MESSAGE (OUTPATIENT)
Dept: INTERNAL MEDICINE | Facility: CLINIC | Age: 43
End: 2023-04-06
Payer: COMMERCIAL

## 2023-05-03 ENCOUNTER — PATIENT MESSAGE (OUTPATIENT)
Dept: INTERNAL MEDICINE | Facility: CLINIC | Age: 43
End: 2023-05-03

## 2023-05-03 ENCOUNTER — OFFICE VISIT (OUTPATIENT)
Dept: INTERNAL MEDICINE | Facility: CLINIC | Age: 43
End: 2023-05-03
Payer: COMMERCIAL

## 2023-05-03 VITALS
TEMPERATURE: 99 F | WEIGHT: 181.19 LBS | OXYGEN SATURATION: 99 % | HEART RATE: 88 BPM | HEIGHT: 71 IN | DIASTOLIC BLOOD PRESSURE: 72 MMHG | BODY MASS INDEX: 25.37 KG/M2 | SYSTOLIC BLOOD PRESSURE: 116 MMHG

## 2023-05-03 DIAGNOSIS — N76.0 ACUTE VAGINITIS: Primary | ICD-10-CM

## 2023-05-03 DIAGNOSIS — R39.9 URINARY SYMPTOM OR SIGN: Primary | ICD-10-CM

## 2023-05-03 LAB
BACTERIA #/AREA URNS HPF: ABNORMAL /HPF
BILIRUB UR QL STRIP: NEGATIVE
CLARITY UR: CLEAR
COLOR UR: YELLOW
GLUCOSE UR QL STRIP: NEGATIVE
HGB UR QL STRIP: ABNORMAL
KETONES UR QL STRIP: NEGATIVE
LEUKOCYTE ESTERASE UR QL STRIP: ABNORMAL
MICROSCOPIC COMMENT: ABNORMAL
NITRITE UR QL STRIP: NEGATIVE
PH UR STRIP: 6 [PH] (ref 5–8)
PROT UR QL STRIP: NEGATIVE
RBC #/AREA URNS HPF: 2 /HPF (ref 0–4)
SP GR UR STRIP: 1.01 (ref 1–1.03)
SQUAMOUS #/AREA URNS HPF: 1 /HPF
URN SPEC COLLECT METH UR: ABNORMAL
UROBILINOGEN UR STRIP-ACNC: NEGATIVE EU/DL
WBC #/AREA URNS HPF: 15 /HPF (ref 0–5)

## 2023-05-03 PROCEDURE — 3044F HG A1C LEVEL LT 7.0%: CPT | Mod: CPTII,S$GLB,,

## 2023-05-03 PROCEDURE — 87086 URINE CULTURE/COLONY COUNT: CPT

## 2023-05-03 PROCEDURE — 99999 PR PBB SHADOW E&M-EST. PATIENT-LVL IV: CPT | Mod: PBBFAC,,,

## 2023-05-03 PROCEDURE — 3008F PR BODY MASS INDEX (BMI) DOCUMENTED: ICD-10-PCS | Mod: CPTII,S$GLB,,

## 2023-05-03 PROCEDURE — 1159F MED LIST DOCD IN RCRD: CPT | Mod: CPTII,S$GLB,,

## 2023-05-03 PROCEDURE — 3074F SYST BP LT 130 MM HG: CPT | Mod: CPTII,S$GLB,,

## 2023-05-03 PROCEDURE — 3044F PR MOST RECENT HEMOGLOBIN A1C LEVEL <7.0%: ICD-10-PCS | Mod: CPTII,S$GLB,,

## 2023-05-03 PROCEDURE — 1159F PR MEDICATION LIST DOCUMENTED IN MEDICAL RECORD: ICD-10-PCS | Mod: CPTII,S$GLB,,

## 2023-05-03 PROCEDURE — 81000 URINALYSIS NONAUTO W/SCOPE: CPT

## 2023-05-03 PROCEDURE — 99213 PR OFFICE/OUTPT VISIT, EST, LEVL III, 20-29 MIN: ICD-10-PCS | Mod: S$GLB,,,

## 2023-05-03 PROCEDURE — 87088 URINE BACTERIA CULTURE: CPT

## 2023-05-03 PROCEDURE — 3074F PR MOST RECENT SYSTOLIC BLOOD PRESSURE < 130 MM HG: ICD-10-PCS | Mod: CPTII,S$GLB,,

## 2023-05-03 PROCEDURE — 3078F PR MOST RECENT DIASTOLIC BLOOD PRESSURE < 80 MM HG: ICD-10-PCS | Mod: CPTII,S$GLB,,

## 2023-05-03 PROCEDURE — 87077 CULTURE AEROBIC IDENTIFY: CPT

## 2023-05-03 PROCEDURE — 99999 PR PBB SHADOW E&M-EST. PATIENT-LVL IV: ICD-10-PCS | Mod: PBBFAC,,,

## 2023-05-03 PROCEDURE — 3008F BODY MASS INDEX DOCD: CPT | Mod: CPTII,S$GLB,,

## 2023-05-03 PROCEDURE — 3078F DIAST BP <80 MM HG: CPT | Mod: CPTII,S$GLB,,

## 2023-05-03 PROCEDURE — 99213 OFFICE O/P EST LOW 20 MIN: CPT | Mod: S$GLB,,,

## 2023-05-03 PROCEDURE — 87186 SC STD MICRODIL/AGAR DIL: CPT

## 2023-05-03 RX ORDER — CIPROFLOXACIN 500 MG/1
500 TABLET ORAL EVERY 12 HOURS
Qty: 10 TABLET | Refills: 0 | Status: SHIPPED | OUTPATIENT
Start: 2023-05-03 | End: 2023-05-03

## 2023-05-03 RX ORDER — CIPROFLOXACIN 500 MG/1
500 TABLET ORAL EVERY 12 HOURS
Qty: 10 TABLET | Refills: 0 | Status: SHIPPED | OUTPATIENT
Start: 2023-05-03 | End: 2023-06-06

## 2023-05-03 RX ORDER — PHENAZOPYRIDINE HYDROCHLORIDE 200 MG/1
200 TABLET, FILM COATED ORAL 3 TIMES DAILY PRN
Qty: 20 TABLET | Refills: 0 | Status: SHIPPED | OUTPATIENT
Start: 2023-05-03 | End: 2023-05-13

## 2023-05-03 RX ORDER — PHENAZOPYRIDINE HYDROCHLORIDE 200 MG/1
200 TABLET, FILM COATED ORAL 3 TIMES DAILY PRN
Qty: 20 TABLET | Refills: 0 | Status: SHIPPED | OUTPATIENT
Start: 2023-05-03 | End: 2023-05-03

## 2023-05-03 RX ORDER — FLUCONAZOLE 150 MG/1
150 TABLET ORAL DAILY
Qty: 2 TABLET | Refills: 0 | Status: SHIPPED | OUTPATIENT
Start: 2023-05-03 | End: 2023-07-12

## 2023-05-03 NOTE — PROGRESS NOTES
Arielle Verde  05/03/2023  [id number]    Vera Broussard MD  Patient Care Team:  Vera Broussard MD as PCP - General (Family Medicine)  Luly Gonzalez LPN as Care Coordinator (Internal Medicine)          Visit Type:an urgent visit for a new problem    Chief Complaint:  Chief Complaint   Patient presents with    Urinary Tract Infection        History of Present Illness:    Suprapubic pain and dysuria started on Monday  Urine is malodorous   Denies using new any new products  Negative for vaginal discharge         History:  Past Medical History:   Diagnosis Date    Anxiety     Chronic headaches     SALLIE III (cervical intraepithelial neoplasia grade III) with severe dysplasia 2019    s/p LEEP    Depression     Migraine      Past Surgical History:   Procedure Laterality Date    ADENOIDECTOMY      AUGMENTATION OF BREAST  06/2005    saline    BREAST SURGERY      breast augmentatin    COLD KNIFE CONIZATION OF CERVIX N/A 11/27/2019    Procedure: CONE BIOPSY, CERVIX, USING COLD;  Surgeon: Rachele Willis MD;  Location: [facility] OR;  Service: OB/GYN;  Laterality: N/A;    Hyperhydrosis      LIPOSUCTION OF NECK      REMOVAL OF INTRAUTERINE DEVICE (IUD)  11/27/2019    Procedure: REMOVAL, INTRAUTERINE DEVICE;  Surgeon: Rachele Willis MD;  Location: [facility] OR;  Service: OB/GYN;;    TILT TABLE TEST N/A 01/11/2019    Procedure: TILT TABLE TEST;  Surgeon: Justin Freeman MD;  Location: [facility] CATH LAB;  Service: Cardiology;  Laterality: N/A;  Rodriguez pt/pt req 930 start time    TONSILLECTOMY       Family History   Problem Relation Age of Onset    Lupus Mother     Ulcerative colitis Mother     Kidney failure Father     Drug abuse Father     Breast cancer Paternal Grandmother     Colon cancer Neg Hx     Ovarian cancer Neg Hx      Social History     Socioeconomic History    Marital status: Single   Occupational History    Occupation: Podiatry nurse    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     Comment: Mirena 3/30/20 LOT [id number] exp 4.2022     Patient Active Problem List   Diagnosis    Anxiety associated with depression    Chronic bilateral low back pain without sciatica    Generalized headaches    PLMD (periodic limb movement disorder)    Postural hypotension    Heart palpitations    Common migraine with intractable migraine    Use of medication with teratogenic potential in female of reproductive age    Orthostasis    Abnormal vaginal bleeding    Low grade squamous intraepithelial lesion (LGSIL) on cervical Pap smear    Migraine without aura and without status migrainosus, not intractable    SALLIE III (cervical intraepithelial neoplasia grade III) with severe dysplasia    Cognitive complaints    Polypharmacy    Vasodepressor syncope     Review of patient's allergies indicates:   Allergen Reactions    Emgality [galcanezumab-gnlm] Hives and Itching    Topiramate Itching       The following were reviewed at this visit: active problem list, medication list, allergies, family history, social history, and health maintenance.    Medications:  Current Outpatient Medications on File Prior to Visit   Medication Sig Dispense Refill    atomoxetine (STRATTERA) 80 MG capsule Take 1 capsule (80 mg total) by mouth once daily. 30 capsule 2    betaxoloL (KERLONE) 10 mg Tab Take 1 tablet (10 mg total) by mouth once daily. Start taking 2.5 mg daily 30 tablet 11    buPROPion (WELLBUTRIN XL) 150 MG TB24 tablet Take 3 tablets (450 mg total) by mouth once daily. 90 tablet 2    busPIRone (BUSPAR) 15 MG tablet Take 2 tablets (30 mg total) by mouth 2 (two) times daily. 120 tablet 5    carboxymethylcellulose-citric (PLENITY, WELCOME KIT,) 0.75 gram  Cap Take 3 capsules by mouth 2 (two) times a day. 180 capsule 1    cetirizine (ZYRTEC) 10 MG tablet Take 10 mg by mouth daily as needed.   0    clindamycin (CLEOCIN T) 1 % Swab Apply topically 2 (two) times daily. AAA qd to bid as tolerated for acne. Decrease frequency if any dryness. 60 each 5    clonazePAM (KLONOPIN) 0.5 MG tablet Take 1 tablet daily as needed for anxiety. 30 tablet 1    EPINEPHrine (EPIPEN 2-DUANE) 0.3 mg/0.3 mL AtIn Inject 0.6 mLs (0.6 mg total) into the muscle once. for 1 dose 0.6 mL 0    fludrocortisone (FLORINEF) 0.1 mg Tab Take 1 tablet (100 mcg total) by mouth 2 (two) times daily. 180 tablet 3    levothyroxine (SYNTHROID) 25 MCG tablet Take 1 tablet (25 mcg total) by mouth before breakfast. 30 tablet 11    metFORMIN (GLUCOPHAGE) 500 MG tablet Take 1 tablet (500 mg total) by mouth 2 (two) times daily. 60 tablet 3    midodrine (PROAMATINE) 5 MG Tab Take 1 tablet (5 mg total) by mouth 3 (three) times daily with meals. 90 tablet 11    potassium chloride SA (K-DUR,KLOR-CON) 20 MEQ tablet Take 1 tablet (20 mEq total) by mouth 2 (two) times daily. 180 tablet 3    rizatriptan (MAXALT) 10 MG tablet Take 10 mg by mouth daily as needed.      traZODone (DESYREL) 100 MG tablet Take 1-3 tablets at bedtime as needed for sleep. 270 tablet 1    tretinoin (RETIN-A) 0.025 % cream Apply a pea-sized amount to the entire face at bedtime.  Use every third night and increase as tolerated to nightly. 20 g 6    triamcinolone acetonide 0.1% (KENALOG) 0.1 % cream Apply topically 2 (two) times daily. 80 g 1     Current Facility-Administered Medications on File Prior to Visit   Medication Dose Route Frequency Provider Last Rate Last Admin    levonorgestreL 20 mcg/24 hours (5 yrs) 52 mg IUD 1 Intra Uterine Device  1 each Intrauterine 1 time in Clinic/HOD Rachele Willis MD        onabotulinumtoxina injection 200 Units  200 Units Intramuscular Q90 Days Jose Luis Anderson MD   200 Units at 03/22/23 1030       Medications have  been reviewed and reconciled with patient at this visit.  Barriers to medications reviewed with patient.    Adverse reactions to current medications reviewed with patient..    Over the counter medications reviewed and reconciled with patient.    Exam:  Wt Readings from Last 3 Encounters:   03/22/23 82 kg (180 lb 12.4 oz)   02/20/23 81.4 kg (179 lb 7.3 oz)   02/08/23 81.1 kg (178 lb 12.7 oz)     Temp Readings from Last 3 Encounters:   02/20/23 96.9 °F (36.1 °C) (Temporal)   02/07/23 97.9 °F (36.6 °C)   08/11/22 99.2 °F (37.3 °C) (Oral)     BP Readings from Last 3 Encounters:   03/22/23 106/71   02/20/23 104/64   02/08/23 102/69     Pulse Readings from Last 3 Encounters:   03/22/23 77   02/20/23 100   02/08/23 87     There is no height or weight on file to calculate BMI.      Review of Systems   Genitourinary:  Positive for dysuria and urgency. Negative for flank pain and hematuria.   Physical Exam  Vitals and nursing note reviewed.   Constitutional:       General: She is not in acute distress.     Appearance: She is well-developed. She is not diaphoretic.   HENT:      Head: Normocephalic and atraumatic.      Right Ear: External ear normal.      Left Ear: External ear normal.   Eyes:      General:         Right eye: No discharge.         Left eye: No discharge.      Conjunctiva/sclera: Conjunctivae normal.      Pupils: Pupils are equal, round, and reactive to light.   Cardiovascular:      Rate and Rhythm: Normal rate and regular rhythm.      Heart sounds: Normal heart sounds. No murmur heard.  Pulmonary:      Effort: Pulmonary effort is normal. No respiratory distress.      Breath sounds: Normal breath sounds. No wheezing.   Abdominal:      General: Bowel sounds are normal.      Tenderness: There is abdominal tenderness in the suprapubic area. There is no right CVA tenderness or left CVA tenderness.   Neurological:      Mental Status: She is alert and oriented to person, place, and time.   Psychiatric:          Behavior: Behavior normal.         Thought Content: Thought content normal.         Judgment: Judgment normal.       Laboratory Reviewed ({Yes)  Lab Results   Component Value Date    WBC 6.08 08/11/2022    HGB 11.0 (L) 08/11/2022    HCT 35.3 (L) 08/11/2022     08/11/2022    CHOL 170 06/16/2022    TRIG 78 06/16/2022    HDL 67 06/16/2022    ALT 25 08/11/2022    AST 20 08/11/2022     04/04/2023    K 4.0 04/04/2023     04/04/2023    CREATININE 0.9 04/04/2023    BUN 10 04/04/2023    CO2 22 (L) 04/04/2023    TSH 1.109 04/04/2023    HGBA1C 5.4 04/04/2023           Arielle was seen today for urinary tract infection.    Diagnoses and all orders for this visit:    Urinary symptom or sign  -     Urinalysis, Reflex to Urine Culture; Future  -     Urinalysis, Reflex to Urine Culture  -     Discontinue: phenazopyridine (PYRIDIUM) 200 MG tablet; Take 1 tablet (200 mg total) by mouth 3 (three) times daily as needed for Pain.  -     Discontinue: ciprofloxacin HCl (CIPRO) 500 MG tablet; Take 1 tablet (500 mg total) by mouth every 12 (twelve) hours.  -     ciprofloxacin HCl (CIPRO) 500 MG tablet; Take 1 tablet (500 mg total) by mouth every 12 (twelve) hours.  -     phenazopyridine (PYRIDIUM) 200 MG tablet; Take 1 tablet (200 mg total) by mouth 3 (three) times daily as needed for Pain.    Other orders  -     Urinalysis Microscopic  -     Urine culture      Citrus and caffeine are bladder irritants. If you consume high amounts of caffeine and or citrus products, recommend decreasing the amount. Increase water intake to 6-8 glasses of water a day        Care Plan/Goals: Reviewed    Goals    None         Follow up: No follow-ups on file.    After visit summary was printed and given to patient upon discharge today.  Patient goals and care plan are included in After Visit Summary.

## 2023-05-03 NOTE — LETTER
May 3, 2023      [facility]  [address] [place] [id number]  Phone: [phone]  Fax: [phone]       Patient: Airelle Verde   YOB: 1980  Date of Visit: 05/03/2023    To Whom It May Concern:    Rajesh Verde  was at [facility] on 05/03/2023. She was under medical care from 05/02-05/03/2023.  She may return to work/school on 05/04/2023 with no restrictions. If you have any questions or concerns, or if I can be of further assistance, please do not hesitate to contact me.    Sincerely,      Breanna Nguyen NP

## 2023-05-05 LAB — BACTERIA UR CULT: ABNORMAL

## 2023-05-25 RX ORDER — METFORMIN HYDROCHLORIDE 500 MG/1
500 TABLET ORAL 2 TIMES DAILY
Qty: 60 TABLET | Refills: 6 | Status: SHIPPED | OUTPATIENT
Start: 2023-05-25 | End: 2024-01-02

## 2023-05-25 NOTE — TELEPHONE ENCOUNTER
No care due was identified.  [facility] Embedded Care Due Messages. Reference number: [id number].   5/24/2023 9:34:05 PM CDT

## 2023-05-29 RX ORDER — BUPROPION HYDROCHLORIDE 150 MG/1
TABLET ORAL
Qty: 90 TABLET | Refills: 2 | Status: SHIPPED | OUTPATIENT
Start: 2023-05-29 | End: 2023-08-15

## 2023-06-06 ENCOUNTER — LAB VISIT (OUTPATIENT)
Dept: LAB | Facility: HOSPITAL | Age: 43
End: 2023-06-06
Payer: COMMERCIAL

## 2023-06-06 ENCOUNTER — OFFICE VISIT (OUTPATIENT)
Dept: INTERNAL MEDICINE | Facility: CLINIC | Age: 43
End: 2023-06-06
Payer: COMMERCIAL

## 2023-06-06 VITALS
HEIGHT: 71 IN | WEIGHT: 181.44 LBS | TEMPERATURE: 98 F | DIASTOLIC BLOOD PRESSURE: 68 MMHG | SYSTOLIC BLOOD PRESSURE: 116 MMHG | OXYGEN SATURATION: 99 % | HEART RATE: 78 BPM | BODY MASS INDEX: 25.4 KG/M2

## 2023-06-06 DIAGNOSIS — F07.81 POST CONCUSSION SYNDROME: ICD-10-CM

## 2023-06-06 DIAGNOSIS — J02.9 PHARYNGITIS, UNSPECIFIED ETIOLOGY: Primary | ICD-10-CM

## 2023-06-06 DIAGNOSIS — G90.A POTS (POSTURAL ORTHOSTATIC TACHYCARDIA SYNDROME): ICD-10-CM

## 2023-06-06 DIAGNOSIS — R55 SYNCOPE AND COLLAPSE: ICD-10-CM
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
## 2023-06-06 DIAGNOSIS — H65.91 RIGHT OTITIS MEDIA WITH EFFUSION: ICD-10-CM

## 2023-06-06 DIAGNOSIS — I95.1 POSTURAL HYPOTENSION: Chronic | ICD-10-CM

## 2023-06-06 DIAGNOSIS — E03.9 HYPOTHYROIDISM, UNSPECIFIED TYPE: ICD-10-CM

## 2023-06-06 LAB
GROUP A STREP, MOLECULAR: NEGATIVE
TSH SERPL DL<=0.005 MIU/L-ACNC: 1 UIU/ML (ref 0.4–4)

## 2023-06-06 PROCEDURE — 3008F BODY MASS INDEX DOCD: CPT | Mod: CPTII,S$GLB,,

## 2023-06-06 PROCEDURE — 3074F PR MOST RECENT SYSTOLIC BLOOD PRESSURE < 130 MM HG: ICD-10-PCS | Mod: CPTII,S$GLB,,

## 2023-06-06 PROCEDURE — 1159F PR MEDICATION LIST DOCUMENTED IN MEDICAL RECORD: ICD-10-PCS | Mod: CPTII,S$GLB,,

## 2023-06-06 PROCEDURE — 3044F PR MOST RECENT HEMOGLOBIN A1C LEVEL <7.0%: ICD-10-PCS | Mod: CPTII,S$GLB,,

## 2023-06-06 PROCEDURE — 84443 ASSAY THYROID STIM HORMONE: CPT

## 2023-06-06 PROCEDURE — 3044F HG A1C LEVEL LT 7.0%: CPT | Mod: CPTII,S$GLB,,

## 2023-06-06 PROCEDURE — 1160F PR REVIEW ALL MEDS BY PRESCRIBER/CLIN PHARMACIST DOCUMENTED: ICD-10-PCS | Mod: CPTII,S$GLB,,

## 2023-06-06 PROCEDURE — 3008F PR BODY MASS INDEX (BMI) DOCUMENTED: ICD-10-PCS | Mod: CPTII,S$GLB,,

## 2023-06-06 PROCEDURE — 99999 PR PBB SHADOW E&M-EST. PATIENT-LVL V: ICD-10-PCS | Mod: PBBFAC,,,

## 2023-06-06 PROCEDURE — 1159F MED LIST DOCD IN RCRD: CPT | Mod: CPTII,S$GLB,,

## 2023-06-06 PROCEDURE — 1160F RVW MEDS BY RX/DR IN RCRD: CPT | Mod: CPTII,S$GLB,,

## 2023-06-06 PROCEDURE — 3078F DIAST BP <80 MM HG: CPT | Mod: CPTII,S$GLB,,

## 2023-06-06 PROCEDURE — 36415 COLL VENOUS BLD VENIPUNCTURE: CPT

## 2023-06-06 PROCEDURE — 3074F SYST BP LT 130 MM HG: CPT | Mod: CPTII,S$GLB,,

## 2023-06-06 PROCEDURE — 99215 PR OFFICE/OUTPT VISIT, EST, LEVL V, 40-54 MIN: ICD-10-PCS | Mod: S$GLB,,,

## 2023-06-06 PROCEDURE — 87651 STREP A DNA AMP PROBE: CPT

## 2023-06-06 PROCEDURE — 99215 OFFICE O/P EST HI 40 MIN: CPT | Mod: S$GLB,,,

## 2023-06-06 PROCEDURE — 99999 PR PBB SHADOW E&M-EST. PATIENT-LVL V: CPT | Mod: PBBFAC,,,

## 2023-06-06 PROCEDURE — 3078F PR MOST RECENT DIASTOLIC BLOOD PRESSURE < 80 MM HG: ICD-10-PCS | Mod: CPTII,S$GLB,,

## 2023-06-06 RX ORDER — MONTELUKAST SODIUM 10 MG/1
10 TABLET ORAL NIGHTLY
Qty: 30 TABLET | Refills: 11 | Status: SHIPPED | OUTPATIENT
Start: 2023-06-06

## 2023-06-06 RX ORDER — FLUTICASONE PROPIONATE 50 MCG
1 SPRAY, SUSPENSION (ML) NASAL DAILY
Qty: 18.2 ML | Refills: 1 | Status: SHIPPED | OUTPATIENT
Start: 2023-06-06 | End: 2023-09-24 | Stop reason: SDUPTHER

## 2023-06-06 NOTE — PROGRESS NOTES
Arielle Verde  06/06/2023  [id number]    Vera Broussard MD  Patient Care Team:  Vera Broussard MD as PCP - General (Family Medicine)  Luly Gonzalez LPN as Care Coordinator (Internal Medicine)          Visit Type:an urgent visit for a new problem    Chief Complaint:  Chief Complaint   Patient presents with    Otalgia     right    Sore Throat        History of Present Illness:    Has concerns about synthroid dose  She feels tired  Increased thirst  Would like to increase dose    Right ear and sore throat started on Yesterday  Negative for fever, body aches or chills       Had syncope episode on last week  She has POTS  She sees Dr. Cruz  Neuro, and Cardiology Electrophysiology at [facility]   She hit the back of her head  She has headache since Tuesday   She does have dizziness and lightheadedness            History:  Past Medical History:   Diagnosis Date    Anxiety     Chronic headaches     SALLIE III (cervical intraepithelial neoplasia grade III) with severe dysplasia 2019    s/p LEEP    Depression     Migraine      Past Surgical History:   Procedure Laterality Date    ADENOIDECTOMY      AUGMENTATION OF BREAST  06/2005    saline    BREAST SURGERY      breast augmentatin    COLD KNIFE CONIZATION OF CERVIX N/A 11/27/2019    Procedure: CONE BIOPSY, CERVIX, USING COLD;  Surgeon: Rachele Willis MD;  Location: [facility] OR;  Service: OB/GYN;  Laterality: N/A;    Hyperhydrosis      LIPOSUCTION OF NECK      REMOVAL OF INTRAUTERINE DEVICE (IUD)  11/27/2019    Procedure: REMOVAL, INTRAUTERINE DEVICE;  Surgeon: Rachele Willis MD;  Location: [facility] OR;  Service: OB/GYN;;    TILT TABLE TEST N/A 01/11/2019    Procedure: TILT TABLE TEST;  Surgeon: Justin Freeman MD;  Location: [facility] CATH LAB;  Service: Cardiology;  Laterality: N/A;  Rodriguez pt/pt req 930 start time    TONSILLECTOMY       Family History   Problem Relation Age of Onset    Lupus Mother     Ulcerative colitis Mother     Kidney failure  Father     Drug abuse Father     Breast cancer Paternal Grandmother     Colon cancer Neg Hx     Ovarian cancer Neg Hx      Social History     Socioeconomic History    Marital status: Single   Occupational History    Occupation: Podiatry nurse    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   Tobacco Use    Smoking status: Former     Types: Cigarettes    Smokeless tobacco: Never    Tobacco comments:     no smoking after m.n prior to sx   Substance and Sexual Activity    Alcohol use: Yes     Comment: social     Drug use: No    Sexual activity: Not Currently     Partners: Male     Birth control/protection: I.U.D.     Comment: Mirena 3/30/20 LOT [id number] exp 4.2022     Patient Active Problem List   Diagnosis    Anxiety associated with depression    Chronic bilateral low back pain without sciatica    Generalized headaches    PLMD (periodic limb movement disorder)    Postural hypotension    Heart palpitations    Common migraine with intractable migraine    Use of medication with teratogenic potential in female of reproductive age    Orthostasis    Abnormal vaginal bleeding    Low grade squamous intraepithelial lesion (LGSIL) on cervical Pap smear    Migraine without aura and without status migrainosus, not intractable    SALLIE III (cervical intraepithelial neoplasia grade III) with severe dysplasia    Cognitive complaints    Polypharmacy    Vasodepressor syncope     Review of patient's allergies indicates:   Allergen Reactions    Emgality [galcanezumab-gnlm] Hives and Itching    Topiramate Itching       The following were reviewed at this visit: active problem list, medication list, allergies, family history, social history, and health maintenance.    Medications:  Current Outpatient Medications on File Prior to Visit   Medication Sig Dispense Refill    atomoxetine (STRATTERA) 80 MG capsule Take 1 capsule (80 mg total) by mouth once daily. 30 capsule 2    betaxoloL (KERLONE) 10 mg Tab Take 1 tablet (10 mg total) by mouth once daily. Start taking 2.5 mg  daily 30 tablet 11    buPROPion (WELLBUTRIN XL) 150 MG TB24 tablet Take 3 tablets by mouth once daily 90 tablet 2    busPIRone (BUSPAR) 15 MG tablet Take 2 tablets (30 mg total) by mouth 2 (two) times daily. 120 tablet 5    carboxymethylcellulose-citric (PLENITY, WELCOME KIT,) 0.75 gram Cap Take 3 capsules by mouth 2 (two) times a day. 180 capsule 1    cetirizine (ZYRTEC) 10 MG tablet Take 10 mg by mouth daily as needed.   0    ciprofloxacin HCl (CIPRO) 500 MG tablet Take 1 tablet (500 mg total) by mouth every 12 (twelve) hours. 10 tablet 0    clindamycin (CLEOCIN T) 1 % Swab Apply topically 2 (two) times daily. AAA qd to bid as tolerated for acne. Decrease frequency if any dryness. 60 each 5    clonazePAM (KLONOPIN) 0.5 MG tablet Take 1 tablet daily as needed for anxiety. 30 tablet 1    EPINEPHrine (EPIPEN 2-DUANE) 0.3 mg/0.3 mL AtIn Inject 0.6 mLs (0.6 mg total) into the muscle once. for 1 dose 0.6 mL 0    fludrocortisone (FLORINEF) 0.1 mg Tab Take 1 tablet (100 mcg total) by mouth 2 (two) times daily. 180 tablet 3    levothyroxine (SYNTHROID) 25 MCG tablet Take 1 tablet (25 mcg total) by mouth before breakfast. 30 tablet 11    metFORMIN (GLUCOPHAGE) 500 MG tablet Take 1 tablet (500 mg total) by mouth 2 (two) times daily. 60 tablet 6    midodrine (PROAMATINE) 5 MG Tab Take 1 tablet (5 mg total) by mouth 3 (three) times daily with meals. 90 tablet 11    potassium chloride SA (K-DUR,KLOR-CON) 20 MEQ tablet Take 1 tablet (20 mEq total) by mouth 2 (two) times daily. 180 tablet 3    rizatriptan (MAXALT) 10 MG tablet Take 10 mg by mouth daily as needed.      traZODone (DESYREL) 100 MG tablet Take 1-3 tablets at bedtime as needed for sleep. 270 tablet 1    tretinoin (RETIN-A) 0.025 % cream Apply a pea-sized amount to the entire face at bedtime.  Use every third night and increase as tolerated to nightly. 20 g 6    triamcinolone acetonide 0.1% (KENALOG) 0.1 % cream Apply topically 2 (two) times daily. 80 g 1     Current  Facility-Administered Medications on File Prior to Visit   Medication Dose Route Frequency Provider Last Rate Last Admin    levonorgestreL 20 mcg/24 hours (5 yrs) 52 mg IUD 1 Intra Uterine Device  1 each Intrauterine 1 time in Clinic/HOD Rachele Willis MD        onabotulinumtoxina injection 200 Units  200 Units Intramuscular Q90 Days Jose Luis Anderson MD   200 Units at 03/22/23 1030       Medications have been reviewed and reconciled with patient at this visit.  Barriers to medications reviewed with patient.    Adverse reactions to current medications reviewed with patient..    Over the counter medications reviewed and reconciled with patient.    Exam:  Wt Readings from Last 3 Encounters:   05/03/23 82.2 kg (181 lb 3.5 oz)   03/22/23 82 kg (180 lb 12.4 oz)   02/20/23 81.4 kg (179 lb 7.3 oz)     Temp Readings from Last 3 Encounters:   05/03/23 98.8 °F (37.1 °C) (Tympanic)   02/20/23 96.9 °F (36.1 °C) (Temporal)   02/07/23 97.9 °F (36.6 °C)     BP Readings from Last 3 Encounters:   05/03/23 116/72   03/22/23 106/71   02/20/23 104/64     Pulse Readings from Last 3 Encounters:   05/03/23 88   03/22/23 77   02/20/23 100     There is no height or weight on file to calculate BMI.      Review of Systems   Constitutional:  Positive for malaise/fatigue.   HENT:  Positive for ear pain and sore throat.    Neurological:  Positive for dizziness and headaches.   Physical Exam  Vitals and nursing note reviewed.   HENT:      Head: Normocephalic and atraumatic.      Salivary Glands: Right salivary gland is tender. Right salivary gland is not diffusely enlarged. Left salivary gland is tender. Left salivary gland is not diffusely enlarged.      Right Ear: No tenderness. A middle ear effusion is present.      Left Ear: Tympanic membrane normal.      Nose: Mucosal edema, congestion and rhinorrhea present.      Right Turbinates: Enlarged.      Left Turbinates: Enlarged.      Right Sinus: No maxillary sinus tenderness or frontal sinus  tenderness.      Left Sinus: No maxillary sinus tenderness or frontal sinus tenderness.      Mouth/Throat:      Pharynx: Uvula midline. Posterior oropharyngeal erythema present.   Cardiovascular:      Rate and Rhythm: Normal rate and regular rhythm.      Pulses: Normal pulses.      Heart sounds: Normal heart sounds.   Pulmonary:      Effort: Pulmonary effort is normal. No respiratory distress.      Breath sounds: Normal breath sounds. No wheezing or rales.   Abdominal:      General: Bowel sounds are normal.   Neurological:      Mental Status: She is alert and oriented to person, place, and time.   Psychiatric:         Mood and Affect: Mood normal.         Behavior: Behavior normal.         Thought Content: Thought content normal.         Judgment: Judgment normal.       Laboratory Reviewed ({Yes)  Lab Results   Component Value Date    WBC 6.08 08/11/2022    HGB 11.0 (L) 08/11/2022    HCT 35.3 (L) 08/11/2022     08/11/2022    CHOL 170 06/16/2022    TRIG 78 06/16/2022    HDL 67 06/16/2022    ALT 25 08/11/2022    AST 20 08/11/2022     04/04/2023    K 4.0 04/04/2023     04/04/2023    CREATININE 0.9 04/04/2023    BUN 10 04/04/2023    CO2 22 (L) 04/04/2023    TSH 1.109 04/04/2023    HGBA1C 5.4 04/04/2023       Arielle was seen today for otalgia and sore throat.    Diagnoses and all orders for this visit:    Pharyngitis, unspecified etiology  -     Group A Strep, Molecular  -     montelukast (SINGULAIR) 10 mg tablet; Take 1 tablet (10 mg total) by mouth every evening.  -     fluticasone propionate (FLONASE) 50 mcg/actuation nasal spray; 1 spray (50 mcg total) by Each Nostril route once daily.    Right otitis media with effusion  -     montelukast (SINGULAIR) 10 mg tablet; Take 1 tablet (10 mg total) by mouth every evening.  -     fluticasone propionate (FLONASE) 50 mcg/actuation nasal spray; 1 spray (50 mcg total) by Each Nostril route once daily.    Hypothyroidism, unspecified type  -     TSH;  Future    Fall, initial encounter    Syncope and collapse    Postural hypotension    POTS (postural orthostatic tachycardia syndrome)    Post concussion syndrome      Instructions  Drink plenty of fluids  Get plenty of Rest  Take OTC tylenol and or ibuprofen as directed on package for pain/fever. Do not take more than package suggests to take   Gargle with warm water and salt and use warm liquids to help with throat pain        check thyroid levels today.  If not within normal limits can adjust thyroid medication.  Discuss post concussion syndrome and limiting  electronics.  If headaches continue encourage to follow-up with Neurology.  Also discussed if she is feeling lightheaded all to C8 down to avoid injury    Care Plan/Goals: Reviewed    Goals    None         Follow up: No follow-ups on file.    After visit summary was printed and given to patient upon discharge today.  Patient goals and care plan are included in After Visit Summary.

## 2023-06-07 ENCOUNTER — PATIENT MESSAGE (OUTPATIENT)
Dept: INTERNAL MEDICINE | Facility: CLINIC | Age: 43
End: 2023-06-07
Payer: COMMERCIAL

## 2023-06-26 ENCOUNTER — PATIENT MESSAGE (OUTPATIENT)
Dept: NEUROLOGY | Facility: CLINIC | Age: 43
End: 2023-06-26
Payer: COMMERCIAL

## 2023-06-26 RX ORDER — ATOMOXETINE 80 MG/1
CAPSULE ORAL
Qty: 30 CAPSULE | Refills: 2 | Status: SHIPPED | OUTPATIENT
Start: 2023-06-26 | End: 2023-09-18

## 2023-07-06 ENCOUNTER — HOSPITAL ENCOUNTER (OUTPATIENT)
Dept: RADIOLOGY | Facility: HOSPITAL | Age: 43
Discharge: HOME | End: 2023-07-06
Payer: COMMERCIAL

## 2023-07-06 ENCOUNTER — HOSPITAL ENCOUNTER (OUTPATIENT)
Dept: CARDIOLOGY | Facility: HOSPITAL | Age: 43
Discharge: HOME | End: 2023-07-06
Payer: COMMERCIAL

## 2023-07-06 ENCOUNTER — OFFICE VISIT (OUTPATIENT)
Dept: CARDIOLOGY | Facility: CLINIC | Age: 43
End: 2023-07-06
Payer: COMMERCIAL

## 2023-07-06 ENCOUNTER — OFFICE VISIT (OUTPATIENT)
Dept: INTERNAL MEDICINE | Facility: CLINIC | Age: 43
End: 2023-07-06
Payer: COMMERCIAL

## 2023-07-06 VITALS
WEIGHT: 175 LBS | HEART RATE: 75 BPM | HEART RATE: 78 BPM | RESPIRATION RATE: 16 BRPM | OXYGEN SATURATION: 100 % | TEMPERATURE: 96 F | HEIGHT: 71 IN | OXYGEN SATURATION: 99 % | BODY MASS INDEX: 24.41 KG/M2 | SYSTOLIC BLOOD PRESSURE: 100 MMHG | BODY MASS INDEX: 24.5 KG/M2 | SYSTOLIC BLOOD PRESSURE: 110 MMHG | HEIGHT: 71 IN | DIASTOLIC BLOOD PRESSURE: 66 MMHG | DIASTOLIC BLOOD PRESSURE: 72 MMHG

## 2023-07-06 DIAGNOSIS — S82.891A CLOSED FRACTURE OF RIGHT ANKLE, INITIAL ENCOUNTER: ICD-10-CM

## 2023-07-06 DIAGNOSIS — Z01.818 PREOP EXAMINATION: ICD-10-CM

## 2023-07-06 DIAGNOSIS — I48.3 TYPICAL ATRIAL FLUTTER: Primary | ICD-10-CM

## 2023-07-06 DIAGNOSIS — R00.2 HEART PALPITATIONS: ICD-10-CM

## 2023-07-06 DIAGNOSIS — R00.2 HEART PALPITATIONS: Primary | ICD-10-CM

## 2023-07-06 DIAGNOSIS — E03.9 HYPOTHYROIDISM, UNSPECIFIED TYPE: ICD-10-CM

## 2023-07-06 DIAGNOSIS — R55 VASODEPRESSOR SYNCOPE: ICD-10-CM

## 2023-07-06 DIAGNOSIS — Z01.818 PREOP EXAMINATION: Primary | ICD-10-CM

## 2023-07-06 DIAGNOSIS — Z01.810 PREOP CARDIOVASCULAR EXAM: ICD-10-CM

## 2023-07-06 DIAGNOSIS — I48.3 TYPICAL ATRIAL FLUTTER: ICD-10-CM

## 2023-07-06 PROCEDURE — 93010 EKG 12-LEAD: ICD-10-PCS | Mod: ,,, | Performed by: STUDENT IN AN ORGANIZED HEALTH CARE EDUCATION/TRAINING PROGRAM

## 2023-07-06 PROCEDURE — 3078F DIAST BP <80 MM HG: CPT | Mod: CPTII,S$GLB,,

## 2023-07-06 PROCEDURE — 99999 PR PBB SHADOW E&M-EST. PATIENT-LVL IV: CPT | Mod: PBBFAC,,, | Performed by: NURSE PRACTITIONER

## 2023-07-06 PROCEDURE — 99999 PR PBB SHADOW E&M-EST. PATIENT-LVL V: ICD-10-PCS | Mod: PBBFAC,,,

## 2023-07-06 PROCEDURE — 99999 PR PBB SHADOW E&M-EST. PATIENT-LVL IV: ICD-10-PCS | Mod: PBBFAC,,, | Performed by: NURSE PRACTITIONER

## 2023-07-06 PROCEDURE — 71046 X-RAY EXAM CHEST 2 VIEWS: CPT | Mod: 26,,, | Performed by: RADIOLOGY

## 2023-07-06 PROCEDURE — 99215 OFFICE O/P EST HI 40 MIN: CPT | Mod: S$GLB,,, | Performed by: NURSE PRACTITIONER

## 2023-07-06 PROCEDURE — 3008F BODY MASS INDEX DOCD: CPT | Mod: CPTII,S$GLB,,

## 2023-07-06 PROCEDURE — 3078F PR MOST RECENT DIASTOLIC BLOOD PRESSURE < 80 MM HG: ICD-10-PCS | Mod: CPTII,S$GLB,,

## 2023-07-06 PROCEDURE — 99215 PR OFFICE/OUTPT VISIT, EST, LEVL V, 40-54 MIN: ICD-10-PCS | Mod: S$GLB,,, | Performed by: NURSE PRACTITIONER

## 2023-07-06 PROCEDURE — 1159F MED LIST DOCD IN RCRD: CPT | Mod: CPTII,S$GLB,, | Performed by: NURSE PRACTITIONER

## 2023-07-06 PROCEDURE — 3044F PR MOST RECENT HEMOGLOBIN A1C LEVEL <7.0%: ICD-10-PCS | Mod: CPTII,S$GLB,, | Performed by: NURSE PRACTITIONER

## 2023-07-06 PROCEDURE — 1159F MED LIST DOCD IN RCRD: CPT | Mod: CPTII,S$GLB,,

## 2023-07-06 PROCEDURE — 3078F DIAST BP <80 MM HG: CPT | Mod: CPTII,S$GLB,, | Performed by: NURSE PRACTITIONER

## 2023-07-06 PROCEDURE — 3008F BODY MASS INDEX DOCD: CPT | Mod: CPTII,S$GLB,, | Performed by: NURSE PRACTITIONER

## 2023-07-06 PROCEDURE — 71046 X-RAY EXAM CHEST 2 VIEWS: CPT | Mod: TC

## 2023-07-06 PROCEDURE — 1159F PR MEDICATION LIST DOCUMENTED IN MEDICAL RECORD: ICD-10-PCS | Mod: CPTII,S$GLB,, | Performed by: NURSE PRACTITIONER

## 2023-07-06 PROCEDURE — 71046 XR CHEST PA AND LATERAL: ICD-10-PCS | Mod: 26,,, | Performed by: RADIOLOGY

## 2023-07-06 PROCEDURE — 3074F PR MOST RECENT SYSTOLIC BLOOD PRESSURE < 130 MM HG: ICD-10-PCS | Mod: CPTII,S$GLB,, | Performed by: NURSE PRACTITIONER

## 2023-07-06 PROCEDURE — 3074F SYST BP LT 130 MM HG: CPT | Mod: CPTII,S$GLB,, | Performed by: NURSE PRACTITIONER

## 2023-07-06 PROCEDURE — 3008F PR BODY MASS INDEX (BMI) DOCUMENTED: ICD-10-PCS | Mod: CPTII,S$GLB,, | Performed by: NURSE PRACTITIONER

## 2023-07-06 PROCEDURE — 3074F PR MOST RECENT SYSTOLIC BLOOD PRESSURE < 130 MM HG: ICD-10-PCS | Mod: CPTII,S$GLB,,

## 2023-07-06 PROCEDURE — 99214 PR OFFICE/OUTPT VISIT, EST, LEVL IV, 30-39 MIN: ICD-10-PCS | Mod: S$GLB,,,

## 2023-07-06 PROCEDURE — 3008F PR BODY MASS INDEX (BMI) DOCUMENTED: ICD-10-PCS | Mod: CPTII,S$GLB,,

## 2023-07-06 PROCEDURE — 3044F PR MOST RECENT HEMOGLOBIN A1C LEVEL <7.0%: ICD-10-PCS | Mod: CPTII,S$GLB,,

## 2023-07-06 PROCEDURE — 99999 PR PBB SHADOW E&M-EST. PATIENT-LVL V: CPT | Mod: PBBFAC,,,

## 2023-07-06 PROCEDURE — 1160F PR REVIEW ALL MEDS BY PRESCRIBER/CLIN PHARMACIST DOCUMENTED: ICD-10-PCS | Mod: CPTII,S$GLB,,

## 2023-07-06 PROCEDURE — 1159F PR MEDICATION LIST DOCUMENTED IN MEDICAL RECORD: ICD-10-PCS | Mod: CPTII,S$GLB,,

## 2023-07-06 PROCEDURE — 3074F SYST BP LT 130 MM HG: CPT | Mod: CPTII,S$GLB,,

## 2023-07-06 PROCEDURE — 99214 OFFICE O/P EST MOD 30 MIN: CPT | Mod: S$GLB,,,

## 2023-07-06 PROCEDURE — 93005 ELECTROCARDIOGRAM TRACING: CPT

## 2023-07-06 PROCEDURE — 93010 ELECTROCARDIOGRAM REPORT: CPT | Mod: ,,, | Performed by: STUDENT IN AN ORGANIZED HEALTH CARE EDUCATION/TRAINING PROGRAM

## 2023-07-06 PROCEDURE — 3078F PR MOST RECENT DIASTOLIC BLOOD PRESSURE < 80 MM HG: ICD-10-PCS | Mod: CPTII,S$GLB,, | Performed by: NURSE PRACTITIONER

## 2023-07-06 PROCEDURE — 3044F HG A1C LEVEL LT 7.0%: CPT | Mod: CPTII,S$GLB,,

## 2023-07-06 PROCEDURE — 3044F HG A1C LEVEL LT 7.0%: CPT | Mod: CPTII,S$GLB,, | Performed by: NURSE PRACTITIONER

## 2023-07-06 PROCEDURE — 1160F RVW MEDS BY RX/DR IN RCRD: CPT | Mod: CPTII,S$GLB,,

## 2023-07-06 RX ORDER — MIDODRINE HYDROCHLORIDE 5 MG/1
5 TABLET ORAL DAILY
Qty: 30 TABLET | Refills: 11
Start: 2023-07-06 | End: 2023-10-29

## 2023-07-06 RX ORDER — FLUDROCORTISONE ACETATE 0.1 MG/1
100 TABLET ORAL DAILY
Qty: 180 TABLET | Refills: 3
Start: 2023-07-06 | End: 2023-11-09 | Stop reason: SDUPTHER

## 2023-07-06 NOTE — PROGRESS NOTES
Arielle Verde  07/06/2023  [id number]    Vera Broussard MD  Patient Care Team:  Vera Broussard MD as PCP - General (Family Medicine)  Luly Gonzalez LPN as Care Coordinator (Internal Medicine)          Visit Type:a scheduled routine follow-up visit    Chief Complaint:  Chief Complaint   Patient presents with    Pre-op Exam        History of Present Illness:    Patient presents today for Pre-Op Clearance. She  will undergo a ORIF of right ankle . Dr. Garcia  At [facility]  will perform the procedure. The surgery is scheduled for 07/11/2023 and it will be under general anesthesia. The patient has had surgery before. He denies personal or family history of complications with anesthesia. The patient is not taking a daily blood thinner. The patient denies cardiac or pulmonary medical conditions.  The patient received cardiac clearance from YOEL Howard with [facility]       History:  Past Medical History:   Diagnosis Date    Anxiety     Chronic headaches     SALLIE III (cervical intraepithelial neoplasia grade III) with severe dysplasia 2019    s/p LEEP    Depression     Migraine      Past Surgical History:   Procedure Laterality Date    ADENOIDECTOMY      AUGMENTATION OF BREAST  06/2005    saline    BREAST SURGERY      breast augmentatin    COLD KNIFE CONIZATION OF CERVIX N/A 11/27/2019    Procedure: CONE BIOPSY, CERVIX, USING COLD;  Surgeon: Rachele Willis MD;  Location: [facility] OR;  Service: OB/GYN;  Laterality: N/A;    Hyperhydrosis      LIPOSUCTION OF NECK      REMOVAL OF INTRAUTERINE DEVICE (IUD)  11/27/2019    Procedure: REMOVAL, INTRAUTERINE DEVICE;  Surgeon: Rachele Willis MD;  Location: [facility] OR;  Service: OB/GYN;;    TILT TABLE TEST N/A 01/11/2019    Procedure: TILT TABLE TEST;  Surgeon: Justin Freeman MD;  Location: 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     Comment: Mirena 3/30/20 LOT [id number] exp 4.2022     Patient Active Problem List   Diagnosis    Anxiety associated with depression    Chronic bilateral low back pain without sciatica    Generalized headaches    PLMD (periodic limb movement disorder)    Postural hypotension    Heart palpitations    Common migraine with intractable migraine    Use of medication with teratogenic potential in female of reproductive age    Orthostasis    Abnormal vaginal bleeding    Low grade squamous intraepithelial lesion (LGSIL) on cervical Pap smear    Migraine without aura and without status migrainosus, not intractable    SALLIE III (cervical intraepithelial neoplasia grade III) with severe dysplasia    Cognitive complaints    Polypharmacy    Vasodepressor syncope    Preop cardiovascular exam     Review of patient's allergies indicates:   Allergen Reactions    Emgality [[id number]] Hives and Itching    Topiramate Itching       The following were reviewed at this visit: active problem list, medication list, allergies, family history, social history, and health maintenance.    Medications:  Current Outpatient Medications on File Prior to Visit   Medication Sig Dispense Refill    atomoxetine (STRATTERA) 80 MG capsule Take 1 capsule by mouth once daily 30 capsule 2    betaxoloL (KERLONE) 10  mg Tab Take 1 tablet (10 mg total) by mouth once daily. Start taking 2.5 mg daily 30 tablet 11    buPROPion (WELLBUTRIN XL) 150 MG TB24 tablet Take 3 tablets by mouth once daily 90 tablet 2    busPIRone (BUSPAR) 15 MG tablet Take 2 tablets (30 mg total) by mouth 2 (two) times daily. 120 tablet 5    carboxymethylcellulose-citric (PLENITY, WELCOME KIT,) 0.75 gram Cap Take 3 capsules by mouth 2 (two) times a day. 180 capsule 1    cetirizine (ZYRTEC) 10 MG tablet Take 10 mg by mouth daily as needed.   0    clindamycin (CLEOCIN T) 1 % Swab Apply topically 2 (two) times daily. AAA qd to bid as tolerated for acne. Decrease frequency if any dryness. 60 each 5    clonazePAM (KLONOPIN) 0.5 MG tablet Take 1 tablet daily as needed for anxiety. 30 tablet 1    EPINEPHrine (EPIPEN 2-DUANE) 0.3 mg/0.3 mL AtIn Inject 0.6 mLs (0.6 mg total) into the muscle once. for 1 dose 0.6 mL 0    fludrocortisone (FLORINEF) 0.1 mg Tab Take 1 tablet (100 mcg total) by mouth once daily. 180 tablet 3    fluticasone propionate (FLONASE) 50 mcg/actuation nasal spray 1 spray (50 mcg total) by Each Nostril route once daily. 18.2 mL 1    levothyroxine (SYNTHROID) 25 MCG tablet Take 1 tablet (25 mcg total) by mouth before breakfast. 30 tablet 11    metFORMIN (GLUCOPHAGE) 500 MG tablet Take 1 tablet (500 mg total) by mouth 2 (two) times daily. 60 tablet 6    midodrine (PROAMATINE) 5 MG Tab Take 1 tablet (5 mg total) by mouth once daily. 30 tablet 11    montelukast (SINGULAIR) 10 mg tablet Take 1 tablet (10 mg total) by mouth every evening. 30 tablet 11    potassium chloride SA (K-DUR,KLOR-CON) 20 MEQ tablet Take 1 tablet (20 mEq total) by mouth 2 (two) times daily. 180 tablet 3    rizatriptan (MAXALT) 10 MG tablet Take 10 mg by mouth daily as needed.      traZODone (DESYREL) 100 MG tablet Take 1-3 tablets at bedtime as needed for sleep. 270 tablet 1    tretinoin (RETIN-A) 0.025 % cream Apply a pea-sized amount to the entire face at bedtime.  Use every third  night and increase as tolerated to nightly. 20 g 6    triamcinolone acetonide 0.1% (KENALOG) 0.1 % cream Apply topically 2 (two) times daily. 80 g 1    [DISCONTINUED] fludrocortisone (FLORINEF) 0.1 mg Tab Take 1 tablet (100 mcg total) by mouth 2 (two) times daily. 180 tablet 3    [DISCONTINUED] midodrine (PROAMATINE) 5 MG Tab Take 1 tablet (5 mg total) by mouth 3 (three) times daily with meals. 90 tablet 11     Current Facility-Administered Medications on File Prior to Visit   Medication Dose Route Frequency Provider Last Rate Last Admin    levonorgestreL 20 mcg/24 hours (5 yrs) 52 mg IUD 1 Intra Uterine Device  1 each Intrauterine 1 time in Clinic/HOD Rachele Willis MD        onabotulinumtoxina injection 200 Units  200 Units Intramuscular Q90 Days Amer SHARON Anderson MD   200 Units at 03/22/23 1030       Medications have been reviewed and reconciled with patient at this visit.  Barriers to medications reviewed with patient.    Adverse reactions to current medications reviewed with patient..    Over the counter medications reviewed and reconciled with patient.    Exam:  Wt Readings from Last 3 Encounters:   07/06/23 79.4 kg (175 lb)   06/06/23 82.3 kg (181 lb 7 oz)   05/03/23 82.2 kg (181 lb 3.5 oz)     Temp Readings from Last 3 Encounters:   06/06/23 98.1 °F (36.7 °C) (Tympanic)   05/03/23 98.8 °F (37.1 °C) (Tympanic)   02/20/23 96.9 °F (36.1 °C) (Temporal)     BP Readings from Last 3 Encounters:   07/06/23 110/72   06/06/23 116/68   05/03/23 116/72     Pulse Readings from Last 3 Encounters:   07/06/23 75   06/06/23 78   05/03/23 88     There is no height or weight on file to calculate BMI.      Review of Systems   Musculoskeletal:  Positive for joint pain.   Physical Exam  Vitals and nursing note reviewed.   Constitutional:       General: She is not in acute distress.     Appearance: She is well-developed. She is not diaphoretic.   HENT:      Head: Normocephalic and atraumatic.      Right Ear: External ear normal.       Left Ear: External ear normal.      Mouth/Throat:      Mouth: Mucous membranes are moist.   Eyes:      General:         Right eye: No discharge.         Left eye: No discharge.      Conjunctiva/sclera: Conjunctivae normal.      Pupils: Pupils are equal, round, and reactive to light.   Cardiovascular:      Rate and Rhythm: Normal rate and regular rhythm.      Heart sounds: Normal heart sounds. No murmur heard.  Pulmonary:      Effort: Pulmonary effort is normal. No respiratory distress.      Breath sounds: Normal breath sounds. No wheezing.   Abdominal:      General: Bowel sounds are normal.   Musculoskeletal:         General: Tenderness and deformity present.      Comments: In walking boot  Using wheelchair during visit    Neurological:      Mental Status: She is alert and oriented to person, place, and time.      Motor: Weakness present.      Gait: Gait abnormal.   Psychiatric:         Behavior: Behavior normal.         Thought Content: Thought content normal.         Judgment: Judgment normal.       Laboratory Reviewed ({Yes)  Lab Results   Component Value Date    WBC 6.08 08/11/2022    HGB 11.0 (L) 08/11/2022    HCT 35.3 (L) 08/11/2022     08/11/2022    CHOL 170 06/16/2022    TRIG 78 06/16/2022    HDL 67 06/16/2022    ALT 25 08/11/2022    AST 20 08/11/2022     04/04/2023    K 4.0 04/04/2023     04/04/2023    CREATININE 0.9 04/04/2023    BUN 10 04/04/2023    CO2 22 (L) 04/04/2023    TSH 0.998 06/06/2023    HGBA1C 5.4 04/04/2023       Arielle was seen today for pre-op exam.    Diagnoses and all orders for this visit:    Preop examination  -     TSH; Future  -     X-Ray Chest PA And Lateral; Future  -     Comprehensive Metabolic Panel; Future  -     CBC Auto Differential; Future  -     Misc Sendout Test, Blood Vitamin D; Future  -     Lipid Panel; Future    Closed fracture of right ankle, initial encounter  -     TSH; Future  -     X-Ray Chest PA And Lateral; Future  -     Comprehensive  Metabolic Panel; Future  -     CBC Auto Differential; Future  -     Misc Sendout Test, Blood Vitamin D; Future    Heart palpitations  -     TSH; Future  -     X-Ray Chest PA And Lateral; Future  -     Comprehensive Metabolic Panel; Future  -     CBC Auto Differential; Future  -     Misc Sendout Test, Blood Vitamin D; Future    Hypothyroidism, unspecified type  -     TSH; Future          Cardiac recommendations:  Ok to proceed with surgery at moderate CV risk  Ensure dose of BB (betaxolol) on AM of surgery  Continue all other medications  Encourage adequate hydration of 2L per day (minimum)    The patient is cleared for upcoming surgery.  If you have any questions or concerns, or if I can be of further assistance, please do not hesitate to contact me.            Breanna Nguyen, GIOVANNY-C    Care Plan/Goals: Reviewed    Goals    None         Follow up: No follow-ups on file.    After visit summary was printed and given to patient upon discharge today.  Patient goals and care plan are included in After Visit Summary.

## 2023-07-06 NOTE — PROGRESS NOTES
Subjective:   Patient ID:  Arielle Verde is a 43 y.o. female who presents for evaluation of Pre-op Exam      HPI     Arielle Verde is a 42 year old female who presents to Arrhythmia clinic due to recurrent syncopal events. Her current medical conditions include Syncope, orthostatic hypotension, anxiety, chronic Headaches, Depression. She returns today and states she is doing ok.     Her resting heart rate has been ranging in 80-90s, used to get to 70s but has not in a little while now.     Feels chest pain and palps when her heart rates increase to the 90-110s.     Is staying properly hydrated with 2L of fluid intake every day.   Has not had any recurrent syncope but near syncope events since discharge.     . No shortness of breath, ROSE or palpitations. Denies orthopnea, PND or abdominal bloating. Reports regular walking without any issues lately. NO leg swelling or claudications. Reports compliance with medications and dietary restrictions. NO CNS complaints to suggest TIA or CVA today. No signs of abnormal bleeding.    Recommended to increase her midodrine to 5 mg every 4 hours for 3 doses daily    Has noted improvement in blood pressure since change in midodrine dosage.     Has noted worsening symptoms of near syncope.     She has lost about 40  Pounds recently.         TILT TABLE  TEST DESCRIPTION   INDICATION:     The patient is a 38 year old female that was referred for head-up tilt test by Dr. Justin Freeman for Presyncope and Orthostatic syncope.       PROCEDURE:   Informed consent was obtained from the patient.       SUMMARY:   1. Baseline values: blood pressure of 86/55 mmHg and heart rate of 65 bpm.   2. Normal response to head up tilt.     3. The maximum heart rate noted was 107 bpm at the 59th minute of tilting.     CONCLUSIONS     1. Compression stockings.   2. Consider midodrine.   3. Consider fludrocortisone.   4. Follow-up with  Dr. Johnson Rodriguez in 1 week.            This document has been electronically    SIGNED BY: Justin Freeman MD On: 01/11/2019 13:22      7/6/2023 update    Arielle Verde returns for preop risk stratification.     She fell on boat and needs ORIF of Right ankle per Dr Neftaly Garcia at [facility].     Denies chest pain or anginal equivalents. No shortness of breath, ROSE or palpitations. Denies orthopnea, PND or abdominal bloating. Reports regular walking without any issues lately. NO leg swelling or claudications. No recent falls, syncope or near syncopal events. Reports compliance with medications and dietary restrictions. NO CNS complaints to suggest TIA or CVA today. No signs of abnormal bleeding.     She is doing well cardiac wise. She had a syncopal event last 1 month ago at night. If recurrent syncopal events, we can further optimize her medications if needed.         Past Medical History:   Diagnosis Date    Anxiety     Chronic headaches     SALLIE III (cervical intraepithelial neoplasia grade III) with severe dysplasia 2019    s/p LEEP    Depression     Migraine        Past Surgical History:   Procedure Laterality Date    ADENOIDECTOMY      AUGMENTATION OF BREAST  06/2005    saline    BREAST SURGERY      breast augmentatin    COLD KNIFE CONIZATION OF CERVIX N/A 11/27/2019    Procedure: CONE BIOPSY, CERVIX, USING COLD;  Surgeon: Rachele Willis MD;  Location: [facility] OR;  Service: OB/GYN;  Laterality: N/A;    Hyperhydrosis      LIPOSUCTION OF NECK      REMOVAL OF INTRAUTERINE DEVICE (IUD)  11/27/2019    Procedure: REMOVAL, INTRAUTERINE DEVICE;  Surgeon: Rachele Willis MD;  Location: [facility] OR;  Service: OB/GYN;;    TILT TABLE TEST N/A 01/11/2019    Procedure: TILT TABLE TEST;  Surgeon: Justin Freeman MD;  Location: [facility] CATH LAB;  Service: Cardiology;  Laterality: N/A;  Rodriguez pt/pt req 930 start time    TONSILLECTOMY         Social History     Tobacco Use    Smoking status: Former     Types: Cigarettes    Smokeless tobacco:  Never    Tobacco comments:     no smoking after m.n prior to sx   Substance Use Topics    Alcohol use: Yes     Comment: social     Drug use: No       Family History   Problem Relation Age of Onset    Lupus Mother     Ulcerative colitis Mother     Kidney failure Father     Drug abuse Father     Breast cancer Paternal Grandmother     Colon cancer Neg Hx     Ovarian cancer Neg Hx      Wt Readings from Last 3 Encounters:   07/06/23 79.4 kg (175 lb)   06/06/23 82.3 kg (181 lb 7 oz)   05/03/23 82.2 kg (181 lb 3.5 oz)     Temp Readings from Last 3 Encounters:   06/06/23 98.1 °F (36.7 °C) (Tympanic)   05/03/23 98.8 °F (37.1 °C) (Tympanic)   02/20/23 96.9 °F (36.1 °C) (Temporal)     BP Readings from Last 3 Encounters:   07/06/23 110/72   06/06/23 116/68   05/03/23 116/72     Pulse Readings from Last 3 Encounters:   07/06/23 75   06/06/23 78   05/03/23 88     Current Outpatient Medications on File Prior to Visit   Medication Sig Dispense Refill    atomoxetine (STRATTERA) 80 MG capsule Take 1 capsule by mouth once daily 30 capsule 2    betaxoloL (KERLONE) 10 mg Tab Take 1 tablet (10 mg total) by mouth once daily. Start taking 2.5 mg daily 30 tablet 11    buPROPion (WELLBUTRIN XL) 150 MG TB24 tablet Take 3 tablets by mouth once daily 90 tablet 2    busPIRone (BUSPAR) 15 MG tablet Take 2 tablets (30 mg total) by mouth 2 (two) times daily. 120 tablet 5    carboxymethylcellulose-citric (PLENITY, WELCOME KIT,) 0.75 gram Cap Take 3 capsules by mouth 2 (two) times a day. 180 capsule 1    cetirizine (ZYRTEC) 10 MG tablet Take 10 mg by mouth daily as needed.   0    clindamycin (CLEOCIN T) 1 % Swab Apply topically 2 (two) times daily. AAA qd to bid as tolerated for acne. Decrease frequency if any dryness. 60 each 5    clonazePAM (KLONOPIN) 0.5 MG tablet Take 1 tablet daily as needed for anxiety. 30 tablet 1    EPINEPHrine (EPIPEN 2-DUANE) 0.3 mg/0.3 mL AtIn Inject 0.6 mLs (0.6 mg total) into the muscle once. for 1 dose 0.6 mL 0     fluticasone propionate (FLONASE) 50 mcg/actuation nasal spray 1 spray (50 mcg total) by Each Nostril route once daily. 18.2 mL 1    levothyroxine (SYNTHROID) 25 MCG tablet Take 1 tablet (25 mcg total) by mouth before breakfast. 30 tablet 11    metFORMIN (GLUCOPHAGE) 500 MG tablet Take 1 tablet (500 mg total) by mouth 2 (two) times daily. 60 tablet 6    montelukast (SINGULAIR) 10 mg tablet Take 1 tablet (10 mg total) by mouth every evening. 30 tablet 11    potassium chloride SA (K-DUR,KLOR-CON) 20 MEQ tablet Take 1 tablet (20 mEq total) by mouth 2 (two) times daily. 180 tablet 3    rizatriptan (MAXALT) 10 MG tablet Take 10 mg by mouth daily as needed.      traZODone (DESYREL) 100 MG tablet Take 1-3 tablets at bedtime as needed for sleep. 270 tablet 1    tretinoin (RETIN-A) 0.025 % cream Apply a pea-sized amount to the entire face at bedtime.  Use every third night and increase as tolerated to nightly. 20 g 6    triamcinolone acetonide 0.1% (KENALOG) 0.1 % cream Apply topically 2 (two) times daily. 80 g 1    [DISCONTINUED] fludrocortisone (FLORINEF) 0.1 mg Tab Take 1 tablet (100 mcg total) by mouth 2 (two) times daily. 180 tablet 3    [DISCONTINUED] midodrine (PROAMATINE) 5 MG Tab Take 1 tablet (5 mg total) by mouth 3 (three) times daily with meals. 90 tablet 11     Current Facility-Administered Medications on File Prior to Visit   Medication Dose Route Frequency Provider Last Rate Last Admin    levonorgestreL 20 mcg/24 hours (5 yrs) 52 mg IUD 1 Intra Uterine Device  1 each Intrauterine 1 time in Clinic/HOD Rachele Willis MD        onabotulinumtoxina injection 200 Units  200 Units Intramuscular Q90 Days Amer SHARON Anderson MD   200 Units at 03/22/23 1030       Review of Systems   Constitutional: Positive for malaise/fatigue.   HENT:  Negative for hearing loss and hoarse voice.    Eyes:  Negative for blurred vision and visual disturbance.   Cardiovascular:  Positive for near-syncope. Negative for chest pain,  "claudication, dyspnea on exertion, irregular heartbeat, leg swelling, orthopnea, palpitations, paroxysmal nocturnal dyspnea and syncope.   Respiratory:  Negative for cough, hemoptysis, shortness of breath, sleep disturbances due to breathing, snoring and wheezing.    Endocrine: Negative for cold intolerance and heat intolerance.   Hematologic/Lymphatic: Does not bruise/bleed easily.   Skin:  Negative for color change, dry skin and nail changes.   Musculoskeletal:  Positive for arthritis and back pain. Negative for joint pain and myalgias.   Gastrointestinal:  Negative for bloating, abdominal pain, constipation, nausea and vomiting.   Genitourinary:  Negative for dysuria, flank pain, hematuria and hesitancy.   Neurological:  Positive for dizziness and light-headedness. Negative for headaches, loss of balance, numbness, paresthesias and weakness.   Psychiatric/Behavioral:  Negative for altered mental status.    Allergic/Immunologic: Negative for environmental allergies.   /72   Pulse 75   Resp 16   Ht 5' 11" (1.803 m)   Wt 79.4 kg (175 lb)   SpO2 100%   BMI 24.41 kg/m²     Objective:   Physical Exam  Vitals and nursing note reviewed.   Constitutional:       General: She is not in acute distress.     Appearance: Normal appearance. She is well-developed. She is not ill-appearing.   HENT:      Head: Normocephalic and atraumatic.      Nose: Nose normal.      Mouth/Throat:      Mouth: Mucous membranes are moist.   Eyes:      Pupils: Pupils are equal, round, and reactive to light.   Neck:      Thyroid: No thyromegaly.      Vascular: No JVD.      Trachea: No tracheal deviation.   Cardiovascular:      Rate and Rhythm: Normal rate and regular rhythm.      Chest Wall: PMI is not displaced.      Pulses: Intact distal pulses.           Radial pulses are 2+ on the right side and 2+ on the left side.        Dorsalis pedis pulses are 2+ on the right side and 2+ on the left side.      Heart sounds: S1 normal and S2 " normal. Heart sounds not distant. No murmur heard.  Pulmonary:      Effort: Pulmonary effort is normal. No respiratory distress.      Breath sounds: Normal breath sounds and air entry. No decreased breath sounds, wheezing or rales.   Abdominal:      General: Bowel sounds are normal. There is no distension.      Palpations: Abdomen is soft.      Tenderness: There is no abdominal tenderness.   Musculoskeletal:         General: No swelling. Normal range of motion.      Cervical back: Full passive range of motion without pain, normal range of motion and neck supple.      Right ankle: No swelling.      Left ankle: No swelling.   Skin:     General: Skin is warm and dry.      Capillary Refill: Capillary refill takes less than 2 seconds.      Nails: There is no clubbing.   Neurological:      General: No focal deficit present.      Mental Status: She is alert and oriented to person, place, and time.      Motor: No weakness.   Psychiatric:         Mood and Affect: Mood normal.         Speech: Speech normal.         Behavior: Behavior normal.         Thought Content: Thought content normal.         Judgment: Judgment normal.       Lab Results   Component Value Date    CHOL 170 06/16/2022    CHOL 192 04/20/2017     Lab Results   Component Value Date    HDL 67 06/16/2022    HDL 46 04/20/2017     Lab Results   Component Value Date    LDLCALC 87.4 06/16/2022    LDLCALC 128 04/20/2017     Lab Results   Component Value Date    TRIG 78 06/16/2022    TRIG 91 04/20/2017     Lab Results   Component Value Date    CHOLHDL 39.4 06/16/2022       Chemistry        Component Value Date/Time     04/04/2023 0940    K 4.0 04/04/2023 0940     04/04/2023 0940    CO2 22 (L) 04/04/2023 0940    BUN 10 04/04/2023 0940    CREATININE 0.9 04/04/2023 0940     04/04/2023 0940        Component Value Date/Time    CALCIUM 9.1 04/04/2023 0940    ALKPHOS 37 (L) 08/11/2022 0550    AST 20 08/11/2022 0550    ALT 25 08/11/2022 0550    BILITOT 0.5  08/11/2022 0550    ESTGFRAFRICA >60 06/16/2022 0948    EGFRNONAA >60 06/16/2022 0948          Lab Results   Component Value Date    TSH 0.998 06/06/2023     No results found for: INR, PROTIME  Lab Results   Component Value Date    WBC 6.08 08/11/2022    HGB 11.0 (L) 08/11/2022    HCT 35.3 (L) 08/11/2022    MCV 94 08/11/2022     08/11/2022     Results for orders placed during the hospital encounter of 08/10/22    Echo    Interpretation Summary  · Concentric remodeling and normal systolic function.  · The estimated ejection fraction is 60%.  · Normal left ventricular diastolic function.  · Normal right ventricular size with normal right ventricular systolic function.  · Normal central venous pressure (3 mmHg).  · The estimated PA systolic pressure is 24 mmHg.     Assessment:      1. Heart palpitations    2. Preop cardiovascular exam    3. Vasodepressor syncope          Plan:     Handicap tag paperwork completed today in office    Ok to proceed with surgery at moderate CV risk  Ensure dose of BB (betaxolol) on AM of surgery  Continue all other medications  Encourage adequate hydration of 2L per day (minimum)  RTC in 6 months      JUANA Howardsner Arrhythmia

## 2023-07-07 ENCOUNTER — TELEPHONE (OUTPATIENT)
Dept: INTERNAL MEDICINE | Facility: CLINIC | Age: 43
End: 2023-07-07
Payer: COMMERCIAL

## 2023-07-07 NOTE — TELEPHONE ENCOUNTER
Pre op paperwork faxed to [facility]-Dr. Neftaly Garcia at [phone]    Outcome:     Your fax has been successfully sent to [phone] at [phone].      7/7/2023 11:01:58 AM Transmission Record   Sent to [phone] with remote ID "   Result: (0/339;0/0) Success   Page record: 1 - 26   Elapsed time: 10:10 on channel 36

## 2023-07-12 RX ORDER — TRAZODONE HYDROCHLORIDE 100 MG/1
TABLET ORAL
Qty: 270 TABLET | Refills: 0 | Status: SHIPPED | OUTPATIENT
Start: 2023-07-12 | End: 2023-09-29 | Stop reason: SDUPTHER

## 2023-07-24 ENCOUNTER — OFFICE VISIT (OUTPATIENT)
Dept: DERMATOLOGY | Facility: CLINIC | Age: 43
End: 2023-07-24
Payer: COMMERCIAL

## 2023-07-24 DIAGNOSIS — L70.0 ACNE VULGARIS: ICD-10-CM

## 2023-07-24 DIAGNOSIS — R61 HYPERHIDROSIS: Primary | ICD-10-CM

## 2023-07-24 PROCEDURE — 1159F PR MEDICATION LIST DOCUMENTED IN MEDICAL RECORD: ICD-10-PCS | Mod: CPTII,S$GLB,, | Performed by: PHYSICIAN ASSISTANT

## 2023-07-24 PROCEDURE — 1160F RVW MEDS BY RX/DR IN RCRD: CPT | Mod: CPTII,S$GLB,, | Performed by: PHYSICIAN ASSISTANT

## 2023-07-24 PROCEDURE — 1159F MED LIST DOCD IN RCRD: CPT | Mod: CPTII,S$GLB,, | Performed by: PHYSICIAN ASSISTANT

## 2023-07-24 PROCEDURE — 99214 OFFICE O/P EST MOD 30 MIN: CPT | Mod: S$GLB,,, | Performed by: PHYSICIAN ASSISTANT

## 2023-07-24 PROCEDURE — 3044F HG A1C LEVEL LT 7.0%: CPT | Mod: CPTII,S$GLB,, | Performed by: PHYSICIAN ASSISTANT

## 2023-07-24 PROCEDURE — 3044F PR MOST RECENT HEMOGLOBIN A1C LEVEL <7.0%: ICD-10-PCS | Mod: CPTII,S$GLB,, | Performed by: PHYSICIAN ASSISTANT

## 2023-07-24 PROCEDURE — 99214 PR OFFICE/OUTPT VISIT, EST, LEVL IV, 30-39 MIN: ICD-10-PCS | Mod: S$GLB,,, | Performed by: PHYSICIAN ASSISTANT

## 2023-07-24 PROCEDURE — 1160F PR REVIEW ALL MEDS BY PRESCRIBER/CLIN PHARMACIST DOCUMENTED: ICD-10-PCS | Mod: CPTII,S$GLB,, | Performed by: PHYSICIAN ASSISTANT

## 2023-07-24 PROCEDURE — 99999 PR PBB SHADOW E&M-EST. PATIENT-LVL IV: ICD-10-PCS | Mod: PBBFAC,,, | Performed by: PHYSICIAN ASSISTANT

## 2023-07-24 PROCEDURE — 99999 PR PBB SHADOW E&M-EST. PATIENT-LVL IV: CPT | Mod: PBBFAC,,, | Performed by: PHYSICIAN ASSISTANT

## 2023-07-24 RX ORDER — TRIFAROTENE 50 UG/G
1 CREAM TOPICAL NIGHTLY
Qty: 45 G | Refills: 3 | Status: SHIPPED | OUTPATIENT
Start: 2023-07-24

## 2023-07-24 NOTE — PROGRESS NOTES
"  Subjective:      Patient ID:  Arielle Verde is a 43 y.o. female who presents for   Chief Complaint   Patient presents with    Excessive Sweating     Previous surgery. Patient is seeking a new treatment option.     Acne     Follow up. Little to no improvement.      Hx of acne, melasma, last seen 1/11/23. Here for f/u of acne. Overall improvement, but still some flares from time to time. Current tx: clinda wipes, tretinoin 0.025%. Denies dryness or irritation.  Has IUD (mirena).      C/o hyperhidrosis, notes "thoracic region", but h/o axillary hyperhidrosis and s/p surgery in 2009 (laproscopic with clipping). Believes laughing and sitting watching tv are triggers. Current tx: artemoi Strong.     Recently diagnosed with hypothyroidism, now on synthroid (started in February).     PMHX: orthostatic hypotension and snycope- followed by [facility] (on fludr      Review of Systems    Objective:   Physical Exam     Diagram Legend     Erythematous scaling macule/papule c/w actinic keratosis       Vascular papule c/w angioma      Pigmented verrucoid papule/plaque c/w seborrheic keratosis      Yellow umbilicated papule c/w sebaceous hyperplasia      Irregularly shaped tan macule c/w lentigo     1-2 mm smooth white papules consistent with Milia      Movable subcutaneous cyst with punctum c/w epidermal inclusion cyst      Subcutaneous movable cyst c/w pilar cyst      Firm pink to brown papule c/w dermatofibroma      Pedunculated fleshy papule(s) c/w skin tag(s)      Evenly pigmented macule c/w junctional nevus     Mildly variegated pigmented, slightly irregular-bordered macule c/w mildly atypical nevus      Flesh colored to evenly pigmented papule c/w intradermal nevus       Pink pearly papule/plaque c/w basal cell carcinoma      Erythematous hyperkeratotic cursted plaque c/w SCC      Surgical scar with no sign of skin cancer recurrence      Open and closed comedones      Inflammatory papules and pustules      " Verrucoid papule consistent consistent with wart     Erythematous eczematous patches and plaques     Dystrophic onycholytic nail with subungual debris c/w onychomycosis     Umbilicated papule    Erythematous-base heme-crusted tan verrucoid plaque consistent with inflamed seborrheic keratosis     Erythematous Silvery Scaling Plaque c/w Psoriasis     See annotation      Assessment / Plan:        Hyperhidrosis  -     Ambulatory referral/consult to Endocrinology; Future; Expected date: 07/31/2023  -     aluminum chloride (DRYSOL) 20 % external solution; AAA 2-3 nights and then taper to 1-2 times per week as needed.  Dispense: 30 mL; Refill: 2  Acute onset, atypical location. Unsure if 2/2 to newly diagnosed hypothyroidism vs. Other. Also, has h/o POTS (followed by [facility]). Reviewed recent labs wnl (CMP, CBC, TSH). Agree to trial of above. Warned of risk of irritation. Discussed qbrexa as another option; however, warned of risk of systemic side effects.    Acne vulgaris  -     trifarotene (AKLIEF) 0.005 % Crea; Apply 1 application  topically every evening. Decrease frequency if any irritation.  Dispense: 45 g; Refill: 3  Still w/flares. Agree to trial of above. Will continue clinda wipes and otc bpo wash.          Follow up in about 6 months (around 1/24/2024).

## 2023-07-26 ENCOUNTER — PROCEDURE VISIT (OUTPATIENT)
Dept: NEUROLOGY | Facility: CLINIC | Age: 43
End: 2023-07-26
Payer: COMMERCIAL

## 2023-07-26 VITALS
BODY MASS INDEX: 24.41 KG/M2 | DIASTOLIC BLOOD PRESSURE: 60 MMHG | OXYGEN SATURATION: 99 % | RESPIRATION RATE: 16 BRPM | SYSTOLIC BLOOD PRESSURE: 90 MMHG | HEART RATE: 97 BPM | HEIGHT: 71 IN

## 2023-07-26 DIAGNOSIS — G43.019 COMMON MIGRAINE WITH INTRACTABLE MIGRAINE: Primary | ICD-10-CM

## 2023-07-26 PROCEDURE — 64615 PR CHEMODENERVATION OF MUSCLE FOR CHRONIC MIGRAINE: ICD-10-PCS | Mod: S$GLB,,, | Performed by: PSYCHIATRY & NEUROLOGY

## 2023-07-26 PROCEDURE — 64615 CHEMODENERV MUSC MIGRAINE: CPT | Mod: S$GLB,,, | Performed by: PSYCHIATRY & NEUROLOGY

## 2023-07-26 RX ADMIN — ONABOTULINUMTOXINA 200 UNITS: 100 INJECTION, POWDER, LYOPHILIZED, FOR SOLUTION INTRADERMAL; INTRAMUSCULAR at 02:07

## 2023-08-15 RX ORDER — BUPROPION HYDROCHLORIDE 150 MG/1
TABLET ORAL
Qty: 90 TABLET | Refills: 1 | Status: SHIPPED | OUTPATIENT
Start: 2023-08-15 | End: 2023-09-29 | Stop reason: SDUPTHER

## 2023-08-23 ENCOUNTER — PATIENT MESSAGE (OUTPATIENT)
Dept: CARDIOLOGY | Facility: CLINIC | Age: 43
End: 2023-08-23
Payer: COMMERCIAL

## 2023-09-18 RX ORDER — ATOMOXETINE 80 MG/1
CAPSULE ORAL
Qty: 30 CAPSULE | Refills: 0 | Status: SHIPPED | OUTPATIENT
Start: 2023-09-18 | End: 2023-09-29 | Stop reason: SDUPTHER

## 2023-09-24 ENCOUNTER — PATIENT MESSAGE (OUTPATIENT)
Dept: INTERNAL MEDICINE | Facility: CLINIC | Age: 43
End: 2023-09-24
Payer: COMMERCIAL

## 2023-09-24 DIAGNOSIS — J02.9 PHARYNGITIS, UNSPECIFIED ETIOLOGY: ICD-10-CM

## 2023-09-24 DIAGNOSIS — H65.91 RIGHT OTITIS MEDIA WITH EFFUSION: ICD-10-CM

## 2023-09-25 RX ORDER — BUSPIRONE HYDROCHLORIDE 15 MG/1
30 TABLET ORAL 2 TIMES DAILY
Qty: 120 TABLET | Refills: 0 | Status: SHIPPED | OUTPATIENT
Start: 2023-09-25 | End: 2023-09-29 | Stop reason: SDUPTHER

## 2023-09-25 RX ORDER — FLUTICASONE PROPIONATE 50 MCG
1 SPRAY, SUSPENSION (ML) NASAL DAILY
Qty: 18.2 ML | Refills: 1 | Status: SHIPPED | OUTPATIENT
Start: 2023-09-25

## 2023-09-25 NOTE — TELEPHONE ENCOUNTER
No care due was identified.  [facility] Embedded Care Due Messages. Reference number: [id number].   9/25/2023 1:23:33 PM CDT

## 2023-09-25 NOTE — TELEPHONE ENCOUNTER
Refill Routing Note   Medication(s) are not appropriate for processing by [facility] for the following reason(s):      No active prescription written by provider    [facility] action(s):  Defer Care Due:  None identified              Appointments  past 12m or future 3m with PCP    Date Provider   Last Visit   2/20/2023 Vera Broussard MD   Next Visit   Visit date not found Vera Broussard MD   ED visits in past 90 days: 0        Note composed:1:57 PM 09/25/2023

## 2023-09-27 ENCOUNTER — OFFICE VISIT (OUTPATIENT)
Dept: DERMATOLOGY | Facility: CLINIC | Age: 43
End: 2023-09-27
Payer: COMMERCIAL

## 2023-09-27 DIAGNOSIS — L29.9 PRURITUS: ICD-10-CM

## 2023-09-27 DIAGNOSIS — L30.8 RETINOID DERMATITIS: Primary | ICD-10-CM

## 2023-09-27 DIAGNOSIS — L29.9 SCALP PRURITUS: ICD-10-CM

## 2023-09-27 DIAGNOSIS — L74.510 AXILLARY HYPERHIDROSIS: ICD-10-CM

## 2023-09-27 PROCEDURE — 99999 PR PBB SHADOW E&M-EST. PATIENT-LVL II: CPT | Mod: PBBFAC,,, | Performed by: DERMATOLOGY

## 2023-09-27 PROCEDURE — 99999 PR PBB SHADOW E&M-EST. PATIENT-LVL II: ICD-10-PCS | Mod: PBBFAC,,, | Performed by: DERMATOLOGY

## 2023-09-27 PROCEDURE — 3044F HG A1C LEVEL LT 7.0%: CPT | Mod: CPTII,S$GLB,, | Performed by: DERMATOLOGY

## 2023-09-27 PROCEDURE — 99214 OFFICE O/P EST MOD 30 MIN: CPT | Mod: S$GLB,,, | Performed by: DERMATOLOGY

## 2023-09-27 PROCEDURE — 1159F MED LIST DOCD IN RCRD: CPT | Mod: CPTII,S$GLB,, | Performed by: DERMATOLOGY

## 2023-09-27 PROCEDURE — 1160F PR REVIEW ALL MEDS BY PRESCRIBER/CLIN PHARMACIST DOCUMENTED: ICD-10-PCS | Mod: CPTII,S$GLB,, | Performed by: DERMATOLOGY

## 2023-09-27 PROCEDURE — 3044F PR MOST RECENT HEMOGLOBIN A1C LEVEL <7.0%: ICD-10-PCS | Mod: CPTII,S$GLB,, | Performed by: DERMATOLOGY

## 2023-09-27 PROCEDURE — 99214 PR OFFICE/OUTPT VISIT, EST, LEVL IV, 30-39 MIN: ICD-10-PCS | Mod: S$GLB,,, | Performed by: DERMATOLOGY

## 2023-09-27 PROCEDURE — 1160F RVW MEDS BY RX/DR IN RCRD: CPT | Mod: CPTII,S$GLB,, | Performed by: DERMATOLOGY

## 2023-09-27 PROCEDURE — 1159F PR MEDICATION LIST DOCUMENTED IN MEDICAL RECORD: ICD-10-PCS | Mod: CPTII,S$GLB,, | Performed by: DERMATOLOGY

## 2023-09-27 RX ORDER — TRIAMCINOLONE ACETONIDE 0.25 MG/G
CREAM TOPICAL 2 TIMES DAILY PRN
Qty: 80 G | Refills: 1 | Status: SHIPPED | OUTPATIENT
Start: 2023-09-27

## 2023-09-27 RX ORDER — MOMETASONE FUROATE 1 MG/ML
SOLUTION TOPICAL
Qty: 60 ML | Refills: 3 | Status: SHIPPED | OUTPATIENT
Start: 2023-09-27

## 2023-09-27 RX ORDER — GLYCOPYRRONIUM 2.4 G/100G
CLOTH TOPICAL
Qty: 30 EACH | Refills: 5 | Status: SHIPPED | OUTPATIENT
Start: 2023-09-27

## 2023-09-27 NOTE — PROGRESS NOTES
Subjective:      Patient ID:  Arielle Verde is a 43 y.o. female who presents for   Chief Complaint   Patient presents with    Skin Check     Very Itchy around breast underneath breast and in the middle and thinks it maybe speaking to the scalp      Hx of acne (previously given aklief, tretinoin, and clinda wipes) and hyperhidrosis (s/p surgery to treat excessive sweating in hands, underarms or feet in [place])    History of Present Illness: The patient presents with chief complaint of pruritus.  Location: sternal chest, scalp  Duration: 2-3 months  Signs/Symptoms: itching, 7/10 in severity    Prior treatments: clinda wipes, aklief          Review of Systems   Constitutional:  Negative for fever and chills.   Gastrointestinal:  Negative for nausea and vomiting.   Skin:  Positive for activity-related sunscreen use. Negative for daily sunscreen use and recent sunburn.   Hematologic/Lymphatic: Does not bruise/bleed easily.       Objective:   Physical Exam   Constitutional: She appears well-developed and well-nourished. No distress.   Neurological: She is alert and oriented to person, place, and time. She is not disoriented.   Psychiatric: She has a normal mood and affect.   Skin:   Areas Examined (abnormalities noted in diagram):   Head / Face Inspection Performed  Neck Inspection Performed  Chest / Axilla Inspection Performed  RUE Inspected  LUE Inspection Performed  Nails and Digits Inspection Performed                 Diagram Legend     Erythematous scaling macule/papule c/w actinic keratosis       Vascular papule c/w angioma      Pigmented verrucoid papule/plaque c/w seborrheic keratosis      Yellow umbilicated papule c/w sebaceous hyperplasia      Irregularly shaped tan macule c/w lentigo     1-2 mm smooth white papules consistent with Milia      Movable subcutaneous cyst with punctum c/w epidermal inclusion cyst      Subcutaneous movable cyst c/w pilar cyst      Firm pink to brown papule c/w  dermatofibroma      Pedunculated fleshy papule(s) c/w skin tag(s)      Evenly pigmented macule c/w junctional nevus     Mildly variegated pigmented, slightly irregular-bordered macule c/w mildly atypical nevus      Flesh colored to evenly pigmented papule c/w intradermal nevus       Pink pearly papule/plaque c/w basal cell carcinoma      Erythematous hyperkeratotic cursted plaque c/w SCC      Surgical scar with no sign of skin cancer recurrence      Open and closed comedones      Inflammatory papules and pustules      Verrucoid papule consistent consistent with wart     Erythematous eczematous patches and plaques     Dystrophic onycholytic nail with subungual debris c/w onychomycosis     Umbilicated papule    Erythematous-base heme-crusted tan verrucoid plaque consistent with inflamed seborrheic keratosis     Erythematous Silvery Scaling Plaque c/w Psoriasis     See annotation      Assessment / Plan:        Retinoid dermatitis  -     triamcinolone acetonide 0.025% (KENALOG) 0.025 % cream; Apply topically 2 (two) times daily as needed (prn rash between breasts). Mild steroid. Use sparingly for 1-2 weeks if needed then stop.  Dispense: 80 g; Refill: 1  -     of sternal chest, originally prescribed for acne of the chest.  Recommend discontinue aklief and drysol use in the sternal area.  We will start above medication.    Scalp pruritus  -     mometasone (ELOCON) 0.1 % solution; AAA of scalp once daily.  Dispense: 60 mL; Refill: 3  -     uncertain etiology, no sidney scaling or dryness of the scalp noted.  Mild erythema.  We will start above medication.    Pruritus  -     CMP; Future; Expected date: 09/27/2023  -     CBC auto differential; Future; Expected date: 09/27/2023  -     TSH; Future; Expected date: 09/27/2023  -     Protein electrophoresis, serum; Future; Expected date: 09/27/2023  -     GABBI; Future; Expected date: 09/27/2023  -     patient with a history of thyroid disease.  We will check above  labs.    Axillary hyperhidrosis  -     glycopyrronium tosylate (QBREXZA) 2.4 % Towl; Use one pad for both underarms daily. Wash hands thoroughly after using.  Dispense: 30 each; Refill: 5  -     patient failed Drysol, we will start above medications.  Warned of risk of effects on pupil if patient transfers medication to that area.  Discussed patient should wash hands thoroughly after use.  The patient acknowledged understanding.             Follow up in about 3 months (around 12/27/2023).

## 2023-09-29 ENCOUNTER — OFFICE VISIT (OUTPATIENT)
Dept: PSYCHIATRY | Facility: CLINIC | Age: 43
End: 2023-09-29
Payer: COMMERCIAL

## 2023-09-29 DIAGNOSIS — R41.840 DISTURBED CONCENTRATION: ICD-10-CM

## 2023-09-29 DIAGNOSIS — F41.9 ANXIETY: ICD-10-CM

## 2023-09-29 DIAGNOSIS — F32.A CHRONIC DEPRESSION: Primary | ICD-10-CM

## 2023-09-29 DIAGNOSIS — G47.00 INSOMNIA, UNSPECIFIED TYPE: ICD-10-CM

## 2023-09-29 PROCEDURE — 99213 OFFICE O/P EST LOW 20 MIN: CPT | Mod: 95,,, | Performed by: PSYCHIATRY & NEUROLOGY

## 2023-09-29 PROCEDURE — 3044F PR MOST RECENT HEMOGLOBIN A1C LEVEL <7.0%: ICD-10-PCS | Mod: CPTII,95,, | Performed by: PSYCHIATRY & NEUROLOGY

## 2023-09-29 PROCEDURE — 3044F HG A1C LEVEL LT 7.0%: CPT | Mod: CPTII,95,, | Performed by: PSYCHIATRY & NEUROLOGY

## 2023-09-29 PROCEDURE — 99213 PR OFFICE/OUTPT VISIT, EST, LEVL III, 20-29 MIN: ICD-10-PCS | Mod: 95,,, | Performed by: PSYCHIATRY & NEUROLOGY

## 2023-09-29 RX ORDER — BUPROPION HYDROCHLORIDE 150 MG/1
450 TABLET ORAL DAILY
Qty: 90 TABLET | Refills: 1 | Status: SHIPPED | OUTPATIENT
Start: 2023-09-29 | End: 2023-12-18

## 2023-09-29 RX ORDER — CLONAZEPAM 0.5 MG/1
TABLET ORAL
Qty: 30 TABLET | Refills: 1 | Status: SHIPPED | OUTPATIENT
Start: 2023-09-29

## 2023-09-29 RX ORDER — TRAZODONE HYDROCHLORIDE 100 MG/1
TABLET ORAL
Qty: 270 TABLET | Refills: 1 | Status: SHIPPED | OUTPATIENT
Start: 2023-09-29

## 2023-09-29 RX ORDER — ATOMOXETINE 80 MG/1
80 CAPSULE ORAL DAILY
Qty: 90 CAPSULE | Refills: 1 | Status: SHIPPED | OUTPATIENT
Start: 2023-09-29

## 2023-09-29 RX ORDER — BUSPIRONE HYDROCHLORIDE 15 MG/1
30 TABLET ORAL 2 TIMES DAILY
Qty: 360 TABLET | Refills: 1 | Status: SHIPPED | OUTPATIENT
Start: 2023-09-29 | End: 2024-09-28

## 2023-10-01 NOTE — PROGRESS NOTES
Established Patient - Audio Only Telehealth Visit     The patient location is: home  The chief complaint leading to consultation is: follow-up  Visit type: Virtual visit with audio only (telephone)  Total time spent with patient: 25 minutes       The reason for the audio only service rather than synchronous audio and video virtual visit was related to technical difficulties or patient preference/necessity.     Each patient to whom I provide medical services by telemedicine is:  (1) informed of the relationship between the physician and patient and the respective role of any other health care provider with respect to management of the patient; and (2) notified that they may decline to receive medical services by telemedicine and may withdraw from such care at any time. Patient verbally consented to receive this service via voice-only telephone call.       HPI: Patient visit attempted by VIDEO, but video platform down during time of visit. Last seen about eleven months prior. Reports mood symptoms are mild, attention symptoms well controlled with current treatment. No new mental health symptoms. No new illnesses or general health complaints. No new medications. Functioning continues to be good in current roles. No upcoming anticipated additional stressors. Reports that she has been regularly adherent to medication. Infrequent clonazepam use. Attributes good benefit to medication. Denies side effects.      Assessment and plan:  43 year old woman with chronic depression, additional problems with anxiety, concentration. All problems are currently mild and managed with current treatment. Agreeable to maintenance plan without changes.     Follow-up in 6 months.                         This service was not originating from a related E/M service provided within the previous 7 days nor will  to an E/M service or procedure within the next 24 hours or my soonest available appointment.  Prevailing standard of care was  able to be met in this audio-only visit.

## 2023-10-10 ENCOUNTER — PATIENT MESSAGE (OUTPATIENT)
Dept: NEUROLOGY | Facility: CLINIC | Age: 43
End: 2023-10-10
Payer: COMMERCIAL

## 2023-10-29 RX ORDER — MIDODRINE HYDROCHLORIDE 5 MG/1
5 TABLET ORAL
Qty: 90 TABLET | Refills: 0 | Status: SHIPPED | OUTPATIENT
Start: 2023-10-29 | End: 2024-01-04

## 2023-10-30 ENCOUNTER — PROCEDURE VISIT (OUTPATIENT)
Dept: NEUROLOGY | Facility: CLINIC | Age: 43
End: 2023-10-30
Payer: COMMERCIAL

## 2023-10-30 VITALS
HEART RATE: 88 BPM | SYSTOLIC BLOOD PRESSURE: 94 MMHG | WEIGHT: 151.88 LBS | DIASTOLIC BLOOD PRESSURE: 64 MMHG | BODY MASS INDEX: 21.26 KG/M2 | HEIGHT: 71 IN

## 2023-10-30 DIAGNOSIS — G43.019 COMMON MIGRAINE WITH INTRACTABLE MIGRAINE: Primary | ICD-10-CM

## 2023-10-30 PROCEDURE — 64615 PR CHEMODENERVATION OF MUSCLE FOR CHRONIC MIGRAINE: ICD-10-PCS | Mod: S$GLB,,, | Performed by: PSYCHIATRY & NEUROLOGY

## 2023-10-30 PROCEDURE — 64615 CHEMODENERV MUSC MIGRAINE: CPT | Mod: S$GLB,,, | Performed by: PSYCHIATRY & NEUROLOGY

## 2023-10-30 RX ADMIN — ONABOTULINUMTOXINA 200 UNITS: 100 INJECTION, POWDER, LYOPHILIZED, FOR SOLUTION INTRADERMAL; INTRAMUSCULAR at 01:10

## 2023-10-30 NOTE — PROCEDURES
Procedures          BOTOX  HAS PROVEN VERY EFFECTIVE IN SIGNIFICANTLY REDUCING THE SEVERITY AND FREQUENCY OF MIGRAINE HEADACHES             PROCEDURE NAME:      INTRAMUSCULAR BOTOX INJECTIONS         PROCEDURE INDICATION:     INTRACTABLE (PHARMACOLOGICALLY UNRESPONSIVE/RESISTANT) MIGRAINE        PROCEDURE DESCRIPTION:    The procedure was discussed in detail with the patient and the consent form was signed. The patient verbalized understanding of the procedure .    I discussed the risks that may include serious immune reaction and distant spread that could results in loss of breathing. Other side effects may include droopy eyelids, trouble swallowing and cardiac arrhythmias. It was also stressed that it is contraindicated in pregnancy.       PROCEDURE TIME OUT PROCESS:     Time Out was called.     Two patient identifiers were used (first and last name in addition to date of birth). The patient stated the procedure being done (Botox injections) in the scalp muscles (both sides).          PROCEDURE EXECUTION:     As per the standard protocol, a total 155 units were injected in 31 sites.         RT  5 units in 1 site    LT  5 units in 1 site     Procerus 5 units in 1 site     RT Frontalis 10 units in 2 sites     LT Frontalis 10 units in 2 sites     RT Temporalis 20 units in 4 sites    LT Temporalis 20 units in 4 sites    RT Occipitalis 15 units in 3 sites    LT Occipitalis 15 units in 3 sites     RT Cervical Paraspinals 10 units in 2 sites    LT Cervical Paraspinals 10 units in 2 sites    RT Trapezius 15 units in 3 sites    LT Trapezius 15 units in 3 sites       Per the standard of care protocol we use 155 units and waste 45 units. I gave the patient the option of following the standard protocol vs.using the extra 45 units to target areas where the pain is maximum which could potentially provide extra help with headache control. I explained to the patient that this will be an off-label use, not the  standard protocol, not evidence-based and could result in more pronounced side effects. The patient verbalized full understanding of all the facts and the risks and benefits and elected to use the extra 45 units for the following sites:         Procerus 5 units in 1 site      RT Frontalis 10 units in 2 sites      LT Frontalis 10 units in 2 site      RT Temporalis 10 units in 2 sites         PROCEDURE COURSE:       The standard protocol was followed. The procedure was executed very smoothly.         PROCEDURE COMPLICATIONS:     None. The patient tolerated the procedure very well.         PROCEDURE AFTERCARE:     I encouraged the patient to use ice if the sites of injection get tender and call me with any problems or complications.         FOLLOW UP:      RTC in 3 months for a new set of injections and call with any questions and/or concerns.

## 2023-11-10 RX ORDER — FLUDROCORTISONE ACETATE 0.1 MG/1
100 TABLET ORAL DAILY
Qty: 180 TABLET | Refills: 3 | Status: SHIPPED | OUTPATIENT
Start: 2023-11-10

## 2023-12-18 RX ORDER — BUPROPION HYDROCHLORIDE 150 MG/1
450 TABLET ORAL
Qty: 90 TABLET | Refills: 0 | Status: SHIPPED | OUTPATIENT
Start: 2023-12-18 | End: 2024-02-08

## 2023-12-30 DIAGNOSIS — Z00.00 ANNUAL PHYSICAL EXAM: Primary | ICD-10-CM

## 2024-01-02 RX ORDER — METFORMIN HYDROCHLORIDE 500 MG/1
500 TABLET ORAL 2 TIMES DAILY
Qty: 60 TABLET | Refills: 1 | Status: SHIPPED | OUTPATIENT
Start: 2024-01-02

## 2024-01-02 NOTE — TELEPHONE ENCOUNTER
Tell patient  Needs annual  Labs in FEB  Labs ordered  [place] and see us in Feb for more refills

## 2024-01-02 NOTE — TELEPHONE ENCOUNTER
Refill Routing Note   Medication(s) are not appropriate for processing by [facility] for the following reason(s):        Required labs outdated    [facility] action(s):  Defer               Appointments  past 12m or future 3m with PCP    Date Provider   Last Visit   2/20/2023 Vera Broussard MD   Next Visit   Visit date not found Vera Broussard MD   ED visits in past 90 days: 0        Note composed:8:29 AM 01/02/2024

## 2024-01-04 RX ORDER — MIDODRINE HYDROCHLORIDE 5 MG/1
5 TABLET ORAL
Qty: 90 TABLET | Refills: 0 | Status: SHIPPED | OUTPATIENT
Start: 2024-01-04 | End: 2024-02-02

## 2024-01-24 ENCOUNTER — PATIENT MESSAGE (OUTPATIENT)
Dept: NEUROLOGY | Facility: CLINIC | Age: 44
End: 2024-01-24

## 2024-01-29 ENCOUNTER — PATIENT MESSAGE (OUTPATIENT)
Dept: NEUROLOGY | Facility: CLINIC | Age: 44
End: 2024-01-29

## 2024-02-02 RX ORDER — MIDODRINE HYDROCHLORIDE 5 MG/1
5 TABLET ORAL
Qty: 90 TABLET | Refills: 0 | Status: SHIPPED | OUTPATIENT
Start: 2024-02-02 | End: 2024-06-04 | Stop reason: SDUPTHER

## 2024-02-08 RX ORDER — BUPROPION HYDROCHLORIDE 150 MG/1
450 TABLET ORAL
Qty: 90 TABLET | Refills: 1 | Status: SHIPPED | OUTPATIENT
Start: 2024-02-08 | End: 2024-06-04 | Stop reason: SDUPTHER

## 2024-02-21 ENCOUNTER — PATIENT MESSAGE (OUTPATIENT)
Dept: NEUROLOGY | Facility: CLINIC | Age: 44
End: 2024-02-21

## 2024-02-21 ENCOUNTER — PATIENT MESSAGE (OUTPATIENT)
Dept: OBSTETRICS AND GYNECOLOGY | Facility: CLINIC | Age: 44
End: 2024-02-21

## 2024-03-15 ENCOUNTER — TELEPHONE (OUTPATIENT)
Dept: NEUROLOGY | Facility: CLINIC | Age: 44
End: 2024-03-15

## 2024-03-15 NOTE — TELEPHONE ENCOUNTER
Attempted to contact patient in regards to letting her know about her insurance needing to be updated. Left a VM to contact office.

## 2024-04-12 RX ORDER — ATOMOXETINE 80 MG/1
80 CAPSULE ORAL
Qty: 90 CAPSULE | Refills: 0 | Status: SHIPPED | OUTPATIENT
Start: 2024-04-12

## 2024-04-12 RX ORDER — BUSPIRONE HYDROCHLORIDE 15 MG/1
30 TABLET ORAL 2 TIMES DAILY
Qty: 360 TABLET | Refills: 0 | Status: SHIPPED | OUTPATIENT
Start: 2024-04-12

## 2024-04-12 RX ORDER — TRAZODONE HYDROCHLORIDE 100 MG/1
TABLET ORAL
Qty: 270 TABLET | Refills: 0 | Status: SHIPPED | OUTPATIENT
Start: 2024-04-12 | End: 2024-06-04 | Stop reason: SDUPTHER

## 2024-05-03 ENCOUNTER — PATIENT MESSAGE (OUTPATIENT)
Dept: NEUROLOGY | Facility: CLINIC | Age: 44
End: 2024-05-03

## 2024-05-10 RX ORDER — METFORMIN HYDROCHLORIDE 500 MG/1
500 TABLET ORAL 2 TIMES DAILY
Qty: 180 TABLET | Refills: 0 | Status: SHIPPED | OUTPATIENT
Start: 2024-05-10 | End: 2024-06-04 | Stop reason: SDUPTHER

## 2024-05-10 NOTE — TELEPHONE ENCOUNTER
No care due was identified.  [facility] Embedded Care Due Messages. Reference number: [id number].   5/10/2024 6:53:55 AM CDT

## 2024-06-04 DIAGNOSIS — R55 VASODEPRESSOR SYNCOPE: ICD-10-CM

## 2024-06-04 DIAGNOSIS — J02.9 PHARYNGITIS, UNSPECIFIED ETIOLOGY: ICD-10-CM

## 2024-06-04 DIAGNOSIS — H65.91 RIGHT OTITIS MEDIA WITH EFFUSION: ICD-10-CM

## 2024-06-04 DIAGNOSIS — R79.89 ABNORMAL TSH: ICD-10-CM

## 2024-06-04 DIAGNOSIS — R00.2 HEART PALPITATIONS: ICD-10-CM

## 2024-06-05 ENCOUNTER — PATIENT MESSAGE (OUTPATIENT)
Dept: PSYCHIATRY | Facility: CLINIC | Age: 44
End: 2024-06-05

## 2024-06-05 RX ORDER — METFORMIN HYDROCHLORIDE 500 MG/1
500 TABLET ORAL 2 TIMES DAILY
Qty: 180 TABLET | Refills: 0 | Status: SHIPPED | OUTPATIENT
Start: 2024-06-05

## 2024-06-05 RX ORDER — TRAZODONE HYDROCHLORIDE 100 MG/1
TABLET ORAL
Qty: 270 TABLET | Refills: 0 | Status: SHIPPED | OUTPATIENT
Start: 2024-06-05

## 2024-06-05 RX ORDER — LEVOTHYROXINE SODIUM 25 UG/1
25 TABLET ORAL
Qty: 90 TABLET | Refills: 0 | Status: SHIPPED | OUTPATIENT
Start: 2024-06-05

## 2024-06-05 RX ORDER — MONTELUKAST SODIUM 10 MG/1
10 TABLET ORAL NIGHTLY
Qty: 30 TABLET | Refills: 11 | Status: SHIPPED | OUTPATIENT
Start: 2024-06-05

## 2024-06-05 RX ORDER — BUPROPION HYDROCHLORIDE 150 MG/1
450 TABLET ORAL DAILY
Qty: 90 TABLET | Refills: 1 | Status: SHIPPED | OUTPATIENT
Start: 2024-06-05

## 2024-06-05 NOTE — TELEPHONE ENCOUNTER
Refill Routing Note   Medication(s) are not appropriate for processing by [facility] for the following reason(s):        Patient not seen by provider within 15 months  Required labs outdated:  A1c    [facility] action(s):  Defer               Appointments  past 12m or future 3m with PCP    Date Provider   Last Visit   2/20/2023 Vera Broussard MD   Next Visit   Visit date not found Vera Broussard MD   ED visits in past 90 days: 0        Note composed:8:18 AM 06/05/2024

## 2024-06-05 NOTE — TELEPHONE ENCOUNTER
Refill Routing Note   Medication(s) are not appropriate for processing by [facility] for the following reason(s):        Non-participating provider    [facility] action(s):  Route               Appointments  past 12m or future 3m with PCP    Date Provider   Last Visit   7/6/2023 Breanna Nguyen, NP   Next Visit   7/5/2024 Breanna Nguyen, NP   ED visits in past 90 days: 0        Note composed:9:51 AM 06/05/2024

## 2024-06-06 RX ORDER — MIDODRINE HYDROCHLORIDE 5 MG/1
5 TABLET ORAL
Qty: 90 TABLET | Refills: 0 | Status: SHIPPED | OUTPATIENT
Start: 2024-06-06

## 2024-06-06 RX ORDER — BETAXOLOL 10 MG/1
10 TABLET, FILM COATED ORAL DAILY
Qty: 30 TABLET | Refills: 11 | Status: SHIPPED | OUTPATIENT
Start: 2024-06-06 | End: 2025-06-06

## 2024-06-06 RX ORDER — FLUDROCORTISONE ACETATE 0.1 MG/1
100 TABLET ORAL DAILY
Qty: 180 TABLET | Refills: 3 | Status: SHIPPED | OUTPATIENT
Start: 2024-06-06

## 2024-06-08 RX ORDER — POTASSIUM CHLORIDE 20 MEQ/1
20 TABLET, EXTENDED RELEASE ORAL 2 TIMES DAILY
Qty: 180 TABLET | Refills: 3 | Status: SHIPPED | OUTPATIENT
Start: 2024-06-08

## 2024-07-12 DIAGNOSIS — I95.1 POSTURAL HYPOTENSION: Primary | Chronic | ICD-10-CM

## 2024-07-15 ENCOUNTER — HOSPITAL ENCOUNTER (OUTPATIENT)
Dept: CARDIOLOGY | Facility: HOSPITAL | Age: 44
Discharge: HOME | End: 2024-07-15
Attending: INTERNAL MEDICINE
Payer: COMMERCIAL

## 2024-07-15 ENCOUNTER — OFFICE VISIT (OUTPATIENT)
Dept: CARDIOLOGY | Facility: CLINIC | Age: 44
End: 2024-07-15
Payer: COMMERCIAL

## 2024-07-15 VITALS
HEIGHT: 71 IN | SYSTOLIC BLOOD PRESSURE: 100 MMHG | BODY MASS INDEX: 21.91 KG/M2 | OXYGEN SATURATION: 99 % | HEART RATE: 85 BPM | DIASTOLIC BLOOD PRESSURE: 79 MMHG | WEIGHT: 156.5 LBS

## 2024-07-15 DIAGNOSIS — R55 VASODEPRESSOR SYNCOPE: ICD-10-CM

## 2024-07-15 DIAGNOSIS — I95.1 POSTURAL HYPOTENSION: Chronic | ICD-10-CM

## 2024-07-15 DIAGNOSIS — I95.1 ORTHOSTASIS: ICD-10-CM

## 2024-07-15 DIAGNOSIS — G43.009 MIGRAINE WITHOUT AURA AND WITHOUT STATUS MIGRAINOSUS, NOT INTRACTABLE: ICD-10-CM

## 2024-07-15 DIAGNOSIS — R00.2 HEART PALPITATIONS: Primary | ICD-10-CM

## 2024-07-15 LAB
OHS QRS DURATION: 74 MS
OHS QTC CALCULATION: 415 MS

## 2024-07-15 PROCEDURE — 3008F BODY MASS INDEX DOCD: CPT | Mod: CPTII,S$GLB,, | Performed by: INTERNAL MEDICINE

## 2024-07-15 PROCEDURE — 1160F RVW MEDS BY RX/DR IN RCRD: CPT | Mod: CPTII,S$GLB,, | Performed by: INTERNAL MEDICINE

## 2024-07-15 PROCEDURE — 99999 PR PBB SHADOW E&M-EST. PATIENT-LVL V: CPT | Mod: PBBFAC,,, | Performed by: INTERNAL MEDICINE

## 2024-07-15 PROCEDURE — 99215 OFFICE O/P EST HI 40 MIN: CPT | Mod: S$GLB,,, | Performed by: INTERNAL MEDICINE

## 2024-07-15 PROCEDURE — 3074F SYST BP LT 130 MM HG: CPT | Mod: CPTII,S$GLB,, | Performed by: INTERNAL MEDICINE

## 2024-07-15 PROCEDURE — 93005 ELECTROCARDIOGRAM TRACING: CPT

## 2024-07-15 PROCEDURE — 1159F MED LIST DOCD IN RCRD: CPT | Mod: CPTII,S$GLB,, | Performed by: INTERNAL MEDICINE

## 2024-07-15 PROCEDURE — 93010 ELECTROCARDIOGRAM REPORT: CPT | Mod: ,,, | Performed by: INTERNAL MEDICINE

## 2024-07-15 PROCEDURE — 3078F DIAST BP <80 MM HG: CPT | Mod: CPTII,S$GLB,, | Performed by: INTERNAL MEDICINE

## 2024-07-15 RX ORDER — BACLOFEN 10 MG/1
10 TABLET ORAL
COMMUNITY
Start: 2024-04-12

## 2024-07-15 RX ORDER — ONDANSETRON 4 MG/1
4 TABLET, ORALLY DISINTEGRATING ORAL EVERY 8 HOURS PRN
COMMUNITY
Start: 2024-07-14 | End: 2024-07-21

## 2024-07-15 RX ORDER — MIDODRINE HYDROCHLORIDE 5 MG/1
TABLET ORAL
Qty: 360 TABLET | Refills: 3 | Status: SHIPPED | OUTPATIENT
Start: 2024-07-15

## 2024-07-15 RX ORDER — CHLORZOXAZONE 500 MG/1
500 TABLET ORAL 2 TIMES DAILY PRN
COMMUNITY
Start: 2024-02-19

## 2024-07-15 RX ORDER — METHOCARBAMOL 750 MG/1
750 TABLET, FILM COATED ORAL 3 TIMES DAILY PRN
COMMUNITY
Start: 2024-02-09

## 2024-07-15 RX ORDER — CELECOXIB 200 MG/1
200 CAPSULE ORAL DAILY
COMMUNITY
Start: 2024-06-08

## 2024-07-15 RX ORDER — BUTALBITAL, ACETAMINOPHEN AND CAFFEINE 50; 325; 40 MG/1; MG/1; MG/1
1 TABLET ORAL EVERY 4 HOURS PRN
COMMUNITY
Start: 2024-07-14 | End: 2024-07-24

## 2024-07-15 NOTE — PROGRESS NOTES
Subjective:   Patient ID:  Arielle Verde is a 44 y.o. female     Chief complaint:vasodepressor syncope      HPI  S     New patient to me. (02/12/2023 )  Referred by Dr Freeman for evaluation and management of syncope/POTS   --   Background as gleaned from patient's records and today's interview :  42 year old female.  Her current medical conditions include Syncope, orthostatic hypotension, anxiety, chronic Headaches, Depression.   She has lost about 40  Pounds recently.      Had HUT:  >>  Probably abnormal arterial cher-receptor reflex arch function.  Data suggest partial efferrent autonomic dysfunction/ true POTS  Excessive venous pooling.  Excessive chronotropic response consistent with the reflex tachycardia of POTS.  With tilting there was an eventual inability to maintain diastolic hypertension again consistent with partial efferrent autonomic dysfunction.     Blood collections for renin, aldosterone and catecholamines were obtained during this baseline tilt test. These will be reported under separate cover.   >>  Her renin/keri axis is somewhat suppressed. This could be appropriate in view of her salt replete state  (7.5 gm salt a day and 45 mEq KCL excretion) but I suspect that   She would benefit from Florinef - I will send for that and additional KCL.  From tilt data she could also benefit from  BBs - which we have started-  + also Midodrine (but will wait on Midodrine until after BBs/ Florinef Rx for a month     Clinically, patient continues to have the orthostatic dizziness and at times blackening of her vision.  This is persistent, recurrent and occurs daily.  She has had 2 episodes of syncope the 1st on August 9th while in bathroom in the 2nd was on [place].    She did have prior symptoms in her late 20s that lasted till her mid 30s.  She now has recurrent symptoms.     Is staying properly hydrated with 2L of fluid intake every day.   >>  Continues to have significant orthostatic  symptoms.   >>  Take midodrine 5 mg every 4 hours while awake.     Update 07/16/2024 :  She is doing reasonably well but continues to she has not taking midodrine as prescribed.  Nevertheless she seems to deal with her symptoms.  Patient has been doing well from a CV point of view w/o c/o undue dyspnea on exertion, orthopnea, PND, leg edema, abdominal swelling chest pain, palpitations, or full fledged syncope.    Patient's main issue relates to orthostatic dizzy spells.  She is on 5 mg of betaxolol, put 1 minute Florinef.  Her potassium is rather low.  She is on 40 mEq of KCl a day.  New line she generally goes to sleep at 9:32 a.m. till a dilated wakes up at 630 to 70.  I have reviewed the actual image of the ECG tracing obtained today and it shows NSR with normal intervals. HR is 63.      Current Outpatient Medications   Medication Sig    aluminum chloride (DRYSOL) 20 % external solution AAA 2-3 nights and then taper to 1-2 times per week as needed.    atomoxetine (STRATTERA) 80 MG capsule Take 1 capsule by mouth once daily    baclofen (LIORESAL) 10 MG tablet Take 10 mg by mouth.    betaxoloL (KERLONE) 10 mg Tab Take 1 tablet (10 mg total) by mouth once daily. Start taking 2.5 mg daily    buPROPion (WELLBUTRIN XL) 150 MG TB24 tablet Take 3 tablets (450 mg total) by mouth once daily.    busPIRone (BUSPAR) 15 MG tablet Take 2 tablets by mouth twice daily    butalbital-acetaminophen-caffeine -40 mg (FIORICET, ESGIC) -40 mg per tablet Take 1 tablet by mouth every 4 (four) hours as needed.    carboxymethylcellulose-citric (PLENITY, WELCOME KIT,) 0.75 gram Cap Take 3 capsules by mouth 2 (two) times a day.    celecoxib (CELEBREX) 200 MG capsule Take 200 mg by mouth once daily.    cetirizine (ZYRTEC) 10 MG tablet Take 10 mg by mouth daily as needed.     chlorzoxazone (PARAFON FORTE) 500 mg Tab Take 500 mg by mouth 2 (two) times daily as needed.    clonazePAM (KLONOPIN) 0.5 MG tablet Take 1 tablet  daily as needed for anxiety.    EPINEPHrine (EPIPEN 2-DUANE) 0.3 mg/0.3 mL AtIn Inject 0.6 mLs (0.6 mg total) into the muscle once. for 1 dose    fluconazole (DIFLUCAN) 150 MG Tab TAKE 1 TABLET BY MOUTH ONCE TODAY AND REPEAT IN 72 HOURS IF SYMPTOMS STILL PRESENT    fludrocortisone (FLORINEF) 0.1 mg Tab Take 1 tablet (100 mcg total) by mouth once daily.    fluticasone propionate (FLONASE) 50 mcg/actuation nasal spray 1 spray (50 mcg total) by Each Nostril route once daily.    glycopyrronium tosylate (QBREXZA) 2.4 % Towl Use one pad for both underarms daily. Wash hands thoroughly after using.    levothyroxine (SYNTHROID) 25 MCG tablet Take 1 tablet (25 mcg total) by mouth before breakfast. Due for annual with PCP, labs    metFORMIN (GLUCOPHAGE) 500 MG tablet Take 1 tablet (500 mg total) by mouth 2 (two) times daily.    methocarbamoL (ROBAXIN) 750 MG Tab Take 750 mg by mouth 3 (three) times daily as needed.    mometasone (ELOCON) 0.1 % solution AAA of scalp once daily.    montelukast (SINGULAIR) 10 mg tablet Take 1 tablet (10 mg total) by mouth every evening.    ondansetron (ZOFRAN-ODT) 4 MG TbDL Take 4 mg by mouth every 8 (eight) hours as needed.    potassium chloride SA (K-DUR,KLOR-CON) 20 MEQ tablet Take 1 tablet (20 mEq total) by mouth 2 (two) times daily.    rizatriptan (MAXALT) 10 MG tablet Take 10 mg by mouth daily as needed.    traZODone (DESYREL) 100 MG tablet TAKE 1 TO 3 TABLETS BY MOUTH AT BEDTIME AS NEEDED FOR SLEEP    triamcinolone acetonide 0.025% (KENALOG) 0.025 % cream Apply topically 2 (two) times daily as needed (prn rash between breasts). Mild steroid. Use sparingly for 1-2 weeks if needed then stop.    triamcinolone acetonide 0.1% (KENALOG) 0.1 % cream Apply topically 2 (two) times daily.    trifarotene (AKLIEF) 0.005 % Crea Apply 1 application  topically every evening. Decrease frequency if any irritation.    clindamycin (CLEOCIN T) 1 % Swab Apply topically 2 (two) times daily. AAA  qd to bid as tolerated for acne. Decrease frequency if any dryness.    midodrine (PROAMATINE) 5 MG Tab Take 4 tabs a day - at 7 am, 11 am, 3 and 7 pm  - can cut pill in half if BP is too high     Current Facility-Administered Medications   Medication    levonorgestreL 20 mcg/24 hours (5 yrs) 52 mg IUD 1 Intra Uterine Device    onabotulinumtoxina injection 200 Units     Review of Systems     Constitutional: Reviewed  for decreased appetite, weight gain and weight loss.   HENT: Reviewed for nosebleeds.    Eyes:  Reviewed for blurred vision and visual disturbance.   Cardiovascular: Reviewed for chest pain, claudication, cyanosis,dyspnea on exertion, leg swelling, orthopnea,paroxysmal nocturnal dyspnearregular heartbeats, palpitations, near-syncope, and syncope.   Respiratory: Reviewed for cough, shortness of breath, wheezing, sleep disturbances due to breathing and snoring, .    Endocrine: Reviewed for heat intolerance.   Hematologic/Lymphatic: Reviewed for easy bruisability/bleeding.   Skin: Reviewed for rash.   Musculoskeletal: Reviewed for muscle weakness and myalgias.   Gastrointestinal: Reviewed for abdominal pain, anorexia, melena, nausea and vomiting.   Genitourinary: Reviewed for menorrhagia, frequency, nocturia and incontinence.   Neurological: Reviewed for excessive daytime sleepiness, dizziness, vertigo, weakness, headaches, loss of balance and seizures,   Psychiatric/Behavioral:  Reviewed for insomnia, altered mental status, depression, anxiety and nervousness.       All symptoms reviewed above were negative except for some hearing loss in visual loss, fatigue, palpitations, constipation, daytime sleepiness, dizziness, arthritic complaints, depression anxiety.       Social History     Tobacco Use   Smoking Status Former    Types: Cigarettes   Smokeless Tobacco Never   Tobacco Comments    no smoking after m.n prior to sx        Objective:     Physical Exam  Vitals and nursing note reviewed.    Constitutional:       Appearance: She is well-developed.   HENT:      Head: Normocephalic and atraumatic.      Right Ear: External ear normal.      Left Ear: External ear normal.   Neck:      Thyroid: No thyromegaly.   Cardiovascular:      Rate and Rhythm: Normal rate and regular rhythm.      Pulses: Intact distal pulses.           Carotid pulses are 2+ on the right side and 2+ on the left side.       Radial pulses are 2+ on the right side and 2+ on the left side.        Dorsalis pedis pulses are 2+ on the right side and 2+ on the left side.        Posterior tibial pulses are 2+ on the right side and 2+ on the left side.      Heart sounds: Normal heart sounds. No midsystolic click and no opening snap. No murmur heard.     No friction rub. No gallop. No S3 or S4 sounds.      Comments:  Orthostatic BP measurements  Sitting:                            BP  108/78          HR  80  Standing 1 min:                     106/70                 84  Standing 3 min:                     106/68                 82  Standing 5 min:                     100/79                 85  bpm      Pulmonary:      Effort: Pulmonary effort is normal.      Breath sounds: Normal breath sounds.   Abdominal:      General: There is no distension.      Palpations: Abdomen is soft.      Tenderness: There is no abdominal tenderness.   Musculoskeletal:      Cervical back: Normal range of motion and neck supple.      Right lower leg: No swelling.      Left lower leg: No swelling.      Right ankle: No swelling.      Left ankle: No swelling.   Skin:     General: Skin is warm.      Findings: No rash.      Nails: There is no clubbing.   Neurological:      Mental Status: She is alert and oriented to person, place, and time.      Cranial Nerves: No cranial nerve deficit.      Gait: Gait normal.   Psychiatric:         Speech: Speech normal.         Behavior: Behavior normal.         Thought Content: Thought content normal.     /79 Comment: standing 5 c/o  "wobbly feeling  Pulse 85   Ht 5' 11" (1.803 m)   Wt 71 kg (156 lb 8.4 oz)   SpO2 99%   BMI 21.83 kg/m²       Results for orders placed during the hospital encounter of 08/10/22    Echo    Interpretation Summary  · Concentric remodeling and normal systolic function.  · The estimated ejection fraction is 60%.  · Normal left ventricular diastolic function.  · Normal right ventricular size with normal right ventricular systolic function.  · Normal central venous pressure (3 mmHg).  · The estimated PA systolic pressure is 24 mmHg.    WBC   Date Value Ref Range Status   07/06/2023 7.07 3.90 - 12.70 K/uL Final     Hematocrit   Date Value Ref Range Status   07/06/2023 39.6 37.0 - 48.5 % Final     Hemoglobin   Date Value Ref Range Status   07/06/2023 12.0 12.0 - 16.0 g/dL Final     Lab Results   Component Value Date     07/06/2023     Lab Results   Component Value Date    CREATININE 0.7 07/06/2023    EGFRNORACEVR >60.0 07/06/2023    K 3.9 07/06/2023     Lab Results   Component Value Date    BNP 45 04/04/2023            reports current alcohol use.  Past Medical History:   Diagnosis Date    Anxiety     Chronic headaches     SALLIE III (cervical intraepithelial neoplasia grade III) with severe dysplasia 2019    s/p LEEP    Depression     Migraine      Past Surgical History:   Procedure Laterality Date    ADENOIDECTOMY      AUGMENTATION OF BREAST  06/2005    saline    BREAST SURGERY      breast augmentatin    COLD KNIFE CONIZATION OF CERVIX N/A 11/27/2019    Procedure: CONE BIOPSY, CERVIX, USING COLD;  Surgeon: Rachele Willis MD;  Location: [facility] OR;  Service: OB/GYN;  Laterality: N/A;    Hyperhydrosis      LIPOSUCTION OF NECK      OPEN REDUCTION AND INTERNAL FIXATION (ORIF) OF INJURY OF ANKLE Right 08/08/2023    REMOVAL OF INTRAUTERINE DEVICE (IUD)  11/27/2019    Procedure: REMOVAL, INTRAUTERINE DEVICE;  Surgeon: Rachele Willis MD;  Location: [facility] OR;  Service: OB/GYN;;    TILT TABLE TEST N/A " "01/11/2019    Procedure: TILT TABLE TEST;  Surgeon: Justin Freeman MD;  Location: [facility] CATH LAB;  Service: Cardiology;  Laterality: N/A;  Rodriguez pt/pt req 930 start time    TONSILLECTOMY       Family History   Problem Relation Name Age of Onset    Lupus Mother      Ulcerative colitis Mother      Kidney failure Father      Drug abuse Father      Breast cancer Paternal Grandmother      Colon cancer Neg Hx      Ovarian cancer Neg Hx         Assessment:   Doing okay.  Symptoms can be improved.  She is not instituted the an exercise program.  1. Heart palpitations    2. Orthostasis    3. Postural hypotension    4. Migraine without aura and without status migrainosus, not intractable    5. Vasodepressor syncope        Plan:   Take midodrine 2.5 to 5 mg per the as she sees fit  all the following schedule: 70 a.m., 11:00 a.m., 3:00 p.m. in 7:00 p.m..    Increase KCl to 60 mEq a day.    I had a long talk with patient and discussed the various treatment options available. Effective symptom relief and long term management rely heavily on instituting life style changes and "holistic" measures.   Pharmalogical treatment is used as a supplemental therapy rather than a primary approach.    As to practical measures to avoid initiation of syncopal/near syncopal events:        Avoid sudden standing from a supine or sitting position especially in the morning after awakening.  You might want to move and tense the muscles of your shoulders, neck and lower extremities prior to standing.  This has to be done for a full minute (which seems like a very long time when one is doing boring activities). Sometimes, a short 15 second period of exercising the legs could back fire and enhance the likelihood of initiating an event.              Sleep on a bed that is tilted up (place 6 inch blocks to raise the head of the bed) helps decrease the dehydration that results from increased renal (kidney) filtration associated with the " supine position.            Avoid prolonged still standing (keep tensing and  moving the legs while in a line etc.), prolonged car rides without hydration nor lower body activity etc..            Avoid excessive heat, excessively hot showers, alcohol intake etc.        Although they are often impractical and uncomfortable, properly measured lower body garments with sequential counter pressure can be quite helpful.  In general, we recommend starting with knee-high Jobst stockings that provide 35 mm of counter pressure.  >>  Specifically, wear support stockings as follows:       Start with knee highs, toe +/- heel cut outs +/- side zippers.   For best performance,apply in am, after showering, while lying in bed: Massage your legs towards your abdomen then roll the stockings up towards the knees, making sure that the pressure is higher at the ankles than at the shins and knees.  Buy at least 4 pairs and rotate them each day, hand wash, cold water, gentle detergent, drip dry.    In the [facility] area, fitted graduated counter-pressure lower body garments (knee, thigh or waist high stockings, abdominal binders etc) can be obtained at a reduced, affordable price from:                    # [facility], [address] [place] [id number]                                                           Tel:    [phone]       Fax: [phone]                    # [facility]                       #  clinic                                              # [facility]                                                # [facility]                                # [place]                                     In the [place] area:                   # [employer] [facility]·  [address]. Tel  [phone]                   # [facility]: [address] · Tel [phone]                    # [facility]: [id number] [facility] · [phone]    Or on the internet:                     [employer]                    [email or web address]                    [email or web address] (their own brand: [employer])    As to practical measures to avoid worsened unpleasant symptoms around acute events:      As soon as you recognize symptoms that could lead to a significant dizziness/near syncopal/syncopal event, you should immediately lie down wherever you are supporting herself with the closest wall, sturdy furniture etc. it is not advisable to try and get to the soft couch or bed. The symptoms that you feel in the aftermath will be greatly reduced in duration if you act immediately.  You should thrive to overcome your embarrassment when these events happen in public and react your symptoms rather than to your social environment.     After you get on to a secure spot, stay down and if possible, raise your legs above the level of your head/shoulders and stayed there for 15 to 20 minutes.  Do not allow good Samaritans to try and prop you up.  This will only restart the syncopal reaction.     In most cases, medical help and or visits to the emergency room are unnecessary.  Once you recover, simply increase your fluid intake by trying to ingest perhaps a L of water and or your favorite electrolytic drink within the hour after your symptoms.  Be wary the rest of the day and certainly avoid driving because symptoms tend to recur more frequently within the 24 to 48 hours following any syncopal event.    As to the holistic measures for long-term management:     1.Sleep:  Establish an appropriate sleep schedule.  No jet lag sleep.  My estimate is that patient needs at least 9 hours of restful sleep a night/day. This should be allocated fully and patient should aim to go to bed and wake up at the same time each day using an alarm clock to awaken them.  After a month of regular 8  hour nights, if  "they still awaken only after the alarm rings (and snoozes), they to increase their time allotment by at least 30 minutes and repeat the same process for a month. At the end of each month, the allotment should be similarly increased by 30 more minutes as needed each month until patient starts waking up reliably on their own, beating the alarm clock   Again, the schedule should be the same on both week days and weekends.    As to the turning in routine, patient should avoid using any blue light devices (cell phones, computers, TVs etc) while in bed.    2. Diet:  Diet should favor a high protein caloric intake (meats of course but grains -especially chickpeas, cheeses, greek yogurt are healthy options that are "power packed" with proteins).     Of course, a high salt high fluid intake is recommended (considers supplements such as Pedialyte adult etc.).    3. Exercise:     .After many years of working with patients with orthostatic symptoms, a retiring physical therapist we worked with put together a booklet of exercises recommended for improving lower body venous/vascular health/tone. We will supply a copy. The recommended time commitment for these exercises is 20 min a day, 5 times a week.       Other excellent forms of exercise that will be helpful include:          Swimming (mary, competitive style swimming preferably under guidance of a "gentle"  - indoor pool at a [facility] or a [facility] for instance).          Recumbent biking           Resistance weight training (NOT weightlifting -ie no clean and jerk, clean and press or snatch moves) and low weight dumbbells.     4. Mental health measures:       Orthostatic hypotension often causes significant anxiety and the PTSD like syndrome.  In that case, patients are encouraged to seek professional psychiatric help as needed.        In general however, simple measures of daily relaxation are very helpful in helping reducing symptomatology.  We recommend a daily 30 minutes " "break of outdoors vegging" soaking up fresh air and sunlight while sipping on a refreshing non alcoholic drink..    Non holistic treatment measures:  Of course, in addition to holistic measures, we can offer pharmacological treatment as needed.  In general, this will be dispensed only in conjunction with holistic measures and generally after after a  workup that includes a 48 hour Holter monitor, a 24 hour collection for sodium, potassium and perhaps tilt studies when indicated.   At this time we are using betaxolol 5 mg a day, florinef 0.1 a day and midodrine every 4 hrs while awake (2.5 to 5 mg a dose as adjusted by patient)                    No orders of the defined types were placed in this encounter.      Follow up in about 6 months (around 1/15/2025).    Medications Discontinued During This Encounter   Medication Reason    midodrine (PROAMATINE) 5 MG Tab        Medications Ordered This Encounter   Medications    midodrine (PROAMATINE) 5 MG Tab     Sig: Take 4 tabs a day - at 7 am, 11 am, 3 and 7 pm  - can cut pill in half if BP is too high     Dispense:  360 tablet     Refill:  3       Medication List with Changes/Refills   Current Medications    ALUMINUM CHLORIDE (DRYSOL) 20 % EXTERNAL SOLUTION    AAA 2-3 nights and then taper to 1-2 times per week as needed.    ATOMOXETINE (STRATTERA) 80 MG CAPSULE    Take 1 capsule by mouth once daily    BACLOFEN (LIORESAL) 10 MG TABLET    Take 10 mg by mouth.    BETAXOLOL (KERLONE) 10 MG TAB    Take 1 tablet (10 mg total) by mouth once daily. Start taking 2.5 mg daily    BUPROPION (WELLBUTRIN XL) 150 MG TB24 TABLET    Take 3 tablets (450 mg total) by mouth once daily.    BUSPIRONE (BUSPAR) 15 MG TABLET    Take 2 tablets by mouth twice daily    BUTALBITAL-ACETAMINOPHEN-CAFFEINE -40 MG (FIORICET, ESGIC) -40 MG PER TABLET    Take 1 tablet by mouth every 4 (four) hours as needed.    CARBOXYMETHYLCELLULOSE-CITRIC (PLENITY, WELCOME KIT,) 0.75 GRAM CAP    Take 3 " capsules by mouth 2 (two) times a day.    CELECOXIB (CELEBREX) 200 MG CAPSULE    Take 200 mg by mouth once daily.    CETIRIZINE (ZYRTEC) 10 MG TABLET    Take 10 mg by mouth daily as needed.     CHLORZOXAZONE (PARAFON FORTE) 500 MG TAB    Take 500 mg by mouth 2 (two) times daily as needed.    CLINDAMYCIN (CLEOCIN T) 1 % SWAB    Apply topically 2 (two) times daily. AAA qd to bid as tolerated for acne. Decrease frequency if any dryness.    CLONAZEPAM (KLONOPIN) 0.5 MG TABLET    Take 1 tablet daily as needed for anxiety.    EPINEPHRINE (EPIPEN 2-DUANE) 0.3 MG/0.3 ML ATIN    Inject 0.6 mLs (0.6 mg total) into the muscle once. for 1 dose    FLUCONAZOLE (DIFLUCAN) 150 MG TAB    TAKE 1 TABLET BY MOUTH ONCE TODAY AND REPEAT IN 72 HOURS IF SYMPTOMS STILL PRESENT    FLUDROCORTISONE (FLORINEF) 0.1 MG TAB    Take 1 tablet (100 mcg total) by mouth once daily.    FLUTICASONE PROPIONATE (FLONASE) 50 MCG/ACTUATION NASAL SPRAY    1 spray (50 mcg total) by Each Nostril route once daily.    GLYCOPYRRONIUM TOSYLATE (QBREXZA) 2.4 % TOWL    Use one pad for both underarms daily. Wash hands thoroughly after using.    LEVOTHYROXINE (SYNTHROID) 25 MCG TABLET    Take 1 tablet (25 mcg total) by mouth before breakfast. Due for annual with PCP, labs    METFORMIN (GLUCOPHAGE) 500 MG TABLET    Take 1 tablet (500 mg total) by mouth 2 (two) times daily.    METHOCARBAMOL (ROBAXIN) 750 MG TAB    Take 750 mg by mouth 3 (three) times daily as needed.    MOMETASONE (ELOCON) 0.1 % SOLUTION    AAA of scalp once daily.    MONTELUKAST (SINGULAIR) 10 MG TABLET    Take 1 tablet (10 mg total) by mouth every evening.    ONDANSETRON (ZOFRAN-ODT) 4 MG TBDL    Take 4 mg by mouth every 8 (eight) hours as needed.    POTASSIUM CHLORIDE SA (K-DUR,KLOR-CON) 20 MEQ TABLET    Take 1 tablet (20 mEq total) by mouth 2 (two) times daily.    RIZATRIPTAN (MAXALT) 10 MG TABLET    Take 10 mg by mouth daily as needed.    TRAZODONE (DESYREL) 100 MG TABLET    TAKE 1 TO 3 TABLETS BY  MOUTH AT BEDTIME AS NEEDED FOR SLEEP    TRIAMCINOLONE ACETONIDE 0.025% (KENALOG) 0.025 % CREAM    Apply topically 2 (two) times daily as needed (prn rash between breasts). Mild steroid. Use sparingly for 1-2 weeks if needed then stop.    TRIAMCINOLONE ACETONIDE 0.1% (KENALOG) 0.1 % CREAM    Apply topically 2 (two) times daily.    TRIFAROTENE (AKLIEF) 0.005 % CREA    Apply 1 application  topically every evening. Decrease frequency if any irritation.   Changed and/or Refilled Medications    Modified Medication Previous Medication    MIDODRINE (PROAMATINE) 5 MG TAB midodrine (PROAMATINE) 5 MG Tab       Take 4 tabs a day - at 7 am, 11 am, 3 and 7 pm  - can cut pill in half if BP is too high    Take 1 tablet (5 mg total) by mouth 3 (three) times daily with meals.        This note is at least partially dictated using the M*Modal Fluency Direct word recognition program. There are word recognition mistakes that are occasionally missed on review.     Pre-visit chart review: 10 min    Face to face time: 30 min, > 50% of which spent in education    I have reviewed the actual images of all relevant ECG, rhythm, holter and long term monitoring tracings available in EPIC, MUSE  and in legacy as well as outside records.   I have reviewed the actual images of all relevant CXRays.       Post visit chart documentation and care coordination: 10 min

## 2024-07-18 ENCOUNTER — HOSPITAL ENCOUNTER (OUTPATIENT)
Dept: RADIOLOGY | Facility: HOSPITAL | Age: 44
Discharge: HOME | End: 2024-07-18
Attending: OBSTETRICS & GYNECOLOGY
Payer: COMMERCIAL

## 2024-07-18 ENCOUNTER — TELEPHONE (OUTPATIENT)
Dept: PSYCHIATRY | Facility: CLINIC | Age: 44
End: 2024-07-18
Payer: COMMERCIAL

## 2024-07-18 DIAGNOSIS — Z12.31 SCREENING MAMMOGRAM, ENCOUNTER FOR: ICD-10-CM

## 2024-07-18 PROCEDURE — 77063 BREAST TOMOSYNTHESIS BI: CPT | Mod: TC,PN

## 2024-07-18 PROCEDURE — 77063 BREAST TOMOSYNTHESIS BI: CPT | Mod: 26,,, | Performed by: RADIOLOGY

## 2024-07-18 PROCEDURE — 77067 SCR MAMMO BI INCL CAD: CPT | Mod: 26,,, | Performed by: RADIOLOGY

## 2024-07-22 ENCOUNTER — LAB VISIT (OUTPATIENT)
Dept: LAB | Facility: HOSPITAL | Age: 44
End: 2024-07-22
Attending: OBSTETRICS & GYNECOLOGY
Payer: COMMERCIAL

## 2024-07-22 ENCOUNTER — OFFICE VISIT (OUTPATIENT)
Dept: PSYCHIATRY | Facility: CLINIC | Age: 44
End: 2024-07-22
Payer: COMMERCIAL

## 2024-07-22 ENCOUNTER — OFFICE VISIT (OUTPATIENT)
Dept: OBSTETRICS AND GYNECOLOGY | Facility: CLINIC | Age: 44
End: 2024-07-22
Payer: COMMERCIAL

## 2024-07-22 VITALS
WEIGHT: 152.13 LBS | HEIGHT: 71 IN | DIASTOLIC BLOOD PRESSURE: 74 MMHG | SYSTOLIC BLOOD PRESSURE: 114 MMHG | BODY MASS INDEX: 21.3 KG/M2

## 2024-07-22 VITALS
BODY MASS INDEX: 21.19 KG/M2 | DIASTOLIC BLOOD PRESSURE: 72 MMHG | WEIGHT: 151.88 LBS | SYSTOLIC BLOOD PRESSURE: 106 MMHG | HEART RATE: 98 BPM

## 2024-07-22 DIAGNOSIS — R53.83 FATIGUE, UNSPECIFIED TYPE: ICD-10-CM

## 2024-07-22 DIAGNOSIS — G47.00 INSOMNIA, UNSPECIFIED TYPE: ICD-10-CM

## 2024-07-22 DIAGNOSIS — F33.41 MDD (MAJOR DEPRESSIVE DISORDER), RECURRENT, IN PARTIAL REMISSION: Primary | ICD-10-CM

## 2024-07-22 DIAGNOSIS — R41.840 DISTURBED CONCENTRATION: ICD-10-CM

## 2024-07-22 DIAGNOSIS — D06.9 CIN III (CERVICAL INTRAEPITHELIAL NEOPLASIA GRADE III) WITH SEVERE DYSPLASIA: Primary | ICD-10-CM

## 2024-07-22 DIAGNOSIS — F41.9 ANXIETY: ICD-10-CM

## 2024-07-22 PROBLEM — Z01.810 PREOP CARDIOVASCULAR EXAM: Status: RESOLVED | Noted: 2023-07-06 | Resolved: 2024-07-22

## 2024-07-22 LAB
FSH SERPL-ACNC: 6.97 MIU/ML
TSH SERPL DL<=0.005 MIU/L-ACNC: 1.47 UIU/ML (ref 0.4–4)

## 2024-07-22 PROCEDURE — 1159F MED LIST DOCD IN RCRD: CPT | Mod: CPTII,S$GLB,, | Performed by: OBSTETRICS & GYNECOLOGY

## 2024-07-22 PROCEDURE — 88175 CYTOPATH C/V AUTO FLUID REDO: CPT | Performed by: OBSTETRICS & GYNECOLOGY

## 2024-07-22 PROCEDURE — 87624 HPV HI-RISK TYP POOLED RSLT: CPT | Performed by: OBSTETRICS & GYNECOLOGY

## 2024-07-22 PROCEDURE — 3008F BODY MASS INDEX DOCD: CPT | Mod: CPTII,S$GLB,, | Performed by: OBSTETRICS & GYNECOLOGY

## 2024-07-22 PROCEDURE — 84443 ASSAY THYROID STIM HORMONE: CPT | Performed by: OBSTETRICS & GYNECOLOGY

## 2024-07-22 PROCEDURE — 83001 ASSAY OF GONADOTROPIN (FSH): CPT | Performed by: OBSTETRICS & GYNECOLOGY

## 2024-07-22 PROCEDURE — 99999 PR PBB SHADOW E&M-EST. PATIENT-LVL IV: CPT | Mod: PBBFAC,,, | Performed by: OBSTETRICS & GYNECOLOGY

## 2024-07-22 PROCEDURE — 99999 PR PBB SHADOW E&M-EST. PATIENT-LVL II: CPT | Mod: PBBFAC,,, | Performed by: PSYCHIATRY & NEUROLOGY

## 2024-07-22 PROCEDURE — 3008F BODY MASS INDEX DOCD: CPT | Mod: CPTII,S$GLB,, | Performed by: PSYCHIATRY & NEUROLOGY

## 2024-07-22 PROCEDURE — 36415 COLL VENOUS BLD VENIPUNCTURE: CPT | Performed by: OBSTETRICS & GYNECOLOGY

## 2024-07-22 PROCEDURE — 3074F SYST BP LT 130 MM HG: CPT | Mod: CPTII,S$GLB,, | Performed by: PSYCHIATRY & NEUROLOGY

## 2024-07-22 PROCEDURE — 99214 OFFICE O/P EST MOD 30 MIN: CPT | Mod: S$GLB,,, | Performed by: PSYCHIATRY & NEUROLOGY

## 2024-07-22 PROCEDURE — 3074F SYST BP LT 130 MM HG: CPT | Mod: CPTII,S$GLB,, | Performed by: OBSTETRICS & GYNECOLOGY

## 2024-07-22 PROCEDURE — 3078F DIAST BP <80 MM HG: CPT | Mod: CPTII,S$GLB,, | Performed by: OBSTETRICS & GYNECOLOGY

## 2024-07-22 PROCEDURE — 99396 PREV VISIT EST AGE 40-64: CPT | Mod: S$GLB,,, | Performed by: OBSTETRICS & GYNECOLOGY

## 2024-07-22 PROCEDURE — 3078F DIAST BP <80 MM HG: CPT | Mod: CPTII,S$GLB,, | Performed by: PSYCHIATRY & NEUROLOGY

## 2024-07-22 RX ORDER — BUPROPION HYDROCHLORIDE 150 MG/1
450 TABLET ORAL DAILY
Qty: 90 TABLET | Refills: 1 | Status: SHIPPED | OUTPATIENT
Start: 2024-07-22

## 2024-07-22 RX ORDER — ATOMOXETINE 80 MG/1
80 CAPSULE ORAL DAILY
Qty: 90 CAPSULE | Refills: 1 | Status: SHIPPED | OUTPATIENT
Start: 2024-07-22

## 2024-07-22 RX ORDER — BUSPIRONE HYDROCHLORIDE 15 MG/1
30 TABLET ORAL 2 TIMES DAILY
Qty: 360 TABLET | Refills: 1 | Status: SHIPPED | OUTPATIENT
Start: 2024-07-22

## 2024-07-22 RX ORDER — TRAZODONE HYDROCHLORIDE 100 MG/1
TABLET ORAL
Qty: 270 TABLET | Refills: 1 | Status: SHIPPED | OUTPATIENT
Start: 2024-07-22

## 2024-07-22 RX ORDER — CLONAZEPAM 0.5 MG/1
TABLET ORAL
Qty: 30 TABLET | Refills: 0 | Status: SHIPPED | OUTPATIENT
Start: 2024-07-22
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
## 2024-07-22 NOTE — PROGRESS NOTES
"  Subjective:       Patient ID: Arielle Verde is a 44 y.o. female.    Chief Complaint:  Well Woman      History of Present Illness  HPI  The patient presents for exam with no complaints, Mirena with no menses, no gyn issues  Contraceptive measures are addressed. Mirena will need replacement in2025   Preventive testing reviewed and discussed.  Annual pap for nicole 3 history     Health Maintenance   Topic Date Due    Mammogram  2025    TETANUS VACCINE  2026    Hepatitis C Screening  Completed    Lipid Panel  Completed     GYN & OB History  No LMP recorded. Patient has had an implant.   Date of Last Pap: 2024    OB History    Para Term  AB Living   3 1 1   2 1   SAB IAB Ectopic Multiple Live Births   2              # Outcome Date GA Lbr [place]/2nd Weight Sex Type Anes PTL Lv   3 Term            2 SAB            1 SAB                Review of Systems  Review of Systems        Objective:   /74   Ht 5' 11" (1.803 m)   Wt 69 kg (152 lb 1.9 oz)   BMI 21.22 kg/m²    Physical Exam:   Constitutional: She appears well-developed and well-nourished. No distress.      Neck: No JVD present. No thyroid mass and no thyromegaly present.    Cardiovascular:  Normal rate and regular rhythm.                  Abdominal: Soft. Bowel sounds are normal. No hernia. Hernia confirmed negative in the ventral area, confirmed negative in the right inguinal area and confirmed negative in the left inguinal area.     Genitourinary:    Vagina, uterus and rectum normal.     Labial bartholins normal.There is no rash, tenderness, lesion or injury on the right labia. There is no rash, tenderness, lesion or injury on the left labia. Cervix is normal. Right adnexum displays no mass, no tenderness and no fullness. Left adnexum displays no mass, no tenderness and no fullness. No erythema, vaginal discharge, tenderness or bleeding in the vagina.    No foreign body in the vagina.   Cervix exhibits no motion " tenderness, no discharge and no friability.    pap smear completedUterus is not deviated, not enlarged, not fixed and not tender.                      Assessment:        1. SALLIE III (cervical intraepithelial neoplasia grade III) with severe dysplasia    2. Fatigue, unspecified type                Plan:            Arielle was seen today for well woman.    Diagnoses and all orders for this visit:    SALLIE III (cervical intraepithelial neoplasia grade III) with severe dysplasia  -     Liquid-Based Pap Smear, Screening  -     HPV High Risk Genotypes, PCR    Fatigue, unspecified type  -     Follicle Stimulating Hormone; Future  -     TSH; Future

## 2024-07-23 LAB
CLINICAL INFO: NORMAL
DATE OF PREVIOUS PAP: NORMAL
DATE PREVIOUS BX: YES
LMP START DATE: NORMAL
SPECIMEN SOURCE CVX/VAG CYTO: NORMAL

## 2024-07-30 ENCOUNTER — PATIENT MESSAGE (OUTPATIENT)
Dept: NEUROLOGY | Facility: CLINIC | Age: 44
End: 2024-07-30

## 2024-07-30 ENCOUNTER — PROCEDURE VISIT (OUTPATIENT)
Dept: NEUROLOGY | Facility: CLINIC | Age: 44
End: 2024-07-30
Payer: COMMERCIAL

## 2024-07-30 VITALS
BODY MASS INDEX: 21.14 KG/M2 | WEIGHT: 151 LBS | DIASTOLIC BLOOD PRESSURE: 62 MMHG | HEIGHT: 71 IN | RESPIRATION RATE: 16 BRPM | SYSTOLIC BLOOD PRESSURE: 98 MMHG | HEART RATE: 98 BPM | OXYGEN SATURATION: 99 %

## 2024-07-30 DIAGNOSIS — G43.019 COMMON MIGRAINE WITH INTRACTABLE MIGRAINE: Primary | ICD-10-CM

## 2024-07-30 DIAGNOSIS — R51.9 WORST HEADACHE OF LIFE: ICD-10-CM

## 2024-07-30 DIAGNOSIS — R29.818 OTHER SYMPTOMS AND SIGNS INVOLVING THE NERVOUS SYSTEM: ICD-10-CM

## 2024-07-30 PROCEDURE — 64615 CHEMODENERV MUSC MIGRAINE: CPT | Mod: S$GLB,,, | Performed by: PSYCHIATRY & NEUROLOGY

## 2024-07-30 RX ORDER — RIMEGEPANT SULFATE 75 MG/75MG
75 TABLET, ORALLY DISINTEGRATING ORAL ONCE AS NEEDED
Qty: 8 TABLET | Refills: 3 | Status: SHIPPED | OUTPATIENT
Start: 2024-07-30 | End: 2024-07-30

## 2024-07-30 RX ADMIN — ONABOTULINUMTOXINA 200 UNITS: 100 INJECTION, POWDER, LYOPHILIZED, FOR SOLUTION INTRADERMAL; INTRAMUSCULAR at 01:07

## 2024-07-31 ENCOUNTER — HOSPITAL ENCOUNTER (OUTPATIENT)
Dept: RADIOLOGY | Facility: HOSPITAL | Age: 44
Discharge: HOME | End: 2024-07-31
Attending: PSYCHIATRY & NEUROLOGY
Payer: COMMERCIAL

## 2024-07-31 ENCOUNTER — PATIENT MESSAGE (OUTPATIENT)
Dept: OBSTETRICS AND GYNECOLOGY | Facility: CLINIC | Age: 44
End: 2024-07-31
Payer: COMMERCIAL

## 2024-07-31 DIAGNOSIS — R29.818 OTHER SYMPTOMS AND SIGNS INVOLVING THE NERVOUS SYSTEM: ICD-10-CM

## 2024-07-31 DIAGNOSIS — R51.9 WORST HEADACHE OF LIFE: ICD-10-CM

## 2024-07-31 PROCEDURE — 70496 CT ANGIOGRAPHY HEAD: CPT | Mod: TC,PN

## 2024-07-31 PROCEDURE — 25500020 PHARM REV CODE 255: Mod: PN | Performed by: PSYCHIATRY & NEUROLOGY

## 2024-07-31 PROCEDURE — 70496 CT ANGIOGRAPHY HEAD: CPT | Mod: 26,,, | Performed by: RADIOLOGY

## 2024-07-31 PROCEDURE — 70498 CT ANGIOGRAPHY NECK: CPT | Mod: 26,,, | Performed by: RADIOLOGY

## 2024-07-31 RX ADMIN — IOHEXOL 100 ML: 350 INJECTION, SOLUTION INTRAVENOUS at 09:07

## 2024-08-17 ENCOUNTER — PATIENT MESSAGE (OUTPATIENT)
Dept: INTERNAL MEDICINE | Facility: CLINIC | Age: 44
End: 2024-08-17
Payer: COMMERCIAL

## 2024-09-03 ENCOUNTER — OFFICE VISIT (OUTPATIENT)
Dept: INTERNAL MEDICINE | Facility: CLINIC | Age: 44
End: 2024-09-03
Payer: COMMERCIAL

## 2024-09-03 VITALS
OXYGEN SATURATION: 96 % | WEIGHT: 157.19 LBS | TEMPERATURE: 98 F | HEART RATE: 110 BPM | DIASTOLIC BLOOD PRESSURE: 78 MMHG | SYSTOLIC BLOOD PRESSURE: 102 MMHG | BODY MASS INDEX: 21.92 KG/M2

## 2024-09-03 DIAGNOSIS — Z29.9 PREVENTIVE MEASURE: ICD-10-CM

## 2024-09-03 DIAGNOSIS — R52 BODY ACHES: Primary | ICD-10-CM

## 2024-09-03 DIAGNOSIS — E03.9 HYPOTHYROIDISM, UNSPECIFIED TYPE: ICD-10-CM

## 2024-09-03 DIAGNOSIS — J30.9 ALLERGIC RHINITIS, UNSPECIFIED SEASONALITY, UNSPECIFIED TRIGGER: ICD-10-CM

## 2024-09-03 LAB
CTP QC/QA: YES
CTP QC/QA: YES
POC MOLECULAR INFLUENZA A AGN: NEGATIVE
POC MOLECULAR INFLUENZA B AGN: NEGATIVE
SARS-COV-2 RDRP RESP QL NAA+PROBE: NEGATIVE

## 2024-09-03 PROCEDURE — 1160F RVW MEDS BY RX/DR IN RCRD: CPT | Mod: CPTII,S$GLB,, | Performed by: NURSE PRACTITIONER

## 2024-09-03 PROCEDURE — 87502 INFLUENZA DNA AMP PROBE: CPT | Mod: QW,S$GLB,, | Performed by: NURSE PRACTITIONER

## 2024-09-03 PROCEDURE — 99214 OFFICE O/P EST MOD 30 MIN: CPT | Mod: S$GLB,,, | Performed by: NURSE PRACTITIONER

## 2024-09-03 PROCEDURE — 3074F SYST BP LT 130 MM HG: CPT | Mod: CPTII,S$GLB,, | Performed by: NURSE PRACTITIONER

## 2024-09-03 PROCEDURE — 3078F DIAST BP <80 MM HG: CPT | Mod: CPTII,S$GLB,, | Performed by: NURSE PRACTITIONER

## 2024-09-03 PROCEDURE — 87635 SARS-COV-2 COVID-19 AMP PRB: CPT | Mod: QW,S$GLB,, | Performed by: NURSE PRACTITIONER

## 2024-09-03 PROCEDURE — 3008F BODY MASS INDEX DOCD: CPT | Mod: CPTII,S$GLB,, | Performed by: NURSE PRACTITIONER

## 2024-09-03 PROCEDURE — 1159F MED LIST DOCD IN RCRD: CPT | Mod: CPTII,S$GLB,, | Performed by: NURSE PRACTITIONER

## 2024-09-03 PROCEDURE — 99999 PR PBB SHADOW E&M-EST. PATIENT-LVL V: CPT | Mod: PBBFAC,,, | Performed by: NURSE PRACTITIONER

## 2024-09-03 RX ORDER — LEVOTHYROXINE SODIUM 25 UG/1
25 TABLET ORAL
Qty: 90 TABLET | Refills: 0 | Status: SHIPPED | OUTPATIENT
Start: 2024-09-03

## 2024-09-03 RX ORDER — RIMEGEPANT SULFATE 75 MG/75MG
TABLET, ORALLY DISINTEGRATING ORAL
COMMUNITY
Start: 2024-07-30

## 2024-09-03 RX ORDER — MONTELUKAST SODIUM 10 MG/1
10 TABLET ORAL NIGHTLY
Qty: 90 TABLET | Refills: 0 | Status: SHIPPED | OUTPATIENT
Start: 2024-09-03

## 2024-09-03 NOTE — PROGRESS NOTES
Subjective:       Patient ID: Arielle Verde is a 44 y.o. female.    Chief Complaint: Generalized Body Aches (Headaches started Sunday )    Patient here for body aches  Started sun with feeling run down  Yesterday felt worse, slept all day yesterday  Feels fatigued, achy, headache  No cough, no fever, no congestion   Daughter had covid 2 weeks ago  Got nauseated yesterday after she ate  Taking ibuprofen for pain which helps  Just sold old house; just moved in new house; moved everything this past sat    Just moved to this area  Wanting to est care with [facility]  Needs refill of levothyroxine and Singulair         /78   Pulse 110   Temp 98.2 °F (36.8 °C) (Tympanic)   Wt 71.3 kg (157 lb 3 oz)   SpO2 96%   BMI 21.92 kg/m²     Review of Systems   Constitutional:  Positive for activity change. Negative for appetite change, chills, diaphoresis, fatigue, fever and unexpected weight change.   HENT: Negative.     Respiratory:  Negative for cough and shortness of breath.    Cardiovascular:  Negative for chest pain, palpitations and leg swelling.   Gastrointestinal: Negative.    Genitourinary: Negative.    Musculoskeletal:  Positive for myalgias.   Skin:  Negative for color change, pallor, rash and wound.   Allergic/Immunologic: Negative for immunocompromised state.   Neurological: Negative.  Negative for dizziness and facial asymmetry.   Hematological:  Negative for adenopathy. Does not bruise/bleed easily.   Psychiatric/Behavioral:  Negative for agitation, behavioral problems and confusion.        Objective:      Physical Exam  Constitutional:       General: She is not in acute distress.     Appearance: Normal appearance. She is well-developed. She is not diaphoretic.   HENT:      Head: Normocephalic and atraumatic.      Right Ear: Tympanic membrane, ear canal and external ear normal.      Left Ear: Tympanic membrane, ear canal and external ear normal.      Nose: Nose normal.       Mouth/Throat:      Mouth: Mucous membranes are moist.      Pharynx: Oropharynx is clear. No oropharyngeal exudate or posterior oropharyngeal erythema.   Eyes:      General: No scleral icterus.        Right eye: No discharge.         Left eye: No discharge.      Conjunctiva/sclera: Conjunctivae normal.   Pulmonary:      Effort: Pulmonary effort is normal. No tachypnea, accessory muscle usage or respiratory distress.      Breath sounds: No stridor.   Musculoskeletal:      Cervical back: Normal range of motion and neck supple.   Skin:     Findings: No rash.   Neurological:      Mental Status: She is alert. She is not disoriented.   Psychiatric:         Attention and Perception: She is attentive.         Mood and Affect: Mood normal. Mood is not anxious or depressed. Affect is not labile, blunt, angry or inappropriate.         Speech: Speech normal.         Behavior: Behavior normal.         Thought Content: Thought content normal.         Judgment: Judgment normal.         Assessment:       1. Body aches    2. Hypothyroidism, unspecified type    3. Allergic rhinitis, unspecified seasonality, unspecified trigger    4. Preventive measure        Plan:       Arielle was seen today for generalized body aches.    Diagnoses and all orders for this visit:    Body aches  -     POCT Influenza A/B Molecular  -     POCT COVID-19 Rapid Screening  Flu and covid Negative  Rest  Supportive care discussed  Monitor symptoms  Follow up if not gradually improving or worsening over the next few days    Hypothyroidism, unspecified type  -     levothyroxine (SYNTHROID) 25 MCG tablet; Take 1 tablet (25 mcg total) by mouth before breakfast. Due for annual with PCP, labs  Stable  On levothyroxine, refill sent  Lab Results   Component Value Date    TSH 1.468 07/22/2024       Allergic rhinitis, unspecified seasonality, unspecified trigger  -     montelukast (SINGULAIR) 10 mg tablet; Take 1 tablet (10 mg total) by mouth every evening.  Stable on  singulair; refill sent    Preventive measure  -     Lipid Panel; Future  -     Hemoglobin A1C; Future    Follow up with me in 2 weeks for lab review/annual

## 2024-09-08 NOTE — TELEPHONE ENCOUNTER
IMM presented and explained at bedside. No intent to appeal discharge at this time. IMM signed by patient at 1118. IMM faxed to [facility] for media upload.   Submitted prior authorization request for Em[employer] to insurance on 12/17 12:42pm. ARR

## 2024-09-24 RX ORDER — BUPROPION HYDROCHLORIDE 150 MG/1
TABLET ORAL
Qty: 90 TABLET | Refills: 1 | Status: SHIPPED | OUTPATIENT
Start: 2024-09-24

## 2024-10-24 RX ORDER — BUPROPION HYDROCHLORIDE 150 MG/1
TABLET ORAL
Qty: 90 TABLET | Refills: 2 | Status: SHIPPED | OUTPATIENT
Start: 2024-10-24

## 2024-10-31 ENCOUNTER — PROCEDURE VISIT (OUTPATIENT)
Dept: NEUROLOGY | Facility: CLINIC | Age: 44
End: 2024-10-31
Payer: COMMERCIAL

## 2024-10-31 VITALS
HEART RATE: 90 BPM | BODY MASS INDEX: 21.48 KG/M2 | DIASTOLIC BLOOD PRESSURE: 63 MMHG | SYSTOLIC BLOOD PRESSURE: 95 MMHG | WEIGHT: 154 LBS

## 2024-10-31 DIAGNOSIS — G43.019 INTRACTABLE MIGRAINE WITHOUT AURA AND WITHOUT STATUS MIGRAINOSUS: Primary | ICD-10-CM

## 2024-10-31 RX ORDER — RIMEGEPANT SULFATE 75 MG/75MG
75 TABLET, ORALLY DISINTEGRATING ORAL ONCE AS NEEDED
Qty: 8 TABLET | Refills: 3 | Status: SHIPPED | OUTPATIENT
Start: 2024-10-31 | End: 2024-10-31

## 2024-10-31 RX ADMIN — ONABOTULINUMTOXINA 200 UNITS: 100 INJECTION, POWDER, LYOPHILIZED, FOR SOLUTION INTRADERMAL; INTRAMUSCULAR at 02:10

## 2024-12-01 DIAGNOSIS — J30.9 ALLERGIC RHINITIS, UNSPECIFIED SEASONALITY, UNSPECIFIED TRIGGER: ICD-10-CM

## 2024-12-02 RX ORDER — MONTELUKAST SODIUM 10 MG/1
10 TABLET ORAL NIGHTLY
Qty: 90 TABLET | Refills: 0 | OUTPATIENT
Start: 2024-12-02

## 2024-12-03 ENCOUNTER — LAB VISIT (OUTPATIENT)
Dept: LAB | Facility: HOSPITAL | Age: 44
End: 2024-12-03
Payer: COMMERCIAL

## 2024-12-03 ENCOUNTER — OFFICE VISIT (OUTPATIENT)
Dept: INTERNAL MEDICINE | Facility: CLINIC | Age: 44
End: 2024-12-03
Payer: COMMERCIAL

## 2024-12-03 VITALS
SYSTOLIC BLOOD PRESSURE: 108 MMHG | RESPIRATION RATE: 16 BRPM | HEART RATE: 102 BPM | TEMPERATURE: 97 F | OXYGEN SATURATION: 95 % | DIASTOLIC BLOOD PRESSURE: 62 MMHG | HEIGHT: 71 IN | WEIGHT: 169.06 LBS | BODY MASS INDEX: 23.67 KG/M2

## 2024-12-03 DIAGNOSIS — J30.9 ALLERGIC RHINITIS, UNSPECIFIED SEASONALITY, UNSPECIFIED TRIGGER: ICD-10-CM

## 2024-12-03 DIAGNOSIS — L70.0 ACNE VULGARIS: ICD-10-CM

## 2024-12-03 DIAGNOSIS — E03.9 HYPOTHYROIDISM, UNSPECIFIED TYPE: ICD-10-CM

## 2024-12-03 DIAGNOSIS — Z00.00 ANNUAL PHYSICAL EXAM: ICD-10-CM

## 2024-12-03 DIAGNOSIS — Z76.0 ENCOUNTER FOR MEDICATION REFILL: ICD-10-CM

## 2024-12-03 DIAGNOSIS — Z00.00 ANNUAL PHYSICAL EXAM: Primary | ICD-10-CM

## 2024-12-03 DIAGNOSIS — N95.1 MENOPAUSAL SYMPTOMS: ICD-10-CM

## 2024-12-03 DIAGNOSIS — R23.4 OILY SKIN: ICD-10-CM

## 2024-12-03 PROCEDURE — 3074F SYST BP LT 130 MM HG: CPT | Mod: CPTII,S$GLB,,

## 2024-12-03 PROCEDURE — 99214 OFFICE O/P EST MOD 30 MIN: CPT | Mod: S$GLB,,,

## 2024-12-03 PROCEDURE — 85025 COMPLETE CBC W/AUTO DIFF WBC: CPT

## 2024-12-03 PROCEDURE — 1160F RVW MEDS BY RX/DR IN RCRD: CPT | Mod: CPTII,S$GLB,,

## 2024-12-03 PROCEDURE — 99999 PR PBB SHADOW E&M-EST. PATIENT-LVL V: CPT | Mod: PBBFAC,,,

## 2024-12-03 PROCEDURE — 84443 ASSAY THYROID STIM HORMONE: CPT

## 2024-12-03 PROCEDURE — 83036 HEMOGLOBIN GLYCOSYLATED A1C: CPT

## 2024-12-03 PROCEDURE — 3008F BODY MASS INDEX DOCD: CPT | Mod: CPTII,S$GLB,,

## 2024-12-03 PROCEDURE — 3078F DIAST BP <80 MM HG: CPT | Mod: CPTII,S$GLB,,

## 2024-12-03 PROCEDURE — 1159F MED LIST DOCD IN RCRD: CPT | Mod: CPTII,S$GLB,,

## 2024-12-03 PROCEDURE — 80053 COMPREHEN METABOLIC PANEL: CPT

## 2024-12-03 PROCEDURE — 83001 ASSAY OF GONADOTROPIN (FSH): CPT

## 2024-12-03 PROCEDURE — 84144 ASSAY OF PROGESTERONE: CPT

## 2024-12-03 PROCEDURE — 83002 ASSAY OF GONADOTROPIN (LH): CPT

## 2024-12-03 PROCEDURE — 82671 ASSAY OF ESTROGENS: CPT

## 2024-12-03 RX ORDER — PROMETHAZINE HYDROCHLORIDE AND DEXTROMETHORPHAN HYDROBROMIDE 6.25; 15 MG/5ML; MG/5ML
5 SYRUP ORAL EVERY 4 HOURS PRN
COMMUNITY
Start: 2024-10-10

## 2024-12-03 RX ORDER — RIMEGEPANT SULFATE 75 MG/75MG
75 TABLET, ORALLY DISINTEGRATING ORAL ONCE AS NEEDED
COMMUNITY
Start: 2024-12-03

## 2024-12-03 RX ORDER — MONTELUKAST SODIUM 10 MG/1
10 TABLET ORAL NIGHTLY
Qty: 90 TABLET | Refills: 4 | Status: SHIPPED | OUTPATIENT
Start: 2024-12-03

## 2024-12-03 RX ORDER — CLINDAMYCIN PHOSPHATE 10 MG/ML
SOLUTION TOPICAL 2 TIMES DAILY
Qty: 60 EACH | Refills: 10 | Status: SHIPPED | OUTPATIENT
Start: 2024-12-03 | End: 2025-12-03

## 2024-12-03 RX ORDER — BENZONATATE 200 MG/1
200 CAPSULE ORAL 3 TIMES DAILY PRN
COMMUNITY
Start: 2024-10-10
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
## 2024-12-04 LAB
ALBUMIN SERPL BCP-MCNC: 4 G/DL (ref 3.5–5.2)
ALP SERPL-CCNC: 37 U/L (ref 40–150)
ALT SERPL W/O P-5'-P-CCNC: 32 U/L (ref 10–44)
ANION GAP SERPL CALC-SCNC: 15 MMOL/L (ref 8–16)
AST SERPL-CCNC: 25 U/L (ref 10–40)
BASOPHILS # BLD AUTO: 0.04 K/UL (ref 0–0.2)
BASOPHILS NFR BLD: 0.6 % (ref 0–1.9)
BILIRUB SERPL-MCNC: 0.2 MG/DL (ref 0.1–1)
BUN SERPL-MCNC: 9 MG/DL (ref 6–20)
CALCIUM SERPL-MCNC: 8.9 MG/DL (ref 8.7–10.5)
CHLORIDE SERPL-SCNC: 107 MMOL/L (ref 95–110)
CO2 SERPL-SCNC: 19 MMOL/L (ref 23–29)
CREAT SERPL-MCNC: 0.9 MG/DL (ref 0.5–1.4)
DIFFERENTIAL METHOD BLD: ABNORMAL
EOSINOPHIL # BLD AUTO: 0.1 K/UL (ref 0–0.5)
EOSINOPHIL NFR BLD: 1.6 % (ref 0–8)
ERYTHROCYTE [DISTWIDTH] IN BLOOD BY AUTOMATED COUNT: 13.3 % (ref 11.5–14.5)
EST. GFR  (NO RACE VARIABLE): >60 ML/MIN/1.73 M^2
ESTIMATED AVG GLUCOSE: 108 MG/DL (ref 68–131)
FSH SERPL-ACNC: 9.12 MIU/ML
GLUCOSE SERPL-MCNC: 75 MG/DL (ref 70–110)
HBA1C MFR BLD: 5.4 % (ref 4–5.6)
HCT VFR BLD AUTO: 37.6 % (ref 37–48.5)
HGB BLD-MCNC: 11.7 G/DL (ref 12–16)
IMM GRANULOCYTES # BLD AUTO: 0.03 K/UL (ref 0–0.04)
IMM GRANULOCYTES NFR BLD AUTO: 0.4 % (ref 0–0.5)
LH SERPL-ACNC: 1.2 MIU/ML
LYMPHOCYTES # BLD AUTO: 2.2 K/UL (ref 1–4.8)
LYMPHOCYTES NFR BLD: 31.5 % (ref 18–48)
MCH RBC QN AUTO: 30 PG (ref 27–31)
MCHC RBC AUTO-ENTMCNC: 31.1 G/DL (ref 32–36)
MCV RBC AUTO: 96 FL (ref 82–98)
MONOCYTES # BLD AUTO: 0.4 K/UL (ref 0.3–1)
MONOCYTES NFR BLD: 5.7 % (ref 4–15)
NEUTROPHILS # BLD AUTO: 4.3 K/UL (ref 1.8–7.7)
NEUTROPHILS NFR BLD: 60.2 % (ref 38–73)
NRBC BLD-RTO: 0 /100 WBC
PLATELET # BLD AUTO: 293 K/UL (ref 150–450)
PMV BLD AUTO: 10.7 FL (ref 9.2–12.9)
POTASSIUM SERPL-SCNC: 3.9 MMOL/L (ref 3.5–5.1)
PROGEST SERPL-MCNC: 0.1 NG/ML
PROT SERPL-MCNC: 6.8 G/DL (ref 6–8.4)
RBC # BLD AUTO: 3.9 M/UL (ref 4–5.4)
SODIUM SERPL-SCNC: 141 MMOL/L (ref 136–145)
TSH SERPL DL<=0.005 MIU/L-ACNC: 0.58 UIU/ML (ref 0.4–4)
WBC # BLD AUTO: 7.07 K/UL (ref 3.9–12.7)

## 2024-12-05 ENCOUNTER — PATIENT MESSAGE (OUTPATIENT)
Dept: INTERNAL MEDICINE | Facility: CLINIC | Age: 44
End: 2024-12-05
Payer: COMMERCIAL

## 2024-12-08 ENCOUNTER — PATIENT MESSAGE (OUTPATIENT)
Dept: INTERNAL MEDICINE | Facility: CLINIC | Age: 44
End: 2024-12-08
Payer: COMMERCIAL

## 2024-12-10 LAB
ESTRADIOL SERPL HS-MCNC: 24 PG/ML
ESTROGEN SERPL CALC-MCNC: 44 PG/ML
ESTRONE SERPL-MCNC: 20 PG/ML

## 2024-12-10 RX ORDER — TRAZODONE HYDROCHLORIDE 100 MG/1
TABLET ORAL
Qty: 270 TABLET | Refills: 0 | Status: SHIPPED | OUTPATIENT
Start: 2024-12-10

## 2024-12-10 RX ORDER — BUSPIRONE HYDROCHLORIDE 15 MG/1
TABLET ORAL
Qty: 360 TABLET | Refills: 0 | Status: SHIPPED | OUTPATIENT
Start: 2024-12-10

## 2024-12-11 RX ORDER — ATOMOXETINE 80 MG/1
80 CAPSULE ORAL
Qty: 90 CAPSULE | Refills: 0 | Status: SHIPPED | OUTPATIENT
Start: 2024-12-11

## 2024-12-17 DIAGNOSIS — E03.9 HYPOTHYROIDISM, UNSPECIFIED TYPE: ICD-10-CM

## 2024-12-18 RX ORDER — LEVOTHYROXINE SODIUM 25 UG/1
25 TABLET ORAL
Qty: 90 TABLET | Refills: 3 | Status: SHIPPED | OUTPATIENT
Start: 2024-12-18

## 2024-12-18 NOTE — TELEPHONE ENCOUNTER
Care Due:                  Date            Visit Type   Department     Provider  --------------------------------------------------------------------------------                                MYCHART                              ANNUAL                              CHECKUP/PHY  [facility]  Last Visit: 02-      [facility]       Vera Broussard  Next Visit: None Scheduled  None         None Found                                                            Last  Test          Frequency    Reason                     Performed    Due Date  --------------------------------------------------------------------------------    Office Visit  15 months..  metFORMIN................  02-   05-    [facility] Embedded Care Due Messages. Reference number: [id number].   12/18/2024 9:31:23 AM CST

## 2024-12-18 NOTE — TELEPHONE ENCOUNTER
Refill Routing Note   Medication(s) are not appropriate for processing by [facility] for the following reason(s):        Patient not seen by provider within 15 months    [facility] action(s):  Route   Requires appointment : Yes             Appointments  past 12m or future 3m with PCP    Date Provider   Last Visit   2/20/2023 Vera Broussard MD   Next Visit   Visit date not found Vera Broussard MD   ED visits in past 90 days: 0        Note composed:2:21 PM 12/18/2024

## 2024-12-23 RX ORDER — METFORMIN HYDROCHLORIDE 500 MG/1
500 TABLET ORAL 2 TIMES DAILY
Qty: 180 TABLET | Refills: 3 | Status: SHIPPED | OUTPATIENT
Start: 2024-12-23

## 2024-12-23 NOTE — TELEPHONE ENCOUNTER
Refill Routing Note   Medication(s) are not appropriate for processing by [facility] for the following reason(s):        Patient not seen by provider within 15 months    [facility] action(s):  Defer               Appointments  past 12m or future 3m with PCP    Date Provider   Last Visit   2/20/2023 Vera Broussard MD   Next Visit   Visit date not found Vera Broussard MD   ED visits in past 90 days: 0        Note composed:1:21 PM 12/23/2024

## 2024-12-23 NOTE — TELEPHONE ENCOUNTER
No care due was identified.  [facility] Embedded Care Due Messages. Reference number: [id number].   12/22/2024 10:19:37 PM CST

## 2025-01-02 ENCOUNTER — OFFICE VISIT (OUTPATIENT)
Dept: INTERNAL MEDICINE | Facility: CLINIC | Age: 45
End: 2025-01-02
Payer: COMMERCIAL

## 2025-01-02 VITALS
HEIGHT: 71 IN | DIASTOLIC BLOOD PRESSURE: 60 MMHG | RESPIRATION RATE: 16 BRPM | SYSTOLIC BLOOD PRESSURE: 106 MMHG | TEMPERATURE: 97 F | BODY MASS INDEX: 22.93 KG/M2 | WEIGHT: 163.81 LBS | OXYGEN SATURATION: 99 % | HEART RATE: 99 BPM

## 2025-01-02 DIAGNOSIS — Z78.9 EDUCATED ABOUT MANAGEMENT OF WEIGHT: ICD-10-CM

## 2025-01-02 DIAGNOSIS — G43.109 MIGRAINE WITH AURA AND WITHOUT STATUS MIGRAINOSUS, NOT INTRACTABLE: Primary | ICD-10-CM

## 2025-01-02 PROCEDURE — 99999 PR PBB SHADOW E&M-EST. PATIENT-LVL V: CPT | Mod: PBBFAC,,,

## 2025-01-02 PROCEDURE — 1159F MED LIST DOCD IN RCRD: CPT | Mod: CPTII,S$GLB,,

## 2025-01-02 PROCEDURE — 3008F BODY MASS INDEX DOCD: CPT | Mod: CPTII,S$GLB,,

## 2025-01-02 PROCEDURE — 99214 OFFICE O/P EST MOD 30 MIN: CPT | Mod: S$GLB,,,

## 2025-01-02 PROCEDURE — 3078F DIAST BP <80 MM HG: CPT | Mod: CPTII,S$GLB,,

## 2025-01-02 PROCEDURE — 3074F SYST BP LT 130 MM HG: CPT | Mod: CPTII,S$GLB,,

## 2025-01-02 PROCEDURE — 1160F RVW MEDS BY RX/DR IN RCRD: CPT | Mod: CPTII,S$GLB,,

## 2025-01-02 RX ORDER — TOPIRAMATE 25 MG/1
50 TABLET ORAL 2 TIMES DAILY
Qty: 120 TABLET | Refills: 1 | Status: SHIPPED | OUTPATIENT
Start: 2025-01-02 | End: 2025-03-03

## 2025-01-02 RX ORDER — SPIRONOLACTONE 25 MG/1
25 TABLET ORAL 2 TIMES DAILY
COMMUNITY
Start: 2024-12-04
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
## 2025-01-08 ENCOUNTER — PATIENT MESSAGE (OUTPATIENT)
Dept: CARDIOLOGY | Facility: CLINIC | Age: 45
End: 2025-01-08
Payer: COMMERCIAL

## 2025-01-09 RX ORDER — MIDODRINE HYDROCHLORIDE 5 MG/1
TABLET ORAL
Qty: 90 TABLET | Refills: 0 | Status: SHIPPED | OUTPATIENT
Start: 2025-01-09

## 2025-01-13 ENCOUNTER — PATIENT MESSAGE (OUTPATIENT)
Dept: CARDIOLOGY | Facility: CLINIC | Age: 45
End: 2025-01-13
Payer: COMMERCIAL

## 2025-01-15 ENCOUNTER — PATIENT MESSAGE (OUTPATIENT)
Dept: INTERNAL MEDICINE | Facility: CLINIC | Age: 45
End: 2025-01-15
Payer: COMMERCIAL

## 2025-01-15 ENCOUNTER — TELEPHONE (OUTPATIENT)
Dept: CARDIOLOGY | Facility: CLINIC | Age: 45
End: 2025-01-15
Payer: COMMERCIAL

## 2025-01-15 NOTE — TELEPHONE ENCOUNTER
Followed up with Arielle, rescheduled appointment for patient. Patient verbalized understanding of date, time, and location of appointment.

## 2025-01-15 NOTE — TELEPHONE ENCOUNTER
----- Message from Lisa sent at 1/15/2025 11:41 AM CST -----  Regarding: Return Call  Contact: patient  Type:  Patient Returning Call    Who Called:Arielle   Who Left Message for Patient: nurse   Does the patient know what this is regarding?: reschedule appointment   Would the patient rather a call back or a response via [facility]?  Call back   Best Call Back Number: [phone] (home)     Additional Information:     DACIA Forbes

## 2025-01-22 ENCOUNTER — OFFICE VISIT (OUTPATIENT)
Dept: PSYCHIATRY | Facility: CLINIC | Age: 45
End: 2025-01-22
Payer: COMMERCIAL

## 2025-01-22 DIAGNOSIS — R41.840 DISTURBED CONCENTRATION: ICD-10-CM

## 2025-01-22 DIAGNOSIS — G47.00 INSOMNIA, UNSPECIFIED TYPE: ICD-10-CM

## 2025-01-22 DIAGNOSIS — F33.41 MDD (MAJOR DEPRESSIVE DISORDER), RECURRENT, IN PARTIAL REMISSION: ICD-10-CM

## 2025-01-22 DIAGNOSIS — F41.9 ANXIETY: Primary | ICD-10-CM

## 2025-01-22 PROCEDURE — 98006 SYNCH AUDIO-VIDEO EST MOD 30: CPT | Mod: 95,,, | Performed by: PSYCHIATRY & NEUROLOGY

## 2025-01-22 RX ORDER — BUPROPION HYDROCHLORIDE 150 MG/1
450 TABLET ORAL DAILY
Qty: 270 TABLET | Refills: 1 | Status: SHIPPED | OUTPATIENT
Start: 2025-01-22

## 2025-01-22 RX ORDER — TRAZODONE HYDROCHLORIDE 100 MG/1
TABLET ORAL
Qty: 270 TABLET | Refills: 1 | Status: SHIPPED | OUTPATIENT
Start: 2025-01-22

## 2025-01-22 RX ORDER — ATOMOXETINE 80 MG/1
80 CAPSULE ORAL DAILY
Qty: 90 CAPSULE | Refills: 0 | Status: SHIPPED | OUTPATIENT
Start: 2025-01-22 | End: 2025-01-22 | Stop reason: SDUPTHER

## 2025-01-22 RX ORDER — BUSPIRONE HYDROCHLORIDE 15 MG/1
15 TABLET ORAL 2 TIMES DAILY
Qty: 360 TABLET | Refills: 1 | Status: SHIPPED | OUTPATIENT
Start: 2025-01-22

## 2025-01-22 RX ORDER — ATOMOXETINE 80 MG/1
80 CAPSULE ORAL DAILY
Qty: 90 CAPSULE | Refills: 1 | Status: SHIPPED | OUTPATIENT
Start: 2025-01-22

## 2025-01-22 RX ORDER — CLONAZEPAM 0.5 MG/1
TABLET ORAL
Qty: 30 TABLET | Refills: 0 | Status: SHIPPED | OUTPATIENT
Start: 2025-01-22
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
## 2025-02-03 ENCOUNTER — PROCEDURE VISIT (OUTPATIENT)
Dept: NEUROLOGY | Facility: CLINIC | Age: 45
End: 2025-02-03
Payer: COMMERCIAL

## 2025-02-03 ENCOUNTER — LAB VISIT (OUTPATIENT)
Dept: LAB | Facility: HOSPITAL | Age: 45
End: 2025-02-03
Attending: INTERNAL MEDICINE
Payer: COMMERCIAL

## 2025-02-03 ENCOUNTER — OFFICE VISIT (OUTPATIENT)
Dept: INTERNAL MEDICINE | Facility: CLINIC | Age: 45
End: 2025-02-03
Payer: COMMERCIAL

## 2025-02-03 VITALS
SYSTOLIC BLOOD PRESSURE: 113 MMHG | HEIGHT: 71 IN | DIASTOLIC BLOOD PRESSURE: 70 MMHG | BODY MASS INDEX: 22.59 KG/M2 | WEIGHT: 161.38 LBS | HEART RATE: 96 BPM

## 2025-02-03 VITALS
TEMPERATURE: 98 F | SYSTOLIC BLOOD PRESSURE: 113 MMHG | DIASTOLIC BLOOD PRESSURE: 70 MMHG | OXYGEN SATURATION: 96 % | HEART RATE: 96 BPM | HEIGHT: 71 IN | WEIGHT: 161.38 LBS | BODY MASS INDEX: 22.59 KG/M2

## 2025-02-03 DIAGNOSIS — Z00.00 ANNUAL WELLNESS VISIT: ICD-10-CM

## 2025-02-03 DIAGNOSIS — Z83.49 FAMILY HISTORY OF THYROID DISEASE: ICD-10-CM

## 2025-02-03 DIAGNOSIS — F41.8 ANXIETY ASSOCIATED WITH DEPRESSION: ICD-10-CM

## 2025-02-03 DIAGNOSIS — I95.1 POSTURAL HYPOTENSION: Chronic | ICD-10-CM

## 2025-02-03 DIAGNOSIS — G43.009 MIGRAINE WITHOUT AURA AND WITHOUT STATUS MIGRAINOSUS, NOT INTRACTABLE: Primary | ICD-10-CM

## 2025-02-03 DIAGNOSIS — Z12.4 CERVICAL CANCER SCREENING: ICD-10-CM

## 2025-02-03 DIAGNOSIS — Z00.00 ANNUAL WELLNESS VISIT: Primary | ICD-10-CM

## 2025-02-03 DIAGNOSIS — R87.612 LOW GRADE SQUAMOUS INTRAEPITHELIAL LESION (LGSIL) ON CERVICAL PAP SMEAR: ICD-10-CM

## 2025-02-03 PROCEDURE — 85025 COMPLETE CBC W/AUTO DIFF WBC: CPT | Performed by: INTERNAL MEDICINE

## 2025-02-03 PROCEDURE — 82671 ASSAY OF ESTROGENS: CPT | Performed by: INTERNAL MEDICINE

## 2025-02-03 PROCEDURE — 99396 PREV VISIT EST AGE 40-64: CPT | Mod: S$GLB,,, | Performed by: INTERNAL MEDICINE

## 2025-02-03 PROCEDURE — 82728 ASSAY OF FERRITIN: CPT | Performed by: INTERNAL MEDICINE

## 2025-02-03 PROCEDURE — 1160F RVW MEDS BY RX/DR IN RCRD: CPT | Mod: CPTII,S$GLB,, | Performed by: INTERNAL MEDICINE

## 2025-02-03 PROCEDURE — 3078F DIAST BP <80 MM HG: CPT | Mod: CPTII,S$GLB,, | Performed by: INTERNAL MEDICINE

## 2025-02-03 PROCEDURE — 3008F BODY MASS INDEX DOCD: CPT | Mod: CPTII,S$GLB,, | Performed by: INTERNAL MEDICINE

## 2025-02-03 PROCEDURE — 84439 ASSAY OF FREE THYROXINE: CPT | Performed by: INTERNAL MEDICINE

## 2025-02-03 PROCEDURE — 3074F SYST BP LT 130 MM HG: CPT | Mod: CPTII,S$GLB,, | Performed by: INTERNAL MEDICINE

## 2025-02-03 PROCEDURE — 84466 ASSAY OF TRANSFERRIN: CPT | Performed by: INTERNAL MEDICINE

## 2025-02-03 PROCEDURE — 99999 PR PBB SHADOW E&M-EST. PATIENT-LVL III: CPT | Mod: PBBFAC,,, | Performed by: INTERNAL MEDICINE

## 2025-02-03 PROCEDURE — 84144 ASSAY OF PROGESTERONE: CPT | Performed by: INTERNAL MEDICINE

## 2025-02-03 PROCEDURE — 36415 COLL VENOUS BLD VENIPUNCTURE: CPT | Mod: PO | Performed by: INTERNAL MEDICINE

## 2025-02-03 PROCEDURE — 82746 ASSAY OF FOLIC ACID SERUM: CPT | Performed by: INTERNAL MEDICINE

## 2025-02-03 PROCEDURE — 82607 VITAMIN B-12: CPT | Performed by: INTERNAL MEDICINE

## 2025-02-03 PROCEDURE — 83001 ASSAY OF GONADOTROPIN (FSH): CPT | Performed by: INTERNAL MEDICINE

## 2025-02-03 PROCEDURE — 1159F MED LIST DOCD IN RCRD: CPT | Mod: CPTII,S$GLB,, | Performed by: INTERNAL MEDICINE

## 2025-02-03 PROCEDURE — 84443 ASSAY THYROID STIM HORMONE: CPT | Performed by: INTERNAL MEDICINE

## 2025-02-03 PROCEDURE — 83002 ASSAY OF GONADOTROPIN (LH): CPT | Performed by: INTERNAL MEDICINE

## 2025-02-03 PROCEDURE — 80061 LIPID PANEL: CPT | Performed by: INTERNAL MEDICINE

## 2025-02-03 NOTE — PROCEDURES
BOTOX  HAS PROVEN VERY EFFECTIVE IN SIGNIFICANTLY REDUCING THE SEVERITY AND FREQUENCY OF MIGRAINE HEADACHES                  PROCEDURE NAME:       INTRAMUSCULAR BOTOX INJECTIONS            PROCEDURE INDICATION:      INTRACTABLE (PHARMACOLOGICALLY UNRESPONSIVE/RESISTANT) MIGRAINE           PROCEDURE DESCRIPTION:     The procedure was discussed in detail with the patient and the consent form was signed. The patient verbalized understanding of the procedure .     I discussed the risks that may include serious immune reaction and distant spread that could results in loss of breathing. Other side effects may include droopy eyelids, trouble swallowing and cardiac arrhythmias. It was also stressed that it is contraindicated in pregnancy.         PROCEDURE TIME OUT PROCESS:      Time Out was called.      Two patient identifiers were used (first and last name in addition to date of birth). The patient stated the procedure being done (Botox injections) in the scalp muscles (both sides).             PROCEDURE EXECUTION:      As per the standard protocol, a total 155 units were injected in 31 sites.            RT  5 units in 1 site     LT  5 units in 1 site      Procerus 5 units in 1 site      RT Frontalis 10 units in 2 sites      LT Frontalis 10 units in 2 sites      RT Temporalis 20 units in 4 sites     LT Temporalis 20 units in 4 sites     RT Occipitalis 15 units in 3 sites     LT Occipitalis 15 units in 3 sites      RT Cervical Paraspinals 10 units in 2 sites     LT Cervical Paraspinals 10 units in 2 sites     RT Trapezius 15 units in 3 sites     LT Trapezius 15 units in 3 sites         Per the standard of care protocol we use 155 units and waste 45 units. I gave the patient the option of following the standard protocol vs.using the extra 45 units to target areas where the pain is maximum which could potentially provide extra help with headache control. I explained to the patient that this will be an  off-label use, not the standard protocol, not evidence-based and could result in more pronounced side effects. The patient verbalized full understanding of all the facts and the risks and benefits and elected to use the extra 45 units for the following sites:           Procerus 5 units in 1 site      RT Frontalis 10 units in 2 sites      LT Frontalis 10 units in 2 site      RT Temporalis 10 units in 2 sites           PROCEDURE COURSE:        The standard protocol was followed. The procedure was executed very smoothly.            PROCEDURE COMPLICATIONS:      None. The patient tolerated the procedure very well.            PROCEDURE AFTERCARE:      I encouraged the patient to use ice if the sites of injection get tender and call me with any problems or complications.            FOLLOW UP:        RTC in 3 months for a new set of injections and call with any questions and/or concerns.

## 2025-02-03 NOTE — PROGRESS NOTES
Chief Complaint  Chief Complaint   Patient presents with    Annual Exam        HPI  Arielle Verde is a 44 y.o. female here today for the following: The primary encounter diagnosis was Annual wellness visit. Diagnoses of BMI 22.0-22.9, adult, Family history of thyroid disease, Anxiety associated with depression, Postural hypotension, Low grade squamous intraepithelial lesion (LGSIL) on cervical Pap smear, and Cervical cancer screening were also pertinent to this visit.  Patient reported not experiencing significant side effects, except as noted in HPI if applicable.    Patient follows with:  Psychiatry Dr. Paul Barber at [facility] manages anxiety and difficulty concentrating including strattera.     February 3, 2025.  Patient reported here for annual.  BP was at goal.  A1c was 5.4 December 2024 and had been as high as 6.1 about 3 years ago, however patient watched diet and exercise and has lost 30 lbs.  MD counseled patient and answered questions.  LDL cholesterol was 90.4 July 2023 consistent with excellent control.  MD counseled patient and answered questions.  Patient reported doing well.  MD counseled patient and answered questions.     PHQ-2 (Reflex to PHQ-9) Interpretation:  Over the last two weeks how often have you been bothered by little interest or pleasure in doing things: 0  Over the last two weeks how often have you been bothered by feeling down, depressed or hopeless: 0  PHQ-2 Total Score: 0       Review of patient's allergies indicates:   Allergen Reactions    Emgality [galcanezumab-gnlm] Hives and Itching       MEDS:  Current Outpatient Medications   Medication Instructions    aluminum chloride (DRYSOL) 20 % external solution AAA 2-3 nights and then taper to 1-2 times per week as needed.    atomoxetine (STRATTERA) 80 mg, Oral, Daily    benzonatate (TESSALON) 200 mg, 3 times daily PRN    betaxoloL (KERLONE) 10 mg, Oral, Daily, Start taking 2.5 mg daily    buPROPion (WELLBUTRIN XL) 450  mg, Oral, Daily    busPIRone (BUSPAR) 15 mg, Oral, 2 times daily, (FOR ANXIETY) TAKE 2 TABLETS BY MOUTH TWICE DAILY    cetirizine (ZYRTEC) 10 mg, Daily PRN    chlorzoxazone (PARAFON FORTE) 500 mg, 2 times daily PRN    clindamycin (CLEOCIN T) 1 % Swab Topical (Top), 2 times daily, AAA qd to bid as tolerated for acne. Decrease frequency if any dryness.    clonazePAM (KLONOPIN) 0.5 MG tablet Take 1 tablet daily as needed for anxiety.    EPINEPHrine (EPIPEN 2-DUANE) 0.6 mg, Intramuscular, Once    fludrocortisone (FLORINEF) 100 mcg, Oral, Daily    fluticasone propionate (FLONASE) 50 mcg, Each Nostril, Daily    glycopyrronium tosylate (QBREXZA) 2.4 % Towl Use one pad for both underarms daily. Wash hands thoroughly after using.    levothyroxine (SYNTHROID) 25 mcg, Oral, Before breakfast, Due for annual with PCP, labs    metFORMIN (GLUCOPHAGE) 500 mg, Oral, 2 times daily    midodrine (PROAMATINE) 5 MG Tab TAKE 1 TABLET BY MOUTH 3 TIMES A DAY with meals    mometasone (ELOCON) 0.1 % solution AAA of scalp once daily.    montelukast (SINGULAIR) 10 mg, Oral, Nightly    NURTEC 75 mg, Once as needed    potassium chloride SA (K-DUR,KLOR-CON) 20 MEQ tablet 20 mEq, Oral, 2 times daily    rizatriptan (MAXALT) 10 mg, Daily PRN    spironolactone (ALDACTONE) 25 mg, 2 times daily    topiramate (TOPAMAX) 50 mg, Oral, 2 times daily    traZODone (DESYREL) 100 MG tablet TAKE 1 TO 3 TABLETS BY MOUTH EVERY NIGHT AT BEDTIME AS NEEDED for sleep    triamcinolone acetonide 0.025% (KENALOG) 0.025 % cream Topical (Top), 2 times daily PRN, Mild steroid. Use sparingly for 1-2 weeks if needed then stop.    trifarotene (AKLIEF) 0.005 % Crea 1 application , Topical (Top), Nightly, Decrease frequency if any irritation.        Past Medical History:   Diagnosis Date    Anxiety     Chronic headaches     SALLIE III (cervical intraepithelial neoplasia grade III) with severe dysplasia 2019    s/p LEEP    Depression     Migraine        Past Surgical History:    Procedure Laterality Date    ADENOIDECTOMY      AUGMENTATION OF BREAST  06/2005    saline    BREAST SURGERY      breast augmentatin    COLD KNIFE CONIZATION OF CERVIX N/A 11/27/2019    Procedure: CONE BIOPSY, CERVIX, USING COLD;  Surgeon: Rachele Willis MD;  Location: [facility] OR;  Service: OB/GYN;  Laterality: N/A;    Hyperhydrosis      LIPOSUCTION OF NECK      OPEN REDUCTION AND INTERNAL FIXATION (ORIF) OF INJURY OF ANKLE Right 08/08/2023    REMOVAL OF INTRAUTERINE DEVICE (IUD)  11/27/2019    Procedure: REMOVAL, INTRAUTERINE DEVICE;  Surgeon: Rachele Willis MD;  Location: [facility] OR;  Service: OB/GYN;;    TILT TABLE TEST N/A 01/11/2019    Procedure: TILT TABLE TEST;  Surgeon: Justin Freeman MD;  Location: [facility] CATH LAB;  Service: Cardiology;  Laterality: N/A;  Rodriguez pt/pt req 930 start time    TONSILLECTOMY         Family History   Problem Relation Name Age of Onset    Lupus Mother      Ulcerative colitis Mother      Kidney failure Father      Drug abuse Father      Breast cancer Paternal Grandmother      Colon cancer Neg Hx      Ovarian cancer Neg Hx         Social History     Substance and Sexual Activity   Drug Use No       Review of Systems   Review of Systems   Constitutional:         As per HPI, otherwise negative.    HENT:          As per HPI, otherwise negative.    Eyes:         As per HPI, otherwise negative.    Respiratory:          As per HPI, otherwise negative.    Cardiovascular:         As per HPI, otherwise negative.    Gastrointestinal:         As per HPI, otherwise negative.    Genitourinary:         As per HPI, otherwise negative.    Musculoskeletal:         As per HPI, otherwise negative.    Skin:         As per HPI, otherwise negative.    Neurological:         As per HPI, otherwise negative.    Endo/Heme/Allergies:         As per HPI, otherwise negative.    Psychiatric/Behavioral:          As per HPI, otherwise negative.        Objective   Vitals:    02/03/25 1043   BP: 113/70  "  Pulse: 96   Temp: 97.8 °F (36.6 °C)   TempSrc: Tympanic   SpO2: 96%   Weight: 73.2 kg (161 lb 6 oz)   Height: 5' 11" (1.803 m)       Body mass index is 22.51 kg/m².    Physical Exam  HENT:      Head: Normocephalic.   Eyes:      Extraocular Movements: Extraocular movements intact.   Cardiovascular:      Rate and Rhythm: Regular rhythm.   Pulmonary:      Effort: No respiratory distress.   Abdominal:      Palpations: Abdomen is soft.   Musculoskeletal:      Cervical back: Neck supple.   Skin:     General: Skin is warm.   Neurological:      Mental Status: She is alert. Mental status is at baseline.         Recent labs:   No results found for this or any previous visit (from the past 24 hours).    Assessment / Plan  Arielle was seen today for annual exam.    Diagnoses and all orders for this visit:    Annual wellness visit  -     Lipid Panel; Future  -     TSH; Future  -     T4, Free; Future  -     ESTROGENS, TOTAL; Future  -     PROGESTERONE; Future  -     LUTEINIZING HORMONE; Future  -     FOLLICLE STIMULATING HORMONE; Future  -     CBC Auto Differential; Future  -     Vitamin B12; Future  -     Folate; Future  -     Ferritin; Future  -     Iron and TIBC; Future    BMI 22.0-22.9, adult  -     Lipid Panel; Future  -     TSH; Future  -     T4, Free; Future  -     ESTROGENS, TOTAL; Future  -     PROGESTERONE; Future  -     LUTEINIZING HORMONE; Future  -     FOLLICLE STIMULATING HORMONE; Future  -     CBC Auto Differential; Future  -     Vitamin B12; Future  -     Folate; Future  -     Ferritin; Future  -     Iron and TIBC; Future    Family history of thyroid disease  Comments:  Including Mom and maternal side including Grandmother.  Orders:  -     Lipid Panel; Future  -     TSH; Future  -     T4, Free; Future  -     ESTROGENS, TOTAL; Future  -     PROGESTERONE; Future  -     LUTEINIZING HORMONE; Future  -     FOLLICLE STIMULATING HORMONE; Future  -     CBC Auto Differential; Future  -     Vitamin B12; Future  -     " Folate; Future  -     Ferritin; Future  -     Iron and TIBC; Future    Anxiety associated with depression  -     Lipid Panel; Future  -     TSH; Future  -     T4, Free; Future  -     ESTROGENS, TOTAL; Future  -     PROGESTERONE; Future  -     LUTEINIZING HORMONE; Future  -     FOLLICLE STIMULATING HORMONE; Future  -     CBC Auto Differential; Future  -     Vitamin B12; Future  -     Folate; Future  -     Ferritin; Future  -     Iron and TIBC; Future    Postural hypotension  -     Lipid Panel; Future  -     TSH; Future  -     T4, Free; Future  -     ESTROGENS, TOTAL; Future  -     PROGESTERONE; Future  -     LUTEINIZING HORMONE; Future  -     FOLLICLE STIMULATING HORMONE; Future  -     CBC Auto Differential; Future  -     Vitamin B12; Future  -     Folate; Future  -     Ferritin; Future  -     Iron and TIBC; Future    Low grade squamous intraepithelial lesion (LGSIL) on cervical Pap smear  -     Lipid Panel; Future  -     TSH; Future  -     T4, Free; Future  -     ESTROGENS, TOTAL; Future  -     PROGESTERONE; Future  -     LUTEINIZING HORMONE; Future  -     FOLLICLE STIMULATING HORMONE; Future  -     CBC Auto Differential; Future  -     Vitamin B12; Future  -     Folate; Future  -     Ferritin; Future  -     Iron and TIBC; Future    Cervical cancer screening  -     Lipid Panel; Future  -     TSH; Future  -     T4, Free; Future  -     ESTROGENS, TOTAL; Future  -     PROGESTERONE; Future  -     LUTEINIZING HORMONE; Future  -     FOLLICLE STIMULATING HORMONE; Future  -     Ambulatory referral/consult to Obstetrics / Gynecology; Future  -     CBC Auto Differential; Future  -     Vitamin B12; Future  -     Folate; Future  -     Ferritin; Future  -     Iron and TIBC; Future

## 2025-02-04 ENCOUNTER — TELEPHONE (OUTPATIENT)
Dept: INTERNAL MEDICINE | Facility: CLINIC | Age: 45
End: 2025-02-04
Payer: COMMERCIAL

## 2025-02-04 DIAGNOSIS — D64.9 ANEMIA, UNSPECIFIED TYPE: Primary | ICD-10-CM

## 2025-02-04 DIAGNOSIS — N93.9 ABNORMAL VAGINAL BLEEDING: ICD-10-CM

## 2025-02-04 LAB
BASOPHILS # BLD AUTO: 0.04 K/UL (ref 0–0.2)
BASOPHILS NFR BLD: 0.6 % (ref 0–1.9)
CHOLEST SERPL-MCNC: 169 MG/DL (ref 120–199)
CHOLEST/HDLC SERPL: 3.3 {RATIO} (ref 2–5)
DIFFERENTIAL METHOD BLD: ABNORMAL
EOSINOPHIL # BLD AUTO: 0.1 K/UL (ref 0–0.5)
EOSINOPHIL NFR BLD: 1.5 % (ref 0–8)
ERYTHROCYTE [DISTWIDTH] IN BLOOD BY AUTOMATED COUNT: 11.9 % (ref 11.5–14.5)
FERRITIN SERPL-MCNC: 71 NG/ML (ref 20–300)
FOLATE SERPL-MCNC: 15 NG/ML (ref 4–24)
FSH SERPL-ACNC: 10.01 MIU/ML
HCT VFR BLD AUTO: 41.2 % (ref 37–48.5)
HDLC SERPL-MCNC: 51 MG/DL (ref 40–75)
HDLC SERPL: 30.2 % (ref 20–50)
HGB BLD-MCNC: 12.4 G/DL (ref 12–16)
IMM GRANULOCYTES # BLD AUTO: 0.01 K/UL (ref 0–0.04)
IMM GRANULOCYTES NFR BLD AUTO: 0.1 % (ref 0–0.5)
IRON SERPL-MCNC: 64 UG/DL (ref 30–160)
LDLC SERPL CALC-MCNC: 108 MG/DL (ref 63–159)
LH SERPL-ACNC: 14.6 MIU/ML
LYMPHOCYTES # BLD AUTO: 2.7 K/UL (ref 1–4.8)
LYMPHOCYTES NFR BLD: 39.6 % (ref 18–48)
MCH RBC QN AUTO: 28.5 PG (ref 27–31)
MCHC RBC AUTO-ENTMCNC: 30.1 G/DL (ref 32–36)
MCV RBC AUTO: 95 FL (ref 82–98)
MONOCYTES # BLD AUTO: 0.4 K/UL (ref 0.3–1)
MONOCYTES NFR BLD: 5.3 % (ref 4–15)
NEUTROPHILS # BLD AUTO: 3.6 K/UL (ref 1.8–7.7)
NEUTROPHILS NFR BLD: 52.9 % (ref 38–73)
NONHDLC SERPL-MCNC: 118 MG/DL
NRBC BLD-RTO: 0 /100 WBC
PLATELET # BLD AUTO: 329 K/UL (ref 150–450)
PMV BLD AUTO: 9.7 FL (ref 9.2–12.9)
PROGEST SERPL-MCNC: 1.5 NG/ML
RBC # BLD AUTO: 4.35 M/UL (ref 4–5.4)
SATURATED IRON: 17 % (ref 20–50)
T4 FREE SERPL-MCNC: 1.11 NG/DL (ref 0.71–1.51)
TOTAL IRON BINDING CAPACITY: 382 UG/DL (ref 250–450)
TRANSFERRIN SERPL-MCNC: 258 MG/DL (ref 200–375)
TRIGL SERPL-MCNC: 50 MG/DL (ref 30–150)
TSH SERPL DL<=0.005 MIU/L-ACNC: 0.87 UIU/ML (ref 0.4–4)
VIT B12 SERPL-MCNC: 996 PG/ML (ref 210–950)
WBC # BLD AUTO: 6.77 K/UL (ref 3.9–12.7)

## 2025-02-04 NOTE — TELEPHONE ENCOUNTER
----- Message from Karel Boston MD sent at 2/4/2025  9:40 AM CST -----  Your LDL cholesterol was 108 at goal for patient.  Your red blood count was within normal limits anemia has resolved and iron, iron stores and folate were within normal limits in your vitamin B12 was above the upper limit of normal.  Recommend hold vitamin B12 for 3 months and then recheck.  Patient to discuss LH, FSH progesterone and estrogen with OB gyn and waiting on results of estrogen.  Your thyroid, liver and kidney function were good.

## 2025-02-04 NOTE — PROGRESS NOTES
Your LDL cholesterol was 108 at goal for patient.  Your red blood count was within normal limits anemia has resolved and iron, iron stores and folate were within normal limits in your vitamin B12 was above the upper limit of normal.  Recommend hold vitamin B12 for 3 months and then recheck.  Patient to discuss LH, FSH progesterone and estrogen with OB gyn and waiting on results of estrogen.  Your thyroid, liver and kidney function were good.

## 2025-02-05 ENCOUNTER — OFFICE VISIT (OUTPATIENT)
Dept: CARDIOLOGY | Facility: CLINIC | Age: 45
End: 2025-02-05
Payer: COMMERCIAL

## 2025-02-05 VITALS
HEIGHT: 71 IN | SYSTOLIC BLOOD PRESSURE: 104 MMHG | BODY MASS INDEX: 22.63 KG/M2 | DIASTOLIC BLOOD PRESSURE: 64 MMHG | WEIGHT: 161.63 LBS | OXYGEN SATURATION: 98 % | HEART RATE: 66 BPM

## 2025-02-05 DIAGNOSIS — R55 VASODEPRESSOR SYNCOPE: Primary | ICD-10-CM

## 2025-02-05 DIAGNOSIS — R00.2 HEART PALPITATIONS: ICD-10-CM

## 2025-02-05 DIAGNOSIS — I95.1 POSTURAL HYPOTENSION: Chronic | ICD-10-CM

## 2025-02-05 PROCEDURE — 1159F MED LIST DOCD IN RCRD: CPT | Mod: CPTII,S$GLB,, | Performed by: INTERNAL MEDICINE

## 2025-02-05 PROCEDURE — 99999 PR PBB SHADOW E&M-EST. PATIENT-LVL III: CPT | Mod: PBBFAC,,, | Performed by: INTERNAL MEDICINE

## 2025-02-05 PROCEDURE — 3078F DIAST BP <80 MM HG: CPT | Mod: CPTII,S$GLB,, | Performed by: INTERNAL MEDICINE

## 2025-02-05 PROCEDURE — 3074F SYST BP LT 130 MM HG: CPT | Mod: CPTII,S$GLB,, | Performed by: INTERNAL MEDICINE

## 2025-02-05 PROCEDURE — 99214 OFFICE O/P EST MOD 30 MIN: CPT | Mod: S$GLB,,, | Performed by: INTERNAL MEDICINE

## 2025-02-05 PROCEDURE — 1160F RVW MEDS BY RX/DR IN RCRD: CPT | Mod: CPTII,S$GLB,, | Performed by: INTERNAL MEDICINE

## 2025-02-05 PROCEDURE — 3008F BODY MASS INDEX DOCD: CPT | Mod: CPTII,S$GLB,, | Performed by: INTERNAL MEDICINE

## 2025-02-05 NOTE — PROGRESS NOTES
Subjective:   Patient ID:  Arielle Verde is a 44 y.o. female who presents for follow-up of No chief complaint on file.  Pt was seeing Dr. Ana GARCIA  Pt with HR drops 90- 40 with sx dizziness  Pt active   Pt still with occasional palpitations.  Dizziness:   Chronicity:  Recurrent  Onset:  More than 1 year ago  Progression since onset:  Waxing and waning  Frequency:  Every few days  Dizziness characteristics:  Off-balance  Frequency of Spells:  Weekly   Associated symptoms: palpitations.no chest pain.  Aggravated by:  Getting up  Treatments tried:  Rest  Improvements on treatment:  Mild  Palpitations   This is a recurrent problem. The current episode started more than 1 month ago. The problem occurs rarely. Nothing aggravates the symptoms. Associated symptoms include dizziness. Pertinent negatives include no chest pain or shortness of breath. Treatments tried: midodrine. The treatment provided mild relief. There are no known risk factors.       Review of Systems   Constitutional: Negative. Negative for weight gain.   HENT: Negative.     Eyes: Negative.    Cardiovascular:  Positive for palpitations. Negative for chest pain and leg swelling.   Respiratory: Negative.  Negative for shortness of breath.    Endocrine: Negative.    Hematologic/Lymphatic: Negative.    Skin: Negative.    Musculoskeletal:  Negative for muscle weakness.   Gastrointestinal: Negative.    Genitourinary: Negative.    Neurological:  Positive for dizziness.   Psychiatric/Behavioral: Negative.     Allergic/Immunologic: Negative.    All other systems reviewed and are negative.    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     Comment: Mirena 3/30/20 LOT [id number] exp 4.2022     Current Outpatient Medications on File Prior to Visit   Medication Sig Dispense Refill    aluminum chloride (DRYSOL) 20 % external solution AAA 2-3 nights and then taper to 1-2 times per week as needed. 30 mL 2    atomoxetine (STRATTERA) 80 MG capsule Take 1 capsule (80 mg total) by mouth once daily. 90 capsule 1    benzonatate (TESSALON) 200 MG capsule Take 200 mg by mouth 3 (three) times daily as needed for Cough.      betaxoloL (KERLONE) 10 mg Tab Take 1 tablet (10 mg total) by mouth once daily. Start taking 2.5 mg daily 30 tablet 11    buPROPion (WELLBUTRIN XL) 150 MG TB24 tablet Take 3 tablets (450 mg total) by mouth once daily. 270 tablet 1    busPIRone (BUSPAR) 15 MG tablet Take 1 tablet (15 mg total) by mouth 2 (two) times daily. (FOR ANXIETY) TAKE 2 TABLETS BY MOUTH TWICE DAILY 360 tablet 1    cetirizine (ZYRTEC) 10 MG tablet Take 10 mg by mouth daily as needed.   0    chlorzoxazone (PARAFON FORTE) 500 mg Tab Take 500 mg by mouth 2 (two) times daily as needed.      clindamycin (CLEOCIN T) 1 % Swab Apply topically 2 (two) times daily. AAA qd to bid as tolerated for acne. Decrease frequency if any dryness. 60 each 10    clonazePAM (KLONOPIN) 0.5 MG tablet Take 1 tablet daily as needed for anxiety. 30 tablet 0    EPINEPHrine (EPIPEN 2-DUANE) 0.3 mg/0.3 mL AtIn Inject 0.6 mLs (0.6 mg  total) into the muscle once. for 1 dose 0.6 mL 0    fludrocortisone (FLORINEF) 0.1 mg Tab Take 1 tablet (100 mcg total) by mouth once daily. 180 tablet 3    fluticasone propionate (FLONASE) 50 mcg/actuation nasal spray 1 spray (50 mcg total) by Each Nostril route once daily. 18.2 mL 1    glycopyrronium tosylate (QBREXZA) 2.4 % Towl Use one pad for both underarms daily. Wash hands thoroughly after using. 30 each 5    levothyroxine (SYNTHROID) 25 MCG tablet Take 1 tablet (25 mcg total) by mouth before breakfast. Due for annual with PCP, labs 90 tablet 3    metFORMIN (GLUCOPHAGE) 500 MG tablet Take 1 tablet (500 mg total) by mouth 2 (two) times daily. 180 tablet 3    midodrine (PROAMATINE) 5 MG Tab TAKE 1 TABLET BY MOUTH 3 TIMES A DAY with meals 90 tablet 0    mometasone (ELOCON) 0.1 % solution AAA of scalp once daily. 60 mL 3    montelukast (SINGULAIR) 10 mg tablet Take 1 tablet (10 mg total) by mouth every evening. 90 tablet 4    NURTEC 75 mg odt Take 75 mg by mouth once as needed for Migraine.      potassium chloride SA (K-DUR,KLOR-CON) 20 MEQ tablet Take 1 tablet (20 mEq total) by mouth 2 (two) times daily. 180 tablet 3    rizatriptan (MAXALT) 10 MG tablet Take 10 mg by mouth daily as needed.      spironolactone (ALDACTONE) 25 MG tablet Take 25 mg by mouth 2 (two) times daily.      topiramate (TOPAMAX) 25 MG tablet Take 2 tablets (50 mg total) by mouth 2 (two) times daily. 120 tablet 1    traZODone (DESYREL) 100 MG tablet TAKE 1 TO 3 TABLETS BY MOUTH EVERY NIGHT AT BEDTIME AS NEEDED for sleep 270 tablet 1    triamcinolone acetonide 0.025% (KENALOG) 0.025 % cream Apply topically 2 (two) times daily as needed (prn rash between breasts). Mild steroid. Use sparingly for 1-2 weeks if needed then stop. 80 g 1    trifarotene (AKLIEF) 0.005 % Crea Apply 1 application  topically every evening. Decrease frequency if any irritation. 45 g 3     Current Facility-Administered Medications on File Prior to Visit   Medication Dose  Route Frequency Provider Last Rate Last Admin    levonorgestreL 20 mcg/24 hours (5 yrs) 52 mg IUD 1 Intra Uterine Device  1 each Intrauterine 1 time in Clinic/HOD Rachele Willis MD        onabotulinumtoxina injection 200 Units  200 Units Intramuscular Q90 Days          Review of patient's allergies indicates:   Allergen Reactions    Emgality [galcanezumab-gnlm] Hives and Itching       Objective:     Physical Exam  Vitals and nursing note reviewed.   Constitutional:       Appearance: She is well-developed.   HENT:      Head: Normocephalic and atraumatic.   Eyes:      Conjunctiva/sclera: Conjunctivae normal.      Pupils: Pupils are equal, round, and reactive to light.   Cardiovascular:      Rate and Rhythm: Normal rate and regular rhythm.      Pulses: Intact distal pulses.      Heart sounds: Normal heart sounds.   Pulmonary:      Effort: Pulmonary effort is normal.      Breath sounds: Normal breath sounds.   Abdominal:      General: Bowel sounds are normal.      Palpations: Abdomen is soft.   Musculoskeletal:         General: Normal range of motion.      Cervical back: Normal range of motion and neck supple.   Skin:     General: Skin is warm and dry.   Neurological:      Mental Status: She is alert and oriented to person, place, and time.         Assessment:     1. Vasodepressor syncope    2. Postural hypotension    3. Heart palpitations        Plan:     Vasodepressor syncope    Postural hypotension    Heart palpitations      Continue kerlone, midodrine-  Cardiac monitor

## 2025-02-07 ENCOUNTER — TELEPHONE (OUTPATIENT)
Dept: INTERNAL MEDICINE | Facility: CLINIC | Age: 45
End: 2025-02-07
Payer: COMMERCIAL

## 2025-02-07 LAB
ESTRADIOL SERPL HS-MCNC: 130 PG/ML
ESTROGEN SERPL CALC-MCNC: 200 PG/ML
ESTRONE SERPL-MCNC: 70 PG/ML

## 2025-02-07 NOTE — TELEPHONE ENCOUNTER
----- Message from Karel Boston MD sent at 2/7/2025  1:14 PM CST -----  Your estrogen level was 200.  Patient to discuss with OB gyn.

## 2025-02-10 ENCOUNTER — HOSPITAL ENCOUNTER (OUTPATIENT)
Dept: CARDIOLOGY | Facility: HOSPITAL | Age: 45
Discharge: HOME | End: 2025-02-10
Attending: INTERNAL MEDICINE
Payer: COMMERCIAL

## 2025-02-10 DIAGNOSIS — R00.2 HEART PALPITATIONS: ICD-10-CM

## 2025-02-10 RX ORDER — MIDODRINE HYDROCHLORIDE 5 MG/1
TABLET ORAL
Qty: 90 TABLET | Refills: 0 | Status: SHIPPED | OUTPATIENT
Start: 2025-02-10

## 2025-02-17 ENCOUNTER — PATIENT MESSAGE (OUTPATIENT)
Dept: CARDIOLOGY | Facility: CLINIC | Age: 45
End: 2025-02-17
Payer: COMMERCIAL

## 2025-02-25 ENCOUNTER — PATIENT MESSAGE (OUTPATIENT)
Dept: INTERNAL MEDICINE | Facility: CLINIC | Age: 45
End: 2025-02-25
Payer: COMMERCIAL

## 2025-02-26 NOTE — TELEPHONE ENCOUNTER
Ludy Ms. Guzmán.  Would have to agree with her boyfriend.  In my experience, I have seen a number of adverse reactions to THC and CBD including passing out and seizures so I would agreed that it does not go well with POTS.  Best.

## 2025-03-04 ENCOUNTER — HOSPITAL ENCOUNTER (EMERGENCY)
Facility: HOSPITAL | Age: 45
Discharge: HOME | End: 2025-03-04
Attending: FAMILY MEDICINE
Payer: COMMERCIAL

## 2025-03-04 ENCOUNTER — PATIENT MESSAGE (OUTPATIENT)
Dept: CARDIOLOGY | Facility: CLINIC | Age: 45
End: 2025-03-04
Payer: COMMERCIAL

## 2025-03-04 VITALS
TEMPERATURE: 99 F | RESPIRATION RATE: 22 BRPM | HEART RATE: 69 BPM | BODY MASS INDEX: 22.59 KG/M2 | DIASTOLIC BLOOD PRESSURE: 77 MMHG | OXYGEN SATURATION: 98 % | SYSTOLIC BLOOD PRESSURE: 128 MMHG | WEIGHT: 162 LBS

## 2025-03-04 DIAGNOSIS — R06.02 SOB (SHORTNESS OF BREATH): ICD-10-CM

## 2025-03-04 DIAGNOSIS — R07.9 CHEST PAIN: ICD-10-CM

## 2025-03-04 DIAGNOSIS — I95.1 ORTHOSTATIC HYPOTENSION: ICD-10-CM

## 2025-03-04 DIAGNOSIS — G90.A POTS (POSTURAL ORTHOSTATIC TACHYCARDIA SYNDROME): Primary | ICD-10-CM

## 2025-03-04 LAB
ALBUMIN SERPL BCP-MCNC: 4.5 G/DL (ref 3.5–5.2)
ALP SERPL-CCNC: 32 U/L (ref 40–150)
ALT SERPL W/O P-5'-P-CCNC: 21 U/L (ref 10–44)
ANION GAP SERPL CALC-SCNC: 7 MMOL/L (ref 8–16)
AST SERPL-CCNC: 20 U/L (ref 10–40)
BASOPHILS # BLD AUTO: 0.04 K/UL (ref 0–0.2)
BASOPHILS NFR BLD: 0.5 % (ref 0–1.9)
BILIRUB SERPL-MCNC: 0.2 MG/DL (ref 0.1–1)
BILIRUB UR QL STRIP: NEGATIVE
BNP SERPL-MCNC: <10 PG/ML (ref 0–99)
BUN SERPL-MCNC: 15 MG/DL (ref 6–20)
CALCIUM SERPL-MCNC: 9.7 MG/DL (ref 8.7–10.5)
CHLORIDE SERPL-SCNC: 106 MMOL/L (ref 95–110)
CLARITY UR: CLEAR
CO2 SERPL-SCNC: 24 MMOL/L (ref 23–29)
COLOR UR: YELLOW
CREAT SERPL-MCNC: 0.8 MG/DL (ref 0.5–1.4)
DIFFERENTIAL METHOD BLD: NORMAL
EOSINOPHIL # BLD AUTO: 0.1 K/UL (ref 0–0.5)
EOSINOPHIL NFR BLD: 1.2 % (ref 0–8)
ERYTHROCYTE [DISTWIDTH] IN BLOOD BY AUTOMATED COUNT: 11.8 % (ref 11.5–14.5)
EST. GFR  (NO RACE VARIABLE): >60 ML/MIN/1.73 M^2
GLUCOSE SERPL-MCNC: 83 MG/DL (ref 70–110)
GLUCOSE UR QL STRIP: NEGATIVE
HCT VFR BLD AUTO: 38.1 % (ref 37–48.5)
HCV AB SERPL QL IA: NEGATIVE
HEP C VIRUS HOLD SPECIMEN: NORMAL
HGB BLD-MCNC: 12.5 G/DL (ref 12–16)
HGB UR QL STRIP: NEGATIVE
HIV 1+2 AB+HIV1 P24 AG SERPL QL IA: NEGATIVE
IMM GRANULOCYTES # BLD AUTO: 0.02 K/UL (ref 0–0.04)
IMM GRANULOCYTES NFR BLD AUTO: 0.2 % (ref 0–0.5)
KETONES UR QL STRIP: NEGATIVE
LEUKOCYTE ESTERASE UR QL STRIP: NEGATIVE
LYMPHOCYTES # BLD AUTO: 2.6 K/UL (ref 1–4.8)
LYMPHOCYTES NFR BLD: 30.8 % (ref 18–48)
MCH RBC QN AUTO: 29.7 PG (ref 27–31)
MCHC RBC AUTO-ENTMCNC: 32.8 G/DL (ref 32–36)
MCV RBC AUTO: 91 FL (ref 82–98)
MONOCYTES # BLD AUTO: 0.5 K/UL (ref 0.3–1)
MONOCYTES NFR BLD: 5.7 % (ref 4–15)
NEUTROPHILS # BLD AUTO: 5.2 K/UL (ref 1.8–7.7)
NEUTROPHILS NFR BLD: 61.6 % (ref 38–73)
NITRITE UR QL STRIP: NEGATIVE
NRBC BLD-RTO: 0 /100 WBC
PH UR STRIP: 6 [PH] (ref 5–8)
PLATELET # BLD AUTO: 308 K/UL (ref 150–450)
PMV BLD AUTO: 9.2 FL (ref 9.2–12.9)
POCT GLUCOSE: 90 MG/DL (ref 70–110)
POTASSIUM SERPL-SCNC: 4.1 MMOL/L (ref 3.5–5.1)
PROT SERPL-MCNC: 7.3 G/DL (ref 6–8.4)
PROT UR QL STRIP: NEGATIVE
RBC # BLD AUTO: 4.21 M/UL (ref 4–5.4)
SODIUM SERPL-SCNC: 137 MMOL/L (ref 136–145)
SP GR UR STRIP: 1.01 (ref 1–1.03)
TROPONIN I SERPL DL<=0.01 NG/ML-MCNC: <0.006 NG/ML (ref 0–0.03)
TSH SERPL DL<=0.005 MIU/L-ACNC: 1.03 UIU/ML (ref 0.4–4)
URN SPEC COLLECT METH UR: NORMAL
UROBILINOGEN UR STRIP-ACNC: NEGATIVE EU/DL
WBC # BLD AUTO: 8.4 K/UL (ref 3.9–12.7)

## 2025-03-04 PROCEDURE — 96360 HYDRATION IV INFUSION INIT: CPT

## 2025-03-04 PROCEDURE — 86803 HEPATITIS C AB TEST: CPT | Performed by: EMERGENCY MEDICINE

## 2025-03-04 PROCEDURE — 25000003 PHARM REV CODE 250: Performed by: FAMILY MEDICINE

## 2025-03-04 PROCEDURE — 93005 ELECTROCARDIOGRAM TRACING: CPT

## 2025-03-04 PROCEDURE — 93010 ELECTROCARDIOGRAM REPORT: CPT | Mod: ,,, | Performed by: INTERNAL MEDICINE

## 2025-03-04 PROCEDURE — 83880 ASSAY OF NATRIURETIC PEPTIDE: CPT | Performed by: NURSE PRACTITIONER

## 2025-03-04 PROCEDURE — 82962 GLUCOSE BLOOD TEST: CPT

## 2025-03-04 PROCEDURE — 85025 COMPLETE CBC W/AUTO DIFF WBC: CPT | Performed by: NURSE PRACTITIONER

## 2025-03-04 PROCEDURE — 81003 URINALYSIS AUTO W/O SCOPE: CPT | Performed by: FAMILY MEDICINE

## 2025-03-04 PROCEDURE — 87389 HIV-1 AG W/HIV-1&-2 AB AG IA: CPT | Performed by: EMERGENCY MEDICINE

## 2025-03-04 PROCEDURE — 80053 COMPREHEN METABOLIC PANEL: CPT | Performed by: NURSE PRACTITIONER

## 2025-03-04 PROCEDURE — 84484 ASSAY OF TROPONIN QUANT: CPT | Performed by: NURSE PRACTITIONER

## 2025-03-04 PROCEDURE — 84443 ASSAY THYROID STIM HORMONE: CPT | Performed by: NURSE PRACTITIONER

## 2025-03-04 PROCEDURE — 25000003 PHARM REV CODE 250: Performed by: EMERGENCY MEDICINE

## 2025-03-04 PROCEDURE — 99284 EMERGENCY DEPT VISIT MOD MDM: CPT | Mod: 25

## 2025-03-04 RX ORDER — MIDODRINE HYDROCHLORIDE 5 MG/1
10 TABLET ORAL ONCE
Status: COMPLETED | OUTPATIENT
Start: 2025-03-04 | End: 2025-03-04

## 2025-03-04 RX ORDER — IBUPROFEN 800 MG/1
800 TABLET ORAL
Status: COMPLETED | OUTPATIENT
Start: 2025-03-04 | End: 2025-03-04

## 2025-03-04 RX ORDER — MIDODRINE HYDROCHLORIDE 5 MG/1
10 TABLET ORAL
Qty: 180 TABLET | Refills: 11 | Status: SHIPPED | OUTPATIENT
Start: 2025-03-04 | End: 2025-03-04 | Stop reason: ALTCHOICE

## 2025-03-04 RX ORDER — MIDODRINE HYDROCHLORIDE 10 MG/1
10 TABLET ORAL
Qty: 42 TABLET | Refills: 0 | Status: SHIPPED | OUTPATIENT
Start: 2025-03-04

## 2025-03-04 RX ADMIN — IBUPROFEN 800 MG: 800 TABLET, FILM COATED ORAL at 08:03

## 2025-03-04 RX ADMIN — SODIUM CHLORIDE 1000 ML: 0.9 INJECTION, SOLUTION INTRAVENOUS at 08:03

## 2025-03-04 RX ADMIN — MIDODRINE HYDROCHLORIDE 10 MG: 5 TABLET ORAL at 08:03

## 2025-03-04 NOTE — FIRST PROVIDER EVALUATION
Medical screening examination initiated.  I have conducted a focused provider triage encounter, findings are as follows:    Brief history of present illness:  reports chest pain and sob. Reports palpitations and near syncope     Vitals:    03/04/25 1659 03/04/25 1706   BP: (!) 110/57 108/64   BP Location: Right arm Right arm   Pulse: 91 85   Resp: 16    Temp: 98.2 °F (36.8 °C)    TempSrc: Oral    SpO2: 97%    Weight: 73.5 kg (162 lb)        Pertinent physical exam:  nad    Brief workup plan:  cardiac workup     Preliminary workup initiated; this workup will be continued and followed by the physician or advanced practice provider that is assigned to the patient when roomed.

## 2025-03-05 ENCOUNTER — RESULTS FOLLOW-UP (OUTPATIENT)
Dept: CARDIOLOGY | Facility: HOSPITAL | Age: 45
End: 2025-03-05
Payer: COMMERCIAL

## 2025-03-05 ENCOUNTER — TELEPHONE (OUTPATIENT)
Dept: CARDIOLOGY | Facility: CLINIC | Age: 45
End: 2025-03-05
Payer: COMMERCIAL

## 2025-03-05 LAB
OHS QRS DURATION: 82 MS
OHS QTC CALCULATION: 422 MS

## 2025-03-05 NOTE — TELEPHONE ENCOUNTER
Followed up with Arielle, patient has been notified of this information and all questions answered. Per patient's provider:  Cardiac monitor unremarkable. Patient verbalized understanding.

## 2025-03-05 NOTE — TELEPHONE ENCOUNTER
----- Message from Justin Freeman MD sent at 3/5/2025  4:14 AM CST -----  Contact: self  Stop aldactone, spirinolactone  ----- Message -----  From: Tri Becerra MA  Sent: 3/4/2025  11:16 AM CST  To: Justin Freeman MD    Please advise  ----- Message -----  From: Cherelle Navarro  Sent: 3/4/2025  10:52 AM CST  To: Pete OTTO Staff    Patient is requesting a call back regarding low blood pressure 86/48 - 86/42 - also sent message on [employer]. Please call back at [phone]

## 2025-03-05 NOTE — ED NOTES
Please approve LPN assessment I reviewed the LPN assessment documented on this patient and agree with the findings.

## 2025-03-05 NOTE — TELEPHONE ENCOUNTER
Followed up with Arielle, patient stated that she went to the emergency room last night were they increased her midodrine.     The patient is unwilling to stop her aldactone because she states it is for her acne and she will have very bad breakouts if she stops.

## 2025-03-05 NOTE — ED PROVIDER NOTES
"SCRIBE #1 NOTE: I, Corinne Rivera, am scribing for, and in the presence of, Vianca Ramachandran MD. I have scribed the HPI, ROS, PEx, lab and imaging results, EKG, and Cardiology consults.     SCRIBE #2 NOTE: I, Wyatt Castillo, am scribing for, and in the presence of,  Estevan Thomas DO. I have scribed the remaining portions of the note not scribed by Scribe #1.      History     Chief Complaint   Patient presents with    Shortness of Breath     Pt c/o trouble catching her breath for the past two days.  She had a near syncopal episode last week and has noted low blood pressure readings since then.       Review of patient's allergies indicates:   Allergen Reactions    Emgality [[id number]] Hives and Itching         History of Present Illness     HPI    3/4/2025, 7:30 PM  History obtained from the patient, medical records, and family member      History of Present Illness: Arielle Verde is a 44 y.o. female patient with a PMHx of POTS, anxiety, depression, chronic headaches, migraines, and grade 3 SALLIE who presents to the Emergency Department for evaluation of weakness which began over the past week. Dr. Justin Freeman MD (Cardiology) advised the pt to come to the ED for further evaluation of her hypotension and sxs. Pt notes she was wearing a Holter monitor for 11 days until it broke 3 days ago. She is currently on the Holter monitor again for the remaining 3 days. Symptoms are constant and moderate in severity. Movement exacerbates her sxs to the point that standing up causes LOC. Additionally, pt's medications do not provide relief to sxs and she has tried all other interventions with no alleviation to sxs. Associated sxs include SOB, lightheadedness, headaches, "brain fog", and nausea. Patient denies any coughing, appetite change, or leg swelling. No prior Tx specified. Pt wears compression socks. Pt's electrophysiologist retired. No further complaints or concerns at this time. "       Arrival mode: Personal Transportation    PCP: Karel Boston MD        Past Medical History:  Past Medical History:   Diagnosis Date    Anxiety     Chronic headaches     SALLIE III (cervical intraepithelial neoplasia grade III) with severe dysplasia 2019    s/p LEEP    Depression     Migraine        Past Surgical History:  Past Surgical History:   Procedure Laterality Date    ADENOIDECTOMY      AUGMENTATION OF BREAST  06/2005    saline    BREAST SURGERY      breast augmentatin    COLD KNIFE CONIZATION OF CERVIX N/A 11/27/2019    Procedure: CONE BIOPSY, CERVIX, USING COLD;  Surgeon: Rachele Willis MD;  Location: [facility] OR;  Service: OB/GYN;  Laterality: N/A;    Hyperhydrosis      LIPOSUCTION OF NECK      OPEN REDUCTION AND INTERNAL FIXATION (ORIF) OF INJURY OF ANKLE Right 08/08/2023    REMOVAL OF INTRAUTERINE DEVICE (IUD)  11/27/2019    Procedure: REMOVAL, INTRAUTERINE DEVICE;  Surgeon: Rachele Willis MD;  Location: [facility] OR;  Service: OB/GYN;;    TILT TABLE TEST N/A 01/11/2019    Procedure: TILT TABLE TEST;  Surgeon: Justin Freeman MD;  Location: [facility] CATH LAB;  Service: Cardiology;  Laterality: N/A;  Rodriguez pt/pt req 930 start time    TONSILLECTOMY           Family History:  Family History   Problem Relation Name Age of Onset    Lupus Mother      Ulcerative colitis Mother      Kidney failure Father      Drug abuse Father      Breast cancer Paternal Grandmother      Colon cancer Neg Hx      Ovarian cancer Neg Hx         Social History:  Social History     Tobacco Use    Smoking status: Former     Types: Cigarettes     Passive exposure: Never    Smokeless tobacco: Never    Tobacco comments:     no smoking after m.n prior to sx   Substance and Sexual Activity    Alcohol use: Yes     Comment: social     Drug use: No    Sexual activity: Not Currently     Partners: Male     Birth control/protection: I.U.D.     Comment: Mirena 3/30/20 LOT [id number] exp 4.2022        Review of Systems     Review of Systems  "  Constitutional:  Negative for appetite change and fever.   HENT:  Negative for sore throat.    Respiratory:  Positive for shortness of breath. Negative for cough.    Cardiovascular:  Negative for chest pain.   Gastrointestinal:  Positive for nausea.   Genitourinary:  Negative for dysuria.   Musculoskeletal:  Negative for back pain.        (-) leg swelling   Skin:  Negative for rash.   Neurological:  Positive for light-headedness and headaches. Negative for weakness.        (+) LOC when standing  (+) "brain fog"   Hematological:  Does not bruise/bleed easily.   All other systems reviewed and are negative.     Physical Exam     Initial Vitals [03/04/25 1659]   BP Pulse Resp Temp SpO2   (!) 110/57 91 16 98.2 °F (36.8 °C) 97 %      MAP       --          Physical Exam  Nursing Notes and Vital Signs Reviewed.  Constitutional: Patient is in no acute distress. Well-developed and well-nourished.  Head: Atraumatic. Normocephalic.  Eyes: PERRL. EOM intact. Conjunctivae are not pale. No scleral icterus.  ENT: Mucous membranes are moist. Oropharynx is clear and symmetric.    Neck: Supple. Full ROM. No lymphadenopathy.  Cardiovascular: Regular rate. Regular rhythm. No murmurs, rubs, or gallops. Distal pulses are 2+ and symmetric.  Pulmonary/Chest: No respiratory distress. Clear to auscultation bilaterally. No wheezing or rales.  Abdominal: Soft and non-distended.  There is no tenderness.  No rebound, guarding, or rigidity. Good bowel sounds.  Genitourinary: No CVA tenderness.  Musculoskeletal: Moves all extremities. No obvious deformities. No edema. No calf tenderness.  Skin: Warm and dry.  Neurological:  Alert, awake, and appropriate.  Normal speech.  No acute focal neurological deficits are appreciated.  Psychiatric: Normal affect. Good eye contact. Appropriate in content.      ED Course   Procedures  ED Vital Signs:  Vitals:    03/04/25 1842 03/04/25 1849 03/04/25 1859 03/04/25 1919   BP: (!) 97/53 (!) 97/53 103/65 99/64 "   Pulse: 94 74 77 75   Resp:       Temp:       TempSrc:       SpO2: 99% 99% 100% 100%   Weight:        03/04/25 1929 03/04/25 1949 03/04/25 2009 03/04/25 2014   BP: 99/66 96/62 132/61    Pulse: 86 76 70 75   Resp:       Temp:       TempSrc:       SpO2: 98% 99% 100%    Weight:        03/04/25 2029 03/04/25 2059 03/04/25 2125 03/04/25 2127   BP: (!) 100/58 102/62 112/64 119/73   Pulse: 74 76 61 65   Resp:       Temp:       TempSrc:       SpO2: 99% 97% 100% 100%   Weight:        03/04/25 2129 03/04/25 2159 03/04/25 2209   BP: 97/60 128/77    Pulse: 81 69    Resp:  (!) 22    Temp:   98.5 °F (36.9 °C)   TempSrc:   Oral   SpO2: 100% 98%    Weight:          Abnormal Lab Results:  Labs Reviewed   COMPREHENSIVE METABOLIC PANEL - Abnormal       Result Value    Sodium 137      Potassium 4.1      Chloride 106      CO2 24      Glucose 83      BUN 15      Creatinine 0.8      Calcium 9.7      Total Protein 7.3      Albumin 4.5      Total Bilirubin 0.2      Alkaline Phosphatase 32 (*)     AST 20      ALT 21      eGFR >60      Anion Gap 7 (*)     Narrative:     Release to patient->Immediate   HEPATITIS C ANTIBODY    Hepatitis C Ab Negative      Narrative:     Release to patient->Immediate   HEP C VIRUS HOLD SPECIMEN    HEP C Virus Hold Specimen Hold for HCV sendout      Narrative:     Release to patient->Immediate   HIV 1 / 2 ANTIBODY    HIV 1/2 Ag/Ab Negative      Narrative:     Release to patient->Immediate   CBC W/ AUTO DIFFERENTIAL    WBC 8.40      RBC 4.21      Hemoglobin 12.5      Hematocrit 38.1      MCV 91      MCH 29.7      MCHC 32.8      RDW 11.8      Platelets 308      MPV 9.2      Immature Granulocytes 0.2      Gran # (ANC) 5.2      Immature Grans (Abs) 0.02      Lymph # 2.6      Mono # 0.5      Eos # 0.1      Baso # 0.04      nRBC 0      Gran % 61.6      Lymph % 30.8      Mono % 5.7      Eosinophil % 1.2      Basophil % 0.5      Differential Method Automated      Narrative:     Release to patient->Immediate   TROPONIN I     Troponin I <0.006      Narrative:     Release to patient->Immediate   B-TYPE NATRIURETIC PEPTIDE    BNP <10      Narrative:     Release to patient->Immediate   TSH    TSH 1.026      Narrative:     Release to patient->Immediate   URINALYSIS    Specimen UA Urine, Clean Catch      Color, UA Yellow      Appearance, UA Clear      pH, UA 6.0      Specific Gravity, UA 1.010      Protein, UA Negative      Glucose, UA Negative      Ketones, UA Negative      Bilirubin (UA) Negative      Occult Blood UA Negative      Nitrite, UA Negative      Urobilinogen, UA Negative      Leukocytes, UA Negative     POCT GLUCOSE    POCT Glucose 90          All Lab Results:  Results for orders placed or performed during the hospital encounter of 03/04/25   EKG 12-lead (Chest Pain) Age >30    Collection Time: 03/04/25  4:57 PM   Result Value Ref Range    QRS Duration 82 ms    [facility] QTC Calculation 422 ms   POCT glucose    Collection Time: 03/04/25  6:23 PM   Result Value Ref Range    POCT Glucose 90 70 - 110 mg/dL   Hepatitis C Antibody    Collection Time: 03/04/25  6:26 PM   Result Value Ref Range    Hepatitis C Ab Negative Negative   HCV Virus Hold Specimen    Collection Time: 03/04/25  6:26 PM   Result Value Ref Range    HEP C Virus Hold Specimen Hold for HCV sendout    HIV 1/2 Ag/Ab (4th Gen)    Collection Time: 03/04/25  6:26 PM   Result Value Ref Range    HIV 1/2 Ag/Ab Negative Negative   CBC auto differential    Collection Time: 03/04/25  6:26 PM   Result Value Ref Range    WBC 8.40 3.90 - 12.70 K/uL    RBC 4.21 4.00 - 5.40 M/uL    Hemoglobin 12.5 12.0 - 16.0 g/dL    Hematocrit 38.1 37.0 - 48.5 %    MCV 91 82 - 98 fL    MCH 29.7 27.0 - 31.0 pg    MCHC 32.8 32.0 - 36.0 g/dL    RDW 11.8 11.5 - 14.5 %    Platelets 308 150 - 450 K/uL    MPV 9.2 9.2 - 12.9 fL    Immature Granulocytes 0.2 0.0 - 0.5 %    Gran # (ANC) 5.2 1.8 - 7.7 K/uL    Immature Grans (Abs) 0.02 0.00 - 0.04 K/uL    Lymph # 2.6 1.0 - 4.8 K/uL    Mono # 0.5 0.3 - 1.0 K/uL     Eos # 0.1 0.0 - 0.5 K/uL    Baso # 0.04 0.00 - 0.20 K/uL    nRBC 0 0 /100 WBC    Gran % 61.6 38.0 - 73.0 %    Lymph % 30.8 18.0 - 48.0 %    Mono % 5.7 4.0 - 15.0 %    Eosinophil % 1.2 0.0 - 8.0 %    Basophil % 0.5 0.0 - 1.9 %    Differential Method Automated    Comprehensive metabolic panel    Collection Time: 03/04/25  6:26 PM   Result Value Ref Range    Sodium 137 136 - 145 mmol/L    Potassium 4.1 3.5 - 5.1 mmol/L    Chloride 106 95 - 110 mmol/L    CO2 24 23 - 29 mmol/L    Glucose 83 70 - 110 mg/dL    BUN 15 6 - 20 mg/dL    Creatinine 0.8 0.5 - 1.4 mg/dL    Calcium 9.7 8.7 - 10.5 mg/dL    Total Protein 7.3 6.0 - 8.4 g/dL    Albumin 4.5 3.5 - 5.2 g/dL    Total Bilirubin 0.2 0.1 - 1.0 mg/dL    Alkaline Phosphatase 32 (L) 40 - 150 U/L    AST 20 10 - 40 U/L    ALT 21 10 - 44 U/L    eGFR >60 >60 mL/min/1.73 m^2    Anion Gap 7 (L) 8 - 16 mmol/L   Troponin I #1    Collection Time: 03/04/25  6:26 PM   Result Value Ref Range    Troponin I <0.006 0.000 - 0.026 ng/mL   BNP    Collection Time: 03/04/25  6:26 PM   Result Value Ref Range    BNP <10 0 - 99 pg/mL   TSH    Collection Time: 03/04/25  6:26 PM   Result Value Ref Range    TSH 1.026 0.400 - 4.000 uIU/mL   Urinalysis    Collection Time: 03/04/25  8:01 PM   Result Value Ref Range    Specimen UA Urine, Clean Catch     Color, UA Yellow Yellow, Straw, Suzan    Appearance, UA Clear Clear    pH, UA 6.0 5.0 - 8.0    Specific Gravity, UA 1.010 1.005 - 1.030    Protein, UA Negative Negative    Glucose, UA Negative Negative    Ketones, UA Negative Negative    Bilirubin (UA) Negative Negative    Occult Blood UA Negative Negative    Nitrite, UA Negative Negative    Urobilinogen, UA Negative <2.0 EU/dL    Leukocytes, UA Negative Negative       Imaging Results:  Imaging Results              X-Ray Chest AP Portable (Final result)  Result time 03/04/25 18:25:59      Final result by Brad Rees MD (03/04/25 18:25:59)                   Impression:     No acute cardiopulmonary  abnormality.    Finalized on: 3/4/2025 6:25 PM By:  Brad Rees MD  [facility]# [id number]      2025-03-04 18:28:01.424     [facility]               Narrative:    EXAM:  XR CHEST AP PORTABLE    CLINICAL HISTORY: Chest pain    COMPARISON: None available.    FINDINGS: No confluent airspace opacity or consolidation.  There is no evidence of pleural effusion, pneumothorax, or other acute pulmonary disease.  The cardiomediastinal silhouette is within normal limits.  No acute osseous abnormality is evident.                                         The EKG was ordered, reviewed, and independently interpreted by the ED provider.  Interpretation time: 16:57  Rate: 94 BPM  Rhythm: normal sinus rhythm  Interpretation: Nonspecific T wave abnormality. No STEMI.  When compared to EKG performed on 15-Jul-2024 at 9:16, Vent. rate has increased by 31 BPM, and nonspecific T wave abnormality now evident in Lateral leads.           The Emergency Provider reviewed the vital signs and test results, which are outlined above.     ED Discussion     7:39 PM: Discussed pt's case with Dr. Booker Johnson MD (Cardiology) asking for recommendations. Informed him the pt is on midodrine and kerlone. Dr. Johnson recommends increasing midodrine to 10 mg to improve her BP and asks if she is taking Florinef. Told him that pt took Florinef last year but is currently not on it.    7:48 PM: Asked Dr. Johnson (Cardiology) if pt should restart Florinef and discussed her case further. Dr. Johnson recommends starting Florinef again. He also recommends administering a liter of fluids and checking orthostatics. Dr. Johnson says if the pt is stable, she can be discharged.    8:00 PM: Dr. Ramachandran transfers care of patient to Dr. Thomas.      9:55 PM: Reassessed pt at this time. Discussed with patient and/or family/caretaker all pertinent ED information and results. Discussed pt dx and plan of tx. Gave the patient all f/u and return to the ED instructions. All questions and concerns  were addressed at this time. Patient and/or family/caretaker expresses understanding of information and instructions, and is comfortable with plan to discharge. Pt is stable for discharge.     I discussed with patient and/or family/caretaker that evaluation in the ED does not suggest any emergent or life threatening medical conditions requiring immediate intervention beyond what was provided in the ED, and I believe patient is safe for discharge.  Regardless, an unremarkable evaluation in the ED does not preclude the development or presence of a serious of life threatening condition. As such, I instructed that the patient is to return immediately for any worsening or change in current symptoms.      ED Course as of 03/06/25 1205   Tue Mar 04, 2025   2125 Urinalysis  No UTI [CD]   2125 HIV 1/2 Ag/Ab (4th Gen)  Negative [CD]   2125 TSH  Within normal limits [CD]   2125 Troponin I #1  Within normal limit [CD]   2125 POCT glucose  Within normal limits [CD]   2125 Comprehensive metabolic panel(!)  Nonspecific findings [CD]   2125 Hepatitis C Antibody  Negative [CD]   2125 BNP  Within normal limits [CD]   2125 CBC auto differential  Within normal limits [CD]      ED Course User Index  [CD] Estevan Thomas, DO     Medical Decision Making  45 yo female with h/o POTS is being seen by Dr. Freeman, says she has been wearing a Holter monitor, continues to have weakness, feeling light headed when she stands, feels like she is going to pass out, having trouble catching her breath. O2 sats % on room air. Lungs clear, labs unremarkable. Looks like she is already on midodrine and kerlone. SBP . EKG NSR.  Cardiology consulted.    The patient's symptoms have improved.  She was instructed by myself on cardiology recommendation to start midodrine 10 mg by mouth 3 times a day and was given a 10 mg dose of midodrine while in the department.  She states that she has enough at home and is not requiring additional  prescription.  She is currently on fludrocortisone and is instructed to continue this as prescribed.  She is also on Aldactone for acne, she was instructed to discontinue this however she does not wish to do this and because of this I instructed that she needs to stay as hydrated as possible.  She verbalized understanding of this.  She will follow-up with Dr. Freeman.  She is instructed to return immediately for any new or worsening symptoms and she verbalized understanding.    Amount and/or Complexity of Data Reviewed  Independent Historian: caregiver     Details: Family member at bedside; provided some Hx, but pt provided majority of Hx.  External Data Reviewed: labs, radiology, ECG and notes.  Labs: ordered. Decision-making details documented in ED Course.  Radiology: ordered and independent interpretation performed. Decision-making details documented in ED Course.     Details: Chest x-ray shows no acute abnormality  ECG/medicine tests: ordered and independent interpretation performed. Decision-making details documented in ED Course.    Risk  OTC drugs.  Prescription drug management.  Decision regarding hospitalization.  Risk Details: Differential diagnosis includes but is not limited to:  POTS, dehydration, medication adverse effect electrolyte abnormality, dysrhythmia, ACS, heart failure    Critical Care  Total time providing critical care: 40 minutes                ED Medication(s):  Medications   sodium chloride 0.9% bolus 1,000 mL 1,000 mL (0 mLs Intravenous Stopped 3/4/25 2116)   ibuprofen tablet 800 mg (800 mg Oral Given 3/4/25 2043)   midodrine tablet 10 mg (10 mg Oral Given 3/4/25 2043)       Discharge Medication List as of 3/4/2025  9:55 PM           Follow-up Information       Schedule an appointment as soon as possible for a visit  with Justin Freeman MD.    Specialties: Interventional Cardiology, Cardiology  Contact information:  [address]  [place] [address] [phone]                Go to  [facility] - Emergency Dept..    Specialty: Emergency Medicine  Why: As needed, If symptoms worsen  Contact information:  [address]  [facility] [address] [phone]                               Scribe Attestation:   Scribe #1: I performed the above scribed service and the documentation accurately describes the services I performed. I attest to the accuracy of the note.     Attending:   Physician Attestation Statement for Scribe #1: I, Vianca Ramachandran MD, personally performed the services described in this documentation, as scribed by Corinne Rivera, in my presence, and it is both accurate and complete.       Scribe Attestation:   Scribe #2: I performed the above scribed service and the documentation accurately describes the services I performed. I attest to the accuracy of the note.    Attending Attestation:           Physician Attestation for Scribe:    Physician Attestation Statement for Scribe #2: I, Estevan Thomas DO, reviewed documentation, as scribed by Wyatt Castillo in my presence, and it is both accurate and complete. I also acknowledge and confirm the content of the note done by Scribe #1.           Clinical Impression       ICD-10-CM ICD-9-CM   1. POTS (postural orthostatic tachycardia syndrome)  G90.A 427.89   2. Chest pain  R07.9 786.50   3. SOB (shortness of breath)  R06.02 786.05   4. Orthostatic hypotension  I95.1 458.0       Disposition:   Disposition: Discharged  Condition: Stable       Estevan Thomas DO  03/06/25 1210

## 2025-03-05 NOTE — TELEPHONE ENCOUNTER
----- Message from Justin Freeman MD sent at 3/5/2025  4:22 AM CST -----  Cardiac monitor unremarkable  ----- Message -----  From: Armand Peterson MD  Sent: 3/4/2025   8:10 AM CST  To: Justin Freeman MD

## 2025-03-05 NOTE — DISCHARGE INSTRUCTIONS
1.)  Increase midodrine to 10 mg by mouth 3 times per day  2.)  Continue fludrocortisone as prescribed  3.)  It is recommended that you discontinue spironolactone  4.)  Stay hydrated

## 2025-03-06 ENCOUNTER — PATIENT MESSAGE (OUTPATIENT)
Dept: CARDIOLOGY | Facility: CLINIC | Age: 45
End: 2025-03-06
Payer: COMMERCIAL

## 2025-03-12 ENCOUNTER — OFFICE VISIT (OUTPATIENT)
Dept: CARDIOLOGY | Facility: CLINIC | Age: 45
End: 2025-03-12
Payer: COMMERCIAL

## 2025-03-12 ENCOUNTER — TELEPHONE (OUTPATIENT)
Dept: INTERNAL MEDICINE | Facility: CLINIC | Age: 45
End: 2025-03-12
Payer: COMMERCIAL

## 2025-03-12 DIAGNOSIS — R55 VASODEPRESSOR SYNCOPE: Primary | ICD-10-CM

## 2025-03-12 DIAGNOSIS — I95.1 ORTHOSTASIS: ICD-10-CM

## 2025-03-12 DIAGNOSIS — R00.2 HEART PALPITATIONS: ICD-10-CM

## 2025-03-12 DIAGNOSIS — G43.109 MIGRAINE WITH AURA AND WITHOUT STATUS MIGRAINOSUS, NOT INTRACTABLE: ICD-10-CM

## 2025-03-12 DIAGNOSIS — I95.1 POSTURAL HYPOTENSION: Chronic | ICD-10-CM

## 2025-03-12 RX ORDER — TOPIRAMATE 50 MG/1
50 TABLET, FILM COATED ORAL 2 TIMES DAILY
Qty: 180 TABLET | Refills: 3 | Status: SHIPPED | OUTPATIENT
Start: 2025-03-12 | End: 2025-05-11

## 2025-03-12 RX ORDER — FLUDROCORTISONE ACETATE 0.1 MG/1
200 TABLET ORAL DAILY
Qty: 180 TABLET | Refills: 3 | Status: SHIPPED | OUTPATIENT
Start: 2025-03-12

## 2025-03-12 NOTE — PROGRESS NOTES
Subjective:   Patient ID:  Arielle Verde is a 44 y.o. female who presents for follow-up of No chief complaint on file.  The patient location is: home  The chief complaint leading to consultation is:   Visit type: Virtual visit with synchronous audio and video  Total time spent with patient: 10 minutes including chart review  Each patient to whom he or she provides medical services by telemedicine is:  (1) informed of the relationship between the physician and patient and the respective role of any other health care provider with respect to management of the patient; and (2) notified that he or she may decline to receive medical services by telemedicine and may withdraw from such care at any time.     Pt seen in [facility] for dizziness and low BP. Pt given IV fluids and midodrine increased. Pt states tid midodrine give her H/A.  Pt still with orthostatic sx  Cardiac monitor unremarkable  Apparent last 5 days not recorded, has another monitor  Dizziness:   Chronicity:  Recurrent  Onset:  More than 1 year ago  Progression since onset:  Waxing and waning  Frequency:  Every few days  Dizziness characteristics:  Off-balance  Frequency of Spells:  Weekly   Associated symptoms: palpitations.no chest pain.  Aggravated by:  Getting up  Treatments tried:  Rest  Improvements on treatment:  Mild  Palpitations   This is a recurrent problem. The current episode started more than 1 month ago. The problem occurs rarely. Nothing aggravates the symptoms. Associated symptoms include dizziness. Pertinent negatives include no chest pain or shortness of breath. Treatments tried: midodrine. The treatment provided mild relief. There are no known risk factors.       Review of Systems   Constitutional: Negative. Negative for weight gain.   HENT: Negative.     Eyes: Negative.    Cardiovascular:  Positive for palpitations. Negative for chest pain and leg swelling.   Respiratory: Negative.  Negative for shortness of breath.    Endocrine:  Negative.    Hematologic/Lymphatic: Negative.    Skin: Negative.    Musculoskeletal:  Negative for muscle weakness.   Gastrointestinal: Negative.    Genitourinary: Negative.    Neurological:  Positive for dizziness.   Psychiatric/Behavioral: Negative.     Allergic/Immunologic: Negative.    All other systems reviewed and are negative.    Family History   Problem Relation Name Age of Onset    Lupus Mother      Ulcerative colitis Mother      Kidney failure Father      Drug abuse Father      Breast cancer Paternal Grandmother      Colon cancer Neg Hx      Ovarian cancer Neg Hx       Past Medical History:   Diagnosis Date    Anxiety     Chronic headaches     SALLIE III (cervical intraepithelial neoplasia grade III) with severe dysplasia 2019    s/p LEEP    Depression     Migraine      Social History[1]  Medications Ordered Prior to Encounter[2]  Review of patient's allergies indicates:   Allergen Reactions    Emgality [galcanezumab-gnlm] Hives and Itching       Objective:     Physical Exam  Constitutional:       Appearance: Normal appearance.   Neurological:      Mental Status: She is alert.         Assessment:     1. Vasodepressor syncope    2. Postural hypotension    3. Orthostasis    4. 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     Comment: Mirena 3/30/20 LOT [id number] exp 4.2022     Social  Drivers of Health     Financial Resource Strain: Patient Declined (3/12/2025)    Overall Financial Resource Strain (CARDIA)     Difficulty of Paying Living Expenses: Patient declined   Food Insecurity: Patient Declined (3/12/2025)    Hunger Vital Sign     Worried About Running Out of Food in the Last Year: Patient declined     Ran Out of Food in the Last Year: Patient declined   Transportation Needs: No Transportation Needs (3/12/2025)    PRAPARE - Transportation     Lack of Transportation (Medical): No     Lack of Transportation (Non-Medical): No   Physical Activity: Patient Declined (3/12/2025)    Exercise Vital Sign     Days of Exercise per Week: Patient declined     Minutes of Exercise per Session: Patient declined   Stress: Stress Concern Present (3/12/2025)    [employer] - Occupational Stress Questionnaire     Feeling of Stress : Very much   Housing Stability: Unknown (3/12/2025)    Housing Stability Vital Sign     Unable to Pay for Housing in the Last Year: Patient declined     Number of Times Moved in the Last Year: 0     Homeless in the Last Year: No   [2]   Current Outpatient Medications on File Prior to Visit   Medication Sig Dispense Refill    aluminum chloride (DRYSOL) 20 % external solution AAA 2-3 nights and then taper to 1-2 times per week as needed. 30 mL 2    atomoxetine (STRATTERA) 80 MG capsule Take 1 capsule (80 mg total) by mouth once daily. 90 capsule 1    benzonatate (TESSALON) 200 MG capsule Take 200 mg by mouth 3 (three) times daily as needed for Cough.      betaxoloL (KERLONE) 10 mg Tab Take 1 tablet (10 mg total) by mouth once daily. Start taking 2.5 mg daily 30 tablet 11    buPROPion (WELLBUTRIN XL) 150 MG TB24 tablet Take 3 tablets (450 mg total) by mouth once daily. 270 tablet 1    busPIRone (BUSPAR) 15 MG tablet Take 1 tablet (15 mg total) by mouth 2 (two) times daily. (FOR ANXIETY) TAKE 2 TABLETS BY MOUTH TWICE DAILY 360 tablet 1    cetirizine (ZYRTEC)  10 MG tablet Take 10 mg by mouth daily as needed.   0    chlorzoxazone (PARAFON FORTE) 500 mg Tab Take 500 mg by mouth 2 (two) times daily as needed.      clindamycin (CLEOCIN T) 1 % Swab Apply topically 2 (two) times daily. AAA qd to bid as tolerated for acne. Decrease frequency if any dryness. 60 each 10    clonazePAM (KLONOPIN) 0.5 MG tablet Take 1 tablet daily as needed for anxiety. 30 tablet 0    EPINEPHrine (EPIPEN 2-DUANE) 0.3 mg/0.3 mL AtIn Inject 0.6 mLs (0.6 mg total) into the muscle once. for 1 dose 0.6 mL 0    fluticasone propionate (FLONASE) 50 mcg/actuation nasal spray 1 spray (50 mcg total) by Each Nostril route once daily. 18.2 mL 1    glycopyrronium tosylate (QBREXZA) 2.4 % Towl Use one pad for both underarms daily. Wash hands thoroughly after using. 30 each 5    levothyroxine (SYNTHROID) 25 MCG tablet Take 1 tablet (25 mcg total) by mouth before breakfast. Due for annual with PCP, labs 90 tablet 3    metFORMIN (GLUCOPHAGE) 500 MG tablet Take 1 tablet (500 mg total) by mouth 2 (two) times daily. 180 tablet 3    midodrine (PROAMATINE) 10 MG tablet Take 1 tablet (10 mg total) by mouth 3 (three) times daily with meals. 42 tablet 0    mometasone (ELOCON) 0.1 % solution AAA of scalp once daily. 60 mL 3    montelukast (SINGULAIR) 10 mg tablet Take 1 tablet (10 mg total) by mouth every evening. 90 tablet 4    NURTEC 75 mg odt Take 75 mg by mouth once as needed for Migraine.      potassium chloride SA (K-DUR,KLOR-CON) 20 MEQ tablet Take 1 tablet (20 mEq total) by mouth 2 (two) times daily. 180 tablet 3    rizatriptan (MAXALT) 10 MG tablet Take 10 mg by mouth daily as needed.      spironolactone (ALDACTONE) 25 MG tablet Take 25 mg by mouth 2 (two) times daily.      traZODone (DESYREL) 100 MG tablet TAKE 1 TO 3 TABLETS BY MOUTH EVERY NIGHT AT BEDTIME AS NEEDED for sleep 270 tablet 1    triamcinolone acetonide 0.025% (KENALOG) 0.025 % cream Apply topically 2 (two) times daily as needed (prn rash between  breasts). Mild steroid. Use sparingly for 1-2 weeks if needed then stop. 80 g 1    trifarotene (AKLIEF) 0.005 % Crea Apply 1 application  topically every evening. Decrease frequency if any irritation. 45 g 3    [DISCONTINUED] fludrocortisone (FLORINEF) 0.1 mg Tab Take 1 tablet (100 mcg total) by mouth once daily. 180 tablet 3    [DISCONTINUED] topiramate (TOPAMAX) 25 MG tablet Take 2 tablets (50 mg total) by mouth 2 (two) times daily. 120 tablet 1     Current Facility-Administered Medications on File Prior to Visit   Medication Dose Route Frequency Provider Last Rate Last Admin    levonorgestreL 20 mcg/24 hours (5 yrs) 52 mg IUD 1 Intra Uterine Device  1 each Intrauterine 1 time in Clinic/HOD Rachele Willis MD        onabotulinumtoxina injection 200 Units  200 Units Intramuscular Q90 Days

## 2025-03-24 ENCOUNTER — TELEPHONE (OUTPATIENT)
Dept: NEUROLOGY | Facility: CLINIC | Age: 45
End: 2025-03-24
Payer: COMMERCIAL

## 2025-03-25 ENCOUNTER — TELEPHONE (OUTPATIENT)
Dept: NEUROLOGY | Facility: CLINIC | Age: 45
End: 2025-03-25
Payer: COMMERCIAL

## 2025-03-25 NOTE — TELEPHONE ENCOUNTER
----- Message from Lisa sent at 3/25/2025  8:54 AM CDT -----  Regarding: Return Call  Contact: patient  Type:  Patient Returning CallWho Called:Arielle Who Left Message for Patient: Nette Does the patient know what this is regarding?: procedure /RescheduleWould the patient rather a call back or a response via [facility]?  Call back Best Call Back Number: [phone] (home) Additional Information: Leidy,DACIA

## 2025-03-25 NOTE — TELEPHONE ENCOUNTER
Pt rescheduled to 05/09/52025 due to change in MD scheduled .  Procedures  are now done on Friday mornings .

## 2025-05-09 ENCOUNTER — PROCEDURE VISIT (OUTPATIENT)
Dept: NEUROLOGY | Facility: CLINIC | Age: 45
End: 2025-05-09
Payer: COMMERCIAL

## 2025-05-09 VITALS
DIASTOLIC BLOOD PRESSURE: 61 MMHG | BODY MASS INDEX: 21.14 KG/M2 | SYSTOLIC BLOOD PRESSURE: 93 MMHG | HEIGHT: 71 IN | HEART RATE: 83 BPM | WEIGHT: 151 LBS

## 2025-05-09 DIAGNOSIS — G43.019 INTRACTABLE MIGRAINE WITHOUT AURA AND WITHOUT STATUS MIGRAINOSUS: Primary | ICD-10-CM

## 2025-05-09 NOTE — PROCEDURES
BOTOX  HAS PROVEN VERY EFFECTIVE IN SIGNIFICANTLY REDUCING THE SEVERITY AND FREQUENCY OF MIGRAINE HEADACHES                  PROCEDURE NAME:       INTRAMUSCULAR BOTOX INJECTIONS            PROCEDURE INDICATION:      INTRACTABLE (PHARMACOLOGICALLY UNRESPONSIVE/RESISTANT) MIGRAINE           PROCEDURE DESCRIPTION:     The procedure was discussed in detail with the patient and the consent form was signed. The patient verbalized understanding of the procedure .     I discussed the risks that may include serious immune reaction and distant spread that could results in loss of breathing. Other side effects may include droopy eyelids, trouble swallowing and cardiac arrhythmias. It was also stressed that it is contraindicated in pregnancy.         PROCEDURE TIME OUT PROCESS:      Time Out was called.      Two patient identifiers were used (first and last name in addition to date of birth). The patient stated the procedure being done (Botox injections) in the scalp muscles (both sides).             PROCEDURE EXECUTION:      As per the standard protocol, a total 155 units were injected in 31 sites.            RT  5 units in 1 site     LT  5 units in 1 site      Procerus 5 units in 1 site      RT Frontalis 10 units in 2 sites      LT Frontalis 10 units in 2 sites      RT Temporalis 20 units in 4 sites     LT Temporalis 20 units in 4 sites     RT Occipitalis 15 units in 3 sites     LT Occipitalis 15 units in 3 sites      RT Cervical Paraspinals 10 units in 2 sites     LT Cervical Paraspinals 10 units in 2 sites     RT Trapezius 15 units in 3 sites     LT Trapezius 15 units in 3 sites         Per the standard of care protocol we use 155 units and waste 45 units. I gave the patient the option of following the standard protocol vs.using the extra 45 units to target areas where the pain is maximum which could potentially provide extra help with headache control. I explained to the patient that this will be an  off-label use, not the standard protocol, not evidence-based and could result in more pronounced side effects. The patient verbalized full understanding of all the facts and the risks and benefits and elected to use the extra 45 units for the following sites:           Procerus 5 units in 1 site      RT Frontalis 10 units in 2 sites      LT Frontalis 10 units in 2 site      RT Temporalis 10 units in 2 sites           PROCEDURE COURSE:        The standard protocol was followed. The procedure was executed very smoothly.            PROCEDURE COMPLICATIONS:      None. The patient tolerated the procedure very well.            PROCEDURE AFTERCARE:      I encouraged the patient to use ice if the sites of injection get tender and call me with any problems or complications.            FOLLOW UP:        RTC in 10 weeks for a new set of injections and call with any questions and/or concerns.

## 2025-06-11 ENCOUNTER — PATIENT MESSAGE (OUTPATIENT)
Dept: CARDIOLOGY | Facility: CLINIC | Age: 45
End: 2025-06-11
Payer: COMMERCIAL

## 2025-06-11 RX ORDER — MIDODRINE HYDROCHLORIDE 10 MG/1
10 TABLET ORAL
Qty: 180 TABLET | Refills: 5 | Status: SHIPPED | OUTPATIENT
Start: 2025-06-11

## 2025-07-21 ENCOUNTER — OFFICE VISIT (OUTPATIENT)
Dept: PSYCHIATRY | Facility: CLINIC | Age: 45
End: 2025-07-21
Payer: COMMERCIAL

## 2025-07-21 VITALS
BODY MASS INDEX: 22.08 KG/M2 | HEART RATE: 73 BPM | WEIGHT: 158.31 LBS | DIASTOLIC BLOOD PRESSURE: 72 MMHG | SYSTOLIC BLOOD PRESSURE: 106 MMHG

## 2025-07-21 DIAGNOSIS — R41.840 DISTURBED CONCENTRATION: ICD-10-CM

## 2025-07-21 DIAGNOSIS — F41.9 ANXIETY: ICD-10-CM

## 2025-07-21 DIAGNOSIS — F32.A CHRONIC DEPRESSION: Primary | ICD-10-CM

## 2025-07-21 PROBLEM — Z01.419 WELL WOMAN EXAM: Status: ACTIVE | Noted: 2025-07-21

## 2025-07-21 PROBLEM — Z78.0 MENOPAUSE: Status: ACTIVE | Noted: 2025-07-21

## 2025-07-21 PROCEDURE — 3008F BODY MASS INDEX DOCD: CPT | Mod: CPTII,S$GLB,, | Performed by: PSYCHIATRY & NEUROLOGY

## 2025-07-21 PROCEDURE — 99999 PR PBB SHADOW E&M-EST. PATIENT-LVL II: CPT | Mod: PBBFAC,,, | Performed by: PSYCHIATRY & NEUROLOGY

## 2025-07-21 PROCEDURE — 3078F DIAST BP <80 MM HG: CPT | Mod: CPTII,S$GLB,, | Performed by: PSYCHIATRY & NEUROLOGY

## 2025-07-21 PROCEDURE — 3074F SYST BP LT 130 MM HG: CPT | Mod: CPTII,S$GLB,, | Performed by: PSYCHIATRY & NEUROLOGY

## 2025-07-21 PROCEDURE — 99214 OFFICE O/P EST MOD 30 MIN: CPT | Mod: S$GLB,,, | Performed by: PSYCHIATRY & NEUROLOGY

## 2025-07-21 RX ORDER — CLONAZEPAM 0.5 MG/1
TABLET ORAL
Qty: 30 TABLET | Refills: 0 | Status: SHIPPED | OUTPATIENT
Start: 2025-07-21

## 2025-07-21 RX ORDER — BUPROPION HYDROCHLORIDE 150 MG/1
450 TABLET ORAL DAILY
Qty: 270 TABLET | Refills: 1 | Status: SHIPPED | OUTPATIENT
Start: 2025-07-21

## 2025-07-21 RX ORDER — TRAZODONE HYDROCHLORIDE 100 MG/1
TABLET ORAL
Qty: 270 TABLET | Refills: 1 | Status: SHIPPED | OUTPATIENT
Start: 2025-07-21

## 2025-07-21 RX ORDER — BUSPIRONE HYDROCHLORIDE 15 MG/1
15 TABLET ORAL 2 TIMES DAILY
Qty: 360 TABLET | Refills: 1 | Status: SHIPPED | OUTPATIENT
Start: 2025-07-21

## 2025-07-21 RX ORDER — ATOMOXETINE 80 MG/1
80 CAPSULE ORAL DAILY
Qty: 90 CAPSULE | Refills: 1 | Status: SHIPPED | OUTPATIENT
Start: 2025-07-21

## 2025-07-21 NOTE — PROGRESS NOTES
"Outpatient Psychiatry Visit (MD/NP)    7/21/2025    Arielle Verde, a 45 y.o. female, presenting for visit. Met with patient.    Reason for Encounter: Patient follow-up, anxiety, depression.    History of Present Illness:     Patient presents for a follow-up visit for psychiatric conditions and to discuss multiple ongoing health issues, including fatigue, low libido, and dehydration related to various medical conditions.    She reports being "super tired all the time" and feeling dehydrated, attributed to her POTS (Postural Orthostatic Tachycardia Syndrome) and low thyroid function. She is also taking a diuretic which may be contributing to her dehydration. Patient mentions experiencing low sexual libido, potentially a side effect of her medications, particularly Strattera (atomoxetine).    Patient is going through pre-menopause and reports that she is "trying to balance out all my hormones." She has just been prescribed Estradiol to start today, while awaiting further labs results.    Patient was recently hospitalized due to severe constipation, not having had a bowel movement for almost two weeks. A CT of her abdomen and pelvic area revealed a large ovarian cyst, gallstones, and kidney stones. She is now taking Miralax every morning to manage constipation. Despite variable appetite, the patient reports gaining 8 lbs in the last month.    Patient describes experiencing brain fog and difficulty concentrating, which is starting to affect her work performance. Sleep quality is inconsistent. She reports sometimes sleeping well, while other times she has trouble staying asleep throughout the night. She takes 300mg of trazodone nightly, which helps her fall asleep but does not always keep her asleep.    Patient mentions dealing with depression "more so than normal" recently. Her mood has been "okay," but she acknowledges there's "a lot just internally going on."    Patient is on multiple medications for " "various conditions including POTS, thyroid issues, migraines, and psychiatric concerns. She expresses frustration with the complexity of her medication regimen, stating, "I feel like I'm taking medicines that fix one thing but then I have to take another medicine because of that thing."    Patient denies any issues with family life or relationships. She also denies having diabetes or polycystic ovary syndrome.    MEDICATIONS: Pt started Estradiol today for pre-menopause. She is on Florinef and Pro-ametyne for POTS, Topamax for migraines, Metformin for previous prediabetes, and Synthroid for low thyroid. She is also on Spironolactone as a diuretic, Singulair, & Nurtec for migraines. Pt is taking Strattera for concentration, which is causing low libido. She is on Klonopin (Clonazepam) 0.5 mg orally as needed for anxiety, and Trazodone 300 mg orally nightly for sleep. She is on buspirone & bupropion. Patient takes Miralax orally every morning for constipation.    IMAGING: Pt underwent a CT of the Abdomen and Pelvis on Tuesday, which revealed a large cyst on her ovary, gallstones, and kidney stones.    LIFESTYLE: Patient is currently employed, but her work is starting to be affected by concentration issues and brain fog. She reports low sexual libido, which may be a side effect of medication (possibly Strattera). Pt lives at home with partner and two children, one of whom is 16-17 years old.         Review Of Systems:     GENERAL:  No weight gain or loss  SKIN:  No rashes or lacerations    Current Evaluation:     Nutritional Screening: Considering the patient's height and weight, medications, medical history and preferences, should a referral be made to the dietitian? no    Constitutional  Vitals:  Most recent vital signs, dated less than 90 days prior to this appointment, were reviewed.    Vitals:    07/21/25 1048   BP: 106/72   Pulse: 73   Weight: 71.8 kg (158 lb 4.6 oz)        General:  unremarkable, age appropriate "     Musculoskeletal  Muscle Strength/Tone:  no tremor, no tic   Gait & Station:  non-ataxic     Psychiatric  Appearance: casually dressed & groomed;   Behavior: calm,   Cooperation: cooperative with assessment  Speech: normal rate, volume, tone  Thought Process: linear, goal-directed  Thought Content: No suicidal or homicidal ideation; no delusions  Affect: normal range  Mood: euthymic  Perceptions: No auditory or visual hallucinations  Level of Consciousness: alert throughout interview  Insight: fair  Cognition: Oriented to person, place, time, & situation  Memory: no apparent deficits to general clinical interview; not formally assessed  Attention/Concentration: no apparent deficits to general clinical interview; not formally assessed  Fund of Knowledge: average by vocabulary/education    Laboratory Data  Lab Visit on 07/21/2025   Component Date Value Ref Range Status    Follicle Stimulating Hormone 07/21/2025 2.17  See Text mIU/mL Final    Estradiol 07/21/2025 151  See Text pg/mL Final    Testosterone, Total 07/21/2025 22  5 - 73 ng/dL Final    LH 07/21/2025 0.8  See Text mIU/mL Final    Progesterone 07/21/2025 0.2  See Text ng/mL Final   Office Visit on 07/21/2025   Component Date Value Ref Range Status    HPV other High Risk types, PCR 07/21/2025 Not Detected  Not Detected Final     Medications  Encounter Medications[1]    Assessment - Diagnosis - Goals:     Impression: 45 year old woman with hx of depression, anxiety, symptoms of which are ongoing, mild manageable.     Treatment Plan/Recommendations:    Recurrent major depressive disorder  Anxiety  Insomnia    Multiple health issues including thyroid problems, POTS, and pre-menopause, contributing to fatigue, low libido, and dehydration.  Recent hospital visit for significant constipation, resulting in discovery of ovarian cyst, gallstones, and kidney stones.  Ongoing process of medication adjustments, including recent addition of Estradiol by  "gynecologist.  Evaluated current medication regimen, including Metformin, which may no longer be necessary given improved lab results.  Strattera likely contributor to decreased libido; proposed strategy for managing sexual side effects through timed dosing and occasional "medicine holidays."  Sleep issues, noting high dose of Trazodone (300 mg) with inconsistent effectiveness.  Potential impact of hormonal changes on mood and depression, explaining how female hormones can affect mood similarly to antidepressants.  Clarified that libido issues may be related to other health factors beyond medication side effects.    MAJOR DEPRESSIVE DISORDER:  1. Potential impact of hormonal changes on mood and depression, explaining how female hormones can affect mood similarly to antidepressants.  2. Continue Bupropion.    ANXIETY DISORDER, UNSPECIFIED:  1. Continue Buspirone.  2. Refilled clonazepam (Klonopin) for as-needed use in anxiety, instructed to use intermittently when feeling overwhelmed.    INSOMNIA, UNSPECIFIED:  1. Continue Trazodone 300 mg nightly for sleep.    MENOPAUSAL AND FEMALE CLIMACTERIC STATES:  1. Potential impact of hormonal changes on mood and depression, explaining how female hormones can affect mood similarly to antidepressants.    HYPOACTIVE SEXUAL DESIRE:  1. For side effect of strattera: Skip dose 24-48 hours before anticipated sexual activity, resume regular dosing after sexual activity.    R41.840 ATTENTION AND CONCENTRATION DEFICIT:  1. Continue Strattera for concentration.    LIFESTYLE CHANGES:  1. Follow up in approximately 6 months, after New Year's, with option for video appointment, noting requirement for 1 in-clinic visit per year has been met.        Return to Clinic: 6 months    TAHIR Thomas MD  Psychiatry  [facility]   [address] [facility].  [phone]    This note was generated with the assistance of ambient listening technology. Verbal consent was obtained by the " patient and accompanying visitor(s) for the recording of patient appointment to facilitate this note. I attest to having reviewed and edited the generated note for accuracy, though some syntax or spelling errors may persist. Please contact the author of this note for any clarification.              [1]   Outpatient Encounter Medications as of 7/21/2025   Medication Sig Dispense Refill    aluminum chloride (DRYSOL) 20 % external solution AAA 2-3 nights and then taper to 1-2 times per week as needed. 30 mL 2    atomoxetine (STRATTERA) 80 MG capsule Take 1 capsule (80 mg total) by mouth once daily. 90 capsule 1    benzonatate (TESSALON) 200 MG capsule Take 200 mg by mouth 3 (three) times daily as needed for Cough.      betaxoloL (KERLONE) 10 mg Tab Take 1 tablet (10 mg total) by mouth once daily. Start taking 2.5 mg daily 30 tablet 11    buPROPion (WELLBUTRIN XL) 150 MG TB24 tablet Take 3 tablets (450 mg total) by mouth once daily. 270 tablet 1    busPIRone (BUSPAR) 15 MG tablet Take 1 tablet (15 mg total) by mouth 2 (two) times daily. (FOR ANXIETY) TAKE 2 TABLETS BY MOUTH TWICE DAILY 360 tablet 1    cetirizine (ZYRTEC) 10 MG tablet Take 10 mg by mouth daily as needed.   0    chlorzoxazone (PARAFON FORTE) 500 mg Tab Take 500 mg by mouth 2 (two) times daily as needed.      clindamycin (CLEOCIN T) 1 % Swab Apply topically 2 (two) times daily. AAA qd to bid as tolerated for acne. Decrease frequency if any dryness. 60 each 10    clonazePAM (KLONOPIN) 0.5 MG tablet Take 1 tablet daily as needed for anxiety. 30 tablet 0    EPINEPHrine (EPIPEN 2-DUANE) 0.3 mg/0.3 mL AtIn Inject 0.6 mLs (0.6 mg total) into the muscle once. for 1 dose 0.6 mL 0    estradioL (ESTRACE) 0.5 MG tablet Take 1 tablet (0.5 mg total) by mouth once daily. 30 tablet 11    fludrocortisone (FLORINEF) 0.1 mg Tab Take 2 tablets (200 mcg total) by mouth once daily. 180 tablet 3    fluticasone propionate (FLONASE) 50 mcg/actuation nasal spray 1 spray (50 mcg  total) by Each Nostril route once daily. 18.2 mL 1    glycopyrronium tosylate (QBREXZA) 2.4 % Towl Use one pad for both underarms daily. Wash hands thoroughly after using. 30 each 5    levothyroxine (SYNTHROID) 25 MCG tablet Take 1 tablet (25 mcg total) by mouth before breakfast. Due for annual with PCP, labs 90 tablet 3    metFORMIN (GLUCOPHAGE) 500 MG tablet Take 1 tablet (500 mg total) by mouth 2 (two) times daily. 180 tablet 3    midodrine (PROAMATINE) 10 MG tablet Take 1 tablet (10 mg total) by mouth 3 (three) times daily with meals. 180 tablet 5    mometasone (ELOCON) 0.1 % solution AAA of scalp once daily. 60 mL 3    montelukast (SINGULAIR) 10 mg tablet Take 1 tablet (10 mg total) by mouth every evening. 90 tablet 4    NURTEC 75 mg odt Take 75 mg by mouth once as needed for Migraine.      potassium chloride SA (K-DUR,KLOR-CON) 20 MEQ tablet Take 1 tablet (20 mEq total) by mouth 2 (two) times daily. 180 tablet 3    rizatriptan (MAXALT) 10 MG tablet Take 10 mg by mouth daily as needed.      spironolactone (ALDACTONE) 25 MG tablet Take 25 mg by mouth 2 (two) times daily.      topiramate (TOPAMAX) 50 MG tablet Take 1 tablet (50 mg total) by mouth 2 (two) times daily. 180 tablet 3    traZODone (DESYREL) 100 MG tablet TAKE 1 TO 3 TABLETS BY MOUTH EVERY NIGHT AT BEDTIME AS NEEDED for sleep 270 tablet 1    triamcinolone acetonide 0.025% (KENALOG) 0.025 % cream Apply topically 2 (two) times daily as needed (prn rash between breasts). Mild steroid. Use sparingly for 1-2 weeks if needed then stop. 80 g 1    trifarotene (AKLIEF) 0.005 % Crea Apply 1 application  topically every evening. Decrease frequency if any irritation. 45 g 3    [DISCONTINUED] atomoxetine (STRATTERA) 80 MG capsule Take 1 capsule (80 mg total) by mouth once daily. 90 capsule 1    [DISCONTINUED] buPROPion (WELLBUTRIN XL) 150 MG TB24 tablet Take 3 tablets (450 mg total) by mouth once daily. 270 tablet 1    [DISCONTINUED] busPIRone (BUSPAR) 15 MG tablet  Take 1 tablet (15 mg total) by mouth 2 (two) times daily. (FOR ANXIETY) TAKE 2 TABLETS BY MOUTH TWICE DAILY 360 tablet 1    [DISCONTINUED] clonazePAM (KLONOPIN) 0.5 MG tablet Take 1 tablet daily as needed for anxiety. 30 tablet 0    [DISCONTINUED] traZODone (DESYREL) 100 MG tablet TAKE 1 TO 3 TABLETS BY MOUTH EVERY NIGHT AT BEDTIME AS NEEDED for sleep 270 tablet 1     Facility-Administered Encounter Medications as of 7/21/2025   Medication Dose Route Frequency Provider Last Rate Last Admin    levonorgestreL 20 mcg/24 hours (5 yrs) 52 mg IUD 1 Intra Uterine Device  1 each Intrauterine 1 time in Clinic/HOD Rachele Willis MD        onabotulinumtoxina injection 200 Units  200 Units Intramuscular Q90 Days    200 Units at 05/09/25 1117

## 2025-07-22 ENCOUNTER — HOSPITAL ENCOUNTER (OUTPATIENT)
Dept: RADIOLOGY | Facility: HOSPITAL | Age: 45
Discharge: HOME | End: 2025-07-22
Attending: INTERNAL MEDICINE
Payer: COMMERCIAL

## 2025-07-22 DIAGNOSIS — Z12.31 ENCOUNTER FOR SCREENING MAMMOGRAM FOR BREAST CANCER: ICD-10-CM

## 2025-07-22 PROCEDURE — 77067 SCR MAMMO BI INCL CAD: CPT | Mod: 26,,, | Performed by: RADIOLOGY

## 2025-07-22 PROCEDURE — 77063 BREAST TOMOSYNTHESIS BI: CPT | Mod: 26,,, | Performed by: RADIOLOGY

## 2025-07-22 PROCEDURE — 77067 SCR MAMMO BI INCL CAD: CPT | Mod: TC,PN

## 2025-07-23 ENCOUNTER — OFFICE VISIT (OUTPATIENT)
Dept: GASTROENTEROLOGY | Facility: CLINIC | Age: 45
End: 2025-07-23
Payer: COMMERCIAL

## 2025-07-23 ENCOUNTER — RESULTS FOLLOW-UP (OUTPATIENT)
Dept: INTERNAL MEDICINE | Facility: CLINIC | Age: 45
End: 2025-07-23
Payer: COMMERCIAL

## 2025-07-23 VITALS
DIASTOLIC BLOOD PRESSURE: 65 MMHG | HEART RATE: 73 BPM | BODY MASS INDEX: 22.1 KG/M2 | HEIGHT: 71 IN | SYSTOLIC BLOOD PRESSURE: 97 MMHG | WEIGHT: 157.88 LBS

## 2025-07-23 DIAGNOSIS — R10.11 RUQ PAIN: ICD-10-CM

## 2025-07-23 DIAGNOSIS — K80.20 GALLSTONES: ICD-10-CM

## 2025-07-23 DIAGNOSIS — K59.04 CHRONIC IDIOPATHIC CONSTIPATION: Primary | ICD-10-CM

## 2025-07-23 DIAGNOSIS — R10.84 GENERALIZED ABDOMINAL PAIN: ICD-10-CM

## 2025-07-23 PROCEDURE — 1160F RVW MEDS BY RX/DR IN RCRD: CPT | Mod: CPTII,S$GLB,, | Performed by: NURSE PRACTITIONER

## 2025-07-23 PROCEDURE — 99204 OFFICE O/P NEW MOD 45 MIN: CPT | Mod: S$GLB,,, | Performed by: NURSE PRACTITIONER

## 2025-07-23 PROCEDURE — 3078F DIAST BP <80 MM HG: CPT | Mod: CPTII,S$GLB,, | Performed by: NURSE PRACTITIONER

## 2025-07-23 PROCEDURE — 3008F BODY MASS INDEX DOCD: CPT | Mod: CPTII,S$GLB,, | Performed by: NURSE PRACTITIONER

## 2025-07-23 PROCEDURE — 1159F MED LIST DOCD IN RCRD: CPT | Mod: CPTII,S$GLB,, | Performed by: NURSE PRACTITIONER

## 2025-07-23 PROCEDURE — 3074F SYST BP LT 130 MM HG: CPT | Mod: CPTII,S$GLB,, | Performed by: NURSE PRACTITIONER

## 2025-07-23 PROCEDURE — 99999 PR PBB SHADOW E&M-EST. PATIENT-LVL V: CPT | Mod: PBBFAC,,, | Performed by: NURSE PRACTITIONER

## 2025-07-23 RX ORDER — POLYETHYLENE GLYCOL 3350, SODIUM SULFATE ANHYDROUS, SODIUM BICARBONATE, SODIUM CHLORIDE, POTASSIUM CHLORIDE 236; 22.74; 6.74; 5.86; 2.97 G/4L; G/4L; G/4L; G/4L; G/4L
4 POWDER, FOR SOLUTION ORAL ONCE
Qty: 4000 ML | Refills: 0 | Status: SHIPPED | OUTPATIENT
Start: 2025-07-23 | End: 2025-07-23

## 2025-07-23 NOTE — PROGRESS NOTES
Clinic Consult:  [facility] Consultation Note    Reason for Consult:  The primary encounter diagnosis was Chronic idiopathic constipation. Diagnoses of Generalized abdominal pain, RUQ pain, and Gallstones were also pertinent to this visit.    PCP: Karel Boston   No address on file    HPI:  This is a 45 y.o. female here for evaluation of constipation.   She has chronic constipation with having bowel movements about every 3 days. Sometimes is hard and sometimes is soft. Sometimes they feel incomplete. Last week, she went about 10 days without a bowel movement. She was having associated abdominal pain. She went to the ER. She had a CT scan that showed a large amount of stool. Also showed gallstones.     She does mention that she has RUQ pain. The pain can get severe at times. She did not have gallstones on ultrasound in 2019.     She did have a colonoscopy about 15 years ago.     Review of Systems   Constitutional:  Negative for fever.   Respiratory:  Negative for cough and shortness of breath.    Cardiovascular:  Negative for chest pain.   Gastrointestinal:  Positive for abdominal pain and constipation. Negative for blood in stool, diarrhea, heartburn, melena, nausea and vomiting.       Medical History:  has a past medical history of Anxiety, Chronic headaches, SALLIE III (cervical intraepithelial neoplasia grade III) with severe dysplasia (2019), Depression, and Migraine.    Surgical History:  has a past surgical history that includes Tonsillectomy; Adenoidectomy; Hyperhydrosis; Tilt table test (N/A, 01/11/2019); Breast surgery; Cold knife conization of cervix (N/A, 11/27/2019); Removal of intrauterine device (IUD) (11/27/2019); Liposuction of neck; Augmentation of breast (06/2005); and Open reduction and internal fixation (ORIF) of injury of ankle (Right, 08/08/2023).    Family History: family history includes Breast cancer in her paternal grandmother; Drug abuse in her father; Kidney failure in her  "father; Lupus in her mother; Ulcerative colitis in her mother..     Social History:  reports that she has quit smoking. Her smoking use included cigarettes. She has never been exposed to tobacco smoke. She has never used smokeless tobacco. She reports current alcohol use. She reports that she does not use drugs.    Allergies: Reviewed    Home Medications:   Medications Ordered Prior to Encounter[1]    Physical Exam:  BP 97/65 (BP Location: Right arm, Patient Position: Sitting)   Pulse 73   Ht 5' 11" (1.803 m)   Wt 71.6 kg (157 lb 13.6 oz)   LMP  (LMP Unknown)   BMI 22.02 kg/m²   Body mass index is 22.02 kg/m².  Physical Exam  Constitutional:       General: She is not in acute distress.  HENT:      Head: Normocephalic.   Neurological:      General: No focal deficit present.      Mental Status: She is alert.   Psychiatric:         Mood and Affect: Mood normal.         Judgment: Judgment normal.         Labs: Pertinent labs reviewed.  CRC Screening:     Assessment:  1. Chronic idiopathic constipation    2. Generalized abdominal pain    3. RUQ pain    4. Gallstones        Recommendations:   - clean out with bowel prep   - then start lactulose-- may titrate based on response.   - referral to general surgery.     Chronic idiopathic constipation  -     lactulose (CHRONULAC) 20 gram/30 mL Soln; Take 30 mLs (20 g total) by mouth once daily.  Dispense: 2700 mL; Refill: 3    Generalized abdominal pain    RUQ pain  -     Ambulatory referral/consult to General Surgery; Future; Expected date: 07/30/2025    Gallstones  -     Ambulatory referral/consult to General Surgery; Future; Expected date: 07/30/2025    Other orders  -     polyethylene glycol (GOLYTELY) 236-22.74-6.74 -5.86 gram suspension; Take 4,000 mLs (4 L total) by mouth once. for 1 dose  Dispense: 4000 mL; Refill: 0    Follow up if symptoms worsen or fail to improve.    Thank you so much for allowing me to participate in the care of Arielle Dumontle " JUANA Shirley         [1]   Current Outpatient Medications on File Prior to Visit   Medication Sig Dispense Refill    aluminum chloride (DRYSOL) 20 % external solution AAA 2-3 nights and then taper to 1-2 times per week as needed. 30 mL 2    atomoxetine (STRATTERA) 80 MG capsule Take 1 capsule (80 mg total) by mouth once daily. 90 capsule 1    benzonatate (TESSALON) 200 MG capsule Take 200 mg by mouth 3 (three) times daily as needed for Cough.      buPROPion (WELLBUTRIN XL) 150 MG TB24 tablet Take 3 tablets (450 mg total) by mouth once daily. 270 tablet 1    busPIRone (BUSPAR) 15 MG tablet Take 1 tablet (15 mg total) by mouth 2 (two) times daily. (FOR ANXIETY) TAKE 2 TABLETS BY MOUTH TWICE DAILY 360 tablet 1    cetirizine (ZYRTEC) 10 MG tablet Take 10 mg by mouth daily as needed.   0    chlorzoxazone (PARAFON FORTE) 500 mg Tab Take 500 mg by mouth 2 (two) times daily as needed.      clindamycin (CLEOCIN T) 1 % Swab Apply topically 2 (two) times daily. AAA qd to bid as tolerated for acne. Decrease frequency if any dryness. 60 each 10    clonazePAM (KLONOPIN) 0.5 MG tablet Take 1 tablet daily as needed for anxiety. 30 tablet 0    EPINEPHrine (EPIPEN 2-DUANE) 0.3 mg/0.3 mL AtIn Inject 0.6 mLs (0.6 mg total) into the muscle once. for 1 dose 0.6 mL 0    estradioL (ESTRACE) 0.5 MG tablet Take 1 tablet (0.5 mg total) by mouth once daily. 30 tablet 11    fludrocortisone (FLORINEF) 0.1 mg Tab Take 2 tablets (200 mcg total) by mouth once daily. 180 tablet 3    fluticasone propionate (FLONASE) 50 mcg/actuation nasal spray 1 spray (50 mcg total) by Each Nostril route once daily. 18.2 mL 1    glycopyrronium tosylate (QBREXZA) 2.4 % Towl Use one pad for both underarms daily. Wash hands thoroughly after using. 30 each 5    levothyroxine (SYNTHROID) 25 MCG tablet Take 1 tablet (25 mcg total) by mouth before breakfast. Due for annual with PCP, labs 90 tablet 3    metFORMIN (GLUCOPHAGE) 500 MG tablet Take 1 tablet  (500 mg total) by mouth 2 (two) times daily. 180 tablet 3    midodrine (PROAMATINE) 10 MG tablet Take 1 tablet (10 mg total) by mouth 3 (three) times daily with meals. 180 tablet 5    mometasone (ELOCON) 0.1 % solution AAA of scalp once daily. 60 mL 3    montelukast (SINGULAIR) 10 mg tablet Take 1 tablet (10 mg total) by mouth every evening. 90 tablet 4    NURTEC 75 mg odt Take 75 mg by mouth once as needed for Migraine.      potassium chloride SA (K-DUR,KLOR-CON) 20 MEQ tablet Take 1 tablet (20 mEq total) by mouth 2 (two) times daily. 180 tablet 3    rizatriptan (MAXALT) 10 MG tablet Take 10 mg by mouth daily as needed.      spironolactone (ALDACTONE) 25 MG tablet Take 25 mg by mouth 2 (two) times daily.      traZODone (DESYREL) 100 MG tablet TAKE 1 TO 3 TABLETS BY MOUTH EVERY NIGHT AT BEDTIME AS NEEDED for sleep 270 tablet 1    triamcinolone acetonide 0.025% (KENALOG) 0.025 % cream Apply topically 2 (two) times daily as needed (prn rash between breasts). Mild steroid. Use sparingly for 1-2 weeks if needed then stop. 80 g 1    trifarotene (AKLIEF) 0.005 % Crea Apply 1 application  topically every evening. Decrease frequency if any irritation. 45 g 3    betaxoloL (KERLONE) 10 mg Tab Take 1 tablet (10 mg total) by mouth once daily. Start taking 2.5 mg daily 30 tablet 11    topiramate (TOPAMAX) 50 MG tablet Take 1 tablet (50 mg total) by mouth 2 (two) times daily. 180 tablet 3     Current Facility-Administered Medications on File Prior to Visit   Medication Dose Route Frequency Provider Last Rate Last Admin    levonorgestreL 20 mcg/24 hours (5 yrs) 52 mg IUD 1 Intra Uterine Device  1 each Intrauterine 1 time in Clinic/HOD Rachele Willis MD        onabotulinumtoxina injection 200 Units  200 Units Intramuscular Q90 Days    200 Units at 05/09/25 0602

## 2025-07-25 NOTE — PROGRESS NOTES
Patient ID: Arielle Verde is a 45 y.o. female.    Chief Complaint: No chief complaint on file.      HPI:  HPI    Review of Systems    Current Medications[1]    Review of patient's allergies indicates:   Allergen Reactions    Emgality [galcanezumab-gnlm] Hives and Itching       Past Medical History:   Diagnosis Date    Anxiety     Chronic headaches     SALLIE III (cervical intraepithelial neoplasia grade III) with severe dysplasia 2019    s/p LEEP    Depression     Migraine        Past Surgical History:   Procedure Laterality Date    ADENOIDECTOMY      AUGMENTATION OF BREAST  06/2005    saline    BREAST SURGERY      breast augmentatin    COLD KNIFE CONIZATION OF CERVIX N/A 11/27/2019    Procedure: CONE BIOPSY, CERVIX, USING COLD;  Surgeon: Rachele Willis MD;  Location: [facility] OR;  Service: OB/GYN;  Laterality: N/A;    Hyperhydrosis      LIPOSUCTION OF NECK      OPEN REDUCTION AND INTERNAL FIXATION (ORIF) OF INJURY OF ANKLE Right 08/08/2023    REMOVAL OF INTRAUTERINE DEVICE (IUD)  11/27/2019    Procedure: REMOVAL, INTRAUTERINE DEVICE;  Surgeon: Rachele Willis MD;  Location: [facility] OR;  Service: OB/GYN;;    TILT TABLE TEST N/A 01/11/2019    Procedure: TILT TABLE TEST;  Surgeon: Justin Freeman MD;  Location: [facility] CATH LAB;  Service: Cardiology;  Laterality: N/A;  Rodriguez pt/pt req 930 start time    TONSILLECTOMY         Social History[2]    There were no vitals filed for this visit.    Physical Exam    Abdomen Pelvis  Without Contrast  Order: [id number]  Impression    Constipation.    Tiny bilateral renal nephrolithiasis.    Multiple tiny pelvic calcifications are present most of which appear to represent vascular calcifications however a calcification in the distal ureters cannot be excluded.  Narrative    EXAM: CT ABDOMEN PELVIS WO IV CONTRAST. Automated exposure control was used for dose reduction.    INDICATION: ABDOMINAL PAIN, constipation,  lower abodminal pain    FINDINGS:  Limited exam due to  lack of intravenous contrast.    Bilateral breast implants.    A small hypodense left hepatic lesion is identified likely representing a cyst. Gallstones appear to be present in the gallbladder. Spleen is unremarkable. Tiny bilateral renal nephrolithiasis. Mild dilatation of the bilateral renal collecting systems is also present. The appendix is unremarkable. A large amount stool is present. An intrauterine device is also identified. In addition, there is an approximately 4.6 x 3.0 cm ovoid hypodense lesion in the left adnexa which may represent a left ovarian cyst (series 2 image 128).    Degenerative changes are present in the spine.  Images on Order [id number]      Assessment & Plan:  ***         [1]   Current Outpatient Medications   Medication Sig Dispense Refill    aluminum chloride (DRYSOL) 20 % external solution AAA 2-3 nights and then taper to 1-2 times per week as needed. 30 mL 2    atomoxetine (STRATTERA) 80 MG capsule Take 1 capsule (80 mg total) by mouth once daily. 90 capsule 1    benzonatate (TESSALON) 200 MG capsule Take 200 mg by mouth 3 (three) times daily as needed for Cough.      betaxoloL (KERLONE) 10 mg Tab Take 1 tablet (10 mg total) by mouth once daily. Start taking 2.5 mg daily 30 tablet 11    buPROPion (WELLBUTRIN XL) 150 MG TB24 tablet Take 3 tablets (450 mg total) by mouth once daily. 270 tablet 1    busPIRone (BUSPAR) 15 MG tablet Take 1 tablet (15 mg total) by mouth 2 (two) times daily. (FOR ANXIETY) TAKE 2 TABLETS BY MOUTH TWICE DAILY 360 tablet 1    cetirizine (ZYRTEC) 10 MG tablet Take 10 mg by mouth daily as needed.   0    chlorzoxazone (PARAFON FORTE) 500 mg Tab Take 500 mg by mouth 2 (two) times daily as needed.      clindamycin (CLEOCIN T) 1 % Swab Apply topically 2 (two) times daily. AAA qd to bid as tolerated for acne. Decrease frequency if any dryness. 60 each 10    clonazePAM (KLONOPIN) 0.5 MG tablet Take 1 tablet daily as needed for anxiety. 30 tablet 0    EPINEPHrine  (EPIPEN 2-DUANE) 0.3 mg/0.3 mL AtIn Inject 0.6 mLs (0.6 mg total) into the muscle once. for 1 dose 0.6 mL 0    estradioL (ESTRACE) 0.5 MG tablet Take 1 tablet (0.5 mg total) by mouth once daily. 30 tablet 11    fludrocortisone (FLORINEF) 0.1 mg Tab Take 2 tablets (200 mcg total) by mouth once daily. 180 tablet 3    fluticasone propionate (FLONASE) 50 mcg/actuation nasal spray 1 spray (50 mcg total) by Each Nostril route once daily. 18.2 mL 1    glycopyrronium tosylate (QBREXZA) 2.4 % Towl Use one pad for both underarms daily. Wash hands thoroughly after using. 30 each 5    lactulose (CHRONULAC) 20 gram/30 mL Soln Take 30 mLs (20 g total) by mouth once daily. 2700 mL 3    levothyroxine (SYNTHROID) 25 MCG tablet Take 1 tablet (25 mcg total) by mouth before breakfast. Due for annual with PCP, labs 90 tablet 3    metFORMIN (GLUCOPHAGE) 500 MG tablet Take 1 tablet (500 mg total) by mouth 2 (two) times daily. 180 tablet 3    midodrine (PROAMATINE) 10 MG tablet Take 1 tablet (10 mg total) by mouth 3 (three) times daily with meals. 180 tablet 5    mometasone (ELOCON) 0.1 % solution AAA of scalp once daily. 60 mL 3    montelukast (SINGULAIR) 10 mg tablet Take 1 tablet (10 mg total) by mouth every evening. 90 tablet 4    NURTEC 75 mg odt Take 75 mg by mouth once as needed for Migraine.      potassium chloride SA (K-DUR,KLOR-CON) 20 MEQ tablet Take 1 tablet (20 mEq total) by mouth 2 (two) times daily. 180 tablet 3    rizatriptan (MAXALT) 10 MG tablet Take 10 mg by mouth daily as needed.      spironolactone (ALDACTONE) 25 MG tablet Take 25 mg by mouth 2 (two) times daily.      topiramate (TOPAMAX) 50 MG tablet Take 1 tablet (50 mg total) by mouth 2 (two) times daily. 180 tablet 3    traZODone (DESYREL) 100 MG tablet TAKE 1 TO 3 TABLETS BY MOUTH EVERY NIGHT AT BEDTIME AS NEEDED for sleep 270 tablet 1    triamcinolone acetonide 0.025% (KENALOG) 0.025 % cream Apply topically 2 (two) times daily as needed (prn rash between breasts).  Mild steroid. Use sparingly for 1-2 weeks if needed then stop. 80 g 1    trifarotene (AKLIEF) 0.005 % Crea Apply 1 application  topically every evening. Decrease frequency if any irritation. 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     Comment: Mirena 3/30/20 LOT [id number] exp 4.2022     Social Drivers of Health     Financial Resource Strain: Patient Declined (3/12/2025)    Overall Financial Resource Strain (CARDIA)     Difficulty of Paying Living Expenses: Patient declined   Food Insecurity: Patient Declined (3/12/2025)    Hunger Vital Sign     Worried About Running Out of Food in the Last Year: Patient declined     Ran Out of Food in the Last Year: Patient declined   Transportation Needs: No Transportation Needs (3/12/2025)    PRAPARE - Transportation     Lack of Transportation (Medical): No     Lack of Transportation (Non-Medical): No   Physical Activity: Patient Declined (3/12/2025)    Exercise Vital Sign     Days of Exercise per Week: Patient declined     Minutes of Exercise per Session: Patient declined   Stress: Stress Concern Present (3/12/2025)    [employer] -  Occupational Stress Questionnaire     Feeling of Stress : Very much   Housing Stability: Unknown (3/12/2025)    Housing Stability Vital Sign     Unable to Pay for Housing in the Last Year: Patient declined     Number of Times Moved in the Last Year: 0     Homeless in the Last Year: No

## 2025-07-30 PROBLEM — R87.610 ASCUS OF CERVIX WITH NEGATIVE HIGH RISK HPV: Status: ACTIVE | Noted: 2025-07-30

## 2025-07-31 ENCOUNTER — OFFICE VISIT (OUTPATIENT)
Dept: SURGERY | Facility: CLINIC | Age: 45
End: 2025-07-31
Payer: COMMERCIAL

## 2025-07-31 VITALS
HEIGHT: 71 IN | DIASTOLIC BLOOD PRESSURE: 59 MMHG | HEART RATE: 76 BPM | SYSTOLIC BLOOD PRESSURE: 86 MMHG | BODY MASS INDEX: 21.64 KG/M2 | WEIGHT: 154.56 LBS | TEMPERATURE: 99 F

## 2025-07-31 DIAGNOSIS — K80.20 GALLSTONES: ICD-10-CM

## 2025-07-31 DIAGNOSIS — R10.11 RIGHT UPPER QUADRANT PAIN: Primary | ICD-10-CM

## 2025-07-31 DIAGNOSIS — R10.11 RUQ PAIN: ICD-10-CM

## 2025-07-31 PROCEDURE — 1160F RVW MEDS BY RX/DR IN RCRD: CPT | Mod: CPTII,S$GLB,, | Performed by: SURGERY

## 2025-07-31 PROCEDURE — 3074F SYST BP LT 130 MM HG: CPT | Mod: CPTII,S$GLB,, | Performed by: SURGERY

## 2025-07-31 PROCEDURE — 1159F MED LIST DOCD IN RCRD: CPT | Mod: CPTII,S$GLB,, | Performed by: SURGERY

## 2025-07-31 PROCEDURE — 3078F DIAST BP <80 MM HG: CPT | Mod: CPTII,S$GLB,, | Performed by: SURGERY

## 2025-07-31 PROCEDURE — 3008F BODY MASS INDEX DOCD: CPT | Mod: CPTII,S$GLB,, | Performed by: SURGERY

## 2025-07-31 PROCEDURE — 99203 OFFICE O/P NEW LOW 30 MIN: CPT | Mod: S$GLB,,, | Performed by: SURGERY

## 2025-07-31 PROCEDURE — 99999 PR PBB SHADOW E&M-EST. PATIENT-LVL V: CPT | Mod: PBBFAC,,, | Performed by: SURGERY

## 2025-07-31 NOTE — PROGRESS NOTES
Patient ID: Arielle Verde is a 45 y.o. female.    Chief Complaint: Consult (gallbladder)      HPI:  45-year-old female who has been having episodes of right upper quadrant pain.  The pain is sharp sometimes course of the back it can be associated with nausea but no vomiting.  The pain gets better if she extends her right flank and stretches the muscle.      She has a family history of gallstones.  She had a CAT scan done at [facility] that has suggestive of gallstones.    She presents now to discuss cholecystectomy    Review of Systems   Constitutional:  Negative for appetite change, chills, fatigue, fever and unexpected weight change.   HENT:  Negative for hearing loss and rhinorrhea.    Eyes:  Negative for visual disturbance.   Respiratory:  Negative for apnea, cough, shortness of breath and wheezing.    Cardiovascular:  Negative for chest pain and palpitations.   Gastrointestinal:  Positive for abdominal pain and nausea. Negative for abdominal distention, blood in stool, constipation, diarrhea and vomiting.   Genitourinary:  Negative for dysuria, frequency and urgency.   Musculoskeletal:  Negative for arthralgias and neck pain.   Skin:  Negative for rash.   Neurological:  Negative for seizures, weakness, numbness and headaches.   Hematological:  Negative for adenopathy. Does not bruise/bleed easily.   Psychiatric/Behavioral:  Negative for hallucinations. The patient is not nervous/anxious.      Current Medications[1]    Review of patient's allergies indicates:   Allergen Reactions    Emgality [galcanezumab-gnlm] Hives and Itching       Past Medical History:   Diagnosis Date    Anxiety     Chronic headaches     SALLIE III (cervical intraepithelial neoplasia grade III) with severe dysplasia 2019    s/p LEEP    Depression     Migraine        Past Surgical History:   Procedure Laterality Date    ADENOIDECTOMY      AUGMENTATION OF BREAST  06/2005    saline    BREAST SURGERY      breast augmentatin     COLD KNIFE CONIZATION OF CERVIX N/A 11/27/2019    Procedure: CONE BIOPSY, CERVIX, USING COLD;  Surgeon: Rachele Willis MD;  Location: [facility] OR;  Service: OB/GYN;  Laterality: N/A;    Hyperhydrosis      LIPOSUCTION OF NECK      OPEN REDUCTION AND INTERNAL FIXATION (ORIF) OF INJURY OF ANKLE Right 08/08/2023    REMOVAL OF INTRAUTERINE DEVICE (IUD)  11/27/2019    Procedure: REMOVAL, INTRAUTERINE DEVICE;  Surgeon: Rachele Willis MD;  Location: [facility] OR;  Service: OB/GYN;;    TILT TABLE TEST N/A 01/11/2019    Procedure: TILT TABLE TEST;  Surgeon: Justin Freeman MD;  Location: [facility] CATH LAB;  Service: Cardiology;  Laterality: N/A;  Rodriguez pt/pt req 930 start time    TONSILLECTOMY         Social History[2]    Vitals:    07/31/25 1551   BP: (!) 86/59   Pulse: 76   Temp: 98.6 °F (37 °C)       Physical Exam  Vitals reviewed.   Constitutional:       Appearance: She is well-developed.   HENT:      Head: Normocephalic.   Eyes:      Pupils: Pupils are equal, round, and reactive to light.   Neck:      Thyroid: No thyromegaly.      Vascular: No JVD.      Trachea: No tracheal deviation.   Cardiovascular:      Rate and Rhythm: Normal rate and regular rhythm.      Heart sounds: Normal heart sounds.   Pulmonary:      Breath sounds: Normal breath sounds. No wheezing.   Abdominal:      General: Abdomen is flat. Bowel sounds are normal. There is no distension.      Palpations: Abdomen is soft. Abdomen is not rigid. There is no mass.      Tenderness: There is no abdominal tenderness. There is no guarding or rebound.   Musculoskeletal:         General: Normal range of motion.   Lymphadenopathy:      Cervical: No cervical adenopathy.   Skin:     General: Skin is warm and dry.      Findings: No erythema or rash.   Neurological:      Mental Status: She is alert and oriented to person, place, and time.     Assessment & Plan:      Possible gallstones on CAT scan.  Clinical symptoms consistent with biliary colic.      Gallbladder  ultrasound.  If stones discuss cholecystectomy.  If no stones then CCK HIDA scan.      I did discuss the risks benefits and complications of cholecystectomy with her.  That has discussed the implication of gallbladder removal for dysfunction.      Consent would need to be obtained.      CBC CMP if surgery is needed    The risks, benefits and complications of robotic/laparoscopic cholecystectomy were discussed.  These included infection, bleeding, abscess, retained stone and bile leak.  The need for open conversion was dicussed.  The rare possibility of a common bile duct injury and the need for additional procedures and/or surgery was reviewed.  All question were answered.      I Explained to the patient with biliary dyskinesia removal of the gallbladder may result in resolution of symptoms, no change in his symptoms, or recurrence of the symptoms after several months of being symptom-free.  This is due to the fact that this is a functional disorder of the gallbladder.          [1]   Current Outpatient Medications   Medication Sig Dispense Refill    aluminum chloride (DRYSOL) 20 % external solution AAA 2-3 nights and then taper to 1-2 times per week as needed. 30 mL 2    atomoxetine (STRATTERA) 80 MG capsule Take 1 capsule (80 mg total) by mouth once daily. 90 capsule 1    benzonatate (TESSALON) 200 MG capsule Take 200 mg by mouth 3 (three) times daily as needed for Cough.      buPROPion (WELLBUTRIN XL) 150 MG TB24 tablet Take 3 tablets (450 mg total) by mouth once daily. 270 tablet 1    busPIRone (BUSPAR) 15 MG tablet Take 1 tablet (15 mg total) by mouth 2 (two) times daily. (FOR ANXIETY) TAKE 2 TABLETS BY MOUTH TWICE DAILY 360 tablet 1    cetirizine (ZYRTEC) 10 MG tablet Take 10 mg by mouth daily as needed.   0    chlorzoxazone (PARAFON FORTE) 500 mg Tab Take 500 mg by mouth 2 (two) times daily as needed.      clindamycin (CLEOCIN T) 1 % Swab Apply topically 2 (two) times daily. AAA qd to bid as tolerated for  acne. Decrease frequency if any dryness. 60 each 10    clonazePAM (KLONOPIN) 0.5 MG tablet Take 1 tablet daily as needed for anxiety. 30 tablet 0    EPINEPHrine (EPIPEN 2-DUANE) 0.3 mg/0.3 mL AtIn Inject 0.6 mLs (0.6 mg total) into the muscle once. for 1 dose 0.6 mL 0    estradioL (ESTRACE) 0.5 MG tablet Take 1 tablet (0.5 mg total) by mouth once daily. 30 tablet 11    fludrocortisone (FLORINEF) 0.1 mg Tab Take 2 tablets (200 mcg total) by mouth once daily. 180 tablet 3    fluticasone propionate (FLONASE) 50 mcg/actuation nasal spray 1 spray (50 mcg total) by Each Nostril route once daily. 18.2 mL 1    glycopyrronium tosylate (QBREXZA) 2.4 % Towl Use one pad for both underarms daily. Wash hands thoroughly after using. 30 each 5    lactulose (CHRONULAC) 20 gram/30 mL Soln Take 30 mLs (20 g total) by mouth once daily. 2700 mL 3    levothyroxine (SYNTHROID) 25 MCG tablet Take 1 tablet (25 mcg total) by mouth before breakfast. Due for annual with PCP, labs 90 tablet 3    metFORMIN (GLUCOPHAGE) 500 MG tablet Take 1 tablet (500 mg total) by mouth 2 (two) times daily. 180 tablet 3    midodrine (PROAMATINE) 10 MG tablet Take 1 tablet (10 mg total) by mouth 3 (three) times daily with meals. 180 tablet 5    mometasone (ELOCON) 0.1 % solution AAA of scalp once daily. 60 mL 3    montelukast (SINGULAIR) 10 mg tablet Take 1 tablet (10 mg total) by mouth every evening. 90 tablet 4    NURTEC 75 mg odt Take 75 mg by mouth once as needed for Migraine.      potassium chloride SA (K-DUR,KLOR-CON) 20 MEQ tablet Take 1 tablet (20 mEq total) by mouth 2 (two) times daily. 180 tablet 3    rizatriptan (MAXALT) 10 MG tablet Take 10 mg by mouth daily as needed.      spironolactone (ALDACTONE) 25 MG tablet Take 25 mg by mouth 2 (two) times daily.      traZODone (DESYREL) 100 MG tablet TAKE 1 TO 3 TABLETS BY MOUTH EVERY NIGHT AT BEDTIME AS NEEDED for sleep 270 tablet 1    triamcinolone acetonide 0.025% (KENALOG) 0.025 % cream Apply topically 2  (two) times daily as needed (prn rash between breasts). Mild steroid. Use sparingly for 1-2 weeks if needed then stop. 80 g 1    trifarotene (AKLIEF) 0.005 % Crea Apply 1 application  topically every evening. Decrease frequency if any irritation. 45 g 3    betaxoloL (KERLONE) 10 mg Tab Take 1 tablet (10 mg total) by mouth once daily. Start taking 2.5 mg daily 30 tablet 11    topiramate (TOPAMAX) 50 MG tablet Take 1 tablet (50 mg total) by mouth 2 (two) times daily. 180 tablet 3     Current Facility-Administered Medications   Medication Dose Route Frequency Provider Last Rate Last Admin    levonorgestreL 20 mcg/24 hours (5 yrs) 52 mg IUD 1 Intra Uterine Device  1 each Intrauterine 1 time in Clinic/HOD Rachele Willis MD        onabotulinumtoxina injection 200 Units  200 Units Intramuscular Q90 Days    200 Units at 05/09/25 1112   [2]   Social History  Socioeconomic History    Marital status: Single   Occupational History    Occupation: Podiatry nurse    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   Tobacco Use    Smoking status: Former     Types: Cigarettes     Passive exposure: Never    Smokeless tobacco: Never    Tobacco comments:     no smoking after m.n prior to sx   Substance and Sexual Activity    Alcohol use: Yes     Comment: social     Drug use: No    Sexual activity: Not Currently     Partners: Male     Birth control/protection: I.U.D.     Comment: Mirena 3/30/20 LOT [id number] exp 4.2022     Social Drivers of Health     Financial Resource Strain: Patient Declined (3/12/2025)    Overall Financial Resource Strain (CARDIA)     Difficulty of Paying Living Expenses: Patient declined   Food Insecurity: Patient Declined (3/12/2025)    Hunger Vital Sign     Worried About Running Out of Food in the Last Year: Patient declined     Ran Out of Food in the Last Year: Patient declined   Transportation Needs: No Transportation Needs (3/12/2025)    PRAPARE - Transportation     Lack of Transportation (Medical): No     Lack of Transportation  (Non-Medical): No   Physical Activity: Patient Declined (3/12/2025)    Exercise Vital Sign     Days of Exercise per Week: Patient declined     Minutes of Exercise per Session: Patient declined   Stress: Stress Concern Present (3/12/2025)    [employer] - Occupational Stress Questionnaire     Feeling of Stress : Very much   Housing Stability: Unknown (3/12/2025)    Housing Stability Vital Sign     Unable to Pay for Housing in the Last Year: Patient declined     Number of Times Moved in the Last Year: 0     Homeless in the Last Year: No

## 2025-07-31 NOTE — PATIENT INSTRUCTIONS
Gallbladder.  If stones are present then the fact that you have stones in your symptoms mean that you need your gallbladder removed that has you were having biliary colic or abdominal pain related to gallstones.      If there are no stones then we will get a test called a cholecystokinin HIDA scan.  With this test we are looking to see how well your gallbladder works in if it reproduces her symptoms.  If you have a low gallbladder ejection fraction and you have episodes of pain similar to the attacks that you were having then it is an indication to take out your gallbladder.      When the gallbladder does not work right removing the gallbladder usually gets rid of the pain as long as the test reproduced your symptoms.  There are occasions where the pain goes away for awhile and then returned our that has no change in the pain.      If you do not have gallstones in your gallbladder functions normally in the HIDA scan that has not reproduce his symptoms then this could be musculoskeletal in nature.      I will contact you by phone or messaging with the results of the ultrasound.  If there were no gallstones I will order the HIDA scan.  If we have to get a CCK HIDA scan I will call you after both tests are done      Our office phone numbers are  [phone] and

## 2025-08-01 ENCOUNTER — PATIENT MESSAGE (OUTPATIENT)
Dept: SURGERY | Facility: HOSPITAL | Age: 45
End: 2025-08-01
Payer: COMMERCIAL

## 2025-08-01 ENCOUNTER — HOSPITAL ENCOUNTER (OUTPATIENT)
Dept: RADIOLOGY | Facility: HOSPITAL | Age: 45
Discharge: HOME | End: 2025-08-01
Attending: SURGERY
Payer: COMMERCIAL

## 2025-08-01 DIAGNOSIS — R55 VASODEPRESSOR SYNCOPE: ICD-10-CM

## 2025-08-01 DIAGNOSIS — R10.11 RUQ PAIN: ICD-10-CM

## 2025-08-01 DIAGNOSIS — R00.2 HEART PALPITATIONS: ICD-10-CM

## 2025-08-01 DIAGNOSIS — K80.10 CALCULUS OF GALLBLADDER WITH CHRONIC CHOLECYSTITIS WITHOUT OBSTRUCTION: Primary | ICD-10-CM

## 2025-08-01 DIAGNOSIS — K80.20 SYMPTOMATIC CHOLELITHIASIS: ICD-10-CM

## 2025-08-01 PROBLEM — N83.299 COMPLEX OVARIAN CYST: Status: ACTIVE | Noted: 2025-08-01

## 2025-08-01 PROCEDURE — 76705 ECHO EXAM OF ABDOMEN: CPT | Mod: TC

## 2025-08-01 PROCEDURE — 76705 ECHO EXAM OF ABDOMEN: CPT | Mod: 26,,, | Performed by: RADIOLOGY

## 2025-08-01 RX ORDER — INDOCYANINE GREEN AND WATER 25 MG
2.5 KIT INJECTION
OUTPATIENT
Start: 2025-08-01

## 2025-08-01 RX ORDER — BETAXOLOL 10 MG/1
10 TABLET, FILM COATED ORAL
Qty: 330 TABLET | Refills: 0 | Status: SHIPPED | OUTPATIENT
Start: 2025-08-01

## 2025-08-01 RX ORDER — ONDANSETRON 4 MG/1
8 TABLET, ORALLY DISINTEGRATING ORAL EVERY 8 HOURS PRN
OUTPATIENT
Start: 2025-08-01

## 2025-08-01 RX ORDER — LIDOCAINE HYDROCHLORIDE 10 MG/ML
1 INJECTION, SOLUTION EPIDURAL; INFILTRATION; INTRACAUDAL; PERINEURAL ONCE
Status: DISCONTINUED | OUTPATIENT
Start: 2025-08-01 | End: 2025-08-01 | Stop reason: HOSPADM

## 2025-08-01 NOTE — TELEPHONE ENCOUNTER
Ultrasound was consistent with gallstones    Reading Physician Reading Date Result Priority   Emerson Johnston MD  [phone]  8/1/2025 Routine     Narrative & Impression  EXAMINATION:  US ABDOMEN LIMITED     CLINICAL HISTORY:  Right upper quadrant pain     TECHNIQUE:  Limited ultrasound of the right upper quadrant of the abdomen  was performed.     COMPARISON:  None available.     FINDINGS:  Pancreas: Visualized portions are unremarkable.     IVC: Visualized portions are unremarkable.     Gallbladder: Multiple gallstones within the otherwise unremarkable gallbladder.     Biliary system: The common duct is dilated, measuring 8 mm.  No intrahepatic ductal dilatation.     Liver: Normal in size, measuring 14.3 cm. Homogeneous echotexture. No focal hepatic lesions. MPV flows toward the liver.     Right kidney: Visualized portions are unremarkable.     Impression:     1. Cholelithiasis.  No other finding to suggest acute cholecystitis.  2. Common bile duct is dilated, 8 mm.  Correlation with total bilirubin is recommended to assess for potential biliary obstruction.        Electronically signed by:Emerson Johnston  Date:                                            08/01/2025  Time:                                           10:49

## 2025-08-05 ENCOUNTER — TELEPHONE (OUTPATIENT)
Dept: PREADMISSION TESTING | Facility: HOSPITAL | Age: 45
End: 2025-08-05
Payer: COMMERCIAL

## 2025-08-05 NOTE — TELEPHONE ENCOUNTER
Called and spoke with PATIENT about the following:     Please arrive to [facility] ([facility]) at 8/06/25 on 11:30AM for your scheduled procedure.  Address: [address] [place] [address] [place] [address] ([address] Building on left, 1st Floor Lobby)    NO FOOD, DRINK OR TOBACCO PRODUCTS AFTER MIDNIGHT THE NIGHT BEFORE SURGERY.     Thank you,  -[facility] Pre Admit Dept.  Mon-Fri 7:30 am - 4 pm [phone]

## 2025-08-06 ENCOUNTER — ANESTHESIA EVENT (OUTPATIENT)
Dept: SURGERY | Facility: HOSPITAL | Age: 45
End: 2025-08-06
Payer: COMMERCIAL

## 2025-08-06 ENCOUNTER — HOSPITAL ENCOUNTER (OUTPATIENT)
Facility: HOSPITAL | Age: 45
Discharge: HOME | End: 2025-08-06
Attending: SURGERY | Admitting: SURGERY
Payer: COMMERCIAL

## 2025-08-06 ENCOUNTER — ANESTHESIA (OUTPATIENT)
Dept: SURGERY | Facility: HOSPITAL | Age: 45
End: 2025-08-06
Payer: COMMERCIAL

## 2025-08-06 VITALS
DIASTOLIC BLOOD PRESSURE: 65 MMHG | HEIGHT: 71 IN | WEIGHT: 153 LBS | RESPIRATION RATE: 12 BRPM | TEMPERATURE: 99 F | BODY MASS INDEX: 21.42 KG/M2 | SYSTOLIC BLOOD PRESSURE: 106 MMHG | OXYGEN SATURATION: 99 % | HEART RATE: 73 BPM

## 2025-08-06 DIAGNOSIS — K80.20 SYMPTOMATIC CHOLELITHIASIS: ICD-10-CM

## 2025-08-06 DIAGNOSIS — K80.10 CALCULUS OF GALLBLADDER WITH CHRONIC CHOLECYSTITIS WITHOUT OBSTRUCTION: ICD-10-CM

## 2025-08-06 LAB
ALBUMIN SERPL BCP-MCNC: 4.8 G/DL (ref 3.5–5.2)
ALP SERPL-CCNC: 39 UNIT/L (ref 40–150)
ALT SERPL W/O P-5'-P-CCNC: 17 UNIT/L (ref 10–44)
ANION GAP (OHS): 8 MMOL/L (ref 8–16)
AST SERPL-CCNC: 23 UNIT/L (ref 11–45)
BILIRUB SERPL-MCNC: 0.2 MG/DL (ref 0.1–1)
BUN SERPL-MCNC: 14 MG/DL (ref 6–20)
CALCIUM SERPL-MCNC: 9.4 MG/DL (ref 8.7–10.5)
CHLORIDE SERPL-SCNC: 107 MMOL/L (ref 95–110)
CO2 SERPL-SCNC: 24 MMOL/L (ref 23–29)
CREAT SERPL-MCNC: 0.8 MG/DL (ref 0.5–1.4)
ERYTHROCYTE [DISTWIDTH] IN BLOOD BY AUTOMATED COUNT: 13 % (ref 11.5–14.5)
GFR SERPLBLD CREATININE-BSD FMLA CKD-EPI: >60 ML/MIN/1.73/M2
GLUCOSE SERPL-MCNC: 85 MG/DL (ref 70–110)
HCG INTACT+B SERPL-ACNC: <2.42 MIU/ML
HCT VFR BLD AUTO: 36.9 % (ref 37–48.5)
HGB BLD-MCNC: 11.9 GM/DL (ref 12–16)
MCH RBC QN AUTO: 29.3 PG (ref 27–31)
MCHC RBC AUTO-ENTMCNC: 32.2 G/DL (ref 32–36)
MCV RBC AUTO: 91 FL (ref 82–98)
PLATELET # BLD AUTO: 350 K/UL (ref 150–450)
PMV BLD AUTO: 9.3 FL (ref 9.2–12.9)
POTASSIUM SERPL-SCNC: 4 MMOL/L (ref 3.5–5.1)
PROT SERPL-MCNC: 7.8 GM/DL (ref 6–8.4)
RBC # BLD AUTO: 4.06 M/UL (ref 4–5.4)
SODIUM SERPL-SCNC: 139 MMOL/L (ref 136–145)
WBC # BLD AUTO: 7.51 K/UL (ref 3.9–12.7)

## 2025-08-06 PROCEDURE — 71000015 HC POSTOP RECOV 1ST HR: Performed by: SURGERY

## 2025-08-06 PROCEDURE — 84702 CHORIONIC GONADOTROPIN TEST: CPT | Performed by: SURGERY

## 2025-08-06 PROCEDURE — 63600175 PHARM REV CODE 636 W HCPCS: Performed by: STUDENT IN AN ORGANIZED HEALTH CARE EDUCATION/TRAINING PROGRAM

## 2025-08-06 PROCEDURE — 37000009 HC ANESTHESIA EA ADD 15 MINS: Performed by: SURGERY

## 2025-08-06 PROCEDURE — 25000003 PHARM REV CODE 250: Performed by: ANESTHESIOLOGY

## 2025-08-06 PROCEDURE — 63600175 PHARM REV CODE 636 W HCPCS: Performed by: SURGERY

## 2025-08-06 PROCEDURE — 47562 LAPAROSCOPIC CHOLECYSTECTOMY: CPT | Mod: ,,, | Performed by: SURGERY

## 2025-08-06 PROCEDURE — 25000003 PHARM REV CODE 250: Performed by: STUDENT IN AN ORGANIZED HEALTH CARE EDUCATION/TRAINING PROGRAM

## 2025-08-06 PROCEDURE — 36000710: Performed by: SURGERY

## 2025-08-06 PROCEDURE — 84155 ASSAY OF PROTEIN SERUM: CPT | Performed by: SURGERY

## 2025-08-06 PROCEDURE — 25000003 PHARM REV CODE 250: Performed by: SURGERY

## 2025-08-06 PROCEDURE — 27201423 OPTIME MED/SURG SUP & DEVICES STERILE SUPPLY: Performed by: SURGERY

## 2025-08-06 PROCEDURE — 37000008 HC ANESTHESIA 1ST 15 MINUTES: Performed by: SURGERY

## 2025-08-06 PROCEDURE — 71000033 HC RECOVERY, INTIAL HOUR: Performed by: SURGERY

## 2025-08-06 PROCEDURE — 36000711: Performed by: SURGERY

## 2025-08-06 PROCEDURE — 85027 COMPLETE CBC AUTOMATED: CPT | Performed by: SURGERY

## 2025-08-06 PROCEDURE — 88304 TISSUE EXAM BY PATHOLOGIST: CPT | Mod: TC | Performed by: SURGERY

## 2025-08-06 RX ORDER — MIDAZOLAM HYDROCHLORIDE 1 MG/ML
INJECTION INTRAMUSCULAR; INTRAVENOUS
Status: DISCONTINUED | OUTPATIENT
Start: 2025-08-06 | End: 2025-08-06

## 2025-08-06 RX ORDER — HYDROCODONE BITARTRATE AND ACETAMINOPHEN 10; 325 MG/1; MG/1
1 TABLET ORAL EVERY 6 HOURS PRN
Refills: 0 | Status: DISCONTINUED | OUTPATIENT
Start: 2025-08-06 | End: 2025-08-06 | Stop reason: HOSPADM

## 2025-08-06 RX ORDER — ONDANSETRON HYDROCHLORIDE 2 MG/ML
4 INJECTION, SOLUTION INTRAVENOUS DAILY PRN
Status: DISCONTINUED | OUTPATIENT
Start: 2025-08-06 | End: 2025-08-06 | Stop reason: HOSPADM

## 2025-08-06 RX ORDER — INDOCYANINE GREEN AND WATER 25 MG
2.5 KIT INJECTION
Status: COMPLETED | OUTPATIENT
Start: 2025-08-06 | End: 2025-08-06

## 2025-08-06 RX ORDER — LIDOCAINE HYDROCHLORIDE 20 MG/ML
INJECTION INTRAVENOUS
Status: DISCONTINUED | OUTPATIENT
Start: 2025-08-06 | End: 2025-08-06

## 2025-08-06 RX ORDER — FENTANYL CITRATE 50 UG/ML
25 INJECTION, SOLUTION INTRAMUSCULAR; INTRAVENOUS EVERY 5 MIN PRN
Status: DISCONTINUED | OUTPATIENT
Start: 2025-08-06 | End: 2025-08-06 | Stop reason: HOSPADM

## 2025-08-06 RX ORDER — BUPIVACAINE HYDROCHLORIDE 2.5 MG/ML
INJECTION, SOLUTION EPIDURAL; INFILTRATION; INTRACAUDAL; PERINEURAL
Status: DISCONTINUED | OUTPATIENT
Start: 2025-08-06 | End: 2025-08-06 | Stop reason: HOSPADM

## 2025-08-06 RX ORDER — HYDROCODONE BITARTRATE AND ACETAMINOPHEN 7.5; 325 MG/1; MG/1
1 TABLET ORAL EVERY 6 HOURS PRN
Qty: 15 TABLET | Refills: 0 | Status: SHIPPED | OUTPATIENT
Start: 2025-08-06 | End: 2025-08-16

## 2025-08-06 RX ORDER — ONDANSETRON HYDROCHLORIDE 2 MG/ML
4 INJECTION, SOLUTION INTRAVENOUS ONCE AS NEEDED
Status: DISCONTINUED | OUTPATIENT
Start: 2025-08-06 | End: 2025-08-06 | Stop reason: HOSPADM

## 2025-08-06 RX ORDER — DEXAMETHASONE SODIUM PHOSPHATE 4 MG/ML
INJECTION, SOLUTION INTRA-ARTICULAR; INTRALESIONAL; INTRAMUSCULAR; INTRAVENOUS; SOFT TISSUE
Status: DISCONTINUED | OUTPATIENT
Start: 2025-08-06 | End: 2025-08-06

## 2025-08-06 RX ORDER — ONDANSETRON HYDROCHLORIDE 2 MG/ML
INJECTION, SOLUTION INTRAVENOUS
Status: DISCONTINUED | OUTPATIENT
Start: 2025-08-06 | End: 2025-08-06

## 2025-08-06 RX ORDER — HYDROMORPHONE HYDROCHLORIDE 2 MG/ML
0.2 INJECTION, SOLUTION INTRAMUSCULAR; INTRAVENOUS; SUBCUTANEOUS EVERY 5 MIN PRN
Status: DISCONTINUED | OUTPATIENT
Start: 2025-08-06 | End: 2025-08-06 | Stop reason: HOSPADM

## 2025-08-06 RX ORDER — PROPOFOL 10 MG/ML
VIAL (ML) INTRAVENOUS
Status: DISCONTINUED | OUTPATIENT
Start: 2025-08-06 | End: 2025-08-06

## 2025-08-06 RX ORDER — ONDANSETRON 8 MG/1
8 TABLET, FILM COATED ORAL EVERY 8 HOURS PRN
Qty: 20 TABLET | Refills: 0 | Status: SHIPPED | OUTPATIENT
Start: 2025-08-06

## 2025-08-06 RX ORDER — CEFAZOLIN 2 G/1
2 INJECTION, POWDER, FOR SOLUTION INTRAMUSCULAR; INTRAVENOUS
Status: COMPLETED | OUTPATIENT
Start: 2025-08-06 | End: 2025-08-06

## 2025-08-06 RX ORDER — ACETAMINOPHEN 10 MG/ML
1000 INJECTION, SOLUTION INTRAVENOUS ONCE
Status: COMPLETED | OUTPATIENT
Start: 2025-08-06 | End: 2025-08-06

## 2025-08-06 RX ORDER — FENTANYL CITRATE 50 UG/ML
INJECTION, SOLUTION INTRAMUSCULAR; INTRAVENOUS
Status: DISCONTINUED | OUTPATIENT
Start: 2025-08-06 | End: 2025-08-06

## 2025-08-06 RX ORDER — HYDROMORPHONE HYDROCHLORIDE 2 MG/ML
1 INJECTION, SOLUTION INTRAMUSCULAR; INTRAVENOUS; SUBCUTANEOUS EVERY 4 HOURS PRN
Refills: 0 | Status: CANCELLED | OUTPATIENT
Start: 2025-08-06

## 2025-08-06 RX ORDER — OXYCODONE AND ACETAMINOPHEN 5; 325 MG/1; MG/1
1 TABLET ORAL
Status: DISCONTINUED | OUTPATIENT
Start: 2025-08-06 | End: 2025-08-06 | Stop reason: HOSPADM

## 2025-08-06 RX ORDER — ONDANSETRON 8 MG/1
8 TABLET, ORALLY DISINTEGRATING ORAL EVERY 8 HOURS PRN
Status: DISCONTINUED | OUTPATIENT
Start: 2025-08-06 | End: 2025-08-06 | Stop reason: HOSPADM

## 2025-08-06 RX ORDER — ROCURONIUM BROMIDE 10 MG/ML
INJECTION, SOLUTION INTRAVENOUS
Status: DISCONTINUED | OUTPATIENT
Start: 2025-08-06 | End: 2025-08-06

## 2025-08-06 RX ORDER — ONDANSETRON 8 MG/1
8 TABLET, ORALLY DISINTEGRATING ORAL EVERY 8 HOURS PRN
Status: CANCELLED | OUTPATIENT
Start: 2025-08-06

## 2025-08-06 RX ORDER — HYDROCODONE BITARTRATE AND ACETAMINOPHEN 7.5; 325 MG/1; MG/1
1 TABLET ORAL EVERY 6 HOURS PRN
Refills: 0 | Status: DISCONTINUED | OUTPATIENT
Start: 2025-08-06 | End: 2025-08-06 | Stop reason: HOSPADM

## 2025-08-06 RX ORDER — KETOROLAC TROMETHAMINE 30 MG/ML
INJECTION, SOLUTION INTRAMUSCULAR; INTRAVENOUS
Status: DISCONTINUED | OUTPATIENT
Start: 2025-08-06 | End: 2025-08-06

## 2025-08-06 RX ADMIN — KETOROLAC TROMETHAMINE 30 MG: 30 INJECTION, SOLUTION INTRAMUSCULAR; INTRAVENOUS at 03:08

## 2025-08-06 RX ADMIN — ROCURONIUM BROMIDE 50 MG: 10 SOLUTION INTRAVENOUS at 03:08

## 2025-08-06 RX ADMIN — FENTANYL CITRATE 50 MCG: 50 INJECTION, SOLUTION INTRAMUSCULAR; INTRAVENOUS at 03:08

## 2025-08-06 RX ADMIN — DEXAMETHASONE SODIUM PHOSPHATE 8 MG: 4 INJECTION, SOLUTION INTRA-ARTICULAR; INTRALESIONAL; INTRAMUSCULAR; INTRAVENOUS; SOFT TISSUE at 03:08

## 2025-08-06 RX ADMIN — FENTANYL CITRATE 25 MCG: 50 INJECTION, SOLUTION INTRAMUSCULAR; INTRAVENOUS at 03:08

## 2025-08-06 RX ADMIN — MIDAZOLAM HYDROCHLORIDE 2 MG: 1 INJECTION, SOLUTION INTRAMUSCULAR; INTRAVENOUS at 03:08

## 2025-08-06 RX ADMIN — INDOCYANINE GREEN AND WATER 2.5 MG: KIT at 03:08

## 2025-08-06 RX ADMIN — CEFAZOLIN 2 G: 2 INJECTION, POWDER, FOR SOLUTION INTRAMUSCULAR; INTRAVENOUS at 03:08

## 2025-08-06 RX ADMIN — ONDANSETRON 4 MG: 2 INJECTION INTRAMUSCULAR; INTRAVENOUS at 03:08

## 2025-08-06 RX ADMIN — OXYCODONE HYDROCHLORIDE AND ACETAMINOPHEN 1 TABLET: 5; 325 TABLET ORAL at 04:08

## 2025-08-06 RX ADMIN — SODIUM CHLORIDE, SODIUM LACTATE, POTASSIUM CHLORIDE, AND CALCIUM CHLORIDE: .6; .31; .03; .02 INJECTION, SOLUTION INTRAVENOUS at 03:08

## 2025-08-06 RX ADMIN — SUGAMMADEX 200 MG: 100 INJECTION, SOLUTION INTRAVENOUS at 04:08

## 2025-08-06 RX ADMIN — LIDOCAINE HYDROCHLORIDE 100 MG: 20 INJECTION INTRAVENOUS at 03:08

## 2025-08-06 RX ADMIN — ACETAMINOPHEN 1000 MG: 10 INJECTION, SOLUTION INTRAVENOUS at 01:08

## 2025-08-06 RX ADMIN — PROPOFOL 150 MG: 10 INJECTION, EMULSION INTRAVENOUS at 03:08

## 2025-08-09 ENCOUNTER — PATIENT MESSAGE (OUTPATIENT)
Dept: SURGERY | Facility: CLINIC | Age: 45
End: 2025-08-09
Payer: COMMERCIAL

## 2025-08-11 LAB
ESTROGEN SERPL-MCNC: NORMAL PG/ML
INSULIN SERPL-ACNC: NORMAL U[IU]/ML
LAB AP CLINICAL INFORMATION: NORMAL
LAB AP GROSS DESCRIPTION: NORMAL
LAB AP PERFORMING LOCATION(S): NORMAL
LAB AP REPORT FOOTNOTES: NORMAL

## 2025-08-15 ENCOUNTER — PROCEDURE VISIT (OUTPATIENT)
Dept: NEUROLOGY | Facility: CLINIC | Age: 45
End: 2025-08-15
Payer: COMMERCIAL

## 2025-08-15 VITALS
HEART RATE: 70 BPM | BODY MASS INDEX: 21.24 KG/M2 | WEIGHT: 151.69 LBS | HEIGHT: 71 IN | SYSTOLIC BLOOD PRESSURE: 93 MMHG | DIASTOLIC BLOOD PRESSURE: 65 MMHG | RESPIRATION RATE: 18 BRPM

## 2025-08-15 DIAGNOSIS — G43.019 COMMON MIGRAINE WITH INTRACTABLE MIGRAINE: Primary | ICD-10-CM

## 2025-08-25 ENCOUNTER — PATIENT MESSAGE (OUTPATIENT)
Dept: SURGERY | Facility: CLINIC | Age: 45
End: 2025-08-25
Payer: COMMERCIAL

## 2025-08-25 DIAGNOSIS — F41.9 ANXIETY: ICD-10-CM

## 2025-08-25 RX ORDER — CLONAZEPAM 0.5 MG/1
TABLET ORAL
Qty: 30 TABLET | Refills: 1 | Status: SHIPPED | OUTPATIENT
Start: 2025-08-25

## 2025-09-05 RX ORDER — POTASSIUM CHLORIDE 20 MEQ/1
20 TABLET, EXTENDED RELEASE ORAL 2 TIMES DAILY
Qty: 180 TABLET | Refills: 3 | Status: CANCELLED | OUTPATIENT
Start: 2025-09-05

## (undated) DEVICE — SEAL CANN UNIVERSAL 5-12MM

## (undated) DEVICE — SOL STRL WATER INJ 1000ML BG

## (undated) DEVICE — SEE MEDLINE ITEM 157181

## (undated) DEVICE — SUT 0 27IN CHROMIC GUT CT-1

## (undated) DEVICE — ELECTRODE REM PLYHSV RETURN 9

## (undated) DEVICE — SEE MEDLINE ITEM 157027

## (undated) DEVICE — NDL ECLIPSE SAF REG 25GX1.5IN

## (undated) DEVICE — OBTURATOR BLADELESS 8MM XI CLR

## (undated) DEVICE — ADHESIVE DERMABOND ADVANCED

## (undated) DEVICE — GLOVE SIGNATURE ESSNTL LTX 8

## (undated) DEVICE — PACK DRAPE PERI/GYN TIBURON

## (undated) DEVICE — BAG TISSUE RETRIEVAL 5MM

## (undated) DEVICE — DRAPE THREE-QTR REINF 53X77IN

## (undated) DEVICE — DRAPE COLUMN DAVINCI XI

## (undated) DEVICE — SEE MEDLINE ITEM 152739

## (undated) DEVICE — KIT ANTIFOG W/SPONG & FLUID

## (undated) DEVICE — COVER OVERHEAD SURG LT BLUE

## (undated) DEVICE — SEE MEDLINE ITEM 152622

## (undated) DEVICE — SET PNEUMOCLEAR HEAT HUM SE HF

## (undated) DEVICE — COVER TIP CURVED SCISSORS XI

## (undated) DEVICE — CLIP HEMO-LOK ML

## (undated) DEVICE — APPLICATOR CHLORAPREP ORN 26ML

## (undated) DEVICE — HEADREST ROUND DISP FOAM 9IN

## (undated) DEVICE — PACK BASIC SETUP SC BR

## (undated) DEVICE — DRAPE LAPSCP CHOLE 122X102X78

## (undated) DEVICE — SOL NS 1000CC

## (undated) DEVICE — MANIFOLD 4 PORT

## (undated) DEVICE — CONTAINER SPECIMEN STRL 4OZ

## (undated) DEVICE — GOWN POLY REINF BRTH SLV XL

## (undated) DEVICE — DRAPE ARM DAVINCI XI

## (undated) DEVICE — SYR 3CC LUER LOC

## (undated) DEVICE — COVER LIGHT HANDLE 80/CA

## (undated) DEVICE — SET TUBE DAVINCI 5 HI FLOW INS

## (undated) DEVICE — SEE L#152161

## (undated) DEVICE — GLOVE SURGICAL LATEX SZ 7

## (undated) DEVICE — NDL PNEUMO INSUFFLATI 120MM

## (undated) DEVICE — SOL NORMAL USPCA 0.9%

## (undated) DEVICE — SUT MONOCRYL 4.0 PS2 CP496G

## (undated) DEVICE — CATH 16FR URETHRL RED RUB

## (undated) DEVICE — BLADE SURG CARBON STEEL SZ11

## (undated) DEVICE — SYR 10CC LUER LOCK